# Patient Record
Sex: FEMALE | Race: ASIAN | NOT HISPANIC OR LATINO | Employment: UNEMPLOYED | URBAN - METROPOLITAN AREA
[De-identification: names, ages, dates, MRNs, and addresses within clinical notes are randomized per-mention and may not be internally consistent; named-entity substitution may affect disease eponyms.]

---

## 2017-02-21 ENCOUNTER — COMMUNICATION - HEALTHEAST (OUTPATIENT)
Dept: ADMINISTRATIVE | Facility: HOSPITAL | Age: 52
End: 2017-02-21

## 2017-03-17 ENCOUNTER — COMMUNICATION - HEALTHEAST (OUTPATIENT)
Dept: ADMINISTRATIVE | Facility: HOSPITAL | Age: 52
End: 2017-03-17

## 2021-05-30 ENCOUNTER — RECORDS - HEALTHEAST (OUTPATIENT)
Dept: ADMINISTRATIVE | Facility: CLINIC | Age: 56
End: 2021-05-30

## 2021-05-31 ENCOUNTER — RECORDS - HEALTHEAST (OUTPATIENT)
Dept: ADMINISTRATIVE | Facility: CLINIC | Age: 56
End: 2021-05-31

## 2021-06-01 ENCOUNTER — RECORDS - HEALTHEAST (OUTPATIENT)
Dept: ADMINISTRATIVE | Facility: CLINIC | Age: 56
End: 2021-06-01

## 2021-06-02 ENCOUNTER — RECORDS - HEALTHEAST (OUTPATIENT)
Dept: ADMINISTRATIVE | Facility: CLINIC | Age: 56
End: 2021-06-02

## 2021-06-03 ENCOUNTER — RECORDS - HEALTHEAST (OUTPATIENT)
Dept: ADMINISTRATIVE | Facility: CLINIC | Age: 56
End: 2021-06-03

## 2022-03-18 ENCOUNTER — HOSPITAL ENCOUNTER (EMERGENCY)
Facility: CLINIC | Age: 57
Discharge: ANOTHER HOSPITAL | End: 2022-03-19
Attending: EMERGENCY MEDICINE | Admitting: EMERGENCY MEDICINE
Payer: MEDICAID

## 2022-03-18 ENCOUNTER — APPOINTMENT (OUTPATIENT)
Dept: MRI IMAGING | Facility: CLINIC | Age: 57
End: 2022-03-18
Attending: EMERGENCY MEDICINE
Payer: MEDICAID

## 2022-03-18 DIAGNOSIS — Z85.3 PERSONAL HISTORY OF MALIGNANT NEOPLASM OF BREAST: ICD-10-CM

## 2022-03-18 DIAGNOSIS — G82.20 PARALYSIS OF BOTH LOWER LIMBS (H): ICD-10-CM

## 2022-03-18 DIAGNOSIS — C79.49 METASTASIS TO SPINAL CORD (H): ICD-10-CM

## 2022-03-18 DIAGNOSIS — C79.51 METASTATIC CANCER TO SPINE (H): ICD-10-CM

## 2022-03-18 LAB
ANION GAP SERPL CALCULATED.3IONS-SCNC: 10 MMOL/L (ref 5–18)
BUN SERPL-MCNC: 12 MG/DL (ref 8–22)
C REACTIVE PROTEIN LHE: <0.1 MG/DL (ref 0–0.8)
CALCIUM SERPL-MCNC: 9.5 MG/DL (ref 8.5–10.5)
CHLORIDE BLD-SCNC: 106 MMOL/L (ref 98–107)
CO2 SERPL-SCNC: 23 MMOL/L (ref 22–31)
CREAT SERPL-MCNC: 0.63 MG/DL (ref 0.6–1.1)
ERYTHROCYTE [DISTWIDTH] IN BLOOD BY AUTOMATED COUNT: 13.8 % (ref 10–15)
ERYTHROCYTE [SEDIMENTATION RATE] IN BLOOD BY WESTERGREN METHOD: 12 MM/HR (ref 0–20)
GFR SERPL CREATININE-BSD FRML MDRD: >90 ML/MIN/1.73M2
GLUCOSE BLD-MCNC: 98 MG/DL (ref 70–125)
HCT VFR BLD AUTO: 40.5 % (ref 35–47)
HGB BLD-MCNC: 13.3 G/DL (ref 11.7–15.7)
MCH RBC QN AUTO: 29.4 PG (ref 26.5–33)
MCHC RBC AUTO-ENTMCNC: 32.8 G/DL (ref 31.5–36.5)
MCV RBC AUTO: 90 FL (ref 78–100)
PLATELET # BLD AUTO: 300 10E3/UL (ref 150–450)
POTASSIUM BLD-SCNC: 4.2 MMOL/L (ref 3.5–5)
RBC # BLD AUTO: 4.52 10E6/UL (ref 3.8–5.2)
SODIUM SERPL-SCNC: 139 MMOL/L (ref 136–145)
WBC # BLD AUTO: 5.9 10E3/UL (ref 4–11)

## 2022-03-18 PROCEDURE — 82607 VITAMIN B-12: CPT | Performed by: EMERGENCY MEDICINE

## 2022-03-18 PROCEDURE — 36415 COLL VENOUS BLD VENIPUNCTURE: CPT | Performed by: EMERGENCY MEDICINE

## 2022-03-18 PROCEDURE — 85652 RBC SED RATE AUTOMATED: CPT | Performed by: EMERGENCY MEDICINE

## 2022-03-18 PROCEDURE — 72157 MRI CHEST SPINE W/O & W/DYE: CPT

## 2022-03-18 PROCEDURE — 82306 VITAMIN D 25 HYDROXY: CPT | Performed by: EMERGENCY MEDICINE

## 2022-03-18 PROCEDURE — 72158 MRI LUMBAR SPINE W/O & W/DYE: CPT

## 2022-03-18 PROCEDURE — 86140 C-REACTIVE PROTEIN: CPT | Performed by: EMERGENCY MEDICINE

## 2022-03-18 PROCEDURE — 85014 HEMATOCRIT: CPT | Performed by: EMERGENCY MEDICINE

## 2022-03-18 PROCEDURE — 99285 EMERGENCY DEPT VISIT HI MDM: CPT | Mod: 25

## 2022-03-18 PROCEDURE — C9803 HOPD COVID-19 SPEC COLLECT: HCPCS

## 2022-03-18 PROCEDURE — 82310 ASSAY OF CALCIUM: CPT | Performed by: EMERGENCY MEDICINE

## 2022-03-18 PROCEDURE — 250N000013 HC RX MED GY IP 250 OP 250 PS 637: Performed by: EMERGENCY MEDICINE

## 2022-03-18 PROCEDURE — A9585 GADOBUTROL INJECTION: HCPCS | Performed by: EMERGENCY MEDICINE

## 2022-03-18 PROCEDURE — 255N000002 HC RX 255 OP 636: Performed by: EMERGENCY MEDICINE

## 2022-03-18 RX ORDER — GADOBUTROL 604.72 MG/ML
6.5 INJECTION INTRAVENOUS ONCE
Status: COMPLETED | OUTPATIENT
Start: 2022-03-18 | End: 2022-03-18

## 2022-03-18 RX ORDER — OXYCODONE AND ACETAMINOPHEN 5; 325 MG/1; MG/1
1 TABLET ORAL EVERY 4 HOURS PRN
Status: DISCONTINUED | OUTPATIENT
Start: 2022-03-18 | End: 2022-03-19 | Stop reason: HOSPADM

## 2022-03-18 RX ORDER — OXYCODONE HYDROCHLORIDE 5 MG/1
5 TABLET ORAL ONCE
Status: COMPLETED | OUTPATIENT
Start: 2022-03-18 | End: 2022-03-18

## 2022-03-18 RX ADMIN — OXYCODONE HYDROCHLORIDE 5 MG: 5 TABLET ORAL at 19:36

## 2022-03-18 RX ADMIN — GADOBUTROL 6.5 ML: 604.72 INJECTION INTRAVENOUS at 21:30

## 2022-03-18 NOTE — ED TRIAGE NOTES
"Pt here with her sister because her legs don't move. Originally signed in with numbness but she can feel this writers touch but cannot move them. This started in January. Pt rented a wheelchair because she has not been able to walk since mid January. Sister and  have been caring for her. Has not been seen because she \"I had an insurance issue.\" Has had back pain for a year after lifting a heavy bag of rice. Pt states she is not incontinent of urine or bladder.Can move at the hip but not the knees or feet. Wearing back brace.   "

## 2022-03-18 NOTE — ED PROVIDER NOTES
EMERGENCY DEPARTMENT ENCOUNTER      NAME: Mickie Mcghee  AGE: 57 year old female  YOB: 1965  MRN: [id number]  EVALUATION DATE & TIME: 3/18/2022  3:36 PM    PCP: No Ref-Primary, Physician    ED PROVIDER: Kimberli Hussein MD    Chief Complaint   Patient presents with     Paralysis         FINAL IMPRESSION:  1. Metastatic cancer to spine (H)    2. 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 Paralysis of both lower limbs (H)    4. Personal history of malignant neoplasm of breast          ED COURSE & MEDICAL DECISION MAKING:    Pertinent Labs & Imaging studies reviewed. (See chart for details)  57 year old female with history of breast cancer in remission, anemia and vitamin D deficiency who presents to the Emergency Department for evaluation of 1 year of progressive lower extremity weakness/paralysis dating back to March 2021 after lifting a heavy bag of rice.  She is been completely paralyzed in wheelchair/bedbound x2 months.  Her exam shows fairly impressive flaccid paralysis of the lower extremities.  She had plain film imaging at [facility] earlier today that was reportedly negative for fracture.  I am not able to see these imaging however.  Differential includes disc herniation, fracture, metastatic disease, syrinx, transverse myelitis, Guillain-Barré or other neurologic illness.    IV established.  CBC, BMP, ESR, CRP unremarkable.  Given a dose of Percocet for pain. She got marked improvement of her pain with the Percocet. MRI imaging of the thoracic and lumbar spine unfortunately shows fairly impressive bony metastasis.  Greatest at T10-T12 with associated pathologic compression fracture/deformity up to 70%, involvement of the posterior elements and direct invasion of the spinal canal with abnormal signal to the spinal cord.  There is also some less impressive metastatic lesions that T4, T5, T6, T9, L1, L2, L5, S1, S2, bilateral iliac wings.  Findings were discussed with neurosurgery, Dr. Guzman.   Unfortunately given the fact that she has been paralyzed in bed/wheelchair-bound x2 months unlikely to gain any of her neurologic function back.  However seems how she does not have any issues with bowel or bladder function yet he recommends admission for additional oncology work-up to evaluate for any additional tumor burden, and potential stabilization of the spine to prevent neurologic bowel/bladder.  This would not be done here due to limitations with neurosurgery, but would rather need transfer to [facility] or [facility].  Findings were discussed with patient and family at bedside.  She is extremely stoic.  Agreeable to admission/transfer.      ED Course as of 03/19/22 0022   Fri Mar 18, 2022   2242 Spoke w rad - extensive metastatic disease most T10/T11 levels with pathologic compression deformity and involvement of posterior elements. Tumor extends into spinal canal and compresses cord w cord edema. Smaller mets in thoracic/lumbar spine, sacrum/iliac.     2313 Spoke w neurosurg       4:06 PM I met with the patient, obtained history, performed an initial exam, and discussed options and plan for diagnostics and treatment here in the ED. PPE worn: surgical mask  11:09 PM I spoke to Dr. Guzman, neurosurgery.  11:18 PM I rechecked and updated the patient.    At the conclusion of the encounter I discussed the results of all of the tests and the disposition. The questions were answered. The patient or family acknowledged understanding and was agreeable with the care plan.    CONSULTS:  Neurosurgery Dr. Guzman    CRITICAL CARE:  Critical Care  Performed by: Kimberli Hussein MD  Authorized by: Kimberli Hussein MD     Total critical care time: 52 minutes  Criticalcare time was exclusive of separately billable procedures and treating other patients.  Critical care was necessary to treat or prevent imminent or life-threatening deterioration of the following conditions: Bony metastasis with associated metastatic  compression fracture deformity and spinal cord injury/paralysis  Critical care was time spent personally by me on the following activities: development of treatment plan with patient or surrogate, discussions with consultants, examination of patient, evaluation of patient's response totreatment, obtaining history from patient or surrogate, ordering and performing treatments and interventions, ordering and review of laboratory studies, ordering and review of radiographic studies and re-evaluation ofpatient's condition, this excludes any separately billable procedures.      MEDICATIONS GIVEN IN THE EMERGENCY:  Medications   oxyCODONE-acetaminophen (PERCOCET) 5-325 MG per tablet 1 tablet (has no administration in time range)   oxyCODONE (ROXICODONE) tablet 5 mg (5 mg Oral Given 3/18/22 1936)   gadobutrol (GADAVIST) injection 6.5 mL (6.5 mLs Intravenous Given 3/18/22 2130)       NEW PRESCRIPTIONS STARTED AT TODAY'S ER VISIT  New Prescriptions    No medications on file          =================================================================    HPI    Patient information was obtained from: Patient     Use of Intrepreter: N/A    Mickie Mcghee is a 57 year old female with pertinent medical history of breast cancer, s/p mastectomy, anemia, and s/p hysterectomy who presents paralysis.     In March 2021, the patient reports of back pain due to lifting a heavy bag of rice. She adds that in July 2021, she started to notice tingling in her toes, particularly on her right side. By September 2021, she developed weakness in her legs, causing her to start using a cane in November 2021.    Since January 2022, the patient has not been able to walk. She adds that she has only been able to stay in bed. The patient states that she developed back pain during this time. Today is the first time the patient has used a wheelchair; she notes that her leg pain has become more apparent when she is in a sitting position. The patient is unable to  "move her legs, but feels sensations. The patient states that she has waited to come to the ED due to not having insurance. She reports that when her sister massages her feet, she feels like the \"bones act on their own.\" The patient uses pads and diapers for bowel movements and urination. No incontinence or urinary symptoms. No bowel movement issues. No abdominal pain. No previous metal placements. No other symptoms or complaints at this time.     Per chart review, the patient was seen earlier today at [facility] for lower extremity paralysis since January 2022, lower extremity atrophy, and lumbar spondylosis without myelopathy. Lumbar X-rays showed disc spaces were maintained except for some moderate degenerative changes on L5-S1 and L1-L2. Lumbar spondylosis moderate especially on L4-5 and L5-S1. Discharged to ED for workup for a possible neurological issue versus spine pathology.       REVIEW OF SYSTEMS  Constitutional:  Denies fever, chills, weight loss or weakness  GI:  Denies abdominal pain, nausea, vomiting, diarrhea  : Denies dysuria, denies hematuria  Musculoskeletal: Positive for back pain. Positive for inability to walk. Denies any swelling.  Skin:  Denies rash, pallor  Neurologic: Positive for not being able to move below knee. Positive for leg weakness. Denies headache.  All other systems negative unless noted in HPI.      PAST MEDICAL HISTORY:  Past Medical History:   Diagnosis Date     Anemia      Breast cancer (H)      Vitamin D deficiency        PAST SURGICAL HISTORY:  Past Surgical History:   Procedure Laterality Date     IR MISCELLANEOUS PROCEDURE  7/5/2007     IR MISCELLANEOUS PROCEDURE  10/25/2010       CURRENT MEDICATIONS:    Prior to Admission Medications   Prescriptions Last Dose Informant Patient Reported? Taking?   FERROUS SULFATE ORAL   Yes No   Sig: [FERROUS SULFATE ORAL] Take 1 tablet by mouth.   MEDICATION CANNOT BE REORDERED - PLEASE MANUALLY REORDER AND DISCONTINUE THE OLD " ORDER   Yes No   Sig: [CALCIUM CARBONATE-VITAMIN D2 ORAL] Take 2,000 Units by mouth daily.      Facility-Administered Medications: None       ALLERGIES:  No Known Allergies    FAMILY HISTORY:  History reviewed. No pertinent family history.    SOCIAL HISTORY:  Social History     Tobacco Use     Smoking status: None     Smokeless tobacco: None   Substance Use Topics     Alcohol use: None     Drug use: None        VITALS:  Patient Vitals for the past 24 hrs:   BP Temp Pulse Resp SpO2 Weight   03/18/22 1930 106/79 -- 88 -- 97 % --   03/18/22 1900 106/81 -- 92 -- 93 % --   03/18/22 1830 97/73 -- 88 -- 95 % --   03/18/22 1808 110/79 -- 96 -- 97 % --   03/18/22 1231 (!) 157/99 98.7  F (37.1  C) 61 18 98 % 63.5 kg (140 lb)       PHYSICAL EXAM    General Appearance: Well-appearing, well-nourished, no acute distress  Head:  Normocephalic, atraumatic  Eyes:   conjunctiva/corneas clear  ENT:  membranes are moist without pallor  Neck:  Supple, no midline tenderness palpation  Cardio:  Regular rate and rhythm  Pulm:  No respiratory distress  Back:  No midline tenderness to palpation, No CVA tenderness, normal ROM. No midline tenderness to cervical spine. Points to thoracal lumbar junction of location of pain and extends to lumbar spine but no pain currently. No step offs. No crepitus.  Abdomen:  Soft, non-tender, thin  Extremities:  No cyanosis or edema. 5/5 bilateral upper extremity strength with reflexes intact and sensation intact. Has 1/5 strength to hip flexion bilaterally. Has muscular atrophy of the thigh but also noted in the calves.  Skin:  Skin warm, dry, no rashes  Neuro:  Alert and oriented ×3. Lower extremity sensations intact but paralysis from the waist down. No movement to toes, ankle flexion extension, knee flexion extension. Areflexic at achilles. Patellar reflex causes mild contraction to teena muscles. No clonus.       RADIOLOGY/LABS:  Reviewed all pertinent imaging. Please see official radiology report. All  pertinent labs reviewed and interpreted.    Results for orders placed or performed during the hospital encounter of 03/18/22   MR Thoracic Spine w/o & w Contrast    Impression    IMPRESSION:  THORACIC SPINE MRI:  1.  Extensive osseous metastasis centered at the T10 through T12 levels where there are pathologic compression deformities at T10 and T11 with up to 70% loss of height and extensive involvement of the posterior elements as detailed above. Direct invasion   of the spinal canal ventrally and dorsolaterally bilaterally, left greater than right contribute to severe spinal canal stenosis from T10 through T12 where there is focal abnormal T2 hyperintense signal within the cord at the T10-T11 level.  2.  Additional metastatic lesions are noted at the T9 transverse process and possibly with early involvement of the vertebral body, T6 vertebral body to the right of midline, T4 spinous process, and T5 facet.  3.  Atypical infection such as tuberculosis could give a similar appearance, but this is felt to be much less likely given the patient's history.    LUMBAR SPINE MRI:  1.  Metastasis involving the spinous process of L1. Extensive metastatic involvement of L2 to the right of midline with extension into the right pedicle. Subcentimeter lesion on the left at L5. Near complete involvement of S2 with extension into the   inferior endplate of S1. Sizable metastasis involving the superior aspect of the left iliac wing with smaller metastatic lesions involving both iliac wings, detailed above.  2.  No high grade central canal or neural foraminal stenosis.    Exam discussed with Dr. Hussein at 2241 hours 3/18/2022.   MR Lumbar Spine w/o & w Contrast    Impression    IMPRESSION:  THORACIC SPINE MRI:  1.  Extensive osseous metastasis centered at the T10 through T12 levels where there are pathologic compression deformities at T10 and T11 with up to 70% loss of height and extensive involvement of the posterior elements as  detailed above. Direct invasion   of the spinal canal ventrally and dorsolaterally bilaterally, left greater than right contribute to severe spinal canal stenosis from T10 through T12 where there is focal abnormal T2 hyperintense signal within the cord at the T10-T11 level.  2.  Additional metastatic lesions are noted at the T9 transverse process and possibly with early involvement of the vertebral body, T6 vertebral body to the right of midline, T4 spinous process, and T5 facet.  3.  Atypical infection such as tuberculosis could give a similar appearance, but this is felt to be much less likely given the patient's history.    LUMBAR SPINE MRI:  1.  Metastasis involving the spinous process of L1. Extensive metastatic involvement of L2 to the right of midline with extension into the right pedicle. Subcentimeter lesion on the left at L5. Near complete involvement of S2 with extension into the   inferior endplate of S1. Sizable metastasis involving the superior aspect of the left iliac wing with smaller metastatic lesions involving both iliac wings, detailed above.  2.  No high grade central canal or neural foraminal stenosis.    Exam discussed with Dr. Hussein at 2241 hours 3/18/2022.   CBC (+ platelets, no diff)   Result Value Ref Range    WBC Count 5.9 4.0 - 11.0 10e3/uL    RBC Count 4.52 3.80 - 5.20 10e6/uL    Hemoglobin 13.3 11.7 - 15.7 g/dL    Hematocrit 40.5 35.0 - 47.0 %    MCV 90 78 - 100 fL    MCH 29.4 26.5 - 33.0 pg    MCHC 32.8 31.5 - 36.5 g/dL    RDW 13.8 10.0 - 15.0 %    Platelet Count 300 150 - 450 10e3/uL   Basic metabolic panel   Result Value Ref Range    Sodium 139 136 - 145 mmol/L    Potassium 4.2 3.5 - 5.0 mmol/L    Chloride 106 98 - 107 mmol/L    Carbon Dioxide (CO2) 23 22 - 31 mmol/L    Anion Gap 10 5 - 18 mmol/L    Urea Nitrogen 12 8 - 22 mg/dL    Creatinine 0.63 0.60 - 1.10 mg/dL    Calcium 9.5 8.5 - 10.5 mg/dL    Glucose 98 70 - 125 mg/dL    GFR Estimate >90 >60 mL/min/1.73m2   Erythrocyte  sedimentation rate auto   Result Value Ref Range    Erythrocyte Sedimentation Rate 12 0 - 20 mm/hr   CRP inflammation   Result Value Ref Range    CRP <0.1 0.0-<0.8 mg/dL       The creation of this record is based on the scribe s observations of the work being performed by Kimberli Hussein MD and the provider s statements to them. It was created on his behalf by Ezekiel Barrios, a trained medical scribe. This document has been checked and approved by the attending provider.    Kimberli Hussein MD  Emergency Medicine  [facility] EMERGENCY ROOM  [address] [facility] [id number] [phone]  Dept: [phone]     Kimberli Hussein MD  03/19/22 0022

## 2022-03-18 NOTE — ED PROVIDER NOTES
Expected Patient Referral to ED  11:48 AM    Referring Clinic/Provider:  [facility]    Reason for referral/Clinical facts:  Back pain progressive, lower extremity paralysis since January, can control bowel bladder.  Concerned more for neurologic process such as transverse myelitis over cauda equina?  Will need imaging and admission.      Recommendations provided:  Send to ED for further evaluation.  Would recommend [facility] however patient already en route    Caller was informed that this institution does possess the capabilities and/or resources to provide for patient and should be transferred to our facility.    Discussed that if direct admit is sought and any hurdles are encountered, this ED would be happy to see the patient and evaluate.    Informed caller that recommendations provided are recommendations based only on the facts provided and that they responsible to accept or reject the advice, or to seek a formal in person consultation as needed and that this ED will see/treat patient should they arrive.      Karli Mckeon DO  Emergency Medicine  [facility] EMERGENCY ROOM  [facility] [facility] [id number] [phone]     Karli Mckeon DO  03/18/22 1154

## 2022-03-19 ENCOUNTER — APPOINTMENT (OUTPATIENT)
Dept: MRI IMAGING | Facility: CLINIC | Age: 57
End: 2022-03-19
Attending: INTERNAL MEDICINE
Payer: MEDICAID

## 2022-03-19 ENCOUNTER — APPOINTMENT (OUTPATIENT)
Dept: PHYSICAL THERAPY | Facility: CLINIC | Age: 57
End: 2022-03-19
Attending: INTERNAL MEDICINE
Payer: MEDICAID

## 2022-03-19 ENCOUNTER — APPOINTMENT (OUTPATIENT)
Dept: CT IMAGING | Facility: CLINIC | Age: 57
End: 2022-03-19
Attending: INTERNAL MEDICINE
Payer: MEDICAID

## 2022-03-19 ENCOUNTER — HOSPITAL ENCOUNTER (INPATIENT)
Facility: CLINIC | Age: 57
LOS: 10 days | Discharge: HOME | End: 2022-03-29
Attending: HOSPITALIST | Admitting: INTERNAL MEDICINE
Payer: MEDICAID

## 2022-03-19 VITALS
HEART RATE: 89 BPM | BODY MASS INDEX: 24.03 KG/M2 | RESPIRATION RATE: 16 BRPM | SYSTOLIC BLOOD PRESSURE: 111 MMHG | TEMPERATURE: 98.7 F | OXYGEN SATURATION: 97 % | WEIGHT: 140 LBS | DIASTOLIC BLOOD PRESSURE: 73 MMHG

## 2022-03-19 DIAGNOSIS — K21.9 GASTROESOPHAGEAL REFLUX DISEASE WITHOUT ESOPHAGITIS: ICD-10-CM

## 2022-03-19 DIAGNOSIS — G95.29 SPINAL CORD COMPRESSION DUE TO MALIGNANT NEOPLASM METASTATIC TO SPINE (H): Primary | ICD-10-CM

## 2022-03-19 DIAGNOSIS — G82.20 PARAPLEGIA (H): ICD-10-CM

## 2022-03-19 DIAGNOSIS — K21.00 GASTROESOPHAGEAL REFLUX DISEASE WITH ESOPHAGITIS, UNSPECIFIED WHETHER HEMORRHAGE: ICD-10-CM

## 2022-03-19 DIAGNOSIS — C79.51 SPINAL CORD COMPRESSION DUE TO MALIGNANT NEOPLASM METASTATIC TO SPINE (H): Primary | ICD-10-CM

## 2022-03-19 DIAGNOSIS — C50.919 METASTATIC BREAST CANCER: ICD-10-CM

## 2022-03-19 LAB
ALBUMIN SERPL-MCNC: 3.2 G/DL (ref 3.4–5)
ALBUMIN UR-MCNC: NEGATIVE MG/DL
ALP SERPL-CCNC: 87 U/L (ref 40–150)
ALT SERPL W P-5'-P-CCNC: 15 U/L (ref 0–50)
ANION GAP SERPL CALCULATED.3IONS-SCNC: 7 MMOL/L (ref 3–14)
APPEARANCE UR: CLEAR
AST SERPL W P-5'-P-CCNC: 14 U/L (ref 0–45)
BILIRUB DIRECT SERPL-MCNC: <0.1 MG/DL (ref 0–0.2)
BILIRUB SERPL-MCNC: 0.3 MG/DL (ref 0.2–1.3)
BILIRUB UR QL STRIP: NEGATIVE
BUN SERPL-MCNC: 21 MG/DL (ref 7–30)
CALCIUM SERPL-MCNC: 8.9 MG/DL (ref 8.5–10.1)
CHLORIDE BLD-SCNC: 107 MMOL/L (ref 94–109)
CO2 SERPL-SCNC: 25 MMOL/L (ref 20–32)
COLOR UR AUTO: ABNORMAL
CREAT SERPL-MCNC: 0.61 MG/DL (ref 0.52–1.04)
GFR SERPL CREATININE-BSD FRML MDRD: >90 ML/MIN/1.73M2
GLUCOSE BLD-MCNC: 112 MG/DL (ref 70–99)
GLUCOSE UR STRIP-MCNC: NEGATIVE MG/DL
HGB UR QL STRIP: NEGATIVE
KETONES UR STRIP-MCNC: 10 MG/DL
LEUKOCYTE ESTERASE UR QL STRIP: NEGATIVE
MAGNESIUM SERPL-MCNC: 2.2 MG/DL (ref 1.6–2.3)
NITRATE UR QL: NEGATIVE
PH UR STRIP: 5 [PH] (ref 5–7)
POTASSIUM BLD-SCNC: 3.6 MMOL/L (ref 3.4–5.3)
POTASSIUM BLD-SCNC: 3.7 MMOL/L (ref 3.4–5.3)
PROT SERPL-MCNC: 6.7 G/DL (ref 6.8–8.8)
SARS-COV-2 RNA RESP QL NAA+PROBE: NEGATIVE
SODIUM SERPL-SCNC: 139 MMOL/L (ref 133–144)
SP GR UR STRIP: 1.02 (ref 1–1.03)
UROBILINOGEN UR STRIP-MCNC: NORMAL MG/DL
VIT B12 SERPL-MCNC: 843 PG/ML (ref 213–816)

## 2022-03-19 PROCEDURE — 83735 ASSAY OF MAGNESIUM: CPT | Performed by: INTERNAL MEDICINE

## 2022-03-19 PROCEDURE — 36415 COLL VENOUS BLD VENIPUNCTURE: CPT | Performed by: INTERNAL MEDICINE

## 2022-03-19 PROCEDURE — 99207 PR NO CHARGE LOS: CPT | Performed by: HOSPITALIST

## 2022-03-19 PROCEDURE — 120N000001 HC R&B MED SURG/OB

## 2022-03-19 PROCEDURE — 82248 BILIRUBIN DIRECT: CPT | Performed by: HOSPITALIST

## 2022-03-19 PROCEDURE — 99223 1ST HOSP IP/OBS HIGH 75: CPT | Performed by: INTERNAL MEDICINE

## 2022-03-19 PROCEDURE — 84132 ASSAY OF SERUM POTASSIUM: CPT | Performed by: INTERNAL MEDICINE

## 2022-03-19 PROCEDURE — 81003 URINALYSIS AUTO W/O SCOPE: CPT | Performed by: HOSPITALIST

## 2022-03-19 PROCEDURE — 250N000009 HC RX 250: Performed by: INTERNAL MEDICINE

## 2022-03-19 PROCEDURE — 87635 SARS-COV-2 COVID-19 AMP PRB: CPT | Performed by: EMERGENCY MEDICINE

## 2022-03-19 PROCEDURE — 97530 THERAPEUTIC ACTIVITIES: CPT | Mod: GP | Performed by: PHYSICAL THERAPIST

## 2022-03-19 PROCEDURE — 97161 PT EVAL LOW COMPLEX 20 MIN: CPT | Mod: GP | Performed by: PHYSICAL THERAPIST

## 2022-03-19 PROCEDURE — 250N000011 HC RX IP 250 OP 636: Performed by: INTERNAL MEDICINE

## 2022-03-19 PROCEDURE — 70553 MRI BRAIN STEM W/O & W/DYE: CPT

## 2022-03-19 PROCEDURE — 258N000001 HC RX 258: Performed by: INTERNAL MEDICINE

## 2022-03-19 PROCEDURE — 99222 1ST HOSP IP/OBS MODERATE 55: CPT | Performed by: PHYSICIAN ASSISTANT

## 2022-03-19 PROCEDURE — A9585 GADOBUTROL INJECTION: HCPCS | Performed by: INTERNAL MEDICINE

## 2022-03-19 PROCEDURE — 80053 COMPREHEN METABOLIC PANEL: CPT | Performed by: HOSPITALIST

## 2022-03-19 PROCEDURE — 255N000002 HC RX 255 OP 636: Performed by: INTERNAL MEDICINE

## 2022-03-19 PROCEDURE — 250N000013 HC RX MED GY IP 250 OP 250 PS 637: Performed by: INTERNAL MEDICINE

## 2022-03-19 PROCEDURE — 74177 CT ABD & PELVIS W/CONTRAST: CPT

## 2022-03-19 PROCEDURE — 99223 1ST HOSP IP/OBS HIGH 75: CPT | Mod: AI | Performed by: INTERNAL MEDICINE

## 2022-03-19 RX ORDER — LIDOCAINE 40 MG/G
CREAM TOPICAL
Status: DISCONTINUED | OUTPATIENT
Start: 2022-03-19 | End: 2022-03-29 | Stop reason: HOSPADM

## 2022-03-19 RX ORDER — PROCHLORPERAZINE 25 MG
25 SUPPOSITORY, RECTAL RECTAL EVERY 12 HOURS PRN
Status: DISCONTINUED | OUTPATIENT
Start: 2022-03-19 | End: 2022-03-29 | Stop reason: HOSPADM

## 2022-03-19 RX ORDER — ACETAMINOPHEN 325 MG/1
650 TABLET ORAL EVERY 6 HOURS PRN
Status: DISCONTINUED | OUTPATIENT
Start: 2022-03-19 | End: 2022-03-29 | Stop reason: HOSPADM

## 2022-03-19 RX ORDER — GADOBUTROL 604.72 MG/ML
5 INJECTION INTRAVENOUS ONCE
Status: COMPLETED | OUTPATIENT
Start: 2022-03-19 | End: 2022-03-19

## 2022-03-19 RX ORDER — IOPAMIDOL 755 MG/ML
61 INJECTION, SOLUTION INTRAVASCULAR ONCE
Status: COMPLETED | OUTPATIENT
Start: 2022-03-19 | End: 2022-03-19

## 2022-03-19 RX ORDER — HYDROMORPHONE HCL IN WATER/PF 6 MG/30 ML
0.2 PATIENT CONTROLLED ANALGESIA SYRINGE INTRAVENOUS
Status: DISCONTINUED | OUTPATIENT
Start: 2022-03-19 | End: 2022-03-29 | Stop reason: HOSPADM

## 2022-03-19 RX ORDER — DEXTROSE MONOHYDRATE, SODIUM CHLORIDE, AND POTASSIUM CHLORIDE 50; 1.49; 9 G/1000ML; G/1000ML; G/1000ML
100 INJECTION, SOLUTION INTRAVENOUS CONTINUOUS
Status: DISCONTINUED | OUTPATIENT
Start: 2022-03-19 | End: 2022-03-19 | Stop reason: ALTCHOICE

## 2022-03-19 RX ORDER — AMOXICILLIN 250 MG
2 CAPSULE ORAL 2 TIMES DAILY PRN
Status: DISCONTINUED | OUTPATIENT
Start: 2022-03-19 | End: 2022-03-29 | Stop reason: HOSPADM

## 2022-03-19 RX ORDER — NALOXONE HYDROCHLORIDE 0.4 MG/ML
0.4 INJECTION, SOLUTION INTRAMUSCULAR; INTRAVENOUS; SUBCUTANEOUS
Status: DISCONTINUED | OUTPATIENT
Start: 2022-03-19 | End: 2022-03-29 | Stop reason: HOSPADM

## 2022-03-19 RX ORDER — PROCHLORPERAZINE MALEATE 10 MG
10 TABLET ORAL EVERY 6 HOURS PRN
Status: DISCONTINUED | OUTPATIENT
Start: 2022-03-19 | End: 2022-03-29 | Stop reason: HOSPADM

## 2022-03-19 RX ORDER — OXYCODONE HYDROCHLORIDE 5 MG/1
5 TABLET ORAL EVERY 4 HOURS PRN
Status: DISCONTINUED | OUTPATIENT
Start: 2022-03-19 | End: 2022-03-29 | Stop reason: HOSPADM

## 2022-03-19 RX ORDER — NALOXONE HYDROCHLORIDE 0.4 MG/ML
0.2 INJECTION, SOLUTION INTRAMUSCULAR; INTRAVENOUS; SUBCUTANEOUS
Status: DISCONTINUED | OUTPATIENT
Start: 2022-03-19 | End: 2022-03-29 | Stop reason: HOSPADM

## 2022-03-19 RX ORDER — ACETAMINOPHEN 650 MG/1
650 SUPPOSITORY RECTAL EVERY 6 HOURS PRN
Status: DISCONTINUED | OUTPATIENT
Start: 2022-03-19 | End: 2022-03-29 | Stop reason: HOSPADM

## 2022-03-19 RX ORDER — ONDANSETRON 2 MG/ML
4 INJECTION INTRAMUSCULAR; INTRAVENOUS EVERY 6 HOURS PRN
Status: DISCONTINUED | OUTPATIENT
Start: 2022-03-19 | End: 2022-03-29 | Stop reason: HOSPADM

## 2022-03-19 RX ORDER — AMOXICILLIN 250 MG
1 CAPSULE ORAL 2 TIMES DAILY PRN
Status: DISCONTINUED | OUTPATIENT
Start: 2022-03-19 | End: 2022-03-29 | Stop reason: HOSPADM

## 2022-03-19 RX ORDER — ONDANSETRON 4 MG/1
4 TABLET, ORALLY DISINTEGRATING ORAL EVERY 6 HOURS PRN
Status: DISCONTINUED | OUTPATIENT
Start: 2022-03-19 | End: 2022-03-29 | Stop reason: HOSPADM

## 2022-03-19 RX ADMIN — SODIUM CHLORIDE 60 ML: 9 INJECTION, SOLUTION INTRAVENOUS at 12:28

## 2022-03-19 RX ADMIN — ACETAMINOPHEN 650 MG: 325 TABLET, FILM COATED ORAL at 16:32

## 2022-03-19 RX ADMIN — IOPAMIDOL 61 ML: 755 INJECTION, SOLUTION INTRAVENOUS at 12:27

## 2022-03-19 RX ADMIN — Medication 1 MG: at 04:24

## 2022-03-19 RX ADMIN — POTASSIUM CHLORIDE, DEXTROSE MONOHYDRATE AND SODIUM CHLORIDE 100 ML/HR: 150; 5; 900 INJECTION, SOLUTION INTRAVENOUS at 04:25

## 2022-03-19 RX ADMIN — GADOBUTROL 5 ML: 604.72 INJECTION INTRAVENOUS at 16:17

## 2022-03-19 ASSESSMENT — ACTIVITIES OF DAILY LIVING (ADL)
ADLS_ACUITY_SCORE: 9
ADLS_ACUITY_SCORE: 11
ADLS_ACUITY_SCORE: 13
ADLS_ACUITY_SCORE: 11
TOILETING_ISSUES: NO
DRESSING/BATHING_DIFFICULTY: YES
ADLS_ACUITY_SCORE: 13
ADLS_ACUITY_SCORE: 11
DRESSING/BATHING: BATHING DIFFICULTY, ASSISTANCE 1 PERSON;DRESSING DIFFICULTY, ASSISTANCE 1 PERSON
ADLS_ACUITY_SCORE: 13
ADLS_ACUITY_SCORE: 10
ADLS_ACUITY_SCORE: 11
ADLS_ACUITY_SCORE: 13
DIFFICULTY_EATING/SWALLOWING: NO
BATHING: 1-->ASSISTANCE NEEDED
ADLS_ACUITY_SCORE: 11
CHANGE_IN_FUNCTIONAL_STATUS_SINCE_ONSET_OF_CURRENT_ILLNESS/INJURY: NO
DRESS: 1-->ASSISTANCE (EQUIPMENT/PERSON) NEEDED (NOT DEVELOPMENTALLY APPROPRIATE)
ADLS_ACUITY_SCORE: 13
ADLS_ACUITY_SCORE: 11
ADLS_ACUITY_SCORE: 13
ADLS_ACUITY_SCORE: 11
ADLS_ACUITY_SCORE: 10
ADLS_ACUITY_SCORE: 11
ADLS_ACUITY_SCORE: 11
DRESS: 1-->ASSISTANCE (EQUIPMENT/PERSON) NEEDED

## 2022-03-19 NOTE — PLAN OF CARE
Pt arrived from [facility] around 0200. A&Ox4. VSS on RA. Denies pain. LS clear. BS active, passing gas, no BM this shift. No skin issues noted. Purewick in place, pt able to tell when she needs to void. Q4H neuro and CMS checks. Unable to move from the hips down. Has some sensation to BLE. Uses W/C @ home. T/R as pt allowed. R PIV infusing IVF. NPO since 0500.  K & Mag WNL. MRI checklist completed. Brain MRI, and CT of chest, abdomen, and pelvis to be completed today. Neurosurgery  and heme/onc consulted.

## 2022-03-19 NOTE — ED PROVIDER NOTES
"Progress Note    The patient was signed out to me by Dr. Kimberli Hussein at 12:22 AM. The patient is pending admission at the [facility] or [facility].    1:25 AM I spoke to the Hospitalist at [facility], Dr. Sultana who accepts the patient.    Brief HPI:   Since January 2022, the patient has not been able to walk. She adds that she has only been able to stay in bed. The patient states that she developed back pain during this time. Today is the first time the patient has used a wheelchair; she notes that her leg pain has become more apparent when she is in a sitting position. The patient is unable to move her legs, but feels sensations. The patient states that she has waited to come to the ED due to not having insurance. She reports that when her sister massages her feet, she feels like the \"bones act on their own.\" The patient uses pads and diapers for bowel movements and urination. No incontinence or urinary symptoms.     ED course during my shift:  Patient was accepted for admission at [facility] and is in agreement with plan. No other acute events.    (C79.51) Metastatic cancer to spine (H)  (C79.49) Metastasis to spinal cord (H)  (G82.20) Paralysis of both lower limbs (H)  (Z85.3) Personal history of malignant neoplasm of breast      Elke Menezes MD  Emergency Medicine  [facility]       Elke Menezes MD  03/19/22 0133    "

## 2022-03-19 NOTE — PHARMACY-ADMISSION MEDICATION HISTORY
Pharmacy Medication History  Admission medication history interview status for the 3/19/2022  admission is complete. See EPIC admission navigator for prior to admission medications     Location of Interview: n/a  Medication history sources: Surescripts and Care Everywhere    Significant changes made to the medication list:  none      Additional medication history information:   Patient lost to follow up, no insurance, not on any medications currently    Medication reconciliation completed by provider prior to medication history? Yes    Time spent in this activity: 5 min    Prior to Admission medications    Not on File       The information provided in this note is only as accurate as the sources available at the time of update(s)     Angelica Hernandez, [place]  Inpatient Clinical Pharmacist  [phone]

## 2022-03-19 NOTE — CONSULTS
[facility] Oncology Consult Note    Date of Service.  3/19/2022     Date of Admission.  3/19/2022     Reason:   Hx of Breast Cancer- now with concern for metastatic disease    HPI    She has hx of LEFT breast cancer, T2 N3 M0, stage III, ER/VT +, status post bilateral mastectomy in 2007, followed by TAC ( Taxotere, Adriamycin, Cytoxan )  x6 followed by radiation followed by Arimidex for 6 years and completed in 2013.    She was last seen in 2015.  She lost insurance after that and was lost to follow-up after that.    She was doing well but then started to notice slight left-sided low back pain in March 2021.  In July 2021 when she noticed some right toe numbness.  She started to develop difficulty walking in October 2021 and then in December 2021 she noticed that she was unable to stand or walk at all and the numbness and tingling was more so on the left side as compared to the right side.  Since January 2022 she has been completely paralyzed in the lower limbs and has been bedbound.  She denies any problems passing urine or having a bowel movement.  She denies any perineal anesthesia.  The left lower back pain is not very bothersome.  She is not taking any medications at home.  Denies any new swellings.  No nausea or vomiting.  Initially lost weight but her sister came from [place] in February 2022 and after she started to eat the food which she cooks, she has gained some weight.  Denies any shortness of breath.      She went to [facility] emergency department and then she was transferred to [facility] because she was found to have diffuse bony metastatic disease and invasion of the spinal canal by tumor resulting in a spinal cord compression.        Review of the system.  Otherwise review of the system was negative.    Active Ambulatory Problems     Diagnosis Date Noted     Breast cancer of lower-outer quadrant of left female breast (H) 08/29/2008     Iron deficiency anemia 11/12/2013     Leukopenia 11/12/2013      Vitamin D deficiency 2013     Resolved Ambulatory Problems     Diagnosis Date Noted     No Resolved Ambulatory Problems     Past Medical History:   Diagnosis Date     Anemia      Breast cancer (H)      No family history on file.  Her mother had colon cancer.  She  last year.    No Known Allergies    Social History     Socioeconomic History     Marital status:      Spouse name: Not on file     Number of children: Not on file     Years of education: Not on file     Highest education level: Not on file   Occupational History     Not on file   Tobacco Use     Smoking status: Not on file     Smokeless tobacco: Not on file   Substance and Sexual Activity     Alcohol use: Not on file     Drug use: Not on file     Sexual activity: Not on file   Other Topics Concern     Not on file   Social History Narrative     Not on file     Social Determinants of Health     Financial Resource Strain: Not on file   Food Insecurity: Not on file   Transportation Needs: Not on file   Physical Activity: Not on file   Stress: Not on file   Social Connections: Not on file   Intimate Partner Violence: Not on file   Housing Stability: Not on file     She lives with her .  Her  is also having several health issues.  Her sister is now with her.  She has 1 son who is healthy.    Current Facility-Administered Medications   Medication     acetaminophen (TYLENOL) tablet 650 mg    Or     acetaminophen (TYLENOL) Suppository 650 mg     HYDROmorphone (DILAUDID) injection 0.2 mg     lidocaine (LMX4) cream     lidocaine 1 % 0.1-1 mL     melatonin tablet 1 mg     naloxone (NARCAN) injection 0.2 mg    Or     naloxone (NARCAN) injection 0.4 mg    Or     naloxone (NARCAN) injection 0.2 mg    Or     naloxone (NARCAN) injection 0.4 mg     ondansetron (ZOFRAN-ODT) ODT tab 4 mg    Or     ondansetron (ZOFRAN) injection 4 mg     oxyCODONE (ROXICODONE) tablet 5 mg     prochlorperazine (COMPAZINE) injection 10 mg    Or      "prochlorperazine (COMPAZINE) tablet 10 mg    Or     prochlorperazine (COMPAZINE) suppository 25 mg     senna-docusate (SENOKOT-S/PERICOLACE) 8.6-50 MG per tablet 1 tablet    Or     senna-docusate (SENOKOT-S/PERICOLACE) 8.6-50 MG per tablet 2 tablet     sodium chloride (PF) 0.9% PF flush 3 mL     sodium chloride (PF) 0.9% PF flush 3 mL     Physical examination.  BP 92/61 (BP Location: Right arm)   Pulse 81   Temp 97.8  F (36.6  C) (Oral)   Resp 16   Ht 1.651 m (5' 5\")   Wt 55.3 kg (122 lb)   SpO2 95%   BMI 20.30 kg/m    Wt Readings from Last 4 Encounters:   03/19/22 55.3 kg (122 lb)   03/18/22 63.5 kg (140 lb)   08/28/15 61 kg (134 lb 8 oz)   08/29/14 57.8 kg (127 lb 8 oz)     CONSTITUTIONAL: no acute distress  EYES:  no palor or icterus.   ENT/MOUTH: no mouth lesions. Ears normal  CVS: s1s2 no m r g .   RESPIRATORY: clear to auscultation b/l  GI: soft non tender no hepatosplenomegaly  NEURO: AAOX3 she has complete loss of strength of the lower extremities and decreased sensation.  She has intact groin sensations.  INTEGUMENT: no obvious rashes  LYMPHATIC: no palpable cervical, supraclavicular, axillary or inguinal LAD  MUSCULOSKELETAL: Unremarkable. No bony tenderness.  Previous mastectomy sites are well-healed and there is no evidence of local recurrence upon palpation.  EXTREMITIES: no edema  PSYCH: Mentation, mood and affect are normal. Decision making capacity is intact        LABS/IMAGING    Reviewed    CBC unremarkable    CMP unremarkable      MRI T/L  Spine                          IMPRESSION:  THORACIC SPINE MRI:  1.  Extensive osseous metastasis centered at the T10 through T12 levels where there are pathologic compression deformities at T10 and T11 with up to 70% loss of height and extensive involvement of the posterior elements as detailed above. Direct invasion   of the spinal canal ventrally and dorsolaterally bilaterally, left greater than right contribute to severe spinal canal stenosis from T10 " through T12 where there is focal abnormal T2 hyperintense signal within the cord at the T10-T11 level.  2.  Additional metastatic lesions are noted at the T9 transverse process and possibly with early involvement of the vertebral body, T6 vertebral body to the right of midline, T4 spinous process, and T5 facet.  3.  Atypical infection such as tuberculosis could give a similar appearance, but this is felt to be much less likely given the patient's history.     LUMBAR SPINE MRI:  1.  Metastasis involving the spinous process of L1. Extensive metastatic involvement of L2 to the right of midline with extension into the right pedicle. Subcentimeter lesion on the left at L5. Near complete involvement of S2 with extension into the   inferior endplate of S1. Sizable metastasis involving the superior aspect of the left iliac wing with smaller metastatic lesions involving both iliac wings, detailed above.  2.  No high grade central canal or neural foraminal stenosis.    CT CAP _ Prelim   FINDINGS:   LUNGS AND PLEURA: Focal scarring at the left upper lobe apex is again seen, smaller in size as compared to 05/22/2010. There are several indeterminate new pulmonary nodules noted. These are on the right side. An example is solid and irregular measuring   0.8 x 0.6 cm at the right middle lobe series 4 image 149 with adjacent tiny nodularity. Another example at the right upper lobe is 0.6 x 0.5 cm image 47. A few other small peripheral nodules at the right lower lobe. No effusions.     MEDIASTINUM/AXILLAE: No acute thoracic aortic abnormality. Enlarged right upper paratracheal lymph node with short axis of 1 cm series 3 image 40, previously 0.5 cm. Stable right hilar lymph node measuring 0.5 cm image 63. No axillary enlarged lymph   node.     CORONARY ARTERY CALCIFICATION: None.     HEPATOBILIARY: There is a new hypodense nodule at the right hepatic dome measuring 0.8 cm image 126. Tiny hypodense nodule at the left liver that is 0.4  cm image 134. The gallbladder is distended with increased density material diffusely, image 157 along   with additional gallstones.     PANCREAS: Normal.     SPLEEN: Lobulated hypodense nodular lesion at the spleen is stable in overall size measuring 2.6 cm image 130.     ADRENAL GLANDS: Normal.     KIDNEYS/BLADDER: A few tiny cysts not requiring specific imaging follow-up. No hydronephrosis. Bladder is unremarkable.     BOWEL: No acute abnormality.     LYMPH NODES: Normal.     VASCULATURE: Unremarkable.     PELVIC ORGANS: Unremarkable uterus for any acute abnormality. No adnexal lesion identified.     MUSCULOSKELETAL: New destructive appearing bone lesions with sclerosis and mixed lucency and height loss at T10, T11, sagittal series 9 image 56. Bony lesions also identified involving T12, L1, L2, left iliac wing with lucency and sclerosis, left   posterior innominate bone with a lucent bone lesion, proximal right femur. An example lesion at the left innominate is 2.6 cm image 223. New focal sclerosis at the right second rib. Other small rib sclerosis as well.                                                                      IMPRESSION:  1.  Destructive bone lesions newly identified. These are associated with compression deformity at T10 and T11. There are other areas of involvement involving the spine at the pelvis, right proximal femur, and a few ribs.  2.  New indeterminate pulmonary nodules in the right. Metastatic nodules are a possibility. Recommend follow-up CT chest in 3 months.  3.  New indeterminate enlarged upper mediastinal lymph node with neoplasm being a possibility.  4.  New hypodense nodules of the liver. These are nonspecific. Suggest further assessment with liver MRI.    ASSESSMENT/PLAN    History of left-sided breast cancer, ER/WI positive, HER-2 negative, treated with bilateral mastectomy, adjuvant chemotherapy and radiation in 2007 followed by 6 years of Arimidex.    Now she is coming with  progressive weakness of the lower extremities causing complete paralysis which has been going on for 2 months without any acute change.  She is found to have diffuse metastatic disease to the spine as well as other bones.  There is myelopathy with cord compression at T10-T11 level.    She was evaluated by neurosurgery and I reviewed their note, considering her symptoms are going on for so long and she has deep paralysis of the lower extremities for more than 2 months, no urgent neurosurgical intervention is recommended and I agree with that.  Neurosurgical decompression will not result in return of lower extremity strength and sensations.  Due to the same reason I do not feel an urgent need to start her on systemic dexamethasone.    We did discuss that if there is any worsening neurological symptoms in terms of problems with passing urine or bowel control, then she should be started on dexamethasone.  I would choose 4 mg every 6 hours of dexamethasone in that case.    We definitely need a tissue diagnosis understanding the fact that at this time the most likely diagnosis is metastatic breast cancer and the treatment would be palliative in nature and not curative.  Metastatic lesion in the left iliac bone should be an easy biopsy target.  Recommend IR biopsy.    I would also recommend checking CA 27-29.    Bone metastasis.  She would benefit from medication like Zometa or Xgeva but this can be addressed as an outpatient.    She has significant social issues.  She had lost medical insurance so she did not seek prompt medical care when it was needed.  Her  is also not very well and currently her sister has come from [place] to help her at home.  It would be useful if  can talk to her.      We will follow with her after the biopsy.    Please call with any questions.    I answered all of her and her sister's questions to their satisfaction.  They are agreeable and comfortable with the plan.    Venessa BAUER  MD Zack     Pager.  [phone].

## 2022-03-19 NOTE — H&P
[facility]    History and Physical - Hospitalist Service       Date of Admission:  3/19/2022    Assessment & Plan      Mickie Mcghee is a 57 year old female admitted on 3/19/2022. She presents as a direct admission from [facility] emergency department with bilateral lower extremity paresis since mid January found to have bony metastases, likely of prior breast cancer with direct invasion of spinal canal and spinal cord impingement at T10 level.  Transferred for neurosurgical evaluation and suspected fixation    Bilateral lower extremity paresis secondary to tumor invasion of the spinal canal: Patient with multiple bony metastases suspected secondary to known history of breast cancer.  Paresis has been present/stable since mid January  T2 N3 M0 stage III left breast cancer: Initial diagnosis in 2007.  ER/DC positive, HER-2/halina negative.  Treated with bilateral mastectomy plus radiation, TAC x6 cycles, tamoxifen x6 years, Zometa.  Last follow-up with oncology was in 2015 prior to patient losing insurance.  -MRI brain, CT chest abdomen pelvis for evaluation of other metastatic sites, potential biopsy sites if repeat tissue diagnosis desired and patient is not to undergo neurosurgical intervention.  Note that patient does not wish to receive these results until her sister is able to be at bedside with her.  No urgency to obtaining imaging overnight.  -[place] [facility] consulted.   -Neurosurgery consulted; discussed with on-call neurosurgical PA overnight regarding any potential benefit from Decadron.  Given duration of symptoms, I do not anticipate significant benefit, and pending surgical plan, may actually interfere with surgical healing.  Case will be staffed with neurosurgery staff prior to recommendations per my discussion, callback number given if needed.  -Discussed strong consideration for DNR/DNI status in the future as a future medical emergency is likely going to be secondary to her  underlying malignancy.  Patient is quite realistic and understanding, though wishes wishes to discuss with her  prior to any CODE STATUS change.  -Every 4 hours CNS and neuro checks for now; anticipate we can de-escalate these following neurosurgery evaluation and demonstrated stability.  -Fall precautions   -physical therapy    Social/care issues: Patient's  has significant medical issues apparently, is unable to drive.  Her sister has been caring for her from [place] since she developed paralysis in January.  Sister is unable to drive, she is on a 3-month visa which expires in early May, I believe.  -Care management consult placed  -Patient will clarify with her sister regarding any potential benefit of medical note for visa extension through [place] customs without sister having to travel back to [place] to extend her visa.  With initial diagnosis in 2017, there was apparently an ability to extend visa here on [place] side with medical letter.  -Care team to be aware that transportation will be an issue for patient at discharge if requiring frequent follow-up or outpatient treatments.  -Patient declines  consult.  She tells me that her sister is more Voodoo than she is, and I let her know that a  consult in the future for support of family is also reasonable if they desire.  She will let us know.       Diet:  N.p.o. for breakfast pending surgical evaluation  DVT Prophylaxis: Pneumatic compression devices for now while awaiting neurosurgical evaluation  Plunkett Catheter: Not present  Central Lines: None  Cardiac Monitoring: None  Code Status:  Full code    Clinically Significant Risk Factors Present on Admission                     Disposition Plan   Expected Discharge:  likely 3 to 5 days pending neurosurgical evaluation and possible intervention  Anticipated discharge location:  Awaiting care coordination huddle  Delays:           The patient's care was discussed with the Patient and Accepting  physician, Dr. Sultana.  Discussed also with neurosurgery PA and bedside nurse.    Fabio Vasques MD  Hospitalist Service  [facility]  Securely message with the [employer] Web Console (learn more here)  Text page via [facility] Paging/Directory         ______________________________________________________________________    Chief Complaint   Back pain, lower extremity paresis    History is obtained from patient, chart review, review of outside records including oncology follow-up in 2015 following which time patient was lost to follow-up, discussion with accepting physician.    History of Present Illness   Mickie Mcghee is a 57 year old female who presents as a direct admission from [facility] emergency department after she was found to have diffuse bony metastatic disease and invasion of spinal canal by tumor resulting in spinal cord compression at the T10 level and bilateral lower extremity paresis.    Patient had been generally healthy other than a diagnosis of T2 N3 M0 left-sided breast cancer in 2007 resulting in mastectomy, radiation, TAC x6 cycles, Arimidex x6 years, and Zometa treatment for osteoporosis.  Patient was lost to follow-up in oncology clinic after 2015 visit as she lost her insurance.  She takes no medications.    In March 2021, patient was lifting a heavy bag of rice when she had some mid back pain.  This did not have any significant progression or change, no acute neurologic deficits at that time, so she thought she potentially had a muscular injury.  Discomfort persisted, however, and in July 2021, she started to develop some neuropathy in her feet bilaterally.  In October, she recalls feeling more weak, though still ambulatory, and in January, over the course of several weeks, patient had progressive weakness and neuropathy described in her bilateral lower extremities climbing from her ankles to her knees, then to her thighs.  By mid January, she was unable to ambulate,  and has been bedridden or in a wheelchair.    Patient's sister traveled from [place] to help care for her.  Patient tells me she was initially losing weight as she had difficulty eating with her reduced ability to mobilize in her house.  Patient lives with her , though he has medical issues as well.  Since her sister has arrived, patient has been eating more, and has actually put on weight again.    3/18/2022, patient had an appointment with [facility] in [facility] for evaluation of her bilateral lower extremity paresis.  She was concern for a spine injury.  She had x-rays performed of her spine, these were reportedly negative, but with her paresis, was referred to the emergency department given concern for neurologic issue.    In the emergency department, patient underwent lumbar and thoracic spine MRI.  This was notable for significant bony metastases of pelvis, spine.  At approximately the T10 level, patient had direct involvement/invasion of tumor into spinal canal with impingement of the spinal cord at that level.  Multiple compression fractures present as well.  Outside emergency department discussed with neurosurgery, and recommendation was for transfer for anticipated neurosurgical fixation.  It was identified that with patient's duration of symptoms including lower extremity paresis since mid January, return of function with surgical intervention was unlikely.    Patient has had no fevers or chills, she has no shortness of breath.  She has not had any abdominal pain, nausea, or vomiting.  No headaches, no vision changes, no loss of awareness or seizure-like activity.    Patient sister is visiting from [place] on a 3-month visa as noted above, and patient believes that sister will have to return to [place] to renew her visa at the beginning of May.    Patient tells me when she was initially diagnosed with breast cancer she prayed that she would survive long enough for her son to go to college.  He  is graduating from college this May, and she tells me that her prayers were answered, though in an unfortunate way.    Patient is very stoic, very cerebral regarding her diagnosis.  Recognizes the implications of her disease.  Her mother also  of breast cancer with her sister caring for her mother as well.    For now, patient is full code.  Discussed likely terminal disease and recommendation for DNR/DNI status as any cardiopulmonary arrest will likely be secondary to her underlying cancer.  Patient is understanding of this conversation, wishes to talk with her  prior to any change in CODE STATUS.    Patient continues to have bowel and bladder function.  She uses adult diapers and pads only because she is no longer mobile.  She has sensation to light touch in her lower extremities, though is unable to move significantly.  On exam, patient is able to slightly move toes on right, otherwise no function from hip flexors down bilaterally    Review of Systems    The 10 point Review of Systems is negative other than noted in the HPI or here.  No fevers or chills  No headache    Past Medical History    I have reviewed this patient's medical history and updated it with pertinent information if needed.   Past Medical History:   Diagnosis Date     Anemia      Breast cancer (H)      Vitamin D deficiency        Past Surgical History   I have reviewed this patient's surgical history and updated it with pertinent information if needed.  Past Surgical History:   Procedure Laterality Date     IR MISCELLANEOUS PROCEDURE  2007     IR MISCELLANEOUS PROCEDURE  10/25/2010   Bilateral mastectomy  Oophorectomy following breast cancer diagnosis   section in     Social History   I have reviewed this patient's social history and updated it with pertinent information if needed.  Social History     Tobacco Use     Smoking status: No     Smokeless tobacco: No   Substance Use Topics     Alcohol use: No              Family History     Mother  of breast cancer  Father with diabetes and kidney stones    Prior to Admission Medications   Prior to Admission Medications   Prescriptions Last Dose Informant Patient Reported? Taking?   FERROUS SULFATE ORAL   Yes No   Sig: [FERROUS SULFATE ORAL] Take 1 tablet by mouth.   MEDICATION CANNOT BE REORDERED - PLEASE MANUALLY REORDER AND DISCONTINUE THE OLD ORDER   Yes No   Sig: [CALCIUM CARBONATE-VITAMIN D2 ORAL] Take 2,000 Units by mouth daily.      Facility-Administered Medications: None     Allergies   No Known Allergies    Physical Exam   Vital Signs: Temp: 98.4  F (36.9  C) Temp src: Oral BP: 111/80 Pulse: 85   Resp: 16 SpO2: 96 % O2 Device: None (Room air)    Weight: 122 lbs 0 oz    General Appearance: Generally well-appearing 57-year-old [place] American female reclining in bed.  She does not appear to be in any distress.  Eyes: No scleral icterus or injection  HEENT: Normocephalic, atraumatic  Respiratory: Breath sounds are clear bilateral to auscultation, no wheezes, no crackles.  Good effort and air movement throughout lung fields  Cardiovascular: Regular rate and rhythm without murmur  GI: Abdomen thin, soft, nontender to palpation.  No palpable mass.  Lymph/Hematologic: No lower extremity edema  Genitourinary: Not examined  Skin: Does have some duskiness of third and fourth toes distally on right foot  Musculoskeletal: Muscular tone and bulk intact in all extremities.  Some muscular wasting of thighs bilaterally is noted, no subcutaneous fat loss.  Neurologic: Alert, conversant, appropriate conversation.  Patient reports that sensation to light touch is intact in bilateral lower extremities including toes.  This said, she is able to very minimally wiggle her toes on the right.  Clonus present on the right.  No reflex with Babinski bilaterally appreciated.  She is not able to perform any other movement of her lower extremities from hip flexors down other than slight  wiggling of right toes.  Psychiatric: Very pleasant, normal affect, stoic    Data   Data reviewed today: I reviewed all medications, new labs and imaging results over the last 24 hours. I personally reviewed the Thoracic spine MR image(s) showing Bony destruction secondary to tumor with invasion of spinal canal largely at T10 level.  Correlates with patient's most notable area of discomfort.    Recent Labs   Lab 03/18/22  1633 03/18/22  1632   WBC  --  5.9   HGB  --  13.3   MCV  --  90   PLT  --  300     --    POTASSIUM 4.2  --    CHLORIDE 106  --    CO2 23  --    BUN 12  --    CR 0.63  --    ANIONGAP 10  --    OSMEL 9.5  --    GLC 98  --

## 2022-03-19 NOTE — CONSULTS
Neurosurgery Consult    HPI    Ms. Mcghee is a 57-year-old female with a history of breast cancer who was directly admitted from [facility] emergency department after she was found to have diffuse bony metastatic disease and invasion of the spinal canal by tumor resulting in spinal cord compression and bilateral lower extremity paresis.     Patient states her symptoms began in March 2021 with mild symptoms into her feet and a little bit of back pain, they progressively got worse July and August 2021, and by October she was walking with a cane.  By December 2021 in January 2022 she was no longer able to ambulate.  And this continues until the present.    She states she has sensation until about her mid to upper thighs and anteriorly and posteriorly.  She states she has some sensation in her feet to touch, but does not discern temperature well.  She reports she is able to control her bowel and bladder function if she is sitting up on the edge of the bed in a certain position.  She is able to urinate into a bedpan independently when she positions herself on the edge of the bed.  She is able to control her bowels and has been defecating into a diaper.    She has been at home and receiving care from her sister who traveled from [place] to help care for her, as well as her .    She was lost to oncology follow-up in 2015 due to loss of insurance.    Patient denies neck pain, denies any upper extremity radicular symptoms or weakness.    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      Reflexes are 2+ patella and ankle    Patient has sensation to light touch intact in her bilateral lower extremities in the upper thighs and on her feet she had a few areas where she did not have sensation on the lateral aspect of her left leg.    Sustained bilateral ankle clonus    Mute toes bilaterally      Imaging    IMPRESSION:  THORACIC SPINE MRI:  1.  Extensive osseous metastasis centered at the T10 through T12 levels where there are pathologic compression deformities at T10 and T11 with up to 70% loss of height and extensive involvement of the posterior elements as detailed above. Direct invasion   of the spinal canal ventrally and dorsolaterally bilaterally, left greater than right contribute to severe spinal canal stenosis from T10 through T12 where there is focal abnormal T2 hyperintense signal within the cord at the T10-T11 level.  2.  Additional metastatic lesions are noted at the T9 transverse process and possibly with early involvement of the vertebral body, T6 vertebral body to the right of midline, T4 spinous process, and T5 facet.  3.  Atypical infection such as tuberculosis could give a similar appearance, but this is felt to be much less likely given the patient's history.     LUMBAR SPINE MRI:  1.  Metastasis involving the spinous process of L1. Extensive metastatic involvement of L2 to the right of midline with extension into the right pedicle. Subcentimeter lesion on the left at L5. Near complete involvement of S2 with extension into the   inferior endplate of S1. Sizable metastasis involving the superior aspect of the left iliac wing with smaller metastatic lesions involving both iliac wings, detailed above.  2.  No high grade central canal or neural foraminal stenosis.    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  Further imaging is pending, and oncology consultation is pending to determine tumor burden and prognosis.  Surgical intervention is possible and would be aimed at preserving bowel and bladder function depending on the overall cancer prognosis is determined by oncology.  We will defer initiation of steroid medications to oncology.    Patient imaging, exam, and plan reviewed with Dr. Guzman.    Total time of 60 minutes spent with the patient today in counseling and coordination of care.

## 2022-03-20 LAB
ANION GAP SERPL CALCULATED.3IONS-SCNC: 5 MMOL/L (ref 3–14)
BUN SERPL-MCNC: 9 MG/DL (ref 7–30)
CALCIUM SERPL-MCNC: 8.6 MG/DL (ref 8.5–10.1)
CHLORIDE BLD-SCNC: 109 MMOL/L (ref 94–109)
CO2 SERPL-SCNC: 26 MMOL/L (ref 20–32)
CREAT SERPL-MCNC: 0.53 MG/DL (ref 0.52–1.04)
GFR SERPL CREATININE-BSD FRML MDRD: >90 ML/MIN/1.73M2
GLUCOSE BLD-MCNC: 128 MG/DL (ref 70–99)
MAGNESIUM SERPL-MCNC: 2.2 MG/DL (ref 1.6–2.3)
POTASSIUM BLD-SCNC: 3.3 MMOL/L (ref 3.4–5.3)
POTASSIUM BLD-SCNC: 4.2 MMOL/L (ref 3.4–5.3)
SODIUM SERPL-SCNC: 140 MMOL/L (ref 133–144)

## 2022-03-20 PROCEDURE — 120N000001 HC R&B MED SURG/OB

## 2022-03-20 PROCEDURE — 250N000013 HC RX MED GY IP 250 OP 250 PS 637: Performed by: INTERNAL MEDICINE

## 2022-03-20 PROCEDURE — 99231 SBSQ HOSP IP/OBS SF/LOW 25: CPT | Performed by: PHYSICIAN ASSISTANT

## 2022-03-20 PROCEDURE — 99233 SBSQ HOSP IP/OBS HIGH 50: CPT | Performed by: HOSPITALIST

## 2022-03-20 PROCEDURE — 250N000013 HC RX MED GY IP 250 OP 250 PS 637: Performed by: HOSPITALIST

## 2022-03-20 PROCEDURE — 83735 ASSAY OF MAGNESIUM: CPT | Performed by: HOSPITALIST

## 2022-03-20 PROCEDURE — 80048 BASIC METABOLIC PNL TOTAL CA: CPT | Performed by: HOSPITALIST

## 2022-03-20 PROCEDURE — 36415 COLL VENOUS BLD VENIPUNCTURE: CPT | Performed by: HOSPITALIST

## 2022-03-20 PROCEDURE — 84132 ASSAY OF SERUM POTASSIUM: CPT | Performed by: HOSPITALIST

## 2022-03-20 RX ORDER — POTASSIUM CHLORIDE 1500 MG/1
20 TABLET, EXTENDED RELEASE ORAL ONCE
Status: COMPLETED | OUTPATIENT
Start: 2022-03-20 | End: 2022-03-20

## 2022-03-20 RX ADMIN — ACETAMINOPHEN 650 MG: 325 TABLET, FILM COATED ORAL at 21:45

## 2022-03-20 RX ADMIN — POTASSIUM CHLORIDE 20 MEQ: 1500 TABLET, EXTENDED RELEASE ORAL at 09:51

## 2022-03-20 RX ADMIN — SENNOSIDES AND DOCUSATE SODIUM 2 TABLET: 50; 8.6 TABLET ORAL at 21:45

## 2022-03-20 RX ADMIN — ACETAMINOPHEN 650 MG: 325 TABLET, FILM COATED ORAL at 08:18

## 2022-03-20 RX ADMIN — OXYCODONE HYDROCHLORIDE 5 MG: 5 TABLET ORAL at 12:14

## 2022-03-20 ASSESSMENT — ACTIVITIES OF DAILY LIVING (ADL)
ADLS_ACUITY_SCORE: 13

## 2022-03-20 NOTE — PLAN OF CARE
Pt A/O, VSS on RA. C/o pain to back, PRN tylenol given, heat packs given in addition, with relief. A2 w/ lift, paralysis to LE. Neuros/CMS unchanged, sensation to LE present, denies N/T. Regular diet, good appetite. T/R as pt allows. Pt unable to void this AM after trying to sit at EOB, Bladder scan for 750ml, straight cath for 675ml. This afternoon, pt bladder scan for 700ml, pt voided on BSC, PVR 0ml. PIV SL. MRI and CT completed this shift, please see results. Neurosurgery and Oncology following. Discharge pending, nursing to monitor.

## 2022-03-20 NOTE — PLAN OF CARE
Pt A/O, VSS on RA. C/o pain to back managed with PRN tylenol and oxy, heat packs used in addition. Up A2, w/ lift. T/R as pt allows. Neuros unchanged. Regular diet, great appetite. Continent, of B/B, up to BSC;  passing flatus. PIV SL. K+ 3.3, replaced on days, recheck 4.2, recheck in the AM. Neuro and oncology following. Plan for IR biopsy of  bone tomorrow, NPO at MN. Discharge pending workup.

## 2022-03-20 NOTE — UTILIZATION REVIEW
[facility] Utilization Review  Admission Status; Secondary Review Determination     Admission Date: 3/19/2022  2:43 AM      Under the authority of the Utilization Management Committee, the utilization review process indicated a secondary review on the above patient.  The review outcome is based on review of the medical records, discussions with staff, and applying clinical experience noted on the date of the review.        (X)      Inpatient Status Appropriate - This patient's medical care is consistent with medical management for inpatient care and reasonable inpatient medical practice.          RATIONALE FOR DETERMINATION   Mickie Mcghee is a 57 year old female with past medical history of metastatic breast cancer, who was transferred from outside hospital with paralysis of lower extremities due to metastatic spinal cord compression and infiltration complicated by bowel and bladder dysfunction, urinary retention, liver metastases.  She is noted to have significant urinary retention requiring straight catheterization, neurosurgery and oncology consultations are obtained and further imaging of the brain is being conducted.  Plan for conservative versus surgical intervention pending consultation services recommendations.  Cares are rendered complex due to widespread metastatic disease, advanced breast cancer and poor prognosis.  Anticipated length of stay is more than 2 midnights for the work-up, management and safe disposition planning.  Criteria for inpatient admission is met.             The definitions of Inpatient Status and Observation Status used in making the determination above are those provided in the CMS Coverage Manual, Chapter 1 and Chapter 6, section 70.4.      Sincerely,       Wanda Villareal MD, MS  Physician Advisor  Utilization Review-[place]    Phone: [phone]

## 2022-03-20 NOTE — CONSULTS
Care Management Initial Consult    General Information  Assessment completed with: Patient, Other,  (Mickie and sister, Maurilio)  Type of CM/SW Visit: Initial Assessment    Primary Care Provider verified and updated as needed: No   Readmission within the last 30 days: no previous admission in last 30 days      Reason for Consult: discharge planning  Advance Care Planning: Advance Care Planning Reviewed: no concerns identified        Communication Assessment  Patient's communication style: spoken language (English or Bilingual)    Hearing Difficulty or Deaf: no   Wear Glasses or Blind: yes    Cognitive  Cognitive/Neuro/Behavioral: WDL  Level of Consciousness: alert  Arousal Level: opens eyes spontaneously  Orientation: oriented x 4  Mood/Behavior: calm, cooperative  Best Language: 0 - No aphasia  Speech: clear    Living Environment:   People in home: child(efraín), adult, spouse     Current living Arrangements: other (see comments) ([facility])      Able to return to prior arrangements: other (see comments) ([facility])     Family/Social Support:  Care provided by: self, spouse/significant other, child(efraín), other (see comments) (other = sister)  Provides care for: no one  Marital Status:   , Children, Sibling(s)          Description of Support System: Supportive, Involved    Support Assessment: Adequate social supports, Adequate family and caregiver support    Current Resources:   Patient receiving home care services: No     Community Resources:    Equipment currently used at home: none  Supplies currently used at home:      Employment/Financial:  Employment Status:        Financial Concerns:       Lifestyle & Psychosocial Needs:  Social Determinants of Health     Tobacco Use: Not on file   Alcohol Use: Not on file   Financial Resource Strain: Not on file   Food Insecurity: Not on file   Transportation Needs: Not on file   Physical Activity: Not on file   Stress: Not on file   Social Connections: Not on file    Intimate Partner Violence: Not on file   Depression: Not on file   Housing Stability: Not on file     Functional Status:  Prior to admission patient needed assistance:   Mental Health Status:  Chemical Dependency Status:  Values/Beliefs:  Spiritual, Cultural Beliefs, Advent Practices, Values that affect care:              Additional Information:   Per care management/ social work consult, SW met with patient and patients sister. Patient was admitted to inpatient on 3/19 for bony metastases and has an expected discharge date to be determined. Patient has a history of breast cancer and was last seen by oncology in 2015 prior to losing insurance. Patients sister came to the  on [place] passport and her max visitation stay is up May 11th, 2022. Patients sister is requesting a medical letter to present to [facility] for possible visitation extension.     Patients , Juan Daniel, is disabled and no longer works or drives (adult son also does not drive). Patient lives in a town house with the main bed and bath upstairs. Patient does not currently have and DME and has been taking sponge baths in the sink and sleeping in the living room since January 2022. Currently patient gets families groceries delivered, and depends on sister to clean/cook. Family utilizes [employer] for transportation, SW gave [employer] application to patient.     SW spoke with patient about the possibility of going to TCU, patient is open to TCU and would like to stay in the [facility] area (if this is the route taken). Patient currently has her covid vaccine but needs Pfizer booster. Charge nurse notified.    Patient also needs a primary care provider at [facility], prefers female provider.      SW and CC will continue to follow.    Nikki Morocho, MSW, LGSW

## 2022-03-20 NOTE — PLAN OF CARE
Goal Outcome Evaluation:        A&Ox4.  VSS, RA.  Denies pain.  Up to BSC with mechanical lift.  Continent of bowel/bladder; no BM this shift.  PIV SL.  Neuros and CMS checks done; unchanged this shift.  Tolerating regular diet without problems.  Discharge pending progress/plan.

## 2022-03-20 NOTE — PROGRESS NOTES
[facility]    Hospitalist Progress Note      Assessment & Plan   Mickie Mcghee is a 57 year old female admitted on 3/19/2022. She presents as a direct admission from [facility] emergency department with bilateral lower extremity paresis since mid January found to have bony metastases, likely of prior breast cancer with direct invasion of spinal canal and spinal cord impingement at T10 level.  Transferred for neurosurgical evaluation    Bilateral lower extremity paresis secondary to tumor invasion of the spinal canal  T2 N3 M0 stage III left breast cancer dx 2007  Multiple bony metastases by MRI, CT, March 2022   Patient with multiple bony metastases suspected secondary to known history of breast cancer.  Paresis has been present/stable since mid January   Initial diagnosis in 2007.  ER/UT positive, HER-2/halina negative.  Treated with bilateral mastectomy plus radiation, TAC x6 cycles, tamoxifen x6 years, Zometa.  Lost to follow-up with oncology was in 2015 prior to patient losing insurance.  - CT chest abdomen pelvis; confirms destructive bony lesion T and L-spine.  Indeterminate pulmonary nodules right, indeterminate mediastinal lymph node, hypodense nodule liver, bony metastases right proximal femur, ribs.    -Brain MRI: Interval development of right frontal, right parietal, left temporal and left parietal bony metastases.  No brain metastatic findings.  -Neurosurgery consult.  See note, due to duration of paresis LE 2' to thoracic/lumbar bony metastases impinging on spinal cord defer immediate surgical intervention, defer to oncology plans.  Unclear if intervention may preserve bladder and bowel function.  -Oncology consult: see Note: IR bond bx iliac bone; pending results Palliative treatment.   -Admitting Hospitalist discussed strong consideration for DNR/DNI status in the future as a future medical emergency is likely going to be secondary to her underlying malignancy.  Patient is quite  realistic and understanding, though wishes wishes to discuss with her  prior to any CODE STATUS change.  Her wish is to attend her son's graduation from high school this spring.  -Bladder scan was 700 cc, straight cath.  Scheduled toileting q. meal and at bedtime. Continent of bladder and bowels.   -Currently no significant pain condition.  Has PRN APAP, Oxy and HM available.   -Fall precautions   -PT when able.  Likely would benefit from adaptive equipment including wheelchair, bedside commode, etc.  -Discussed with SW, patient now with MA likely would qualify for PCA, post discharge to TCU     Social/care issues:  By patient's report she was lost to medical follow-up about 14 years ago due to loss of insurance.  She states in January her disabled  qualified for SS disability and now patient has MA.  Due to 's disability and now patient with lower extremity hemiparesis her sister from [place] is providing cares.  However now with MA she probably will qualify for PCA.  Her primary wish is to see her son graduate this spring from high school.  He has been accepted to the [employer] full scholarship this fall.  Discussed care plan pending Oncology recommendations including bone biopsy and subsequent treatment plan established, in the meantime identify functional status and adaptive equipment needed.  -Care management consult placed      DVT Prophylaxis: Pneumatic Compression Devices  Code Status: Full Code  Expected Discharge: TBD pending oncology and Neurosurgery plan established    Total unit/floor time 35 minutes:  time consisted of the following, examination of patient, review of records including labs, imaging results, medications, interdisciplinary notes and completing documentation; > 50% Counseling/discussion with Patient and Sister regarding current condition including results of MRI, CT, oncology recommendations to date including IR iliac bone biopsy, PT and adaptive measures, recommended to  have family including sister, brother and son initiate early cancer surveillance given mother  of colon cancer and patient with premenopausal breast cancer and Coordination of Care time with Nursing  and Specialists, Oncology regarding bone bx and care plan, management and surveillance.      Elena River MD  Text Page (7am - 6pm, M-F)    Interval History   No change bilateral LE paresis, sensation intact.  Bladder and bowel appears continent.  No significant pain reported.  Eating.  Mood and emotion appears strong, sister present to support.      SH: No tobacco.  Lives with  and son who is a senior in high school.   is now on SSI disability [appears has CP]  allowing patient to have Medicaid insurance.  Prior to this was lost to medical follow-up due to lack of insurance which appears to be over the last 14 years.  Her wish is to see her son graduate high school this spring, he is accepted to [employer] on full scholarship in [employer].  Sister visiting from [place] assist with patient and patient's 's care.    FH: Mother  of colon cancer.    ROS: Complete ROS negative except as above.     -Data reviewed today: I reviewed all new labs and imaging results over the last 24 hours.     Physical Exam   Temp: 97.5  F (36.4  C) Temp src: Axillary BP: 107/72 Pulse: 75   Resp: 16 SpO2: 96 % O2 Device: None (Room air)    Vitals:    22 0254   Weight: 55.3 kg (122 lb)     Vital Signs with Ranges  Temp:  [97.5  F (36.4  C)-98  F (36.7  C)] 97.5  F (36.4  C)  Pulse:  [61-75] 75  Resp:  [16] 16  BP: (101-107)/(68-72) 107/72  SpO2:  [93 %-96 %] 96 %  I/O last 3 completed shifts:  In: 720 [P.O.:720]  Out: 2500 [Urine:2500]    General/Constitutional:   NAD, alert, calm, cooperative;  bilateral LE paresis.  HEENT/Head Exam:  atraumatic  Eyes:  PERRL, no conjunctivits  Mouth/Oral Pharynx:  Buccal mucosa WNL  Chest/Respiratory:  Air exchange bilateral lung fields; no rales or wheeze.  Respiration nonlabored on room air.  Cardiovascular:  no murmur appreciated.  LE edema none  Gastrointestinal/Abdomen:  soft, nontender, no rebound, guarding or other peritoneal signs.  Musculoskeletal:  extremities warm, dry, noncyanotic; no acitve synovitis.  Neurologic:  gross CN intact and motor testing paraplegia lower extremities, sensation grossly tested intact   Psychiatric:  Mental status:  Alert, oriented, affect calm      Medications       sodium chloride (PF)  3 mL Intracatheter Q8H       Data   Recent Labs   Lab 03/20/22  1358 03/20/22  0733 03/19/22  0457 03/18/22  1633 03/18/22  1633 03/18/22  1632   WBC  --   --   --   --   --  5.9   HGB  --   --   --   --   --  13.3   MCV  --   --   --   --   --  90   PLT  --   --   --   --   --  300   NA  --  140 139  --  139  --    POTASSIUM 4.2 3.3* 3.7  3.6   < > 4.2  --    CHLORIDE  --  109 107  --  106  --    CO2  --  26 25  --  23  --    BUN  --  9 21  --  12  --    CR  --  0.53 0.61  --  0.63  --    ANIONGAP  --  5 7  --  10  --    OSMEL  --  8.6 8.9  --  9.5  --    GLC  --  128* 112*  --  98  --    ALBUMIN  --   --  3.2*  --   --   --    PROTTOTAL  --   --  6.7*  --   --   --    BILITOTAL  --   --  0.3  --   --   --    ALKPHOS  --   --  87  --   --   --    ALT  --   --  15  --   --   --    AST  --   --  14  --   --   --     < > = values in this interval not displayed.

## 2022-03-21 ENCOUNTER — APPOINTMENT (OUTPATIENT)
Dept: CT IMAGING | Facility: CLINIC | Age: 57
End: 2022-03-21
Attending: HOSPITALIST
Payer: MEDICAID

## 2022-03-21 LAB
ANION GAP SERPL CALCULATED.3IONS-SCNC: 4 MMOL/L (ref 3–14)
BUN SERPL-MCNC: 9 MG/DL (ref 7–30)
CALCIUM SERPL-MCNC: 8.7 MG/DL (ref 8.5–10.1)
CHLORIDE BLD-SCNC: 107 MMOL/L (ref 94–109)
CO2 SERPL-SCNC: 27 MMOL/L (ref 20–32)
CREAT SERPL-MCNC: 0.53 MG/DL (ref 0.52–1.04)
DEPRECATED CALCIDIOL+CALCIFEROL SERPL-MC: 35 UG/L (ref 30–80)
GFR SERPL CREATININE-BSD FRML MDRD: >90 ML/MIN/1.73M2
GLUCOSE BLD-MCNC: 90 MG/DL (ref 70–99)
MAGNESIUM SERPL-MCNC: 2.4 MG/DL (ref 1.6–2.3)
POTASSIUM BLD-SCNC: 3.6 MMOL/L (ref 3.4–5.3)
SODIUM SERPL-SCNC: 138 MMOL/L (ref 133–144)

## 2022-03-21 PROCEDURE — 99152 MOD SED SAME PHYS/QHP 5/>YRS: CPT

## 2022-03-21 PROCEDURE — 36415 COLL VENOUS BLD VENIPUNCTURE: CPT | Performed by: HOSPITALIST

## 2022-03-21 PROCEDURE — 83735 ASSAY OF MAGNESIUM: CPT | Performed by: HOSPITALIST

## 2022-03-21 PROCEDURE — 120N000001 HC R&B MED SURG/OB

## 2022-03-21 PROCEDURE — 250N000011 HC RX IP 250 OP 636: Performed by: INTERNAL MEDICINE

## 2022-03-21 PROCEDURE — 99232 SBSQ HOSP IP/OBS MODERATE 35: CPT | Performed by: INTERNAL MEDICINE

## 2022-03-21 PROCEDURE — 99233 SBSQ HOSP IP/OBS HIGH 50: CPT | Performed by: INTERNAL MEDICINE

## 2022-03-21 PROCEDURE — 999N000154 HC STATISTIC RADIOLOGY XRAY, US, CT, MAR, NM

## 2022-03-21 PROCEDURE — 250N000013 HC RX MED GY IP 250 OP 250 PS 637: Performed by: INTERNAL MEDICINE

## 2022-03-21 PROCEDURE — 0QB33ZX EXCISION OF LEFT PELVIC BONE, PERCUTANEOUS APPROACH, DIAGNOSTIC: ICD-10-PCS | Performed by: RADIOLOGY

## 2022-03-21 PROCEDURE — 250N000011 HC RX IP 250 OP 636: Performed by: NURSE PRACTITIONER

## 2022-03-21 PROCEDURE — 88342 IMHCHEM/IMCYTCHM 1ST ANTB: CPT | Mod: TC | Performed by: RADIOLOGY

## 2022-03-21 PROCEDURE — 82310 ASSAY OF CALCIUM: CPT | Performed by: HOSPITALIST

## 2022-03-21 PROCEDURE — 20225 BONE BIOPSY TROCAR/NDL DEEP: CPT

## 2022-03-21 RX ORDER — NALOXONE HYDROCHLORIDE 0.4 MG/ML
0.4 INJECTION, SOLUTION INTRAMUSCULAR; INTRAVENOUS; SUBCUTANEOUS
Status: DISCONTINUED | OUTPATIENT
Start: 2022-03-21 | End: 2022-03-21

## 2022-03-21 RX ORDER — NALOXONE HYDROCHLORIDE 0.4 MG/ML
0.2 INJECTION, SOLUTION INTRAMUSCULAR; INTRAVENOUS; SUBCUTANEOUS
Status: DISCONTINUED | OUTPATIENT
Start: 2022-03-21 | End: 2022-03-21

## 2022-03-21 RX ORDER — DEXAMETHASONE 4 MG/1
4 TABLET ORAL EVERY 12 HOURS SCHEDULED
Status: COMPLETED | OUTPATIENT
Start: 2022-03-21 | End: 2022-03-28

## 2022-03-21 RX ORDER — FLUMAZENIL 0.1 MG/ML
0.2 INJECTION, SOLUTION INTRAVENOUS
Status: DISCONTINUED | OUTPATIENT
Start: 2022-03-21 | End: 2022-03-23

## 2022-03-21 RX ORDER — FENTANYL CITRATE 50 UG/ML
25-50 INJECTION, SOLUTION INTRAMUSCULAR; INTRAVENOUS EVERY 5 MIN PRN
Status: DISCONTINUED | OUTPATIENT
Start: 2022-03-21 | End: 2022-03-23

## 2022-03-21 RX ADMIN — ACETAMINOPHEN 650 MG: 325 TABLET, FILM COATED ORAL at 20:50

## 2022-03-21 RX ADMIN — FENTANYL CITRATE 50 MCG: 50 INJECTION, SOLUTION INTRAMUSCULAR; INTRAVENOUS at 15:10

## 2022-03-21 RX ADMIN — MIDAZOLAM HYDROCHLORIDE 1 MG: 1 INJECTION, SOLUTION INTRAMUSCULAR; INTRAVENOUS at 15:09

## 2022-03-21 RX ADMIN — DEXAMETHASONE 4 MG: 4 TABLET ORAL at 20:11

## 2022-03-21 ASSESSMENT — ACTIVITIES OF DAILY LIVING (ADL)
ADLS_ACUITY_SCORE: 17
ADLS_ACUITY_SCORE: 13
ADLS_ACUITY_SCORE: 17
ADLS_ACUITY_SCORE: 13
ADLS_ACUITY_SCORE: 17
ADLS_ACUITY_SCORE: 13
ADLS_ACUITY_SCORE: 17
ADLS_ACUITY_SCORE: 13
ADLS_ACUITY_SCORE: 13
ADLS_ACUITY_SCORE: 15
ADLS_ACUITY_SCORE: 15
ADLS_ACUITY_SCORE: 13
ADLS_ACUITY_SCORE: 17

## 2022-03-21 NOTE — PROGRESS NOTES
Patient is on CT schedule today Monday 3/21/22 for a CT guided left ilium lesion bone biopsy.     -Labs WNL for procedure.    -Orders for NPO have been entered.   -No NPO required.   -Consent was obtained from patient Jeayoung after explaining the procedure, risks and benefits and consent is in IR.     ER,DC, HER2 testing to be done and will be added to the order.     Please contact the CT department at [phone] for procedural related questions.     Thanks, Maeve [facility] Interventional Radiology CNP ([phone]) (phone [phone])

## 2022-03-21 NOTE — PLAN OF CARE
Goal Outcome Evaluation:      Pt is A/Ox4, VSS on RA. Neuros unchanged. C/o back pain - managed with hot packs, no PRN pain meds given. NPO this morning for bone biopsy, oral cares completed independently. A2 w/ lift to [facility]. Cont B/B - 1 large BM this morning. Voiding adequately. T/R as pt allows. R PIV SL. K+ = 3.6; Mag = 2.4 - rechecks in AM. Bone biopsy scheduled for 1430 today. Neuro & oncology following. Pt would prefer to go home & receive care from her sister instead of going to [facility]  informed. Discharge pending plan.

## 2022-03-21 NOTE — PROGRESS NOTES
[facility]    Hospitalist Progress Note      Assessment & Plan   Mickie Mcghee is a 57 year old female admitted on 3/19/2022. She presented as a direct admission from [facility] emergency department with bilateral lower extremity paresis since mid January and found to have bony metastases, likely of prior breast cancer with direct invasion of spinal canal and spinal cord impingement at T10 level.  Transferred for neurosurgical evaluation    Bilateral lower extremity paresis secondary to tumor invasion of the spinal canal  T2 N3 M0 stage III left breast cancer dx 2007  Multiple bony metastases by MRI, CT, March 2022   -Patient with multiple bony metastases suspected secondary to known history of breast cancer.  Paresis has been present/stable since mid January    - Initial diagnosis in 2007.  ER/TX positive, HER-2/halina negative.  Treated with bilateral mastectomy plus radiation, TAC x6 cycles, tamoxifen x6 years, Zometa.  Lost to follow-up with oncology, was last seen in 2015 prior to losing insurance.  - CT chest abdomen pelvis; confirms destructive bony lesion T and L-spine.  Indeterminate pulmonary nodules right, indeterminate mediastinal lymph node, hypodense nodule liver, bony metastases right proximal femur, ribs.    -Brain MRI: Interval development of right frontal, right parietal, left temporal and left parietal bony metastases.  No brain metastatic findings.  -Neurosurgery consulted.  See note, due to duration of paresis LE 2' to thoracic/lumbar bony metastases impinging on spinal cord no indication for immediate surgical intervention, defer to oncology plans.  Unclear if intervention may preserve bladder and bowel function.  -Oncology consulted: see Note: Any treatment would be palliative  -S/p iliac bone biopsy 3/21.  Results pending  -Radiation oncology consulted.  Radiation treatments to thoracolumbar spine will be considered after tissue diagnosis.  -Admitting Hospitalist  discussed strong consideration for DNR/DNI status in the future as a future medical emergency is likely going to be secondary to her underlying malignancy.  Patient is quite realistic and understanding, though wishes wishes to discuss with her  prior to any CODE STATUS change.  Her wish is to attend her son's graduation from high school this spring.  -Bladder scan was 700 cc, straight cath.  Scheduled toileting q. meal and at bedtime. Continent of bladder and bowels.   -Currently no significant pain condition.  Has PRN APAP, Oxy and HM available.   -Fall precautions   -PT  when able.  Likely would benefit from adaptive equipment including wheelchair, bedside commode, etc.  -Patient would prefer to go home at discharge       Social/care issues:  By patient's report she was lost to medical follow-up about 14 years ago due to loss of insurance.  She states in January her disabled  qualified for SS disability and now patient has MA.  Due to 's disability and now patient with lower extremity hemiparesis her sister from [place] is providing cares.  However now with MA she probably will qualify for PCA.  Her primary wish is to see her son graduate this spring from high school.  He has been accepted to the [email or web address] full scholarship this fall.  Discussed care plan pending Oncology recommendations including bone biopsy and subsequent treatment plan established, in the meantime identify functional status and adaptive equipment needed.  -Care management consult placed      DVT Prophylaxis: Pneumatic Compression Devices  Code Status: Full Code  Expected Discharge: Likely tomorrow.          Glory Shaikh MD  Text Page (7am - 6pm, M-F)    Interval History   Patient reports she feels okay.  No significant change since yesterday   Continues to have bowel and bladder function   Denies any pain   Emotionally stable   Would prefer to go home at discharge with her sister helping         SH: No tobacco.  Lives with   and son who is a senior in high school.   is now on SSI disability [appears has CP]  allowing patient to have Medicaid insurance.  Prior to this was lost to medical follow-up due to lack of insurance which appears to be over the last 14 years.  Her wish is to see her son graduate high school this spring, he is accepted to [employer]  on full scholarship in [employer].  Sister visiting from [place] assist with patient and patient's 's care.      -Data reviewed today: I reviewed all new labs and imaging results over the last 24 hours.     Physical Exam   Temp: 98.1  F (36.7  C) Temp src: Oral BP: 112/65 Pulse: 69   Resp: 16 SpO2: 96 % O2 Device: None (Room air)    Vitals:    03/19/22 0254   Weight: 55.3 kg (122 lb)     Vital Signs with Ranges  Temp:  [97.5  F (36.4  C)-98.4  F (36.9  C)] 98.1  F (36.7  C)  Pulse:  [67-75] 69  Resp:  [16] 16  BP: ()/(65-72) 112/65  SpO2:  [95 %-96 %] 96 %  I/O last 3 completed shifts:  In: 240 [P.O.:240]  Out: 1900 [Urine:1900]    General/Constitutional:   NAD, alert, calm, cooperative;  bilateral LE paresis.  HEENT/Head Exam:  atraumatic  Eyes:  PERRL, no conjunctivits  Chest/Respiratory:  Air exchange bilateral lung fields; no rales or wheeze. Respiration nonlabored on room air.  Cardiovascular:  no murmur appreciated.  LE edema none  Gastrointestinal/Abdomen:  soft, nontender, no rebound, guarding or other peritoneal signs.  Neurologic:  gross CN intact and motor testing paraplegia lower extremities, sensation grossly tested intact   Psychiatric:  Mental status:  Alert, oriented, affect calm      Medications       sodium chloride (PF)  3 mL Intracatheter Q8H       Data   Recent Labs   Lab 03/21/22  0633 03/20/22  1358 03/20/22  0733 03/19/22  0457 03/18/22  1633 03/18/22  1632   WBC  --   --   --   --   --  5.9   HGB  --   --   --   --   --  13.3   MCV  --   --   --   --   --  90   PLT  --   --   --   --   --  300     --  140 139   < >  --     POTASSIUM 3.6 4.2 3.3* 3.7  3.6   < >  --    CHLORIDE 107  --  109 107   < >  --    CO2 27  --  26 25   < >  --    BUN 9  --  9 21   < >  --    CR 0.53  --  0.53 0.61   < >  --    ANIONGAP 4  --  5 7   < >  --    OSMEL 8.7  --  8.6 8.9   < >  --    GLC 90  --  128* 112*   < >  --    ALBUMIN  --   --   --  3.2*  --   --    PROTTOTAL  --   --   --  6.7*  --   --    BILITOTAL  --   --   --  0.3  --   --    ALKPHOS  --   --   --  87  --   --    ALT  --   --   --  15  --   --    AST  --   --   --  14  --   --     < > = values in this interval not displayed.

## 2022-03-21 NOTE — PROGRESS NOTES
Care Suites Procedure Nursing Note    Patient Information  Name: Mickie Mcghee  Age: 57 year old    Procedure  Procedure: CT guided bone biopsy  Procedure start time: 1505  Procedure complete time: 1525  Concerns/abnormal assessment: No immediate  If abnormal assessment, provider notified: N/A  Plan/Other: Proceed as planned    Jose Alfredo Cheema RN MD Sedation Exam completed at this time. Consent signed previously    1508  Time Out done.    CT guided bone Biopsy: Pt tolerated well. VSS. Total sedation given - 50 mcg Fentanyl and 1 mg Versed. Multiple cores obtained & sent to lab for Quick Path results. Bandaid applied to site - area CDI.     1535  Pt back to rm [id number]  per cart & transport. Detailed report called to elsi Cha RN.

## 2022-03-21 NOTE — PROVIDER NOTIFICATION
MD Notification    Notified Person: MD    Notified Person Name: Glory Shaikh    Notification Date/Time: 03/21/22    Notification Interaction: [employer] messaging    Purpose of Notification: Pt has just returned from biopsy, tolerated very well, no nausea or vomiting. Pt requesting diet order changed to regular as she says she is very hungry.    Orders Received:    Comments:

## 2022-03-21 NOTE — PROGRESS NOTES
Service Date: 03/21/2022    SUBJECTIVE:  Ms. Mcghee is a 57-year-old female with history of stage III left breast cancer diagnosed in 2007, ER positive and HER-2/halina negative.  She had bilateral mastectomy, chemotherapy, radiation and anastrozole.    The patient was brought to the Emergency Room on 03/18/2022 because of bilateral leg weakness for last 2 months.  Her lower extremities have gotten progressively very weak.  The patient cannot put weight on it.  For last 2 months, she has been needing help with everything.  The patient was brought to the Emergency Room and had multiple investigations done.  -Normal CBC.  -Normal BMP.  -Normal vitamin B12 and vitamin D.  -MRI of the lumbar and thoracic spine reveals extensive bone metastases.  There is pathological compression deformity at T10 and T11.  There is direct invasion of the spinal cord from T10-T12.  There is near complete involvement of S2.  -CT chest, abdomen and pelvis reveals bone metastases.  There were indeterminate lung nodules.  There is an enlarged upper mediastinal node and hypodense nodule in the liver.  -Brain MRI reveals bone metastases.    On further questioning, patient's leg weakness has been going on for many months, but in last 2 months it has gotten to the point that she is unable to take care of herself.  She cannot put any weight on it.    No headache or dizziness.  No chest pain or shortness of breath.  No nausea or vomiting.  No bleeding.  Denies any pain.    All other review of systems negative.    PHYSICAL EXAMINATION:    GENERAL:  She is alert and oriented x 3.  Not in distress.  Rest of systems not examined.    LABS:  Reviewed.    ASSESSMENT:    1.  A 57-year-old female with history of breast cancer has multiple new bone metastases.  This is clinically consistent with recurrent breast cancer with bone metastases.  2.  Spinal cord compression secondary to bone metastases.    PLAN:    1.  I had a long discussion with the patient.  Imaging  studies were discussed.  I explained to her that this is clinically consistent with metastatic breast cancer.  She has extensive bone metastases.  There is a small possibility of this being from a new primary or myeloma.    2.  Discussed regarding further workup.  She is going to get a CT-guided bone biopsy. I am also going to get some labs for myeloma workup.    3.  Discussed regarding treatment.  Again, treatment will depend on the pathology.  If it is metastatic breast cancer, will have to wait for ER/AZ and HER-2/halina and then decide regarding treatment.  Further discussion regarding treatment after pathology is back.    The patient will benefit from radiation.  Case discussed with Dr. Rosen.  He will plan on giving palliative radiation.     4.  The patient will need bisphosphonate or Xgeva.  That will be started after pathology proves malignancy.    5.  I am going to start her on dexamethasone twice a day.  After 5-7 days, we will taper it off to once a day.    6.  Discussed with her regarding disposition.  The patient wants to go home.  The patient says that her sister will take care of her.    7.  Oncology will continue to follow.  Case discussed with Dr. Shaikh.    TOTAL TIME SPENT:  Forty minutes.  Time spent in today's visit, review of chart/investigations today, communicating with other providers and documentation.    Yury Lozada MD        D: 2022   T: 2022   MT: [id number]    Name:     JUSTIN ECHEVARRIA  MRN:      [id number]        Account:      [id number]   :      1965           Service Date: 2022       Document: [id number]

## 2022-03-21 NOTE — PROGRESS NOTES
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 Continues to have BLE paresis. Sensation intact. Awaiting bone biopsy today.    Plan:  -Bone biopsy as ordered  -Appreciate assistance from Oncology  -No urgent neurosurgical intervention recommended at this time     I have discussed the following assessment and plan Dr. Guzman who is in agreement with initial plan and will follow up with further consultation recommendations.    Philomena Holguin, CASH  [facility] Neurosurgery  [facility]  [address]  [address] [place] [address]    Tel [phone]  Pager [phone]      Interval History   Stable.    Physical Exam   Temp: 98.1  F (36.7  C) Temp src: Oral BP: 107/69 Pulse: 69   Resp: 16 SpO2: 96 % O2 Device: None (Room air)    Vitals:    03/19/22 0254   Weight: 122 lb (55.3 kg)     Vital Signs with Ranges  Temp:  [97.5  F (36.4  C)-98.4  F (36.9  C)] 98.1  F (36.7  C)  Pulse:  [67-75] 69  Resp:  [16] 16  BP: ()/(65-72) 107/69  SpO2:  [95 %-96 %] 96 %  I/O last 3 completed shifts:  In: 480 [P.O.:480]  Out: 2950 [Urine:2950]     , Blood pressure 107/69, pulse 69, temperature 98.1  F (36.7  C), temperature source Oral, resp. rate 16, height 5' 5\" (1.651 m), weight 122 lb (55.3 kg), SpO2 96 %.  122 lbs 0 oz  HEENT:  Normocephalic.  PERRLA.    Heart:  No peripheral edema  Lungs:  No SOB  Skin:  Warm and dry, good capillary refill.  Extremities:  Good radial and dorsalis pedis pulses bilaterally, no edema, cyanosis or clubbing.    NEUROLOGICAL EXAMINATION:   Mental status:  Alert and Oriented x 3, speech is fluent.  Cranial nerves:  II-XII intact.   Motor:  Strength intact BUE, no movement BLE  Sensation:  " intact    Medications       sodium chloride (PF)  3 mL Intracatheter Q8H       Data     CBC RESULTS:   Recent Labs   Lab Test 03/18/22  1632   WBC 5.9   RBC 4.52   HGB 13.3   HCT 40.5   MCV 90   MCH 29.4   MCHC 32.8   RDW 13.8        Basic Metabolic Panel:  Lab Results   Component Value Date     03/21/2022      Lab Results   Component Value Date    POTASSIUM 3.6 03/21/2022     Lab Results   Component Value Date    CHLORIDE 107 03/21/2022     Lab Results   Component Value Date    OSMEL 8.7 03/21/2022     Lab Results   Component Value Date    CO2 27 03/21/2022     Lab Results   Component Value Date    BUN 9 03/21/2022     Lab Results   Component Value Date    CR 0.53 03/21/2022     Lab Results   Component Value Date    GLC 90 03/21/2022     INR:  No results found for: INR

## 2022-03-21 NOTE — PRE-PROCEDURE
GENERAL PRE-PROCEDURE:   Procedure:  Image guided left ilium bone lesion biopsy with intravenous moderate sedation   Date/Time:  3/21/2022 12:50 PM    Written consent obtained?: Yes    Risks and benefits: Risks, benefits and alternatives were discussed    Consent given by:  Patient  Patient states understanding of procedure being performed: Yes    Patient's understanding of procedure matches consent: Yes    Procedure consent matches procedure scheduled: Yes    Expected level of sedation:  Moderate  Appropriately NPO:  Yes  ASA Class:  3  Mallampati  :  Grade 2- soft palate, base of uvula, tonsillar pillars, and portion of posterior pharyngeal wall visible  Lungs:  Lungs clear with good breath sounds bilaterally  Heart:  Normal heart sounds and rate  History & Physical reviewed:  History and physical reviewed and no updates needed  Statement of review:  I have reviewed the lab findings, diagnostic data, medications, and the plan for sedation

## 2022-03-21 NOTE — PLAN OF CARE
Goal Outcome Evaluation:  1930-0730: Pt A/O, VSS on RA. Neuros unchanged. C/o back pain managed with PRN tylenol and heat packs.up to BSC with lift. Continent, of B/B, up to BSC; Pt reports day 4 of no BM. Thinks shell have bm today. T/R as pt allows. PIV SL. K+ 4.2, Mag 2.2 recheck in the AM. Neuro and oncology following. Npo since midnight for IR biopsy of  bone sometime today. Discharge pending workup.

## 2022-03-21 NOTE — PROGRESS NOTES
Neurosurgery progress note    Subjective    Patient states she feels well today, denies any pain, denies any change in her symptoms.  Her bowel bladder function remains unchanged.  Bone biopsy has been ordered.  Brain MRI shows bony metastases in the calvarium.  CT chest and pelvis demonstrates additional destructive bone lesions in the pelvis, femur and ribs, as well as enlarged mediastinal lymph node, pulmonary nodules and liver nodules.     Assessment    History of breast cancer, probable metastasis to the spine  Myelopathy with abnormal cord signal at the T10-T11 level  Lower extremity paresis    Plan    Awaiting further evaluation via bone biopsy and oncology.  No urgent surgical intervention at this time.

## 2022-03-21 NOTE — PROGRESS NOTES
Pt S/P biopsy of left iliac bone lesion. Pathology pending. I discussed with pt XRT treatment to T-L spine area of spinal cord compression.  Pt would like XRT rx here at [facility] and plans to be discharged to home.  Outlined XRT rx. to dose of 3000 cGy in 10 treatment fractions to spine area.  Will plan to carry out simulation tomorrow AM & initiate XRT Rx. after finalizing treatment plan.  I discussed with Dr. Lozada in Oncology. KG Rosen MD.

## 2022-03-21 NOTE — PROCEDURES
[facility]    Procedure: IR Procedure Note    Date/Time: 3/21/2022 4:00 PM  Performed by: Lelia Cuellar MD  Authorized by: Lelia Cuellar MD       UNIVERSAL PROTOCOL   Site Marked: NA  Prior Images Obtained and Reviewed:  Yes  Required items: Required blood products, implants, devices and special equipment available    Patient identity confirmed:  Verbally with patient, arm band, provided demographic data and hospital-assigned identification number  Patient was reevaluated immediately before administering moderate or deep sedation or anesthesia  Confirmation Checklist:  Patient's identity using two indicators, relevant allergies, procedure was appropriate and matched the consent or emergent situation and correct equipment/implants were available  Time out: Immediately prior to the procedure a time out was called    Universal Protocol: the Joint Commission Universal Protocol was followed    Preparation: Patient was prepped and draped in usual sterile fashion       ANESTHESIA    Anesthesia: Local infiltration  Local Anesthetic:  Lidocaine 1% without epinephrine      SEDATION  Patient Sedated: Yes    Sedation Type:  Moderate (conscious) sedation  Sedation:  Fentanyl and midazolam  Vital signs: Vital signs monitored during sedation    See dictated procedure note for full details.  Findings: Lytic bone lesion in the left iliac bone    Specimens: core needle biopsy specimens sent for pathological analysis    Complications: None    Condition: Stable      PROCEDURE    Patient Tolerance:  Patient tolerated the procedure well with no immediate complications  Length of time physician/provider present for 1:1 monitoring during sedation: 20

## 2022-03-21 NOTE — PLAN OF CARE
Pt is A&Ox4, neuros unchanged, BLE paralysis, VSS on RA. Had bone biopsy this shift, site lower left back/hip, dressing CDI. Denies pain. Good appetite, tolerating regular diet. Continent B&B, uses lift. Refused turns this shift. Neuro and oncology following. Pt prefers to go home and receive care from sister instead of TCU. Discharge pending plan.

## 2022-03-21 NOTE — CONSULTS
56 y/o female admitted 3/19/22 with spinal cord compression & lower extremity paralysis.  Pt has previous Dx. Of left breast CA in 2007. MRI T spine shows T10-T12  Pathologic compression & direct spinal canal invasion & L1 & L2 lesions.  Biopsy of left iliac bone metastasis pending.  I met & reviewed potential XRT treatment with pt & family.  Rec: Consider XRT treatment to T-L spine after tissue Dx.  Pt being considered for TCU placement & this may limit XRT treatment location.  Will discuss with Oncology & will follow.  KG Rosen MD.

## 2022-03-22 LAB
KAPPA LC FREE SER-MCNC: 1.33 MG/DL (ref 0.33–1.94)
KAPPA LC FREE/LAMBDA FREE SER NEPH: 1.2 {RATIO} (ref 0.26–1.65)
LAMBDA LC FREE SERPL-MCNC: 1.11 MG/DL (ref 0.57–2.63)
MAGNESIUM SERPL-MCNC: 2.5 MG/DL (ref 1.6–2.3)
POTASSIUM BLD-SCNC: 4 MMOL/L (ref 3.4–5.3)
TOTAL PROTEIN SERUM FOR ELP: 6.5 G/DL (ref 6.8–8.8)

## 2022-03-22 PROCEDURE — 99232 SBSQ HOSP IP/OBS MODERATE 35: CPT | Performed by: INTERNAL MEDICINE

## 2022-03-22 PROCEDURE — 77300 RADIATION THERAPY DOSE PLAN: CPT

## 2022-03-22 PROCEDURE — 120N000001 HC R&B MED SURG/OB

## 2022-03-22 PROCEDURE — 250N000011 HC RX IP 250 OP 636: Performed by: INTERNAL MEDICINE

## 2022-03-22 PROCEDURE — DP0C1ZZ BEAM RADIATION OF OTHER BONE USING PHOTONS 1 - 10 MEV: ICD-10-PCS | Performed by: INTERNAL MEDICINE

## 2022-03-22 PROCEDURE — 77290 THER RAD SIMULAJ FIELD CPLX: CPT

## 2022-03-22 PROCEDURE — 77295 3-D RADIOTHERAPY PLAN: CPT

## 2022-03-22 PROCEDURE — 86334 IMMUNOFIX E-PHORESIS SERUM: CPT | Performed by: PATHOLOGY

## 2022-03-22 PROCEDURE — 84165 PROTEIN E-PHORESIS SERUM: CPT | Mod: TC | Performed by: PATHOLOGY

## 2022-03-22 PROCEDURE — 99231 SBSQ HOSP IP/OBS SF/LOW 25: CPT | Performed by: INTERNAL MEDICINE

## 2022-03-22 PROCEDURE — 84132 ASSAY OF SERUM POTASSIUM: CPT | Performed by: INTERNAL MEDICINE

## 2022-03-22 PROCEDURE — 36415 COLL VENOUS BLD VENIPUNCTURE: CPT | Performed by: INTERNAL MEDICINE

## 2022-03-22 PROCEDURE — 84155 ASSAY OF PROTEIN SERUM: CPT | Performed by: INTERNAL MEDICINE

## 2022-03-22 PROCEDURE — 77334 RADIATION TREATMENT AID(S): CPT

## 2022-03-22 PROCEDURE — 77412 RADIATION TX DELIVERY LVL 3: CPT

## 2022-03-22 PROCEDURE — 83735 ASSAY OF MAGNESIUM: CPT | Performed by: INTERNAL MEDICINE

## 2022-03-22 PROCEDURE — 83521 IG LIGHT CHAINS FREE EACH: CPT | Performed by: INTERNAL MEDICINE

## 2022-03-22 RX ADMIN — ONDANSETRON 4 MG: 4 TABLET, ORALLY DISINTEGRATING ORAL at 15:46

## 2022-03-22 RX ADMIN — DEXAMETHASONE 4 MG: 4 TABLET ORAL at 08:29

## 2022-03-22 RX ADMIN — DEXAMETHASONE 4 MG: 4 TABLET ORAL at 20:59

## 2022-03-22 ASSESSMENT — ACTIVITIES OF DAILY LIVING (ADL)
ADLS_ACUITY_SCORE: 17
ADLS_ACUITY_SCORE: 19
ADLS_ACUITY_SCORE: 17
ADLS_ACUITY_SCORE: 19
ADLS_ACUITY_SCORE: 17
ADLS_ACUITY_SCORE: 17
ADLS_ACUITY_SCORE: 19
ADLS_ACUITY_SCORE: 17
ADLS_ACUITY_SCORE: 19
ADLS_ACUITY_SCORE: 19
ADLS_ACUITY_SCORE: 17

## 2022-03-22 NOTE — PLAN OF CARE
Goal Outcome Evaluation:        A&Ox4, neuros unchanged, BLE paralysis, VSS on RA, soft BPs. Had bone biopsy this shift, site lower left back/hip, dressing CDI. Daily dressing change orders. Denies pain. Good appetite, tolerating regular diet. Continent B&B, uses lift. Up to BSC once during shift. Refused turns this shift. Neuro and oncology following. First of 10 radiation treatments planned for AM. Pt prefers to go home and receive care from sister instead of TCU. Discharge pending plan.

## 2022-03-22 NOTE — PLAN OF CARE
Goal Outcome Evaluation:      A/Ox4, neuros unchanged. BLE paralysis. VSS on RA. Denies pain or N/V. Regular diet, tolerating well. Up A2 w/ lift to BSC, T/R as pt allows. Cont B/B, voiding adequately, no BM this shift. K+ & Mag protocol - recheck in AM. Bone biopsy completed yesterday evening, site lower left back/hip, dressing CDI. Radiation planning this am, will go down at 345 for 1/10 treatments. D/t transportation needs & pt condition, pt will stay in hospital until radiation treatment is completed - estimated for 4/4.

## 2022-03-22 NOTE — PLAN OF CARE
Goal Outcome Evaluation:  Patient A/Ox4, Vs in baseline, bed bound- use lift.   Denies pain. Sister is in the room to assist the patient.   Repositioned patient- has redness on the buttocks and small open wound (stage 2).

## 2022-03-22 NOTE — PROGRESS NOTES
Service Date: 2022    SUBJECTIVE:  Ms. Echevarria is a 57-year-old female with likely recurrent breast cancer.  The patient admitted with spinal cord compression.  The patient has multiple bone metastases.  Biopsy has been done.  Pathology is pending.    The patient's overall condition is stable.  No headache or dizziness.  No chest pain or shortness of breath.  No nausea or vomiting.  Main problem is bilateral lower extremity paralysis.  She cannot move her lower extremities at all.    The patient is going to start radiation today.  Also started her on dexamethasone.    The patient said that overall she is stable.    PHYSICAL EXAMINATION:   She is alert, oriented x3.  Not in distress.  Rest of the systems not examined.    ASSESSMENT:     1.  A 57-year-old female with history of breast cancer has new bone metastases.  This is likely recurrent breast cancer.  2.  Spinal cord compression.    PLAN:    1.  For spinal cord compression, she is going to start radiation.  Also started on dexamethasone 4 mg twice a day.  After a week, we will taper it down to once a day.  2.  Depending on the pathology, I will discuss regarding further treatment.  Hopefully, it will be back tomorrow.  3.  Once pathology confirms malignancy, we will also start her on bisphosphonate or Xgeva.  4.  She had a few questions, which were all answered.  Oncology will see her after pathology is back.    Yury Lozada MD        D: 2022   T: 2022   MT: [id number]    Name:     JUSTIN ECHEVARRIA  MRN:      [id number]        Account:      [id number]   :      1965           Service Date: 2022       Document: [id number]

## 2022-03-22 NOTE — PROGRESS NOTES
Patient came to radiation therapy for treatment #1 of 10 to her Thoracic-Lumbar Spine and Lumbar-Sacral Spine at a daily dose of 300 cGy to each treatment area using 10MV energy.  Plan for 9 more radiation therapy treatments.

## 2022-03-22 NOTE — PLAN OF CARE
Goal Outcome Evaluation:             Summary: Direct admission from [facility] with bilateral lower extremity paresis since mid January found to have bony metastases, likely of prior breast cancer with direct invasion of spinal canal and spinal cord impingement at T10 level.    Primary Diagnosis: Spinal cord compression due to malignant neoplasm metastatic to spine   Orientation: A&Ox4  Aggression Stop Light: green  Mobility: W/C bound, BLE paralysis   Pain Management: denies pain  Diet: NPO  Bowel/Bladder: purewick, can tell when she needs to void/ have a BM  Abnormal Lab/Assessments:  Drain/Device/Wound: R PIV  Consults: neurosurgery   D/C Day/Goals/Place:     Shift Note:   Pt is A&Ox4, neuros unchanged, BLE paralysis, VSS on RA. Had bone biopsy today, site lower left back/hip, dressing CDI. Tylenol given for pain. Good appetite, tolerating regular diet. Continent B&B, uses lift. Neuro and oncology following. Pt prefers to go home and receive care from sister instead of TCU. Discharge pending plan.

## 2022-03-22 NOTE — PROVIDER NOTIFICATION
MD Notification    Notified Person: MD    Notified Person Name: Jolly MALDONADO    Notification Date/Time: 3/22/22 14:35    Notification Interaction: FYI page regarding DME requested for discharge    Purpose of Notification: Patient and sister preferring discharge to home vs TCU they would like help with DME asking for orders for Hospital bed,wheelchair and bedside commode for discharge to home if appropriate     Orders Received: Awaiting call back if needed or orders to be entered.    Comments:

## 2022-03-22 NOTE — CONSULTS
Care Management Follow UP    Length of Stay (days): 3    Expected Discharge Date: to be determined     Concerns to be Addressed: basic needs, coping/stress, discharge planning, financial/insurance     Referrals for DME to be sent with supporting documentation when available  [facility]  Phone#[phone]  Fax# [phone]  Referral to [facility] for possible homecare at discharge (pending accepting Oncology provider for orders or PCP to be established and scheduled)  Patient plan of care discussed at interdisciplinary rounds: Yes    Anticipated Discharge Disposition: Home, Home Care     Anticipated Discharge Services: Transportation Services  Anticipated Discharge DME: Bed, Wheelchair, Commode    Patient/family educated on Medicare website which has current facility and service quality ratings:  no  Education Provided on the Discharge Plan:  yes  Patient/Family in Agreement with the Plan: no patient and sister preferring to go home with DME, Homecare and transportation    Referrals Placed by CM/SW: External Care Coordination, Homecare, Specialty Providers, Durable Medical Equipment (DME), Transportation, Community Resources, Insurance Verification for DME  Private pay costs discussed: insurance costs possible DME, transportation etc    Additional Information:  Writer met with the patient and her sister GI He at the bedside.  Patient is A+Ox4 she is lift dependent but has upper arm strength for repositioning and is hopeful to be able to transfer to/from bed to chair at home, and is willing to work with PT/OT here as well.  Patient had met with SW and was possibly going to discharge to TCU, however the patient is now preferring to discharge to home.    Patient stated her  is disabled and home during the day. He is able to assist her with some cares like [facility], and bringing the patient things that she needs but not a lot of physical things.  Her sister is here on a visa until mid May and plans on going home and  coming back when able.  Patient has a son that is able to provide some assistance.    Writer discussed the following with the patient who was also translating for her sister who has been her primary caregiver.  1. Patient is simulated for radiation therapy and recommendation is 10 treatments to start today.  Writer explained that most patients do not stay in the hospital for radiation and that radiation therapy provides an outpatient schedule with permit if able.  Patient did receive  a copy of her schedule and is expected to complete radiation therapy by ~4/4/22.   Patient does not have transportation and has not sat up to chair and may need to be cleared for this due to cord compression. If patient is not able to have transportation or sit up radiation therapy may need to be completed here during this hospitalization.  2. Writer discussed possible transportation benefits and if patient had checked with her insurance, patient has not looked into this. Writer explained that [place] Medicaid sometimes has benefits for transportation and they would bring her to and from medical appointments.  Patient asked for assistance with coordinating possible transportation to home and in the future.  3. Writer went over DME equipment.  Patient is in need of a hospital bed, bedside commode, wheelchair and possible PCD's. Writer will work work DME agency and discharging provider to work on a prior auth for DME.  Writer reached out to [facility] and they require a prescription for the DME, documentation for the support of the DME in the Physicians progress note and face to face for the DME. They do not have PCD's so this may need to be looked at by the patient or family as patient stated she is looking on "[employer].   Per [facility] once all of the information is received they would reach out to us to determine if more documentation is needed this can take a couple of days.  Writer will reach out to Hospitalist to start process if agreeable  so that we can work on DME for safe discharge.  Patient stated they have a place for the DME and that she would be staying on the main floor where her sister would be helping to care for her.  4. Patient would need homecare at discharge. Patient needs a new PCP and is preferring to go to [facility] or [facility] for PCP.   Patient aware that we are currently having difficulties in finding  homecare due to capacity and patient would need to have a PCP appointment prior to getting homecare.  Patient stated she feels that with the help of her spouse and sister that she would be able to manage at home okay even if homecare was not to start immediately.  5. Writer discussed follow up recommendations. Will need to see where Oncology will refer the patient to if at [facility] writer would work on coordinating this appointment pending final recommendations.    Patient aware that we will continue to follow for further discharge planning needs as identified.          Kelley Guallpa RN

## 2022-03-22 NOTE — PROGRESS NOTES
[facility]    Hospitalist Progress Note      Assessment & Plan   Mickie Mcghee is a 57 year old female admitted on 3/19/2022. She presented as a direct admission from [facility] emergency department with bilateral lower extremity paresis since mid January and found to have bony metastases, likely of prior breast cancer with direct invasion of spinal canal and spinal cord impingement at T10 level.  Transferred for neurosurgical evaluation    Bilateral lower extremity paresis secondary to tumor invasion of the spinal canal  T2 N3 M0 stage III left breast cancer dx 2007  Multiple bony metastases by MRI, CT, March 2022   -Patient with multiple bony metastases suspected secondary to known history of breast cancer.  Paresis has been present/stable since mid January    - Initial diagnosis in 2007.  ER/NE positive, HER-2/halina negative.  Treated with bilateral mastectomy plus radiation, TAC x6 cycles, tamoxifen x6 years, Zometa.  Lost to follow-up with oncology, was last seen in 2015 prior to losing insurance.  - CT chest abdomen pelvis; confirms destructive bony lesion T and L-spine.  Indeterminate pulmonary nodules right, indeterminate mediastinal lymph node, hypodense nodule liver, bony metastases right proximal femur, ribs.    -Brain MRI: Interval development of right frontal, right parietal, left temporal and left parietal bony metastases.  No brain metastatic findings.  -Neurosurgery consulted.  See note, due to duration of paresis LE 2' to thoracic/lumbar bony metastases impinging on spinal cord no indication for immediate surgical intervention, defer to oncology plans.  Unclear if intervention may preserve bladder and bowel function.  -Oncology consulted: see Note: Any treatment would be palliative  -S/p iliac bone biopsy 3/21.  Results pending  -Radiation oncology consulted.  Radiation treatments to thoracolumbar spine to start 3/22.  -Admitting Hospitalist discussed strong consideration for  DNR/DNI status in the future as a future medical emergency is likely going to be secondary to her underlying malignancy.  Patient is quite realistic and understanding, though wishes wishes to discuss with her  prior to any CODE STATUS change.  Her wish is to attend her son's graduation from high school this spring.  -Bladder scan was 700 cc, straight cath.  Scheduled toileting q. meal and at bedtime. Continent of bladder and bowels.   -Currently no significant pain condition.  Has PRN APAP, Oxy and HM available.   -Fall precautions   -Discussed with care coordinator-hospital bed, wheelchair, bedside commode ordered.  -Patient would prefer to go home at discharge   -Started on dexamethasone 4 mg twice daily on 3/22.  After 5-7 days this can be decreased to once a day.   -10 radiation therapy sessions starting 3/22  -Oncology will start her on bisphosphonates or Xgeva after final pathology report  - working on discharge     Social/care issues:  By patient's report she was lost to medical follow-up about 14 years ago due to loss of insurance.  She states in January her disabled  qualified for SS disability and now patient has MA.  Due to 's disability and now patient with lower extremity hemiparesis her sister from [place] is providing cares.  However now with MA she probably will qualify for PCA.  Her primary wish is to see her son graduate this spring from high school.  He has been accepted to the [employer] full scholarship this fall.  Discussed care plan pending Oncology recommendations including bone biopsy and subsequent treatment plan established, in the meantime identify functional status and adaptive equipment needed.  -Care management consult placed      DVT Prophylaxis: Pneumatic Compression Devices  Code Status: Full Code  Expected Discharge: Likely in 1 to 2 days           Glory Shaikh MD  Text Page (7am - 6pm, M-F)    Interval History   Patient reports she feels okay.  No  significant change since yesterday   Continues to have bowel and bladder function   Denies any pain   Emotionally stable   Now started on dexamethasone  Also assessed by radiation therapy, reports she will be getting radiation treatment later today        SH: No tobacco.  Lives with  and son who is a senior in high school.   is now on SSI disability [appears has CP]  allowing patient to have Medicaid insurance.  Prior to this was lost to medical follow-up due to lack of insurance which appears to be over the last 14 years.  Her wish is to see her son graduate high school this spring, he is accepted to [employer] on full scholarship in [place].  Sister visiting from [place] assist with patient and patient's 's care.      -Data reviewed today: I reviewed all new labs and imaging results over the last 24 hours.     Physical Exam   Temp: 99.3  F (37.4  C) Temp src: Oral BP: 110/72 Pulse: 83   Resp: 20 SpO2: 98 % O2 Device: None (Room air)    Vitals:    03/19/22 0254   Weight: 55.3 kg (122 lb)     Vital Signs with Ranges  Temp:  [96.9  F (36.1  C)-99.3  F (37.4  C)] 99.3  F (37.4  C)  Pulse:  [58-87] 83  Resp:  [14-20] 20  BP: ()/(63-72) 110/72  SpO2:  [95 %-98 %] 98 %  I/O last 3 completed shifts:  In: 824 [P.O.:824]  Out: 1875 [Urine:1875]    General/Constitutional:   NAD, alert, calm, cooperative;  bilateral LE paresis.  HEENT/Head Exam:  atraumatic  Eyes:  PERRL, no conjunctivits  Chest/Respiratory:  Air exchange bilateral lung fields; no rales or wheeze. Respiration nonlabored on room air.  Cardiovascular:  no murmur appreciated.  LE edema none  Gastrointestinal/Abdomen:  soft, nontender, no rebound, guarding or other peritoneal signs.  Neurologic:  gross CN intact and motor testing paraplegia lower extremities, sensation grossly tested intact   Psychiatric:  Mental status:  Alert, oriented, affect calm      Medications       dexamethasone  4 mg Oral Q12H TAN     sodium chloride  (PF)  3 mL Intracatheter Q8H       Data   Recent Labs   Lab 03/22/22  0645 03/21/22  0633 03/20/22  1358 03/20/22  0733 03/19/22  0457 03/18/22  1633 03/18/22  1632   WBC  --   --   --   --   --   --  5.9   HGB  --   --   --   --   --   --  13.3   MCV  --   --   --   --   --   --  90   PLT  --   --   --   --   --   --  300   NA  --  138  --  140 139   < >  --    POTASSIUM 4.0 3.6 4.2 3.3* 3.7  3.6   < >  --    CHLORIDE  --  107  --  109 107   < >  --    CO2  --  27  --  26 25   < >  --    BUN  --  9  --  9 21   < >  --    CR  --  0.53  --  0.53 0.61   < >  --    ANIONGAP  --  4  --  5 7   < >  --    OSMEL  --  8.7  --  8.6 8.9   < >  --    GLC  --  90  --  128* 112*   < >  --    ALBUMIN  --   --   --   --  3.2*  --   --    PROTTOTAL  --   --   --   --  6.7*  --   --    BILITOTAL  --   --   --   --  0.3  --   --    ALKPHOS  --   --   --   --  87  --   --    ALT  --   --   --   --  15  --   --    AST  --   --   --   --  14  --   --     < > = values in this interval not displayed.

## 2022-03-22 NOTE — PROGRESS NOTES
Patient was brought down to Radiation therapy for planning to begin for her treatment to her T-L-S-spines.  She will return later today @ 3:45 pm to have the first of ten total treatments to these areas.  She has been given an outpatient schedule in case of discharge.  Given her disability she may need to come on a stretcher or with a jani lift belt as an outpatient.  SE  RTT

## 2022-03-22 NOTE — PROGRESS NOTES
Pt seen & XRT treatment to T-L spine & L-S spinal cord compression outlined & recommended.  Explained side-effects & goals of XRT rx.  Rec: 3000 cGy in 10 rx fractions to both areas.  Pt consents.  Will simulate area this AM & initiate rx this PM.  Consult dictated.  KG Rosen MD.

## 2022-03-23 ENCOUNTER — APPOINTMENT (OUTPATIENT)
Dept: PHYSICAL THERAPY | Facility: CLINIC | Age: 57
End: 2022-03-23
Attending: HOSPITALIST
Payer: MEDICAID

## 2022-03-23 LAB
ALBUMIN SERPL ELPH-MCNC: 3.8 G/DL (ref 3.7–5.1)
ALPHA1 GLOB SERPL ELPH-MCNC: 0.3 G/DL (ref 0.2–0.4)
ALPHA2 GLOB SERPL ELPH-MCNC: 0.8 G/DL (ref 0.5–0.9)
B-GLOBULIN SERPL ELPH-MCNC: 0.7 G/DL (ref 0.6–1)
GAMMA GLOB SERPL ELPH-MCNC: 0.9 G/DL (ref 0.7–1.6)
M PROTEIN SERPL ELPH-MCNC: 0 G/DL
MAGNESIUM SERPL-MCNC: 2.5 MG/DL (ref 1.6–2.3)
POTASSIUM BLD-SCNC: 4.1 MMOL/L (ref 3.4–5.3)
PROT PATTERN SERPL ELPH-IMP: NORMAL
PROT PATTERN SERPL IFE-IMP: NORMAL

## 2022-03-23 PROCEDURE — 120N000001 HC R&B MED SURG/OB

## 2022-03-23 PROCEDURE — 83735 ASSAY OF MAGNESIUM: CPT | Performed by: INTERNAL MEDICINE

## 2022-03-23 PROCEDURE — 84132 ASSAY OF SERUM POTASSIUM: CPT | Performed by: INTERNAL MEDICINE

## 2022-03-23 PROCEDURE — 77412 RADIATION TX DELIVERY LVL 3: CPT

## 2022-03-23 PROCEDURE — 86334 IMMUNOFIX E-PHORESIS SERUM: CPT | Mod: 26 | Performed by: PATHOLOGY

## 2022-03-23 PROCEDURE — 97530 THERAPEUTIC ACTIVITIES: CPT | Mod: GP | Performed by: PHYSICAL THERAPIST

## 2022-03-23 PROCEDURE — 250N000011 HC RX IP 250 OP 636: Performed by: INTERNAL MEDICINE

## 2022-03-23 PROCEDURE — 84165 PROTEIN E-PHORESIS SERUM: CPT | Mod: 26 | Performed by: PATHOLOGY

## 2022-03-23 PROCEDURE — 99232 SBSQ HOSP IP/OBS MODERATE 35: CPT | Performed by: INTERNAL MEDICINE

## 2022-03-23 PROCEDURE — 77387 GUIDANCE FOR RADJ TX DLVR: CPT

## 2022-03-23 PROCEDURE — 36415 COLL VENOUS BLD VENIPUNCTURE: CPT | Performed by: INTERNAL MEDICINE

## 2022-03-23 RX ADMIN — ONDANSETRON 4 MG: 4 TABLET, ORALLY DISINTEGRATING ORAL at 11:02

## 2022-03-23 RX ADMIN — DEXAMETHASONE 4 MG: 4 TABLET ORAL at 20:52

## 2022-03-23 RX ADMIN — DEXAMETHASONE 4 MG: 4 TABLET ORAL at 08:51

## 2022-03-23 ASSESSMENT — ACTIVITIES OF DAILY LIVING (ADL)
ADLS_ACUITY_SCORE: 19

## 2022-03-23 NOTE — PLAN OF CARE
Wheelchair Documentation:     Describe the reason for need to support medical necessity: B LE paralysis.   1. The patient has mobility limitations that impairs their ability to participate in one or more mobility related activities: Toileting, Feeding, Grooming and Bathing.  2. The patient's mobility limitations cannot be safely resolved by using a cane/walker: Yes  3. The patients home has adequate access to use a manual wheelchair: Yes  4. The use of a manual wheelchair on a regular basis will improve the patients ability to participate in mobility related ADL's at home: Yes  5. The patient is willing to use a manual wheelchair at home: Yes  6. The patient has adequate upper body strength and the mental capability to safely use a manual wheelchair and/or has a caregiver that is able to assist: Yes  7. Does the patient have a lower extremity injury or edema?: Yes    Reason for Type of Wheelchair:    1) Light Weight Wheelchair: Patient is unable to self-propel a standard wheelchair in the home but can self propel a light weight wheelchair.     2) Reclining back support:  Needed to relieve pressure and decrease the risk of pressure sores. Needed to reduce risk of patient falling out of chair due to B LE spasms and locking her legs into knee extension.     3) Elevating foot rests: Needed due to patient needing reclining back support (see above). Needed also due to patient being unable to achieve 90deg knee flexion consistently due to severe muscle spasms and locking her legs into knee extension.     4) Gel or Roho Wheelchair Cushion:   Needed to relieve pressure and decrease the risk of pressure sores. Patient already has skin tear/open skin on her bottom. Needed for increased stability in sitting to have a contoured base to provide better posture and stability.     5) Anti-tippers: Pt is at increased risk of tipping backwards due to severe LE muscle spasms and locking her legs into knee extension. Needed as a safety  mechanism to prevent the wheelchair from tipping over.        **Use of a manual wheelchair will significantly improve the patient's ability to participate in MRADLs and the patient will use it on a regular basis in the home. The patient has not expressed an unwillingness to use the manual wheelchair that is provided in the home.**    I, the undersigned, certify that the above prescribed supplies are medically necessary for this patient and is both reasonable and necessary in reference to accepted standards of medical and necessary in reference to accepted standards of medical practice in the treatment of this patient's condition and is not prescribed as a convenience.     María Beltran, PT, DPT

## 2022-03-23 NOTE — CONSULTS
Care Management Follow Up    Length of Stay (days): 4    Expected Discharge Date: 03/28/2022     Concerns to be Addressed: basic needs, coping/stress, discharge planning, financial/insurance     Patient plan of care discussed at interdisciplinary rounds: Yes    Anticipated Discharge Disposition: Home, Home Care     Anticipated Discharge Services: Transportation Services  Anticipated Discharge DME: Bed, Wheelchair, Commode    Patient/family educated on Medicare website which has current facility and service quality ratings:    Education Provided on the Discharge Plan:    Patient/Family in Agreement with the Plan: yes    Referrals Placed by CM/SW: External Care Coordination, Homecare, Specialty Providers, Durable Medical Equipment (DME), Transportation, Community Resources, Insurance Verification for DME  Private pay costs discussed: Not applicable    Additional Information:  Writer did not get a chance to meet with the patient today.  Donovanr received a follow up call from [facility] DME per Pema they have enough information to support the need for hospital bed, wheelchair and bedside commode.  Pema was working on sending a new RX for the wheelchair that will need to be signed by the rounding provider.  Per Pema they have the equipment in stock and would need a 24 hour notice for delivery they would only be able to deliver the equipment day before discharge due to billing and reimbursement requirements.  Writer left a message for the PT lead regarding working with the patient and sister for transferring into wheelchair/chair from bed (per Hospitalist this would be okay) as the patient currently declined the need for a jani lift at home.  Patient will most likely need a jani lift belt for outpatient radiation if we are able to coordinate transportation through her insurance.  Will need to continue to work with the patient to identify discharge and discharge planning needs to include transportation, possible  homecare, follow up appointments and DME.     Addendum: 3/23/22 16:09  Per PT the patient requires additional equipment for a wheelchair, she will enter the specifics in her note from today. Will need to check back with [employer] to see if they are able to accomodate.        Kelley Guallpa RN

## 2022-03-23 NOTE — PROGRESS NOTES
[facility]    Neurosurgery  Daily Progress Note    Assessment & Plan   57F with history of breast cancer, admitted with bony metastasis to spine and BLE paresis. Radiation oncology met with patient and discussed radiation treatment to spine. Patient had first treatment yesterday. Neuro exam stable with continued BLE weakness, sensation intact. Patient reports midline low back pain.     Plan:  - Radiation therapy treatment per Rad Onc  - No surgical intervention planned  - NSG will sign off. Please call with questions or concerns.     Discussed with Dr. Megan Morejon CNP  [facility]  [address] [address]  Tel [phone]  Pager [phone]      Interval History   Stable     Physical Exam   Temp: 97.8  F (36.6  C) Temp src: Oral BP: 107/69 Pulse: 63   Resp: 18 SpO2: 95 % O2 Device: None (Room air)    Vitals:    03/19/22 0254   Weight: 122 lb (55.3 kg)     Vital Signs with Ranges  Temp:  [97.8  F (36.6  C)-99.3  F (37.4  C)] 97.8  F (36.6  C)  Pulse:  [63-83] 63  Resp:  [18-20] 18  BP: (102-110)/(64-72) 107/69  SpO2:  [95 %-98 %] 95 %  I/O last 3 completed shifts:  In: 584 [P.O.:584]  Out: 2050 [Urine:2050]    Mental status:  Alert and Oriented x 3, speech is fluent.  Cranial nerves:  II-XII intact.   Motor:  Strength intact BUE. No movement in BLE.   Sensation:  Intact    Medications        dexamethasone  4 mg Oral Q12H TAN     sodium chloride (PF)  3 mL Intracatheter Q8H       Nusrat Morejon CNP  [facility]   [facility]  [address] [place] [address] [address]  [place] [address]  Tel [phone]  Pager [phone]

## 2022-03-23 NOTE — PROGRESS NOTES
Radiation treatment # 2 to T-L-S spines.  600 cGy total dose to date delivered with 10 MV Photons.  Patient has 8 treatments remaining.  SE  RTT

## 2022-03-23 NOTE — PROGRESS NOTES
[facility]    Hospitalist Progress Note      Assessment & Plan   Mickie Mcghee is a 57 year old female admitted on 3/19/2022. She presented as a direct admission from [facility] emergency department with bilateral lower extremity paresis since mid January and found to have bony metastases, likely of prior breast cancer with direct invasion of spinal canal and spinal cord impingement at T10 level.  Transferred for neurosurgical evaluation    Bilateral lower extremity paresis secondary to tumor invasion of the spinal canal  T2 N3 M0 stage III left breast cancer dx 2007  Multiple bony metastases by MRI, CT, March 2022   -Patient with multiple bony metastases suspected secondary to known history of breast cancer.  Paresis has been present/stable since mid January    - Initial diagnosis in 2007.  ER/ME positive, HER-2/halina negative.  Treated with bilateral mastectomy plus radiation, TAC x6 cycles, tamoxifen x6 years, Zometa.  Lost to follow-up with oncology, was last seen in 2015 prior to losing insurance.  - CT chest abdomen pelvis; confirms destructive bony lesion T and L-spine.  Indeterminate pulmonary nodules right, indeterminate mediastinal lymph node, hypodense nodule liver, bony metastases right proximal femur, ribs.    -Brain MRI: Interval development of right frontal, right parietal, left temporal and left parietal bony metastases.  No brain metastatic findings.  -Neurosurgery consulted.  See note, due to duration of paresis LE 2' to thoracic/lumbar bony metastases impinging on spinal cord no indication for immediate surgical intervention, defer to oncology plans.  Unclear if intervention may preserve bladder and bowel function.  -Oncology consulted: see Note: Any treatment would be palliative  -S/p iliac bone biopsy 3/21.  Results pending  -Radiation oncology consulted.  Radiation treatments to thoracolumbar spine to start 3/22.  -Admitting Hospitalist discussed strong consideration for  DNR/DNI status in the future as a future medical emergency is likely going to be secondary to her underlying malignancy.  Patient is quite realistic and understanding, though wishes wishes to discuss with her  prior to any CODE STATUS change.  Her wish is to attend her son's graduation from high school this spring.  -Bladder scan was 700 cc, straight cath.  Scheduled toileting q. meal and at bedtime. Continent of bladder and bowels.   -Currently no significant pain condition.  Has PRN APAP, Oxy and HM available.   -Fall precautions   -Discussed with care coordinator-hospital bed, wheelchair, bedside commode ordered.  -Patient would prefer to go home at discharge   -Started on dexamethasone 4 mg twice daily on 3/22.  After 7 days this can be decreased to once a day.   -10 radiation therapy sessions starting 3/22  -Oncology will start her on bisphosphonates or Xgeva after final pathology report  - working on discharge     Social/care issues:  By patient's report she was lost to medical follow-up about 14 years ago due to loss of insurance.  She states in January her disabled  qualified for SS disability and now patient has MA.  Due to 's disability and now patient with lower extremity hemiparesis her sister from [place] is providing cares.  However now with MA she probably will qualify for PCA.  Her primary wish is to see her son graduate this spring from high school.  He has been accepted to the [employer] full scholarship this fall.  Discussed care plan pending Oncology recommendations including bone biopsy and subsequent treatment plan established, in the meantime identify functional status and adaptive equipment needed.  -Care management consult placed      DVT Prophylaxis: Pneumatic Compression Devices  Code Status: Full Code  Expected Discharge: Likely in 1 to 2 days           Glory Shaikh MD  Text Page (7am - 6pm, M-F)    Interval History   Patient reports she feels okay.  No  significant change since yesterday   Has noted some pain after sitting in chair for over 30 minutes  Continues to have bowel and bladder function   Underwent second radiation therapy treatment today        SH: No tobacco.  Lives with  and son who is a senior in high school.   is now on SSI disability [appears has CP]  allowing patient to have Medicaid insurance.  Prior to this was lost to medical follow-up due to lack of insurance which appears to be over the last 14 years.  Her wish is to see her son graduate high school this spring, he is accepted to [employer] on full scholarship in [employer].  Sister visiting from [place] assist with patient and patient's 's care.      -Data reviewed today: I reviewed all new labs and imaging results over the last 24 hours.     Physical Exam   Temp: 98.2  F (36.8  C) Temp src: Oral BP: 91/59 Pulse: 69   Resp: 20 SpO2: 97 % O2 Device: None (Room air)    Vitals:    03/19/22 0254   Weight: 55.3 kg (122 lb)     Vital Signs with Ranges  Temp:  [97.8  F (36.6  C)-98.2  F (36.8  C)] 98.2  F (36.8  C)  Pulse:  [63-69] 69  Resp:  [18-20] 20  BP: ()/(59-69) 91/59  SpO2:  [95 %-97 %] 97 %  I/O last 3 completed shifts:  In: 340 [P.O.:340]  Out: 1400 [Urine:1400]    General/Constitutional:   NAD, alert, calm, cooperative;  bilateral LE paresis.  Chest/Respiratory:  Air exchange bilateral lung fields; no rales or wheeze. Respiration nonlabored on room air.  Cardiovascular:  no murmur appreciated.  LE edema none  Gastrointestinal/Abdomen:  soft, nontender, no rebound, guarding or other peritoneal signs.  Neurologic:  gross CN intact and motor testing paraplegia lower extremities, sensation grossly tested intact   Psychiatric:  Mental status:  Alert, oriented, affect calm      Medications       dexamethasone  4 mg Oral Q12H TAN     sodium chloride (PF)  3 mL Intracatheter Q8H       Data   Recent Labs   Lab 03/23/22  0611 03/22/22  0645 03/21/22  0633  03/20/22  1358 03/20/22  0733 03/19/22  0457 03/18/22  1633 03/18/22  1632   WBC  --   --   --   --   --   --   --  5.9   HGB  --   --   --   --   --   --   --  13.3   MCV  --   --   --   --   --   --   --  90   PLT  --   --   --   --   --   --   --  300   NA  --   --  138  --  140 139   < >  --    POTASSIUM 4.1 4.0 3.6   < > 3.3* 3.7  3.6   < >  --    CHLORIDE  --   --  107  --  109 107   < >  --    CO2  --   --  27  --  26 25   < >  --    BUN  --   --  9  --  9 21   < >  --    CR  --   --  0.53  --  0.53 0.61   < >  --    ANIONGAP  --   --  4  --  5 7   < >  --    OSMEL  --   --  8.7  --  8.6 8.9   < >  --    GLC  --   --  90  --  128* 112*   < >  --    ALBUMIN  --   --   --   --   --  3.2*  --   --    PROTTOTAL  --   --   --   --   --  6.7*  --   --    BILITOTAL  --   --   --   --   --  0.3  --   --    ALKPHOS  --   --   --   --   --  87  --   --    ALT  --   --   --   --   --  15  --   --    AST  --   --   --   --   --  14  --   --     < > = values in this interval not displayed.

## 2022-03-23 NOTE — PLAN OF CARE
A&Ox4, VSS on RA. Neuros unchanged. BLE paralysis. Denied pain. Tolerating regular diet. UP A2 with lift to BSC, continent of bladder and bowel. Educated pt on need to T/R as sore is present on left buttocks. Dressing over biopsy site is CDI. Radiation 1 of 10 completed yesterday, 3/22. SW following to assist in planning discharge back home and equipment needs.

## 2022-03-23 NOTE — PLAN OF CARE
Goal Outcome Evaluation:      A/Ox4, VSS on RA. Neuros unchanged. BLE paralysis. Denies pain & N/V. Regular diet, tolerating well. Up A2 w/ lift to BSC, continent of bladder & bowel. T/R Q2h - encouraged d/t skin tear/open skin on bottom. 1 BM this shift. Dressing over biopsy site is CDI. Radiation 2/10 is completed (3/23). PT following. SW assisting with getting pt ready to discharge home (equipment, transportation, etc).

## 2022-03-23 NOTE — CONSULTS
Consult Date: 03/22/2022    REFERRING PHYSICIANS:  Dr. Venessa Dixon, Dr. Yury Lozada, also hospitalist Dr. River.    HISTORY OF PRESENT ILLNESS:  Mickie Mcghee is a 57-year-old female with a history of left breast cancer, admitted to [facility] on 03/19/2022 with lower extremity paralysis and evidence of spinal cord compression.    The patient had previous treatment for left breast cancer, stage III, in 2007.  The patient underwent bilateral mastectomy followed by Taxotere, Adriamycin, and Cytoxan chemotherapy followed by radiation treatment to the left chest wall region and Arimidex hormonal medication.  This was completed in 2013.    The patient subsequently has been lost to followup.    The patient has noted left low back discomfort occurring in 03/2021.  Subsequent lower extremity weakness led to inability for ambulation starting in 12/2021.  She continues to have lower extremity weakness.  She has had some numbness left greater than right in the lower extremities as well.  She states that she is able to have bowel movements and has passing of urine.  She denies significant perineal anesthesia.    Subsequent evaluation in the emergency room led to MRI study of the lower thoracic and lumbar spine on 03/18/2022.  This study identified compression fractures of the T10 and T11 vertebrae with direct invasion of the spinal cord from T10 through T12.  Additional near complete involvement of the S2 sacral region was noted, in addition to left iliac bone metastatic lesion.  CT scan of the chest, abdomen and pelvis identified bony metastasis and an indeterminate pulmonary nodules and a hypodense lesion in the liver.  Brain MRI study showed no parenchymal brain lesions; however, there was evidence of calvarial bony metastasis.    The patient has additionally undergone a biopsy of her left iliac crest bone lesion carried out on 03/21/2022.  Pathology from this is pending.    The patient is seen for  consideration of radiation treatment for her spinal cord impinging tumor in the thoracolumbar spine in addition to sacral bone metastatic involvement.    PAST MEDICAL HISTORY:  As noted above.  Previous chemotherapy and radiation treatment for her left breast cancer disease involvement in 2007.    MEDICATIONS:  Include:  1.  Decadron 4 mg b.i.d.  2.  Dilaudid pain medication.  4.  Oxycodone pain medication as needed.  5.  Compazine medication.    ALLERGIES:  NO KNOWN DRUG ALLERGIES.    ECOG PERFORMANCE SCORE:  3.    PHYSICAL EXAMINATION:    GENERAL:  Reveals an alert female, resting on gurney.  ECOG performance score 3.  VITAL SIGNS:  Blood pressure 107/70, pulse 64 and regular, temperature 96.9 degrees Fahrenheit, weight 122 pounds.  EOMs are intact.  There is no cervical or supraclavicular lymphadenopathy.  LUNGS:  On auscultation reveal no significant rales or rhonchi.  CARDIAC:  Regular rhythm without significant murmur.  CHEST:  Bilateral mastectomy wound sites are well-healed without nodules or masses.  ABDOMEN:  Soft, without organomegaly identified.  EXTREMITIES:  Show lower extremity paresis with sensation along the right lateral hip region but distal sensation absent from the left lower extremity and right distal lower extremity.    NEUROLOGIC:  No cranial nerve deficits noted.    IMPRESSION AND RECOMMENDATIONS:  Mickie Mcghee is a 57-year-old female with a diagnosis of likely metastatic breast cancer with spinal cord impingement at the T10 through T12 vertebrae and S2 sacral region.    I reviewed and discussed with the patient consideration of external beam radiation treatment to be directed to the thoracolumbar spine and lumbosacral spine regions.  I have outlined treatment with external beam radiation to a dose of 3000 cGy in 10 treatment fractions to these sites.  I have recommended pretreatment antiemetic medication with Compazine medication.  The patient will additionally continue on Decadron  medication as outlined above.  As noted, the patient has consented, and we will be carrying out simulation and initiating emergency radiation treatment later today based on the patient's significant spinal cord impingement.    I spent 20 minutes in face-to-face time with the patient, 18 minutes of which were spent in counseling and coordination of care.  Ten minutes were spent reviewing imaging, test results and other medical records.  I additionally spent 5 minutes in consultative discussions with Dr. Lozada.    En Rosen MD        D: 2022   T: 2022   MT: [place]OL[place]    Name:     JUSTIN ECHEVARRAI  MRN:      [id number]        Account:      [id number]   :      1965           Consult Date: 2022     Document: [id number]    cc:  MD Yury Trevizo MD

## 2022-03-24 LAB
MAGNESIUM SERPL-MCNC: 2.5 MG/DL (ref 1.6–2.3)
POTASSIUM BLD-SCNC: 3.6 MMOL/L (ref 3.4–5.3)

## 2022-03-24 PROCEDURE — 83735 ASSAY OF MAGNESIUM: CPT | Performed by: INTERNAL MEDICINE

## 2022-03-24 PROCEDURE — 84132 ASSAY OF SERUM POTASSIUM: CPT | Performed by: INTERNAL MEDICINE

## 2022-03-24 PROCEDURE — 88360 TUMOR IMMUNOHISTOCHEM/MANUAL: CPT | Mod: 26 | Performed by: PATHOLOGY

## 2022-03-24 PROCEDURE — 99233 SBSQ HOSP IP/OBS HIGH 50: CPT | Performed by: INTERNAL MEDICINE

## 2022-03-24 PROCEDURE — 120N000001 HC R&B MED SURG/OB

## 2022-03-24 PROCEDURE — 250N000011 HC RX IP 250 OP 636: Performed by: INTERNAL MEDICINE

## 2022-03-24 PROCEDURE — 250N000013 HC RX MED GY IP 250 OP 250 PS 637: Performed by: INTERNAL MEDICINE

## 2022-03-24 PROCEDURE — 77412 RADIATION TX DELIVERY LVL 3: CPT

## 2022-03-24 PROCEDURE — 88305 TISSUE EXAM BY PATHOLOGIST: CPT | Mod: 26 | Performed by: PATHOLOGY

## 2022-03-24 PROCEDURE — 258N000003 HC RX IP 258 OP 636: Performed by: INTERNAL MEDICINE

## 2022-03-24 PROCEDURE — 36415 COLL VENOUS BLD VENIPUNCTURE: CPT | Performed by: INTERNAL MEDICINE

## 2022-03-24 PROCEDURE — 99232 SBSQ HOSP IP/OBS MODERATE 35: CPT | Performed by: INTERNAL MEDICINE

## 2022-03-24 PROCEDURE — 88342 IMHCHEM/IMCYTCHM 1ST ANTB: CPT | Mod: 26 | Performed by: PATHOLOGY

## 2022-03-24 PROCEDURE — 77387 GUIDANCE FOR RADJ TX DLVR: CPT

## 2022-03-24 RX ORDER — LETROZOLE 2.5 MG/1
2.5 TABLET, FILM COATED ORAL DAILY
Status: DISCONTINUED | OUTPATIENT
Start: 2022-03-25 | End: 2022-03-29 | Stop reason: HOSPADM

## 2022-03-24 RX ADMIN — CALCIUM CARBONATE 600 MG (1,500 MG)-VITAMIN D3 400 UNIT TABLET 1 TABLET: at 17:09

## 2022-03-24 RX ADMIN — DEXAMETHASONE 4 MG: 4 TABLET ORAL at 20:38

## 2022-03-24 RX ADMIN — DEXAMETHASONE 4 MG: 4 TABLET ORAL at 08:40

## 2022-03-24 RX ADMIN — ZOLEDRONIC ACID 4 MG: 4 INJECTION, SOLUTION, CONCENTRATE INTRAVENOUS at 17:08

## 2022-03-24 RX ADMIN — ONDANSETRON 4 MG: 4 TABLET, ORALLY DISINTEGRATING ORAL at 10:51

## 2022-03-24 RX ADMIN — CALCIUM CARBONATE 600 MG (1,500 MG)-VITAMIN D3 400 UNIT TABLET 1 TABLET: at 13:12

## 2022-03-24 RX ADMIN — ACETAMINOPHEN 650 MG: 325 TABLET, FILM COATED ORAL at 21:45

## 2022-03-24 ASSESSMENT — ACTIVITIES OF DAILY LIVING (ADL)
ADLS_ACUITY_SCORE: 17
ADLS_ACUITY_SCORE: 19
ADLS_ACUITY_SCORE: 17
ADLS_ACUITY_SCORE: 19
ADLS_ACUITY_SCORE: 19
ADLS_ACUITY_SCORE: 17
ADLS_ACUITY_SCORE: 19
ADLS_ACUITY_SCORE: 17
ADLS_ACUITY_SCORE: 19
ADLS_ACUITY_SCORE: 17
ADLS_ACUITY_SCORE: 17
ADLS_ACUITY_SCORE: 19
ADLS_ACUITY_SCORE: 17
ADLS_ACUITY_SCORE: 17
ADLS_ACUITY_SCORE: 19
ADLS_ACUITY_SCORE: 17
ADLS_ACUITY_SCORE: 17
ADLS_ACUITY_SCORE: 19
ADLS_ACUITY_SCORE: 19

## 2022-03-24 NOTE — PLAN OF CARE
Goal Outcome Evaluation:        Pt A/O x4, VSS on RA. Neuros intact. Denies n/v, some pain to back when repositioning. A2 lift, pt has BLE paralysis. Continent B/B, lift to [facility], 1x BM this shift. T/r q2 encouraged, pt shifts her weight and calls appropriately to repo. Regular diet, tolerating well. Open skin to L buttock, covered with new mepi. Radiation site to L flank, covered with bandaid. Mg and K protocol, recheck in AM. PT saw today, pt will need specialized wheelchair and jani lift provided for home. R PIV SL. Discharge to home pending equipment availability and clinical improvement.

## 2022-03-24 NOTE — PROGRESS NOTES
"Care Management Follow Up    Length of Stay (days): 5    Expected Discharge Date: 03/28/2022     Concerns to be Addressed: basic needs, coping/stress, discharge planning, financial/insurance     Patient plan of care discussed at interdisciplinary rounds: Yes    Anticipated Discharge Disposition: Home, Home Care     Anticipated Discharge Services: Transportation Services  Anticipated Discharge DME: Bed, Wheelchair, Commode    Patient/family educated on Medicare website which has current facility and service quality ratings:    Education Provided on the Discharge Plan:    Patient/Family in Agreement with the Plan: yes    Referrals Placed by CM/SW: External Care Coordination, Homecare, Specialty Providers, Durable Medical Equipment (DME), Transportation, Community Resources, Insurance Verification for DME  Private pay costs discussed: Not applicable    Additional Information:    Referral sent to [facility] declined for home PT and HHA: \"at capacity for this type of payer in this patient s area.\" Will wait for PCP establishment before sending more referrals (pending location of final oncology referral).     Wheelchair specifications faxed to [employer] at [phone]. VM left x2 for [facility] to confirm receipt of fax.    Various DME locations contacted for PCDs. Does not appear that this is an option offered by DME suppliers. Pt to continue to search other retailers.     Ongoing need from CM team: transportation at discharge, home care PT and HHA, establish PCP, follow-up appointments and DME.     CM team will continue to follow for discharge planning.         Guerline Whitehead, RN, BSN, PHN, CMSRN  Care Coordination  [facility]  Phone [phone]       "

## 2022-03-24 NOTE — PROGRESS NOTES
Radiation treatment # 3 to T-L-S-spines.  900 cGy total dose to date delivered with 10 MV Photons.  7 treatments remaining.  SE  RTT

## 2022-03-24 NOTE — PLAN OF CARE
Goal Outcome Evaluation:     A/Ox4. VSS on RA. Denies pain/ N/V. Neuros intact & unchanged. Regular diet, tolerating well. Up A2 w/ lift to BSC. Good UOP, no BM this shift. T/R Q2hr - tolerating well.  L flank biopsy site bandaid CDI. Mepilex to buttock - CDI. Mg/K+ protocol - scheduled AM recheck. Discharge pending improvement & obtaining necessary home equipment including jani lift & specialized wheelchair.

## 2022-03-24 NOTE — PROGRESS NOTES
Service Date: 03/24/2022    SUBJECTIVE:  Ms. Mcghee is a 57-year-old female with recurrent breast cancer.  The patient had stage III left breast cancer in 2007, ER positive, HER-2/halina negative.  She had mastectomy, chemotherapy, radiation and anastrozole.  The patient now admitted with bilateral leg weakness.  Scans revealed bone metastases including spinal cord compression.  She is currently getting radiation and is also on steroid.    Left posterior iliac crest biopsy was done on 03/21/2022.  Pathology reveals metastatic breast cancer. ER positive, FL positive and HER-2/halina negative by IHC.    The patient overall is stable.  She has no new complaints or concerns.    ASSESSMENT:    1.  A 57-year-old female with recurrent breast cancer with bone metastases.  2.  Spinal cord compression causing lower extremity paresis.    PLAN:    1.  Biopsy report was discussed with the patient.  I explained to her she has metastatic breast cancer, ER/FL positive, HER-2/halina negative.    Discussed regarding further treatment.  Her tumor is ER/FL positive and HER-2/halina negative.  I would recommend hormonal treatment.    I am going to start her on letrozole once a day.  We will also add a CDK4/6 inhibitor like Ibrance or ribociclib.  I explained to her that we will have our clinic Oncology pharmacy try to get one of the CDK4/6 inhibitors approved.  Once approved, it will be started.      I explained to the patient that at this time, we do need to give her any chemotherapy.  We will probably use chemotherapy in the future when there is progression.    The patient had a few questions, which were all answered.    2.  She has bone metastases.  We will start her on Zometa.  She will get 4 mg IV once.  Side effects including jaw pain, swelling and osteonecrosis of were discussed.  I will also start her on calcium and vitamin D twice a day.    3.  She is on dexamethasone 4 mg twice a day.  It can be reduced to 4 mg once a day starting 03/28/2022  (Monday).    4.  Oncology will see her next week in the hospital if she is still inpatient.  If discharged, we will see her in the clinic.  Please call us with any questions.    TOTAL TIME SPENT:  40 minutes.  Time spent in today's visit, review of chart/investigations today and documentation.    uYry Lozada MD        D: 2022   T: 2022   MT: [id number]    Name:     JUSTIN ECHEVARRIA  MRN:      [id number]        Account:      [id number]   :      1965           Service Date: 2022       Document: [id number]

## 2022-03-24 NOTE — PROGRESS NOTES
[facility]    Hospitalist Progress Note      Assessment & Plan   Mickie Mcghee is a 57 year old female admitted on 3/19/2022. She presented as a direct admission from [facility] emergency department with bilateral lower extremity paresis since mid January and found to have bony metastases, likely of prior breast cancer with direct invasion of spinal canal and spinal cord impingement at T10 level.  Transferred for neurosurgical evaluation    Bilateral lower extremity paresis secondary to tumor invasion of the spinal canal  Metastatic breast cancer, estrogen and progesterone positive and  IHC HER2 negative (iliac bone biopsy 3/21/2022)  Multiple bony metastases by MRI, CT, March 2022   -Patient with multiple bony metastases suspected secondary to known history of breast cancer.  Paresis has been present/stable since mid January    - Initial diagnosis in 2007.  ER/[place] positive, HER-2/halina negative.  Treated with bilateral mastectomy plus radiation, TAC x6 cycles, tamoxifen x6 years, Zometa.  Lost to follow-up with oncology, was last seen in 2015 prior to losing insurance.  - CT chest abdomen pelvis; confirms destructive bony lesion T and L-spine.  Indeterminate pulmonary nodules right, indeterminate mediastinal lymph node, hypodense nodule liver, bony metastases right proximal femur, ribs.    -Brain MRI: Interval development of right frontal, right parietal, left temporal and left parietal bony metastases.  No brain metastatic findings.  -Neurosurgery consulted.  See note, due to duration of paresis LE 2' to thoracic/lumbar bony metastases impinging on spinal cord no indication for immediate surgical intervention.  Unclear if intervention may preserve bladder and bowel function.  -Oncology consulted: see Note: Any treatment would be palliative  -S/p iliac bone biopsy 3/21.  Pathology - metastatic breast carcinoma, estrogen and progesterone positive and IHC HER2 negative   -Radiation oncology  consulted.  Radiation treatments to thoracolumbar spine to start 3/22.  -Admitting Hospitalist discussed strong consideration for DNR/DNI status in the future as a future medical emergency is likely going to be secondary to her underlying malignancy.  Patient is quite realistic and understanding, though wishes wishes to discuss with her  prior to any CODE STATUS change.  Her wish is to attend her son's graduation from high school this spring.  -Bladder scan was 700 cc, straight cath.  Scheduled toileting q. meal and at bedtime. Continent of bladder and bowels.   -Currently no significant pain condition.  Has PRN APAP, Oxy and HM available.   -Fall precautions   -Discussed with care coordinator-hospital bed, wheelchair, bedside commode ordered.  -Patient would prefer to go home at discharge   -Started on dexamethasone 4 mg twice daily on 3/22.  After 7 days this can be decreased to once a day.   -10 radiation therapy sessions starting 3/22  -Oncology will start her on bisphosphonates or Xgeva after final pathology report  - working on discharge     Social/care issues:  By patient's report she was lost to medical follow-up about 14 years ago due to loss of insurance.  She states in January her disabled  qualified for SS disability and now patient has MA.  Due to 's disability and now patient with lower extremity hemiparesis her sister from [place] is providing cares.  However now with MA she probably will qualify for PCA.  Her primary wish is to see her son graduate this spring from high school.  He has been accepted to the [employer] full scholarship this fall.  Discussed care plan pending Oncology recommendations including bone biopsy and subsequent treatment plan established, in the meantime identify functional status and adaptive equipment needed.  -Care management consult placed      DVT Prophylaxis: Pneumatic Compression Devices  Code Status: Full Code  Expected Discharge: Likely in  1 to 2 days           Glory Shaikh MD  Text Page (7am - 6pm, M-F)    Interval History   Patient reports she feels okay.  No significant change since yesterday   No significant pain   Continues to have bowel and bladder function   Underwent 3rd radiation therapy treatment today        SH: No tobacco.  Lives with  and son who is a senior in high school.   is now on SSI disability [appears has CP]  allowing patient to have Medicaid insurance.  Prior to this was lost to medical follow-up due to lack of insurance which appears to be over the last 14 years.  Her wish is to see her son graduate high school this spring, he is accepted to [employer] on full scholarship in [employer].  Sister visiting from [place] assist with patient and patient's 's care.      -Data reviewed today: I reviewed all new labs and imaging results over the last 24 hours.     Physical Exam   Temp: 98.5  F (36.9  C) Temp src: Oral BP: (!) 87/53 Pulse: 64   Resp: 18 SpO2: 96 % O2 Device: None (Room air)    Vitals:    03/19/22 0254   Weight: 55.3 kg (122 lb)     Vital Signs with Ranges  Temp:  [98.1  F (36.7  C)-98.5  F (36.9  C)] 98.5  F (36.9  C)  Pulse:  [64-69] 64  Resp:  [18-20] 18  BP: (87-92)/(53-59) 87/53  SpO2:  [96 %-97 %] 96 %  I/O last 3 completed shifts:  In: 340 [P.O.:340]  Out: 2600 [Urine:2600]    General/Constitutional:   NAD, alert, calm, cooperative;  bilateral LE paresis.  Chest/Respiratory:  Air exchange bilateral lung fields; no rales or wheeze. Respiration nonlabored on room air.  Cardiovascular:  no murmur appreciated.  LE edema none  Gastrointestinal/Abdomen:  soft, nontender, no rebound, guarding or other peritoneal signs.  Neurologic:  gross CN intact and motor testing paraplegia lower extremities, sensation grossly tested intact   Psychiatric:  Mental status:  Alert, oriented, affect calm      Medications       calcium carbonate 600 mg-vitamin D 400 units  1 tablet Oral BID w/meals      dexamethasone  4 mg Oral Q12H TAN     [START ON 3/25/2022] letrozole  2.5 mg Oral Daily     sodium chloride (PF)  3 mL Intracatheter Q8H     zoledronic acid  4 mg Intravenous Once       Data   Recent Labs   Lab 03/24/22  0739 03/23/22  0611 03/22/22  0645 03/21/22  0633 03/20/22  1358 03/20/22  0733 03/19/22  0457 03/18/22  1633 03/18/22  1632   WBC  --   --   --   --   --   --   --   --  5.9   HGB  --   --   --   --   --   --   --   --  13.3   MCV  --   --   --   --   --   --   --   --  90   PLT  --   --   --   --   --   --   --   --  300   NA  --   --   --  138  --  140 139   < >  --    POTASSIUM 3.6 4.1 4.0 3.6   < > 3.3* 3.7  3.6   < >  --    CHLORIDE  --   --   --  107  --  109 107   < >  --    CO2  --   --   --  27  --  26 25   < >  --    BUN  --   --   --  9  --  9 21   < >  --    CR  --   --   --  0.53  --  0.53 0.61   < >  --    ANIONGAP  --   --   --  4  --  5 7   < >  --    OSMEL  --   --   --  8.7  --  8.6 8.9   < >  --    GLC  --   --   --  90  --  128* 112*   < >  --    ALBUMIN  --   --   --   --   --   --  3.2*  --   --    PROTTOTAL  --   --   --   --   --   --  6.7*  --   --    BILITOTAL  --   --   --   --   --   --  0.3  --   --    ALKPHOS  --   --   --   --   --   --  87  --   --    ALT  --   --   --   --   --   --  15  --   --    AST  --   --   --   --   --   --  14  --   --     < > = values in this interval not displayed.

## 2022-03-24 NOTE — PLAN OF CARE
[id number]  A/Ox4. VSS on RA. Denies pain/nausea. Neuro checks intact.  T/R q2h as pt allows - shifts weight at times. Cont b/b. Up A2 w/ lift to BSC, good UOP. R PIV SL. L flank radiation site band aid CDI. Mepilex to buttock. Pt is on Mg/K+ protocol, re-check for am. Discharge pending improvement & acquisition of home equipment including jani lift and specialized wheelchair.

## 2022-03-25 ENCOUNTER — APPOINTMENT (OUTPATIENT)
Dept: PHYSICAL THERAPY | Facility: CLINIC | Age: 57
End: 2022-03-25
Attending: HOSPITALIST
Payer: MEDICAID

## 2022-03-25 LAB
MAGNESIUM SERPL-MCNC: 2.5 MG/DL (ref 1.6–2.3)
POTASSIUM BLD-SCNC: 4 MMOL/L (ref 3.4–5.3)

## 2022-03-25 PROCEDURE — 77387 GUIDANCE FOR RADJ TX DLVR: CPT

## 2022-03-25 PROCEDURE — 83735 ASSAY OF MAGNESIUM: CPT | Performed by: INTERNAL MEDICINE

## 2022-03-25 PROCEDURE — 250N000011 HC RX IP 250 OP 636: Performed by: INTERNAL MEDICINE

## 2022-03-25 PROCEDURE — 36415 COLL VENOUS BLD VENIPUNCTURE: CPT | Performed by: INTERNAL MEDICINE

## 2022-03-25 PROCEDURE — 84132 ASSAY OF SERUM POTASSIUM: CPT | Performed by: INTERNAL MEDICINE

## 2022-03-25 PROCEDURE — 77412 RADIATION TX DELIVERY LVL 3: CPT

## 2022-03-25 PROCEDURE — 120N000001 HC R&B MED SURG/OB

## 2022-03-25 PROCEDURE — 97530 THERAPEUTIC ACTIVITIES: CPT | Mod: GP | Performed by: PHYSICAL THERAPIST

## 2022-03-25 PROCEDURE — 99232 SBSQ HOSP IP/OBS MODERATE 35: CPT | Performed by: INTERNAL MEDICINE

## 2022-03-25 PROCEDURE — 250N000013 HC RX MED GY IP 250 OP 250 PS 637: Performed by: INTERNAL MEDICINE

## 2022-03-25 RX ADMIN — DEXAMETHASONE 4 MG: 4 TABLET ORAL at 20:47

## 2022-03-25 RX ADMIN — ONDANSETRON 4 MG: 4 TABLET, ORALLY DISINTEGRATING ORAL at 09:28

## 2022-03-25 RX ADMIN — ONDANSETRON 4 MG: 4 TABLET, ORALLY DISINTEGRATING ORAL at 00:34

## 2022-03-25 RX ADMIN — OXYCODONE HYDROCHLORIDE 5 MG: 5 TABLET ORAL at 01:04

## 2022-03-25 RX ADMIN — DEXAMETHASONE 4 MG: 4 TABLET ORAL at 09:00

## 2022-03-25 RX ADMIN — CALCIUM CARBONATE 600 MG (1,500 MG)-VITAMIN D3 400 UNIT TABLET 1 TABLET: at 09:00

## 2022-03-25 RX ADMIN — LETROZOLE 2.5 MG: 2.5 TABLET ORAL at 08:59

## 2022-03-25 RX ADMIN — CALCIUM CARBONATE 600 MG (1,500 MG)-VITAMIN D3 400 UNIT TABLET 1 TABLET: at 17:59

## 2022-03-25 ASSESSMENT — ACTIVITIES OF DAILY LIVING (ADL)
ADLS_ACUITY_SCORE: 17
ADLS_ACUITY_SCORE: 19
ADLS_ACUITY_SCORE: 19
ADLS_ACUITY_SCORE: 17
ADLS_ACUITY_SCORE: 19
ADLS_ACUITY_SCORE: 17
ADLS_ACUITY_SCORE: 19
ADLS_ACUITY_SCORE: 17
ADLS_ACUITY_SCORE: 19
ADLS_ACUITY_SCORE: 19
ADLS_ACUITY_SCORE: 17
ADLS_ACUITY_SCORE: 19
ADLS_ACUITY_SCORE: 19
ADLS_ACUITY_SCORE: 17
ADLS_ACUITY_SCORE: 19
ADLS_ACUITY_SCORE: 17

## 2022-03-25 NOTE — PROGRESS NOTES
[facility]    Hospitalist Progress Note      Assessment & Plan   Mickie Mcghee is a 57 year old female admitted on 3/19/2022. She presented as a direct admission from [facility] emergency department with bilateral lower extremity paresis since mid January and found to have bony metastases, likely of prior breast cancer with direct invasion of spinal canal and spinal cord impingement at T10 level.  Transferred for neurosurgical evaluation    #.  Paraplegia secondary to metastatic breast cancer invasion of the spinal canal  #.  Metastatic breast cancer, estrogen and progesterone positive and  HER2 negative (iliac bone biopsy 3/21/2022)  #. Multiple bony metastases by MRI, CT, March 2022    - Initial diagnosis in 2007.  ER/SD positive, HER-2/halina negative.  Treated with bilateral mastectomy plus radiation, chemotherapy TAC x6 cycles, tamoxifen x6 years, Zometa.    - Lost to follow-up with oncology, was last seen in 2015 prior to losing insurance.  - Now presented with 2 months history of paraplegia  - CT chest abdomen pelvis -showed destructive bony lesion T and L-spine.  Indeterminate pulmonary nodules right, indeterminate mediastinal lymph node, hypodense nodule liver, bony metastases right proximal femur, ribs.    -Brain MRI: Interval development of right frontal, right parietal, left temporal and left parietal bony metastases.  No brain metastatic findings.  -Neurosurgery consulted. Due to duration of paresis LE 2' to thoracic/lumbar bony metastases impinging on spinal cord no indication for immediate surgical intervention.  Unclear if intervention may preserve bladder and bowel function.  -Oncology consulted  -S/p iliac bone biopsy 3/21.  Pathology - metastatic breast carcinoma, estrogen and progesterone positive and HER2 negative   -Radiation oncology consulted.  10 Radiation treatments to thoracolumbar spine, started on 3/22.  -Bladder scan was 700 cc, straight cath.  Scheduled toileting  q. meal and at bedtime. Continent of bladder and bowels.   -Currently no significant pain condition.  Has PRN APAP, Oxy and HM available.   -Fall precautions   -Discussed with care coordinator-hospital bed, wheelchair, bedside commode ordered.  -Patient would prefer to go home at discharge   -Started on dexamethasone 4 mg twice daily on 3/22.  On 3/29, decrease to once daily   -Started on letrozole 2.5 mg daily on 3/25  -IV zoledronic acid 4 mg administered on 3/24  - working on discharge     Social/care issues:  By patient's report she was lost to medical follow-up about 14 years ago due to loss of insurance.  She states in January her disabled  qualified for SS disability and now patient has MA.  Due to 's disability and now patient with lower extremity paresis, her sister from [place] is providing cares.  However now with MA she probably will qualify for PCA.  Her primary wish is to see her son graduate this spring from high school.  He has been accepted to the [employer] full scholarship this fall.      DVT Prophylaxis: Pneumatic Compression Devices  Code Status: Full Code   -Admitting Hospitalist discussed strong consideration for DNR/DNI status in the future as a future medical emergency is likely going to be secondary to her underlying malignancy.  Patient is quite realistic and understanding, though wishes wishes to discuss with her  prior to any CODE STATUS change.  Her wish is to attend her son's graduation from high school this spring    Expected Discharge: Likely in 1 to 2 days           Glory Shaikh MD  Text Page (7am - 6pm, M-F)    Interval History   Patient reports she feels okay.  No significant change since yesterday   No significant pain   Continues to have bowel and bladder function   Underwent 4rd radiation therapy treatment today    -Data reviewed today: I reviewed all new labs and imaging results over the last 24 hours.     Physical Exam   Temp: 98.4  F (36.9  C)  Temp src: Oral BP: 94/57 Pulse: 66   Resp: 17 SpO2: 97 % O2 Device: None (Room air)    Vitals:    03/19/22 0254   Weight: 55.3 kg (122 lb)     Vital Signs with Ranges  Temp:  [97.9  F (36.6  C)-98.4  F (36.9  C)] 98.4  F (36.9  C)  Pulse:  [58-73] 66  Resp:  [17-20] 17  BP: ()/(57-70) 94/57  SpO2:  [96 %-97 %] 97 %  I/O last 3 completed shifts:  In: 825 [P.O.:825]  Out: 2150 [Urine:2150]    General/Constitutional:   NAD, alert, calm, cooperative;  bilateral LE paresis.  Chest/Respiratory:  Air exchange bilateral lung fields; no rales or wheeze. Respiration nonlabored on room air.  Cardiovascular:  no murmur appreciated.  LE edema none  Gastrointestinal/Abdomen:  soft, nontender, no rebound, guarding or other peritoneal signs.  Neurologic:  gross CN intact and motor testing paraplegia lower extremities, sensation grossly tested intact   Psychiatric:  Mental status:  Alert, oriented, affect calm      Medications       calcium carbonate 600 mg-vitamin D 400 units  1 tablet Oral BID w/meals     dexamethasone  4 mg Oral Q12H TAN     letrozole  2.5 mg Oral Daily     sodium chloride (PF)  3 mL Intracatheter Q8H       Data   Recent Labs   Lab 03/25/22  0656 03/24/22  0739 03/23/22  0611 03/22/22  0645 03/21/22  0633 03/20/22  1358 03/20/22  0733 03/19/22  0457 03/18/22  1633 03/18/22  1632   WBC  --   --   --   --   --   --   --   --   --  5.9   HGB  --   --   --   --   --   --   --   --   --  13.3   MCV  --   --   --   --   --   --   --   --   --  90   PLT  --   --   --   --   --   --   --   --   --  300   NA  --   --   --   --  138  --  140 139   < >  --    POTASSIUM 4.0 3.6 4.1   < > 3.6   < > 3.3* 3.7  3.6   < >  --    CHLORIDE  --   --   --   --  107  --  109 107   < >  --    CO2  --   --   --   --  27  --  26 25   < >  --    BUN  --   --   --   --  9  --  9 21   < >  --    CR  --   --   --   --  0.53  --  0.53 0.61   < >  --    ANIONGAP  --   --   --   --  4  --  5 7   < >  --    OSMEL  --   --   --   --  8.7  --   8.6 8.9   < >  --    GLC  --   --   --   --  90  --  128* 112*   < >  --    ALBUMIN  --   --   --   --   --   --   --  3.2*  --   --    PROTTOTAL  --   --   --   --   --   --   --  6.7*  --   --    BILITOTAL  --   --   --   --   --   --   --  0.3  --   --    ALKPHOS  --   --   --   --   --   --   --  87  --   --    ALT  --   --   --   --   --   --   --  15  --   --    AST  --   --   --   --   --   --   --  14  --   --     < > = values in this interval not displayed.

## 2022-03-25 NOTE — PLAN OF CARE
Goal Outcome Evaluation:        Pt A/O x4, VSS on RA. Denies n/v. C/o 3/10 back pain, PRN tylenol given, effective. Regular diet, tolerating well. A2 lift, paralysis to BLE. Continent B/B, no BM this shift, up to BSC to void x2. Neuros intact. R PIV SL, zoledronic acid given x1 per order. Mepilex to L buttock, bandaid to L flank biopsy site. K and Mg protocol, rechecks in AM[facility], no chemo at this time. Discharge pending acquisition of specialized wheelchair and jani lift, and discharge transportation.

## 2022-03-25 NOTE — PLAN OF CARE
Goal Outcome Evaluation:      [id number]:    Pt A/O x4, VSS on RA. Denies n/v. C/o 5/10 rib cage/ chest pain, managed with Oxycodone PO PRN with improvement and effective. Regular diet, not eating this shift. C/o nause, Zofran given x1, effective. A2 lift, paralysis to BLE. Continent B/B, no BM this shift, commode bedside. Neuros intact. R PIV SL. Mepilex to L buttock, bandaid to L flank biopsy site, DCI. K and Mg protocol, rechecks in AM. Sister at bedside helping pt. Plan to treat cancer medically, no chemo at this time. Discharge pending acquisition of specialized wheelchair and jani lift, and discharge transportation.

## 2022-03-25 NOTE — PLAN OF CARE
[id number] shift. Vitals stable. A&O. Paraplegic, up to chair and commode w/ lift. Pain to chest/back- managed with heat pad. Denies nausea this shift, ate fair for supper. Abrasion to L buttock- mepi CDI. Bandaid to biopsy site on back. Sister at bedside helping with cares. Daily radiation continues on Monday, discharge plan pending.

## 2022-03-25 NOTE — PLAN OF CARE
Goal Outcome Evaluation:        Pt A&Ox4, VSS on RA. Denied pain and nausea during shift. Pre-medicated with ODT zofran prior to radiation treatment 4/10 @ 1015. Regular diet, good appetite. BLE paralysis. A2 with lift to BSC. Continent B&B, no BM this shift. Neuros intact. R PIV SL. Bandaid to L flank biopsy site - changed/CDI. PT worked with pt in afternoon to use slide board to get into chair. Sister at bedside, helps with cares. Discharge pending acquisition of specialized wheelchair and jani lift, and discharge transportation.

## 2022-03-25 NOTE — CONSULTS
Care Management Follow Up    Length of Stay (days): 6    Expected Discharge Date: 03/28/2022     Concerns to be Addressed: basic needs, coping/stress, discharge planning, financial/insurance     Patient plan of care discussed at interdisciplinary rounds: Yes    Anticipated Discharge Disposition: Home, Home Care     Anticipated Discharge Services: Transportation Services  Anticipated Discharge DME: Bed, Wheelchair, Commode    Patient/family educated on Medicare website which has current facility and service quality ratings:  n/a  Education Provided on the Discharge Plan:  Yes  Patient/Family in Agreement with the Plan: yes    Referrals Placed by CM/SW: External Care Coordination, Homecare, Specialty Providers, Durable Medical Equipment (DME), Transportation, Community Resources, Insurance Verification for DME  Private pay costs discussed: insurance costs out of pocket expenses   -Sliding Board and PCD's if recommended.    Additional Information:  Writer met with patient and her sister at the bedside. Patient aware that we have coordinated with [facility] and they are able to provider a wheelchair, bedside commode and hospital bed they received the updated PT notes to support the additional equipment needed for the wheelchair and are able to accommodate.  They do not have PCD's/Sliding boards and patient would need to most likely explore getting this on her own she will check on amaz[employer] and other sites.  Currently working on getting transportation to and from Radiation appointments.  Called [facility] at [phone] per Emma we would need to work with the [employer] contracted providers to get rides to/from appointments and follow up's.  Writer working on list of contractors for follow up patient stated her sister will also be attending these appointments. Patient is aware that she would need to continue to coordinate rides with provider(s) for future appointments.   Patient did have a concern about uncontrolled pain last  night, she is aware that any new medications can be prescribed for her to take home.  Per Patient she agreed to see Dr. Lozada for follow up so will work on follow up appointment(s) and ask if Dr. Lozada would be willing to sign for homecare orders.  Will need homecare agency attempted [facility] they were not able to take her will work on additional agencies.  Will continue following for discharge planning.       Kelley Guallpa RN

## 2022-03-25 NOTE — PROGRESS NOTES
Radiation treatment # 4 to T-L-S spines.  1200 cGy total dose to date delivered with 10 MV Photons.  Patient has 6 treatments remaining.  She was given an outpatient schedule earlier this week in case of discharge.  We will need a stretcher or jani sling to be able to get on our treatment table.  SE  RTT

## 2022-03-26 ENCOUNTER — APPOINTMENT (OUTPATIENT)
Dept: PHYSICAL THERAPY | Facility: CLINIC | Age: 57
End: 2022-03-26
Attending: HOSPITALIST
Payer: MEDICAID

## 2022-03-26 LAB
MAGNESIUM SERPL-MCNC: 2.6 MG/DL (ref 1.6–2.3)
POTASSIUM BLD-SCNC: 3.7 MMOL/L (ref 3.4–5.3)
SARS-COV-2 RNA RESP QL NAA+PROBE: NEGATIVE

## 2022-03-26 PROCEDURE — 99232 SBSQ HOSP IP/OBS MODERATE 35: CPT | Performed by: INTERNAL MEDICINE

## 2022-03-26 PROCEDURE — 250N000013 HC RX MED GY IP 250 OP 250 PS 637: Performed by: INTERNAL MEDICINE

## 2022-03-26 PROCEDURE — 36415 COLL VENOUS BLD VENIPUNCTURE: CPT | Performed by: INTERNAL MEDICINE

## 2022-03-26 PROCEDURE — 84132 ASSAY OF SERUM POTASSIUM: CPT | Performed by: INTERNAL MEDICINE

## 2022-03-26 PROCEDURE — 120N000001 HC R&B MED SURG/OB

## 2022-03-26 PROCEDURE — 250N000011 HC RX IP 250 OP 636: Performed by: INTERNAL MEDICINE

## 2022-03-26 PROCEDURE — U0005 INFEC AGEN DETEC AMPLI PROBE: HCPCS | Performed by: INTERNAL MEDICINE

## 2022-03-26 PROCEDURE — 83735 ASSAY OF MAGNESIUM: CPT | Performed by: INTERNAL MEDICINE

## 2022-03-26 PROCEDURE — 97530 THERAPEUTIC ACTIVITIES: CPT | Mod: GP

## 2022-03-26 RX ADMIN — CALCIUM CARBONATE 600 MG (1,500 MG)-VITAMIN D3 400 UNIT TABLET 1 TABLET: at 08:08

## 2022-03-26 RX ADMIN — LETROZOLE 2.5 MG: 2.5 TABLET ORAL at 08:08

## 2022-03-26 RX ADMIN — DEXAMETHASONE 4 MG: 4 TABLET ORAL at 08:08

## 2022-03-26 RX ADMIN — DEXAMETHASONE 4 MG: 4 TABLET ORAL at 20:21

## 2022-03-26 RX ADMIN — ACETAMINOPHEN 650 MG: 325 TABLET, FILM COATED ORAL at 13:49

## 2022-03-26 RX ADMIN — CALCIUM CARBONATE 600 MG (1,500 MG)-VITAMIN D3 400 UNIT TABLET 1 TABLET: at 17:31

## 2022-03-26 ASSESSMENT — ACTIVITIES OF DAILY LIVING (ADL)
ADLS_ACUITY_SCORE: 17
ADLS_ACUITY_SCORE: 15
ADLS_ACUITY_SCORE: 17
ADLS_ACUITY_SCORE: 17
ADLS_ACUITY_SCORE: 15
ADLS_ACUITY_SCORE: 15
ADLS_ACUITY_SCORE: 17
ADLS_ACUITY_SCORE: 15
ADLS_ACUITY_SCORE: 17
ADLS_ACUITY_SCORE: 15
ADLS_ACUITY_SCORE: 17
ADLS_ACUITY_SCORE: 17
ADLS_ACUITY_SCORE: 15
ADLS_ACUITY_SCORE: 17

## 2022-03-26 NOTE — PLAN OF CARE
Goal Outcome Evaluation:      A&Ox4, VSS on RA. Paraplegic, up with lift to commode. BM x1 last evening, voiding adequately. Family at bedside assisting with cares. Back pain controlled with heat pad. Denies nausea. Mepilex to L buttock CDI. Bandaid to biopsy site on back CDI. Plan for continued daily radiation resuming on Monday, discharge pending.

## 2022-03-26 NOTE — PROGRESS NOTES
[facility]    Hospitalist Progress Note      Assessment & Plan   Mickie Mcghee is a 57 year old female admitted on 3/19/2022. She presented as a direct admission from [facility] ED with paraplegia since mid January and found to have bony metastases due to metastatic breast cancer with direct invasion of spinal canal and spinal cord impingement at T10 level.  Transferred for neurosurgical evaluation    #.  Paraplegia secondary to metastatic breast cancer invasion of the spinal canal at T10-T12  #.  Metastatic breast cancer, estrogen and progesterone positive and  HER2 negative (iliac bone biopsy 3/21/2022)  #. Multiple bony metastases by MRI, CT, March 2022    - Initial diagnosis in 2007.  ER/IN positive, HER-2/halina negative.  Treated with bilateral mastectomy plus radiation, chemotherapy TAC x6 cycles, tamoxifen x6 years, Zometa.    - Lost to follow-up with oncology, was last seen in 2015 prior to losing insurance.  - Now presented with 2 months history of paraplegia    - MRI thoracic and lumbar spine 3/18 -showed extensive osseous metastasis centered at T10-T12 with pathologic compression deformities and extensive involvement of posterior elements, direct invasion of spinal canal bilaterally causing severe spinal canal stenosis from T10-T12 and invasion of spinal cord at T10-T11 level.  -Additional metastatic lesions involving T4, T5, T6, T9, L1, L2, L5, S1, S2     - CT chest abdomen pelvis 3/19 -showed destructive bony lesion T and L-spine.  Indeterminate pulmonary nodules right, indeterminate mediastinal lymph node, hypodense nodule liver, bony metastases right proximal femur, ribs.      -Brain MRI 3/19: new right frontal, right parietal, left temporal and left parietal bony metastases.  No brain metastatic findings.    -Neurosurgery consulted. Due to duration of paresis LE 2' to thoracic/lumbar bony metastases impinging on spinal cord no indication for immediate surgical intervention.   Unclear if intervention may preserve bladder and bowel function.    -Oncology consulted  -S/p iliac bone biopsy 3/21.  Pathology - metastatic breast carcinoma, estrogen and progesterone positive and HER2 negative     -Radiation oncology consulted.  10 Radiation treatments to thoracolumbar spine, started on 3/22.  Underwent fourth radiation treatment on 3/25    -Continue toileting q. meal and at bedtime. Continent of bladder and bowels.   -Currently no significant pain condition.  Has PRN APAP, Oxy and HM available.   -Fall precautions   -Discussed with care coordinator-hospital bed, wheelchair, bedside commode ordered as patient is paraplegic 1  -Started on dexamethasone 4 mg twice daily on 3/22.  On 3/29, decrease to once daily   -Started on letrozole 2.5 mg daily on 3/25  -IV zoledronic acid 4 mg administered on 3/24  - working on discharge.  Patient will discharge home       Social/care issues:  By patient's report she was lost to medical follow-up about 14 years ago due to loss of insurance.  She states in January her disabled  qualified for SS disability and now patient has MA.  Due to 's disability and now patient with lower extremity paresis, her sister from [facility] is providing cares.  However now with MA she probably will qualify for PCA.  Her primary wish is to see her son graduate this spring from high school.  He has been accepted to the [employer] full scholarship this fall.      DVT Prophylaxis: Pneumatic Compression Devices  Code Status: Full Code   -Admitting Hospitalist discussed strong consideration for DNR/DNI status in the future as a future medical emergency is likely going to be secondary to her underlying malignancy.  Patient is quite realistic and understanding, though wishes wishes to discuss with her  prior to any CODE STATUS change.  Her wish is to attend her son's graduation from high school this spring    Expected Discharge: Likely in 2 days           Glory  MD Hawa  Text Page (7am - 6pm, M-F)    Interval History   Patient reports she feels okay.  No significant change since yesterday   Had some increased pain yesterday.  Relief from aqua K pad  Continues to have bowel and bladder function   No other questions or concerns    -Data reviewed today: I reviewed all new labs and imaging results over the last 24 hours.     Physical Exam   Temp: 97.6  F (36.4  C) Temp src: Oral BP: 102/66 Pulse: 60   Resp: 16 SpO2: 96 % O2 Device: None (Room air)    Vitals:    03/19/22 0254   Weight: 55.3 kg (122 lb)     Vital Signs with Ranges  Temp:  [97.6  F (36.4  C)-98  F (36.7  C)] 97.6  F (36.4  C)  Pulse:  [60-65] 60  Resp:  [16] 16  BP: ()/(61-66) 102/66  SpO2:  [95 %-96 %] 96 %  I/O last 3 completed shifts:  In: 480 [P.O.:480]  Out: 2725 [Urine:2725]    General/Constitutional:   NAD, alert, calm, cooperative;  bilateral LE paresis.  Chest/Respiratory:  Air exchange bilateral lung fields; no rales or wheeze. Respiration nonlabored on room air.  Cardiovascular:  no murmur appreciated.  LE edema none  Gastrointestinal/Abdomen:  soft, nontender, no rebound, guarding or other peritoneal signs.  Neurologic:  gross CN intact and motor testing paraplegia lower extremities, sensation grossly tested intact   Psychiatric:  Mental status:  Alert, oriented, affect calm      Medications       calcium carbonate 600 mg-vitamin D 400 units  1 tablet Oral BID w/meals     dexamethasone  4 mg Oral Q12H TAN     letrozole  2.5 mg Oral Daily     sodium chloride (PF)  3 mL Intracatheter Q8H       Data   Recent Labs   Lab 03/26/22  0730 03/25/22  0656 03/24/22  0739 03/22/22  0645 03/21/22  0633 03/20/22  1358 03/20/22  0733   NA  --   --   --   --  138  --  140   POTASSIUM 3.7 4.0 3.6   < > 3.6   < > 3.3*   CHLORIDE  --   --   --   --  107  --  109   CO2  --   --   --   --  27  --  26   BUN  --   --   --   --  9  --  9   CR  --   --   --   --  0.53  --  0.53   ANIONGAP  --   --   --   --  4  --  5    OSMEL  --   --   --   --  8.7  --  8.6   GLC  --   --   --   --  90  --  128*    < > = values in this interval not displayed.

## 2022-03-26 NOTE — PLAN OF CARE
Goal Outcome Evaluation:      A/Ox4, VSS on RA. C/o back pain - controlled with heat pad & PRN tylenol x1. Denies nausea. Regualr diet, tolerating well - great appetite. Paraplegic, up with lift to commode. T/R Q2hr, family at bedside assisting with cares & repos. Bandaid to biopsy site - CDI. New mepilex applied to L buttock - CDI. Plan for continued daily radiation resuming on Monday, discharge pending.

## 2022-03-26 NOTE — PLAN OF CARE
Primary Diagnosis: Spinal cord compression due to malignant neoplasm metastatic to spine   Orientation: A&Ox4  Aggression Stop Light: green  Mobility: W/C bound, BLE paralysis up with ceiling lift moves very well in bed using upper body just unable to move legs  Pain Management: denies pain  Diet: Regular  Bowel/Bladder: Continent, BSC  Abnormal Lab/Assessments: Drain/Device/Wound: R PIV  Consults: neurosurgery   D/C Day/Goals/Place: pending  plans

## 2022-03-27 ENCOUNTER — APPOINTMENT (OUTPATIENT)
Dept: PHYSICAL THERAPY | Facility: CLINIC | Age: 57
End: 2022-03-27
Attending: HOSPITALIST
Payer: MEDICAID

## 2022-03-27 LAB
ALBUMIN SERPL-MCNC: 3.3 G/DL (ref 3.4–5)
ALP SERPL-CCNC: 90 U/L (ref 40–150)
ALT SERPL W P-5'-P-CCNC: 24 U/L (ref 0–50)
ANION GAP SERPL CALCULATED.3IONS-SCNC: 6 MMOL/L (ref 3–14)
AST SERPL W P-5'-P-CCNC: 10 U/L (ref 0–45)
BILIRUB SERPL-MCNC: 0.4 MG/DL (ref 0.2–1.3)
BUN SERPL-MCNC: 16 MG/DL (ref 7–30)
CALCIUM SERPL-MCNC: 8.7 MG/DL (ref 8.5–10.1)
CHLORIDE BLD-SCNC: 104 MMOL/L (ref 94–109)
CO2 SERPL-SCNC: 26 MMOL/L (ref 20–32)
CREAT SERPL-MCNC: 0.43 MG/DL (ref 0.52–1.04)
ERYTHROCYTE [DISTWIDTH] IN BLOOD BY AUTOMATED COUNT: 13.4 % (ref 10–15)
GFR SERPL CREATININE-BSD FRML MDRD: >90 ML/MIN/1.73M2
GLUCOSE BLD-MCNC: 149 MG/DL (ref 70–99)
HCT VFR BLD AUTO: 35.8 % (ref 35–47)
HGB BLD-MCNC: 11.8 G/DL (ref 11.7–15.7)
MCH RBC QN AUTO: 29.6 PG (ref 26.5–33)
MCHC RBC AUTO-ENTMCNC: 33 G/DL (ref 31.5–36.5)
MCV RBC AUTO: 90 FL (ref 78–100)
PLATELET # BLD AUTO: 285 10E3/UL (ref 150–450)
POTASSIUM BLD-SCNC: 3.5 MMOL/L (ref 3.4–5.3)
PROT SERPL-MCNC: 6.8 G/DL (ref 6.8–8.8)
RBC # BLD AUTO: 3.98 10E6/UL (ref 3.8–5.2)
SODIUM SERPL-SCNC: 136 MMOL/L (ref 133–144)
WBC # BLD AUTO: 4.6 10E3/UL (ref 4–11)

## 2022-03-27 PROCEDURE — 120N000001 HC R&B MED SURG/OB

## 2022-03-27 PROCEDURE — 250N000013 HC RX MED GY IP 250 OP 250 PS 637: Performed by: INTERNAL MEDICINE

## 2022-03-27 PROCEDURE — 82040 ASSAY OF SERUM ALBUMIN: CPT | Performed by: INTERNAL MEDICINE

## 2022-03-27 PROCEDURE — 85027 COMPLETE CBC AUTOMATED: CPT | Performed by: INTERNAL MEDICINE

## 2022-03-27 PROCEDURE — 250N000011 HC RX IP 250 OP 636: Performed by: INTERNAL MEDICINE

## 2022-03-27 PROCEDURE — 36415 COLL VENOUS BLD VENIPUNCTURE: CPT | Performed by: INTERNAL MEDICINE

## 2022-03-27 PROCEDURE — 80053 COMPREHEN METABOLIC PANEL: CPT | Performed by: INTERNAL MEDICINE

## 2022-03-27 PROCEDURE — 97530 THERAPEUTIC ACTIVITIES: CPT | Mod: GP

## 2022-03-27 PROCEDURE — 99232 SBSQ HOSP IP/OBS MODERATE 35: CPT | Performed by: INTERNAL MEDICINE

## 2022-03-27 RX ORDER — DEXAMETHASONE 4 MG/1
4 TABLET ORAL DAILY
Status: DISCONTINUED | OUTPATIENT
Start: 2022-03-29 | End: 2022-03-29 | Stop reason: HOSPADM

## 2022-03-27 RX ADMIN — ACETAMINOPHEN 650 MG: 325 TABLET, FILM COATED ORAL at 11:30

## 2022-03-27 RX ADMIN — DEXAMETHASONE 4 MG: 4 TABLET ORAL at 08:00

## 2022-03-27 RX ADMIN — DEXAMETHASONE 4 MG: 4 TABLET ORAL at 19:31

## 2022-03-27 RX ADMIN — LETROZOLE 2.5 MG: 2.5 TABLET ORAL at 08:00

## 2022-03-27 RX ADMIN — CALCIUM CARBONATE 600 MG (1,500 MG)-VITAMIN D3 400 UNIT TABLET 1 TABLET: at 08:00

## 2022-03-27 RX ADMIN — CALCIUM CARBONATE 600 MG (1,500 MG)-VITAMIN D3 400 UNIT TABLET 1 TABLET: at 18:08

## 2022-03-27 ASSESSMENT — ACTIVITIES OF DAILY LIVING (ADL)
ADLS_ACUITY_SCORE: 15
ADLS_ACUITY_SCORE: 13
ADLS_ACUITY_SCORE: 15
ADLS_ACUITY_SCORE: 15
ADLS_ACUITY_SCORE: 13
ADLS_ACUITY_SCORE: 15

## 2022-03-27 NOTE — PROGRESS NOTES
[facility]    Hospitalist Progress Note      Assessment & Plan   Mickie Mcghee is a 57 year old female admitted on 3/19/2022. She presented as a direct admission from [facility] ED with paraplegia since mid January and found to have bony metastases due to metastatic breast cancer with direct invasion of spinal canal and spinal cord impingement at T10 level.  Transferred for neurosurgical evaluation    #.  Paraplegia secondary to metastatic breast cancer invasion of the spinal canal at T10-T12  #.  Metastatic breast cancer, estrogen and progesterone positive and  HER2 negative (iliac bone biopsy 3/21/2022)  #. Multiple bony metastases by MRI, CT, March 2022    - Initial diagnosis in 2007.  ER/IL positive, HER-2/halina negative.  Treated with bilateral mastectomy plus radiation, chemotherapy TAC x6 cycles, tamoxifen x6 years, Zometa.    - Lost to follow-up with oncology, was last seen in 2015 prior to losing insurance.  - Now presented with 2 months history of paraplegia    - MRI thoracic and lumbar spine 3/18 -showed extensive osseous metastasis centered at T10-T12 with pathologic compression deformities and extensive involvement of posterior elements, direct invasion of spinal canal bilaterally causing severe spinal canal stenosis from T10-T12 and invasion of spinal cord at T10-T11 level.  -Additional metastatic lesions involving T4, T5, T6, T9, L1, L2, L5, S1, S2     - CT chest abdomen pelvis 3/19 -showed destructive bony lesion T and L-spine.  Indeterminate pulmonary nodules right, indeterminate mediastinal lymph node, hypodense nodule liver, bony metastases right proximal femur, ribs.      -Brain MRI 3/19: new right frontal, right parietal, left temporal and left parietal bony metastases.  No brain metastatic findings.    -Neurosurgery consulted. Due to duration of paresis LE 2' to thoracic/lumbar bony metastases impinging on spinal cord no indication for immediate surgical intervention.   Unclear if intervention may preserve bladder and bowel function.    -Oncology consulted  -S/p iliac bone biopsy 3/21.  Pathology - metastatic breast carcinoma, estrogen and progesterone positive and HER2 negative     -Radiation oncology consulted.  10 Radiation treatments to thoracolumbar spine, started on 3/22.  Underwent fourth radiation treatment on 3/25    -Continue toileting q. meal and at bedtime. Continent of bladder and bowels.   -Currently no significant pain condition.  Has PRN APAP, Oxy and HM available.   -Fall precautions   -Discussed with care coordinator-hospital bed, wheelchair, bedside commode ordered as patient is paraplegic 1  -Started on dexamethasone 4 mg twice daily on 3/22.  On 3/29, decrease to once daily   -Started on letrozole 2.5 mg daily on 3/25  -IV zoledronic acid 4 mg administered on 3/24  - working on discharge.  Patient will discharge home       Social/care issues:  By patient's report she was lost to medical follow-up about 14 years ago due to loss of insurance.  She states in January her disabled  qualified for SS disability and now patient has MA.  Due to 's disability and now patient with lower extremity paresis, her sister from [facility] is providing cares.  However now with MA she probably will qualify for PCA.  Her primary wish is to see her son graduate this spring from high school.  He has been accepted to the [employer] full scholarship this fall.      DVT Prophylaxis: Pneumatic Compression Devices  Code Status: Full Code   -Admitting Hospitalist discussed strong consideration for DNR/DNI status in the future as a future medical emergency is likely going to be secondary to her underlying malignancy.  Patient is quite realistic and understanding, though wishes wishes to discuss with her  prior to any CODE STATUS change.  Her wish is to attend her son's graduation from high school this spring    Expected Discharge: Likely in 2 days           Glory  MD Hawa  Text Page (7am - 6pm, M-F)    Interval History   Patient reports she feels okay.    Has started to experience some increased back pain in the last 2 days and has been utilizing aqua K pad for pain relief.    Had some increased pain yesterday.  Relief from aqua K pad  Continues to have bowel and bladder function   No other questions or concerns    -Data reviewed today: I reviewed all new labs and imaging results over the last 24 hours.     Physical Exam   Temp: 98  F (36.7  C) Temp src: Oral BP: 98/60 Pulse: 74   Resp: 16 SpO2: 99 % O2 Device: None (Room air)    Vitals:    03/19/22 0254   Weight: 55.3 kg (122 lb)     Vital Signs with Ranges  Temp:  [97.9  F (36.6  C)-98  F (36.7  C)] 98  F (36.7  C)  Pulse:  [70-74] 74  Resp:  [16] 16  BP: (96-99)/(57-60) 98/60  SpO2:  [98 %-99 %] 99 %  I/O last 3 completed shifts:  In: -   Out: 2850 [Urine:2850]    General/Constitutional:   NAD, alert, calm, cooperative;  bilateral LE paresis.  Chest/Respiratory:  Air exchange bilateral lung fields; no rales or wheeze. Respiration nonlabored on room air.  Cardiovascular:  no murmur appreciated.  LE edema none  Gastrointestinal/Abdomen:  soft, nontender, no rebound, guarding or other peritoneal signs.  Neurologic:  gross CN intact and motor testing paraplegia lower extremities, sensation grossly tested intact   Psychiatric:  Mental status:  Alert, oriented, affect calm      Medications       calcium carbonate 600 mg-vitamin D 400 units  1 tablet Oral BID w/meals     dexamethasone  4 mg Oral Q12H TAN     letrozole  2.5 mg Oral Daily     sodium chloride (PF)  3 mL Intracatheter Q8H       Data   Recent Labs   Lab 03/27/22  0753 03/26/22  0730 03/25/22  0656 03/22/22  0645 03/21/22  0633   WBC 4.6  --   --   --   --    HGB 11.8  --   --   --   --    MCV 90  --   --   --   --      --   --   --   --      --   --   --  138   POTASSIUM 3.5 3.7 4.0   < > 3.6   CHLORIDE 104  --   --   --  107   CO2 26  --   --   --  27    BUN 16  --   --   --  9   CR 0.43*  --   --   --  0.53   ANIONGAP 6  --   --   --  4   OSMEL 8.7  --   --   --  8.7   *  --   --   --  90   ALBUMIN 3.3*  --   --   --   --    PROTTOTAL 6.8  --   --   --   --    BILITOTAL 0.4  --   --   --   --    ALKPHOS 90  --   --   --   --    ALT 24  --   --   --   --    AST 10  --   --   --   --     < > = values in this interval not displayed.

## 2022-03-27 NOTE — PROGRESS NOTES
SPIRITUAL HEALTH SERVICES Progress Note    808    Visit per LOS.  Litzy politely declined a visit.  I let her know that [facility] if available for any needs that may arise.    Domitila Wells MA  Associate   [phone] - pager

## 2022-03-27 NOTE — CONSULTS
Care Management Follow Up    Length of Stay (days): 8    Expected Discharge Date: 03/29/2022     Concerns to be Addressed: basic needs, coping/stress, discharge planning, financial/insurance     Patient plan of care discussed at interdisciplinary rounds: Yes    Anticipated Discharge Disposition: Home, Home Care     Anticipated Discharge Services: Transportation Services  Anticipated Discharge DME: Bed, Wheelchair, Commode    Patient/family educated on Medicare website which has current facility and service quality ratings:    Education Provided on the Discharge Plan:    Patient/Family in Agreement with the Plan: yes    Referrals Placed by CM/SW: External Care Coordination, Homecare, Specialty Providers, Durable Medical Equipment (DME), Transportation, Community Resources, Insurance Verification for DME  Private pay costs discussed: Not applicable    Additional Information:  Writer met with the patient at the bedside. Patient is aware that we will be working on getting her transportation through Insurance for all medical appointments.  Will get a copy of the radiation therapy and Dr. Lozada appointment for scheduling.  Patient will also be provided with the company information for appointments extended after radiation therapy.  Patient aware that we will also work on getting homecare Dr. Lozada would need to be agreeable to getting orders for future needs.    Patient was working with therapies is now asking for jani lift.  Writer will check with [email or web address] tomorrow there is also a question about bedside commode having a liftable arm to allow the patient to slide. Therapy to see patient and sister tomorrow ~10:00 for continued teaching on safe transfer technique.  Writer will continue to update patient and discharge planning team.       Kelley Guallpa RN

## 2022-03-27 NOTE — PLAN OF CARE
Goal Outcome Evaluation:      A/Ox4, VSS on RA. C/o mild back pain - controlled with heat pad & PRN tylenol x1. Denies nausea. Regular diet, tolerating well - great appetite. Paraplegic, up with lift to commode; voiding adequately, no BM this shift.  T/R Q2hr - pt changes position independently.  Bandaid to biopsy site - CDI. Mepilex to L buttock - CDI. R PIV SL. K+ & Mag protocol - AM recheck. PT/OT following. Plan for continued daily radiation resuming on Monday, discharge home pending transportation services.

## 2022-03-27 NOTE — PLAN OF CARE
Summary: Direct admission from [facility] with bilateral lower extremity paralysis since mid January found to have bony metastases, likely of prior breast cancer with direct invasion of spinal canal and spinal cord impingement at T10 level.    Primary Diagnosis: Spinal cord compression due to malignant neoplasm metastatic to spine   Orientation: A&Ox4  Aggression Stop Light: green  Mobility: W/C bound, BLE paralysis up with ceiling lift moves very well in bed using upper body just unable to move legs  Pain Management: denies pain  Diet: Regular  Bowel/Bladder: Continent, BSC  Abnormal Lab/Assessments: Drain/Device/Wound: R PIV  Consults: neurosurgery   D/C Day/Goals/Place: pending SW plans     Patient rested well, respiration non labored, v/s to baseline, denies pain ,no distress  Noted. Routine medications given as ordered, assistance provided as  Needed, patients  Deniers discomfort,po fluid  Encouraged, call light within reach.

## 2022-03-28 ENCOUNTER — APPOINTMENT (OUTPATIENT)
Dept: OCCUPATIONAL THERAPY | Facility: CLINIC | Age: 57
End: 2022-03-28
Attending: INTERNAL MEDICINE
Payer: MEDICAID

## 2022-03-28 ENCOUNTER — APPOINTMENT (OUTPATIENT)
Dept: PHYSICAL THERAPY | Facility: CLINIC | Age: 57
End: 2022-03-28
Attending: HOSPITALIST
Payer: MEDICAID

## 2022-03-28 ENCOUNTER — TELEPHONE (OUTPATIENT)
Dept: ONCOLOGY | Facility: CLINIC | Age: 57
End: 2022-03-28

## 2022-03-28 PROBLEM — C50.919 METASTATIC BREAST CANCER: Status: ACTIVE | Noted: 2022-03-28

## 2022-03-28 LAB
MAGNESIUM SERPL-MCNC: 2.6 MG/DL (ref 1.6–2.3)
POTASSIUM BLD-SCNC: 4 MMOL/L (ref 3.4–5.3)

## 2022-03-28 PROCEDURE — 250N000011 HC RX IP 250 OP 636: Performed by: INTERNAL MEDICINE

## 2022-03-28 PROCEDURE — 83735 ASSAY OF MAGNESIUM: CPT | Performed by: INTERNAL MEDICINE

## 2022-03-28 PROCEDURE — 250N000013 HC RX MED GY IP 250 OP 250 PS 637: Performed by: INTERNAL MEDICINE

## 2022-03-28 PROCEDURE — 97530 THERAPEUTIC ACTIVITIES: CPT | Mod: GP | Performed by: PHYSICAL THERAPIST

## 2022-03-28 PROCEDURE — 97165 OT EVAL LOW COMPLEX 30 MIN: CPT | Mod: GO

## 2022-03-28 PROCEDURE — 36415 COLL VENOUS BLD VENIPUNCTURE: CPT | Performed by: INTERNAL MEDICINE

## 2022-03-28 PROCEDURE — 99232 SBSQ HOSP IP/OBS MODERATE 35: CPT | Performed by: INTERNAL MEDICINE

## 2022-03-28 PROCEDURE — 250N000013 HC RX MED GY IP 250 OP 250 PS 637: Performed by: NURSE PRACTITIONER

## 2022-03-28 PROCEDURE — 84132 ASSAY OF SERUM POTASSIUM: CPT | Performed by: INTERNAL MEDICINE

## 2022-03-28 PROCEDURE — 120N000001 HC R&B MED SURG/OB

## 2022-03-28 PROCEDURE — 97535 SELF CARE MNGMENT TRAINING: CPT | Mod: GO

## 2022-03-28 PROCEDURE — 77412 RADIATION TX DELIVERY LVL 3: CPT

## 2022-03-28 PROCEDURE — 77336 RADIATION PHYSICS CONSULT: CPT

## 2022-03-28 PROCEDURE — 77387 GUIDANCE FOR RADJ TX DLVR: CPT

## 2022-03-28 RX ORDER — AMOXICILLIN 250 MG
1-2 CAPSULE ORAL 2 TIMES DAILY PRN
Qty: 60 TABLET | Refills: 0 | Status: SHIPPED | OUTPATIENT
Start: 2022-03-28 | End: 2022-04-20

## 2022-03-28 RX ORDER — LETROZOLE 2.5 MG/1
2.5 TABLET, FILM COATED ORAL DAILY
Qty: 30 TABLET | Refills: 0 | Status: SHIPPED | OUTPATIENT
Start: 2022-03-29 | End: 2022-04-05

## 2022-03-28 RX ORDER — CALCIUM CARBONATE 500 MG/1
500-1000 TABLET, CHEWABLE ORAL 3 TIMES DAILY PRN
Status: DISCONTINUED | OUTPATIENT
Start: 2022-03-28 | End: 2022-03-29 | Stop reason: HOSPADM

## 2022-03-28 RX ORDER — DEXAMETHASONE 4 MG/1
4 TABLET ORAL DAILY
Qty: 30 TABLET | Refills: 0 | Status: SHIPPED | OUTPATIENT
Start: 2022-03-30 | End: 2022-04-05

## 2022-03-28 RX ORDER — ACETAMINOPHEN 325 MG/1
650 TABLET ORAL EVERY 6 HOURS PRN
Qty: 30 TABLET | Refills: 0 | Status: SHIPPED | OUTPATIENT
Start: 2022-03-28 | End: 2022-12-28

## 2022-03-28 RX ORDER — MAGNESIUM HYDROXIDE/ALUMINUM HYDROXICE/SIMETHICONE 120; 1200; 1200 MG/30ML; MG/30ML; MG/30ML
30 SUSPENSION ORAL EVERY 4 HOURS PRN
Status: DISCONTINUED | OUTPATIENT
Start: 2022-03-28 | End: 2022-03-29 | Stop reason: HOSPADM

## 2022-03-28 RX ADMIN — DEXAMETHASONE 4 MG: 4 TABLET ORAL at 08:00

## 2022-03-28 RX ADMIN — ONDANSETRON 4 MG: 4 TABLET, ORALLY DISINTEGRATING ORAL at 11:44

## 2022-03-28 RX ADMIN — CALCIUM CARBONATE 600 MG (1,500 MG)-VITAMIN D3 400 UNIT TABLET 1 TABLET: at 08:00

## 2022-03-28 RX ADMIN — DEXAMETHASONE 4 MG: 4 TABLET ORAL at 20:41

## 2022-03-28 RX ADMIN — CALCIUM CARBONATE (ANTACID) CHEW TAB 500 MG 1000 MG: 500 CHEW TAB at 20:53

## 2022-03-28 RX ADMIN — CALCIUM CARBONATE 600 MG (1,500 MG)-VITAMIN D3 400 UNIT TABLET 1 TABLET: at 17:35

## 2022-03-28 RX ADMIN — ACETAMINOPHEN 650 MG: 325 TABLET, FILM COATED ORAL at 08:00

## 2022-03-28 RX ADMIN — LETROZOLE 2.5 MG: 2.5 TABLET ORAL at 08:00

## 2022-03-28 RX ADMIN — ACETAMINOPHEN 650 MG: 325 TABLET, FILM COATED ORAL at 14:14

## 2022-03-28 ASSESSMENT — ACTIVITIES OF DAILY LIVING (ADL)
ADLS_ACUITY_SCORE: 19
ADLS_ACUITY_SCORE: 13
ADLS_ACUITY_SCORE: 17
ADLS_ACUITY_SCORE: 13
ADLS_ACUITY_SCORE: 19
ADLS_ACUITY_SCORE: 13
ADLS_ACUITY_SCORE: 19
ADLS_ACUITY_SCORE: 17
ADLS_ACUITY_SCORE: 13
ADLS_ACUITY_SCORE: 13
ADLS_ACUITY_SCORE: 17
ADLS_ACUITY_SCORE: 19
ADLS_ACUITY_SCORE: 17
ADLS_ACUITY_SCORE: 17

## 2022-03-28 NOTE — PROGRESS NOTES
Patient received 5th treatment to her T-L spine and L-S spine today. Both areas received 300 cGy with 10 mV photons for a total dose of 1500 cGy. 5 treatments remain to both areas.    ** Patient inquired about how we would be getting her up on to the treatment table if she came in as an Outpatient. We would definitely need her to arrive already sitting in a Bebeto harness. I gave her a harness and also labeled it for positioning in chair.    Kelley Dunne RTT

## 2022-03-28 NOTE — PLAN OF CARE
Primary Diagnosis: Spinal cord compression due to malignant neoplasm metastatic to spine   Orientation: A&Ox4  Aggression Stop Light: green  Mobility: W/C bound, BLE paralysis up with ceiling lift moves very well in bed using upper body just unable to move legs  Pain Management: heat pack & PRN tylenol  Diet: Regular  Bowel/Bladder: Continent, BSC  Abnormal Lab/Assessments: Drain/Device/Wound: R PIV  Consults: neurosurgery   D/C Day/Goals/Place: pending  plans

## 2022-03-28 NOTE — PROGRESS NOTES
[facility]    Medicine Progress Note - Hospitalist Service       Date of Admission:  3/19/2022    Assessment & Plan   Micike Mcghee is a 57 year old female with hx of breast cancer lost to follow-up due to loss of insurance who initially presented to [facility] ED on 3/1/2022 for evaluation of 2-month history of paraplegia, and was found to have extensive bone metastases from breast cancer with direct invasion of spinal canal and spinal cord impingement at T10 level. She was transferred to [facility] on 3/19 for Neurosurgery consultation.    # Paraplegia due to bone metastases with invasion of the spinal canal at T10-T12  # Brain metastases  # Stage IV metastatic breast cancer (ER+, AR+, HER2 negative)  * Initially diagnosed with breast cancer in 2007. ER/AR positive, HER-2/halina negative. Treated with bilateral mastectomy plus radiation, chemotherapy TAC x6 cycles, tamoxifen x6 years  * Last saw Oncology in 2015. Was lost to follow-up due to loss of insurance  * Presented to [facility] ED on 3/18 with paraplegia since Jan 2022. MRI thoracic and lumbar spine (3/18) revealed extensive osseous metastasis centered at T10-T12 with pathologic compression deformities and extensive involvement of posterior elements, direct invasion of spinal canal bilaterally causing severe spinal canal stenosis from T10-T12 and invasion of spinal cord at T10-T11 level; dditional metastatic lesions involving T4-T6, T9, L1-L2, L5, S1-S2   * CT chest/abd/pelvis (3/19) showed destructive bony lesion T and L-spine.  Indeterminate pulmonary nodules right, indeterminate mediastinal lymph node, hypodense nodule liver, bony metastases right proximal femur and ribs.    * Brain MRI (3/19) showed new right frontal, right parietal, left temporal and left parietal bony metastases.  No brain mets  * Neurosurgery was consulted on admission; given duration of symptoms, surgical intervention was not felt indicated  * [facility] and  Radiation Oncology were consulted during admission  * Underwent iliac bone biopsy 3/21.  Pathology revealed metastatic breast carcinoma, ER/DE+, HER2 neg  * Initiated on dexamethasone on 3/22  * Initiated on radiation to T-L spine on 3/22  * Received zoledronic acid 4 mg IV x1 on 3/24  * Initiated on letrozole on 3/25  - Continues on dexamethasone 4 mg BID, decrease to daily tomorrow, 3/29  - Pain adequately controlled with APAP prn  - Care coordinator currently arranging home equipment  - Has follow up with Dr. Lozada on 3/31  - Plan to continue outpatient radiation. Patient's goal is to pursue any life prolonging treatments to attend her son's high school graduation in the Spring    Advanced care planning  Admitting Hospitalist discussed strong consideration of changing her code status to DNR/DNI. Patient is quite realistic and understanding, but is focused on attending her son's high school graduation this upcoming Spring     Diet: Regular Diet Adult    DVT Prophylaxis: Pneumatic Compression Devices  Plunkett Catheter: Not present  Code Status: Full Code         Disposition: Expected discharge once able to coordinate home equipment, possibly tomorrow    The patient's care was discussed with the Bedside Nurse, Care Coordinator/ and Patient.    Mala Hardy MD  Hospitalist Service  [facility]  Contact information available via [facility] Paging/Directory    ______________________________________________________________________    Interval History   No events overnight. Pain adequately controlled with Tylenol prn. Discussed with care coordinator who is currently working on getting patient the equipment she needs at home    Data reviewed today: I reviewed all medications, new labs and imaging results over the last 24 hours. I personally reviewed no images or EKG's today.    Physical Exam   Vital Signs: Temp: 98  F (36.7  C) Temp src: Oral BP: 102/68 Pulse: 67   Resp: 16 SpO2: 97  % O2 Device: None (Room air)    Weight: 122 lbs 0 oz  Constitutional: Appears comfortable  HEENT: Sclera white, MMM  Respiratory: Breathing non-labored. Lungs CTAB - no wheezes, crackles, or rhonchi  Cardiovascular: Heart RRR, no m/r/g. No pedal edema  GI: +BS, abd soft/NT  Skin/Integument: No rash  Musculoskeletal: Normal muscle bulk and tone  Neuro: Alert and appropriate, +paraplegia, no sensation below level of T10  Psych: Calm and cooperative    Data   Recent Labs   Lab 03/28/22  0640 03/27/22  0753 03/26/22  0730   WBC  --  4.6  --    HGB  --  11.8  --    MCV  --  90  --    PLT  --  285  --    NA  --  136  --    POTASSIUM 4.0 3.5 3.7   CHLORIDE  --  104  --    CO2  --  26  --    BUN  --  16  --    CR  --  0.43*  --    ANIONGAP  --  6  --    OSMEL  --  8.7  --    GLC  --  149*  --    ALBUMIN  --  3.3*  --    PROTTOTAL  --  6.8  --    BILITOTAL  --  0.4  --    ALKPHOS  --  90  --    ALT  --  24  --    AST  --  10  --          No results found for this or any previous visit (from the past 24 hour(s)).    Medications       calcium carbonate 600 mg-vitamin D 400 units  1 tablet Oral BID w/meals     [START ON 3/29/2022] dexamethasone  4 mg Oral Daily     dexamethasone  4 mg Oral Q12H TAN     letrozole  2.5 mg Oral Daily     sodium chloride (PF)  3 mL Intracatheter Q8H

## 2022-03-28 NOTE — PLAN OF CARE
Goal Outcome Evaluation:      A&Ox4, VSS on RA. Paraplegic, up with lift to BSC. Continent of B/B. No changes in CMS or neuros. T/R as requested. Back pain controlled with T pump. Tolerating regular diet. Mepilex to L buttock abrasion CDI. Plan to discharge home with home care.

## 2022-03-28 NOTE — PLAN OF CARE
Goal Outcome Evaluation:      A/Ox4, VSS on RA. Paraplegic, up w/ lift to BSC. Back pain managed with PRN tylenol x2 & heating pad.  Regular diet, tolerating well. Up A2 w/ lift to BSC. Continent of B/B, 1 BM this shift. Mepilex to L buttock abrasion, CDI. Completed 5/10 radiation treatment today. Plan to discharge home tomorrow pending jani lift sling being delivered.

## 2022-03-28 NOTE — DISCHARGE INSTRUCTIONS
"[employer] Phone# [phone]    We were unable to find a homecare company for your discharge.  Please continue to work with Dr. Lozada's team to get homecare services.      [email or web address] Phone# [phone]  You are scheduled for the following rides through [email or web address] through April 4th.  Please call them for any other appointments scheduled after your discharge.  Please call them within 72 hours for any follow up appointment to make sure that they are able to fit you into their schedule.  They are aware that your sister will be attending these appointments as well.  You will need to call them once your appointments are finished for \"will call\" for .    Wednesday March 30th 2022 your ride will arrive between 11:30-12:00 p.m.  Thursday March 31th 2022 you will have two rides the first will arrive between 9:20-9:50 a.m.([place]) and the second arrival will be between 1:45-2:15 p.m.  Friday April 1st 2022 your ride will arrive between 7:30-8:00 a.m.  Monday April 4th 2022 your ride will arrive between 12:45-1:15 p.m.    You should reach out to your county and or Medical Assistance Insurance for other resources and questions for future needs.  You should ask for a county  or a medical case manager to be assigned to you for ongoing needs.      "

## 2022-03-28 NOTE — TELEPHONE ENCOUNTER
PA Initiation    Medication: Kisqali- PA initiated, treatment voucher obtained  Insurance Company: [place] Medicaid ([facility]) - Phone [phone] Fax [phone]  Pharmacy Filling the Rx: [facility] - [place], [place] - [address]  Filling Pharmacy Phone:    Filling Pharmacy Fax:    Start Date: 3/28/2022    Also obtained Kisqali free treatment voucher:

## 2022-03-28 NOTE — PROGRESS NOTES
"   03/28/22 0824   Quick Adds   Type of Visit Initial Occupational Therapy Evaluation   Living Environment   People in Home child(efraín), dependent;sibling(s);spouse  (, son (going to college soon), temporarily with siste)   Current Living Arrangements other (see comments)  ([facility])   Home Accessibility stairs to enter home;stairs within home   Number of Stairs, Main Entrance 1   Number of Stairs, Within Home, Primary   (flight to upstairs)   Transportation Anticipated health plan transportation   Living Environment Comments Currently living in [facility], but  is planning to move them to a wheelchair accessible apartment soon. They are looking for A in resources for finding apartment. Pt has been \"bed bound\" since end of December/early January. Since decline in LE function (~November/December). pt Has been living on [facility] with a bed that was moved to main floor with half bath. Full bath and bedroom are upstairs. Currently does not have any AE/AD.  planning to move to wheelchair accessible apartment.    Self-Care   Usual Activity Tolerance poor   Current Activity Tolerance poor   Equipment Currently Used at Home wheelchair, manual   Fall history within last six months yes   Number of times patient has fallen within last six months 1   Activity/Exercise/Self-Care Comment Pt was using bed pan and depends for voiding.  or sister A for ADLs (bathing, toileting, g/h). Sister was trained by [place] nurses to be a caregiver, but may have to move back to [place] in May. With set up, (I) in dressing in bed. Does not have own WC.    Instrumental Activities of Daily Living (IADL)   IADL Comments , sister and son A for all IADLs.   General Information   Onset of Illness/Injury or Date of Surgery 03/19/22   Referring Physician Kimberli Hussein MD   Patient/Family Therapy Goal Statement (OT) Home with  and sister with jani lift, AE, HH   Additional Occupational Profile Info/Pertinent " History of Current Problem Mickie Mcghee is a 57 year old female admitted on 3/19/2022. She presented as a direct admission from [facility] ED with paraplegia since mid January and found to have bony metastases due to metastatic breast cancer with direct invasion of spinal canal and spinal cord impingement at T10 level. Pt is paraplegic.    Existing Precautions/Restrictions fall   Cognitive Status Examination   Orientation Status orientation to person, place and time   Visual Perception   Visual Impairment/Limitations corrective lenses full-time   Sensory   Sensory Comments No pain or temperature sensation in LEs. Able to feel light touch in LEs.    Range of Motion Comprehensive   General Range of Motion no range of motion deficits identified   Bed Mobility   Bed Mobility supine-sit;sit-supine   Supine-Sit [place] (Bed Mobility) contact guard   Sit-Supine [place] (Bed Mobility) maximum assist (25% patient effort)   Transfers   Transfers bed-chair transfer;toilet transfer   Transfer Skill: Bed to Chair/Chair to Bed   Bed-Chair [place] (Transfers) dependent (less than 25% patient effort)   Transfer Comments lift   Toilet Transfer   [place] Level (Toilet Transfer) dependent (less than 25% patient effort)   Toilet Transfer Comments lift   Activities of Daily Living   BADL Assessment/Intervention lower body dressing;upper body dressing;toileting   Upper Body Dressing Assessment/Training   Comment, (Upper Body Dressing) With set up, (I) in bed   Lower Body Dressing Assessment/Training   Comment, (Lower Body Dressing) With set up, (I) in bed   Toileting   [place] Level (Toileting) not tested   Comment, (Toileting) Declined   Clinical Impression   Criteria for Skilled Therapeutic Interventions Met (OT) Yes, treatment indicated   OT Diagnosis Impaired (I) in I/ADLs   OT Problem List-Impairments impacting ADL problems related to;activity tolerance impaired;balance;mobility;strength;sensation;range of  motion (ROM);muscle tone;motor control;sensory feedback;pain   Assessment of Occupational Performance 5 or more Performance Deficits   Identified Performance Deficits Impaired (I) in dressing, bathing, toileting, functional mobility, all IADLs   Planned Therapy Interventions (OT) ADL retraining;IADL retraining;neuromuscular re-education;ROM;strengthening;transfer training;home program guidelines;progressive activity/exercise;risk factor education   Clinical Decision Making Complexity (OT) low complexity   Anticipated Equipment Needs Upon Discharge (OT) commode chair;lift device;transfer board;wheelchair;wheelchair cushion;toileting equipment   Risk & Benefits of therapy have been explained evaluation/treatment results reviewed;care plan/treatment goals reviewed;risks/benefits reviewed;current/potential barriers reviewed;participants voiced agreement with care plan;participants included;patient   OT Discharge Planning   OT Discharge Recommendation (DC Rec) home with assist;home with home care occupational therapy;Leaving home requires significant assistance;Leaving home requires significant taxing effort;[facility]-Motivated patient will benefit from intensive, interdisciplinary therapy.  Anticipate will be able to tolerate 3 hours of therapy per day   OT Rationale for DC Rec Pt functioning well below baseline due to paraplegic status, weakness, activity tolerance, sensation. Recommend ARU and continued IP OT services for strengthening, functional transfer safety and to improve (I) in I/ADLs. Pt requests to d/c home. If pt is able to obtain lift, commode chair with drop arm rests, slide board, WC, could discharge home with 24/7 supervision and A for all I/ADLs from , sister and possibly home health aide. Pt open to possibly having home health aide or respite care services to relieve  and sister as caregivers. Recommend HHOT for adaptive equipment and home safety. Pt reports her  is  looking into moving to a wheelchair accessible apartment as ron pt has a bed moved into their main floor in town home with only a half bath available to pt that is not wc accessible.    OT Brief overview of current status lift for transfer to commode, working on slide board transfers w/ PT, (I) dressing with set up   Total Evaluation Time (Minutes)   Total Evaluation Time (Minutes) 10   OT Goals   Therapy Frequency (OT) Daily   OT Predicated Duration/Target Date for Goal Attainment 04/01/22   OT Goals Hygiene/Grooming;Upper Body Dressing;Lower Body Dressing;Toilet Transfer/Toileting   OT: Hygiene/Grooming minimal assist;from wheelchair;using adaptive equipment   OT: Upper Body Dressing Modified independent  (A for set up )   OT: Lower Body Dressing Modified independent  (A for set up)   OT: Toilet Transfer/Toileting cleaning and garment management;using adaptive equipment;Minimal assist

## 2022-03-28 NOTE — PLAN OF CARE
Goal Outcome Evaluation:      A/Ox4, VSS on RA. Paraplegic, up w/ lift to BSC. Back pain managed with PRN tylenol & heating pad.  Regular diet, tolerating well. Up A2 w/ lift to BSC. Continent of B/B, 1 BM this shift. Mepilex to L buttock abrasion, CDI. Completed 5/10 radiation treatment today. Plan to discharge home tomorrow pending jani lift sling being delivered.

## 2022-03-28 NOTE — CONSULTS
Care Management Follow Up    Length of Stay (days): 9    Expected Discharge Date: 2022     Concerns to be Addressed: basic needs, coping/stress, discharge planning, financial/insurance     Patient plan of care discussed at interdisciplinary rounds: Yes    Anticipated Discharge Disposition: Home, Home Care     Anticipated Discharge Services: Transportation Services  Anticipated Discharge DME: Bed, Wheelchair, Commode    Patient/family educated on Medicare website which has current facility and service quality ratings:    Education Provided on the Discharge Plan:    Patient/Family in Agreement with the Plan: yes    Referrals Placed by CM/SW: External Care Coordination, Homecare, Specialty Providers, Durable Medical Equipment (DME), Transportation, Community Resources, Insurance Verification for DME  Private pay costs discussed:   Patient looking at PCD's and possible Sliding board through [employer]    Additional Information:  Writer met with the patient at the bedside. Discussed discharge planning to home 3/29/22 and the followin. We have set up transportation through [employer]  Patient provided with her ride  times and dates through .  [facility] confirmed that her sister will be able to attend appointments and that they will use a wheelchair lift to get the patient to/from appointments.  2. Writer working with [facility] regarding DME wheelchair, bed, commode and now a jani.  Talked to Pema at [facility] they do not have a wheelchair or commode that has a an arm removal for sliding. Patient is aware of this and the jani would then be the mode of transfer for the patient.  Patient encouraged to continue to work with outpatient and homecare (when an agency is found) to continue to assess for additional equipment or needs in the home. Patient aware that the wheelchair and bed are rented and once/if  additional equipment is needed then the bed/wheelchair would be returned to  [facility] and she may need to use another DME company.  3. We have not had any success with homecare agencies tried the following   -[facility]  -[facility]  -[facility]  -[facility]  -[facility]  Patient is scheduled with Dr. Lozada 3/31 she is aware that we will ask the CC RN and SW at his clinic to continue to assist in finding a homecare agency RN/PT/OT/HHA/SW.  Dr. Lzoada would be her primary MD as she has not established care.  Patient stated she will have help at home with her son and sister for cares and needs.  4. Patient was encouraged to call her county for additional resources such as additional equipment and possible resources for housing (patient was inquiring about ramp accessible/handicap accessible apartments).  4. Eduora has ordered a Bebeto Sling as the DME company [employer] has the Bebeto but Slings are on back order.  Per [employer] delivery estimated for 3/29 patient is aware of this, she stated that she would still like to go home even if the sling is not delivered and will have her family assist her with needs until they can get the sling to use the equipment.  5. Writer set up a stretcher ride for 14:00 through [employer].  PCS transport form faxed and original is on the chart.    CCRN to follow up with patient tomorrow if additional questions.    Addendum: 3/28 16:26  [facility] was delivering equipment in the home. Writer faxed the Bebeto lift order for DME.       Kelley Guallpa RN

## 2022-03-29 ENCOUNTER — DOCUMENTATION ONLY (OUTPATIENT)
Dept: PHARMACY | Facility: CLINIC | Age: 57
End: 2022-03-29
Payer: COMMERCIAL

## 2022-03-29 VITALS
OXYGEN SATURATION: 97 % | RESPIRATION RATE: 16 BRPM | WEIGHT: 122 LBS | SYSTOLIC BLOOD PRESSURE: 106 MMHG | DIASTOLIC BLOOD PRESSURE: 66 MMHG | TEMPERATURE: 98.4 F | HEIGHT: 65 IN | BODY MASS INDEX: 20.33 KG/M2 | HEART RATE: 62 BPM

## 2022-03-29 DIAGNOSIS — C50.919 METASTATIC BREAST CANCER: Primary | ICD-10-CM

## 2022-03-29 LAB
MAGNESIUM SERPL-MCNC: 2.5 MG/DL (ref 1.6–2.3)
POTASSIUM BLD-SCNC: 4.3 MMOL/L (ref 3.4–5.3)

## 2022-03-29 PROCEDURE — 77412 RADIATION TX DELIVERY LVL 3: CPT

## 2022-03-29 PROCEDURE — 250N000013 HC RX MED GY IP 250 OP 250 PS 637: Performed by: INTERNAL MEDICINE

## 2022-03-29 PROCEDURE — 250N000013 HC RX MED GY IP 250 OP 250 PS 637: Performed by: NURSE PRACTITIONER

## 2022-03-29 PROCEDURE — 250N000011 HC RX IP 250 OP 636: Performed by: INTERNAL MEDICINE

## 2022-03-29 PROCEDURE — 99239 HOSP IP/OBS DSCHRG MGMT >30: CPT | Performed by: INTERNAL MEDICINE

## 2022-03-29 PROCEDURE — 36415 COLL VENOUS BLD VENIPUNCTURE: CPT | Performed by: INTERNAL MEDICINE

## 2022-03-29 PROCEDURE — 84132 ASSAY OF SERUM POTASSIUM: CPT | Performed by: INTERNAL MEDICINE

## 2022-03-29 PROCEDURE — G0463 HOSPITAL OUTPT CLINIC VISIT: HCPCS

## 2022-03-29 PROCEDURE — 77387 GUIDANCE FOR RADJ TX DLVR: CPT

## 2022-03-29 PROCEDURE — 83735 ASSAY OF MAGNESIUM: CPT | Performed by: INTERNAL MEDICINE

## 2022-03-29 PROCEDURE — 99231 SBSQ HOSP IP/OBS SF/LOW 25: CPT | Performed by: INTERNAL MEDICINE

## 2022-03-29 RX ORDER — MAGNESIUM HYDROXIDE/ALUMINUM HYDROXICE/SIMETHICONE 120; 1200; 1200 MG/30ML; MG/30ML; MG/30ML
30 SUSPENSION ORAL EVERY 4 HOURS PRN
Qty: 355 ML | Refills: 0 | Status: SHIPPED | OUTPATIENT
Start: 2022-03-29 | End: 2022-12-28

## 2022-03-29 RX ORDER — LETROZOLE 2.5 MG/1
2.5 TABLET, FILM COATED ORAL EVERY MORNING
Qty: 28 TABLET | Refills: 0 | Status: SHIPPED | OUTPATIENT
Start: 2022-03-30 | End: 2022-05-19

## 2022-03-29 RX ORDER — PROCHLORPERAZINE MALEATE 10 MG
10 TABLET ORAL EVERY 6 HOURS PRN
Qty: 30 TABLET | Refills: 2 | Status: SHIPPED | OUTPATIENT
Start: 2022-03-29 | End: 2022-04-20

## 2022-03-29 RX ADMIN — CALCIUM CARBONATE 600 MG (1,500 MG)-VITAMIN D3 400 UNIT TABLET 1 TABLET: at 09:10

## 2022-03-29 RX ADMIN — ALUMINUM HYDROXIDE, MAGNESIUM HYDROXIDE, AND SIMETHICONE 30 ML: 200; 200; 20 SUSPENSION ORAL at 00:02

## 2022-03-29 RX ADMIN — ACETAMINOPHEN 650 MG: 325 TABLET, FILM COATED ORAL at 13:09

## 2022-03-29 RX ADMIN — LETROZOLE 2.5 MG: 2.5 TABLET ORAL at 09:11

## 2022-03-29 RX ADMIN — ONDANSETRON 4 MG: 4 TABLET, ORALLY DISINTEGRATING ORAL at 11:29

## 2022-03-29 RX ADMIN — DEXAMETHASONE 4 MG: 4 TABLET ORAL at 09:11

## 2022-03-29 ASSESSMENT — ACTIVITIES OF DAILY LIVING (ADL)
ADLS_ACUITY_SCORE: 17

## 2022-03-29 NOTE — PROGRESS NOTES
Care Management Discharge Note    Discharge Date: 03/29/2022       Discharge Disposition: Home, Home Care    Discharge Services: Transportation Services    Discharge DME: Bed, Wheelchair, Commode    Discharge Transportation: health plan transportation    Private pay costs discussed: Not applicable    Education Provided on the Discharge Plan:    Persons Notified of Discharge Plans:   Patient/Family in Agreement with the Plan: yes    Handoff Referral Completed: Yes    Additional Information:  Spoke w/ CC SHAREE Benson to confirm. She will follow up w/ Dr Lozada's coordinator tomorrow to have him as her primary MD and to follow through with HHC orders. No need for HHC orders upon discharge  2 additional Agencies were contacted earlier- [facility] (at capacity ) and [facility] ( capped for [place] patients)  Per Chuyita Benson confirmed 3/29 that all DME was being delivered. Sling ordered off [employer]- family aware it may not be delivered yet. Patient has assist of family at home.

## 2022-03-29 NOTE — PROGRESS NOTES
Service Date: 2022    SUBJECTIVE:  Ms. Echevarria is a 57-year-old female with recurrent breast cancer with bone metastasis.  This is causing spinal cord compression.  She is currently getting palliative radiation.  She is also on dexamethasone.  We have also started her on letrozole.  Plan is to add ribociclib as outpatient.    Overall, her condition is stable.  Mild back pain.  No headache or dizziness.  No chest pain or shortness of breath.  No nausea or vomiting.  No improvement in her lower extremity weakness.    PHYSICAL EXAMINATION:    She is alert and oriented x 3.  Not in distress.  Rest of the systems not examined.    ASSESSMENT:  A 57-year-old female with recurrent breast cancer with bone metastasis causing spinal cord compression.    PLAN:     1.  The patient is clinically stable.  She will complete her radiation.  2.  She will continue on letrozole.  Ribociclib will be added in the next few days when approved by insurance.  3.  She will continue on monthly Zometa.  4.  She had a few questions, which were all answered.  I will see her in clinic for followup.    Yury Lozada MD        D: 2022   T: 2022   MT: [id number]    Name:     JUSTIN ECHEVARRIA  MRN:      [id number]        Account:      [id number]   :      1965           Service Date: 2022       Document: [id number]

## 2022-03-29 NOTE — CONSULTS
"WOC Focused Assessment    S: WOC consulted for coccyx wound    B:  Mickie Mcghee is a 57 year old female with hx of breast cancer lost to follow-up due to loss of insurance who initially presented to [facility] ED on 3/1/2022 for evaluation of 2-month history of paraplegia, and was found to have extensive bone metastases from breast cancer with direct invasion of spinal canal and spinal cord impingement at T10 level. She was transferred to [facility] on 3/19 for Neurosurgery consultation.       A: Pt reports she was being moved with lift and she feels her brief pinched and rubbed.      Pt has a 1cm x 0.7cm x 0.01cm small abrasion to Left fleshy buttock buttock. Wound with scant serous drainage, and blanchable periwound pink erythema. Pt denies any pain. Mild blanchable erythema from wrinkled linens to Right buttock with no open areas. Pt able to position independently side to side.     R: 1. Clean wound with saline or MicroKlenz Spray, pat dry  2. Wipe / \"clean\" the surrounding periwound tissue with skin prep (Cavilon No Sting Skin Prep #[id number]) and allow to dry. This will help protect periwound and help dressing adherence  3. Press a Mepilex  Border Dressing (#[id number])   to the area, making sure to conform nicely to skin curvatures.   4. Time and date dressing change  NOTE  -Reposition pt side to side giovany when in bed, every 2 hours-get the pt way over on side to completely offload pressure. This will benefit skin and respiratory function   -Keep heels elevated and floating on pillows at all times. Try using at least 2 pillows under each calf.  -When up to the chair pt needs to fully offload every 2 hours and use a chair cushion if needed     Kaushik White RN CWOCN      "

## 2022-03-29 NOTE — DISCHARGE SUMMARY
[facility]  Hospitalist Discharge Summary      Date of Admission:  3/19/2022  Date of Discharge:  3/29/2022  Discharging Provider: Mala Hardy MD    Discharge Diagnoses   1. Paraplegia due to bone metastases with invasion of the spinal canal at T10-T12  2. Brain metastases  3. Stage IV metastatic breast cancer (ER+, GA+, HER2 negative)    Follow-ups Needed After Discharge   Follow-up Appointments     Follow-up and recommended labs and tests       Follow up with Dr. Lozada on 3/31 as scheduled           Discharge Disposition   Discharged to home  Condition at discharge: Stable    Hospital Course   Mickie Mcghee is a 57 year old female with hx of breast cancer lost to follow-up due to loss of insurance who initially presented to [facility] ED on 3/1/2022 for evaluation of 2-month history of paraplegia, and was found to have extensive bone metastases from breast cancer with direct invasion of spinal canal and spinal cord impingement at T10 level. She was transferred to [facility] on 3/19 for Neurosurgery consultation.    # Paraplegia due to bone metastases with invasion of the spinal canal at T10-T12  # Brain metastases  # Stage IV metastatic breast cancer (ER+, GA+, HER2 negative)  * Initially diagnosed with breast cancer in 2007. ER/GA positive, HER-2/halina negative. Treated with bilateral mastectomy plus radiation, chemotherapy TAC x6 cycles, tamoxifen x6 years  * Last saw Oncology in 2015. Was lost to follow-up due to loss of insurance  * Presented to [facility] ED on 3/18 with paraplegia since Jan 2022. MRI thoracic and lumbar spine (3/18) revealed extensive osseous metastasis centered at T10-T12 with pathologic compression deformities and extensive involvement of posterior elements, direct invasion of spinal canal bilaterally causing severe spinal canal stenosis from T10-T12 and invasion of spinal cord at T10-T11 level; dditional metastatic lesions involving T4-T6, T9, L1-L2, L5, S1-S2    * CT chest/abd/pelvis (3/19) showed destructive bony lesion T and L-spine.  Indeterminate pulmonary nodules right, indeterminate mediastinal lymph node, hypodense nodule liver, bony metastases right proximal femur and ribs.    * Brain MRI (3/19) showed new right frontal, right parietal, left temporal and left parietal bony metastases.  No brain mets  * Neurosurgery was consulted on admission; given duration of symptoms, surgical intervention was not felt indicated  * [facility] and [facility] were consulted during admission  * Underwent iliac bone biopsy 3/21.  Pathology revealed metastatic breast carcinoma, ER/MI+, HER2 neg  * Initiated on dexamethasone on 3/22  * Initiated on radiation to T-L spine on 3/22  * Received zoledronic acid 4 mg IV x1 on 3/24  - Initiated on letrozole on 3/25, and will continue at discharge  - She will be discharged on dexamethasone 4 mg daily  - Pain has otherwise been adequately controlled with APAP prn  - Home equipment was arranged prior to discharge  - Has follow up with Dr. Lozada on 3/31  - She will continue outpatient radiation    Advanced care planning  Admitting Hospitalist discussed strong consideration of changing her code status to DNR/DNI. Patient is quite realistic and understanding, but is focused on pursuing life prolonging treatments to attend her son's high school graduation in the Spring    Consultations This Hospital Stay   1. HEMATOLOGY & ONCOLOGY IP CONSULT  2. NEUROSURGERY IP CONSULT  3. RADIATION IP CONSULT  4. PHYSICAL THERAPY ADULT IP CONSULT  5. OCCUPATIONAL THERAPY ADULT IP CONSULT  6. WOUND OSTOMY CONTINENCE NURSE  IP CONSULT  7. CARE MANAGEMENT / SOCIAL WORK IP CONSULT    Code Status   Full Code    Time Spent on this Encounter   I, Mala Hardy MD, personally saw the patient today and spent 40 minutes discharging this patient.       Mala Hardy MD  [place] [id number] [facility]  [address]  "[id number]  Phone: [phone]  ______________________________________________________________________    Physical Exam   Vital Signs: Temp: 98.4  F (36.9  C) Temp src: Oral BP: 106/66 Pulse: 62   Resp: 16 SpO2: 97 % O2 Device: None (Room air)    Weight: 122 lbs 0 oz    Constitutional: Appears comfortable  HEENT: Sclera white, MMM  Respiratory: Breathing non-labored. Lungs CTAB - no wheezes, crackles, or rhonchi  Cardiovascular: Heart RRR, no m/r/g. No pedal edema  GI: +BS, abd soft/NT  Skin/Integument: No rash  Musculoskeletal: Normal muscle bulk and tone  Neuro: Alert and appropriate, +paraplegia, no sensation below level of T10  Psych: Calm and cooperative    Primary Care Physician   Physician No Ref-Primary    Discharge Orders      Comprehensive metabolic panel     Magnesium     Phosphorus     Hepatic panel     Reason for your hospital stay    Paraplegia due to bone mets     Follow-up and recommended labs and tests     Follow up with Dr. Lozada on 3/31 as scheduled     Activity    Your activity upon discharge: activity as tolerated     Hospital Bed DME    Hospital Bed Documentation:   Hospital bed is required for body positioning, to allow for safe transfers to wheelchair and standing and frequent changes in body position, not feasible in an ordinary bed     NOTE: Patient must have a \"Yes\" in one of the four following questions to qualify for a hospital bed.    1. Does the patient require positioning of the body in ways not feasible with an ordinary bed due to a medical condition that is expected to last at least 1 month? Yes (Please explain): has paraplegia due to metastatic cancer    2. Does the patient require, for the alleviation of pain, positioning of the body in ways not feasible with an ordinary bed? No     3. Does the patient require the head of the bed to be elevated more than 30 degrees most of the time due to congestive heart failure, chronic pulmonary disease, or aspiration? No    4. Does the " patient require traction that can only be attached to a hospital bed? No    Additional Criteria:    Does the patient require frequent changes in body position and/or have an immediate need for change in body position? Yes - Patient qualifies for Semi Electric Bed     Trapeze Criteria:  (Patient must meet standard hospital bed criteria also)   1. Does patient need this device to sit up because of a respiratory condition, for change in body position for other medical reasons, or to get in or out of bed? Yes (Please explain): has paraplegia and needs to adjust height for transfers     I, the undersigned, certify that the above prescribed supplies are medically necessary for this patient and is both reasonable and necessary in reference to accepted standards of medical and necessary in reference to accepted standards of medical practice in the treatment of this patient's condition and is not prescribed as a convenience.     Commode Chair DME    DME Documentation:   Describe the reason for need to support medical necessity: paraplegia due to metastatic cancer, spinal cord compression.     I, the undersigned, certify that the above prescribed supplies are medically necessary for this patient and is both reasonable and necessary in reference to accepted standards of medical and necessary in reference to accepted standards of medical practice in the treatment of this patient's condition and is not prescribed as a convenience.     Wheelchair Order    Wheelchair Documentation:   Describe the reason for need to support medical necessity: paraplegia, spinal cord compression, metastatic cancer   1. The patient has mobility limitations that impairs their ability to participate in one or more mobility related activities: Toileting, Bathing, and ambulating.  2. The patient's mobility limitations cannot be safely resolved by using a cane/walker: Yes  3. The patients home has adequate access to use a manual wheelchair: Yes  4. The  use of a manual wheelchair on a regular basis will improve the patients ability to participate in mobility related ADL's at home: Yes  5. The patient is willing to use a manual wheelchair at home: Yes  6. The patient has adequate upper body strength and the mental capability to safely use a manual wheelchair and/or has a caregiver that is able to assist: Yes  7. Does the patient have a lower extremity injury or edema?: No    Reason for Type of Wheelchair: Light Weight Wheelchair: Patient is unable to self-propel a standard wheelchair in the home but can self propel a light weight wheelchair.    **Use of a manual wheelchair will significantly improve the patient's ability to participate in MRADLs and the patient will use it on a regular basis in the home. The patient has not expressed an unwillingness to use the manual wheelchair that is provided in the home.**    I, the undersigned, certify that the above prescribed supplies are medically necessary for this patient and is both reasonable and necessary in reference to accepted standards of medical and necessary in reference to accepted standards of medical practice in the treatment of this patient's condition and is not prescribed as a convenience.     Miscellaneous DME Order    DME Documentation:   Describe the reason for need to support medical necessity: difficulty tranferring    I, the undersigned, certify that the above prescribed supplies are medically necessary for this patient and is both reasonable and necessary in reference to accepted standards of medical and necessary in reference to accepted standards of medical practice in the treatment of this patient's condition and is not prescribed as a convenience.     Miscellaneous DME Order    DME Documentation: Bebeto lift  Describe the reason for need to support medical necessity: cord compression need for safe transferring.     I, the undersigned, certify that the above prescribed supplies are medically  necessary for this patient and is both reasonable and necessary in reference to accepted standards of medical and necessary in reference to accepted standards of medical practice in the treatment of this patient's condition and is not prescribed as a convenience.     Bebeto Lift Order    Bebeto Lift Documentation:   Patient is non-weight bearing: Yes.     I, the undersigned, certify that the above prescribed supplies are medically necessary for this patient and is both reasonable and necessary in reference to accepted standards of medical and necessary in reference to accepted standards of medical practice in the treatment of this patient's condition and is not prescribed as a convenience.     Diet    Follow this diet upon discharge: Regular diet     CBC with platelets differential     CBC with platelets differential       Significant Results and Procedures   Results for orders placed or performed during the hospital encounter of 03/19/22   MR Brain w/o & w Contrast    Narrative    EXAM: MR BRAIN W/O and W CONTRAST  LOCATION: [facility]  DATE/TIME: 3/19/2022 3:39 PM    INDICATION: Breast cancer. Clinical concern for brain metastases.  COMPARISON: Head CT 10/27/2007  CONTRAST: 5mL Gadavist  TECHNIQUE: Routine multiplanar multisequence head MRI without and with intravenous contrast.    FINDINGS:  INTRACRANIAL CONTENTS: No acute or subacute infarct. No mass, acute hemorrhage, or extra-axial fluid collections. Scattered nonspecific T2/FLAIR hyperintensities within the cerebral white matter most consistent with mild chronic microvascular ischemic   change. Normal ventricles and sulci. Normal position of the cerebellar tonsils. No abnormal intra-axial enhancement.    SELLA: No abnormality accounting for technique.    OSSEOUS STRUCTURES/SOFT TISSUES: Right frontal bony metastasis measuring 21.0 x 12.7 mm in AP by transverse dimensions. Right parietal bony metastasis measuring 15.2 x 8.1 mm in AP by  transverse dimensions. 10.0 x 4.4 mm left temporal bone metastasis and   5.5 x 3.5 mm left parietal bony metastasis. The major intracranial vascular flow voids are maintained.     ORBITS: No abnormality accounting for technique.     SINUSES/MASTOIDS: No paranasal sinus mucosal disease. Left mastoid effusion.       Impression    IMPRESSION:  1.  Interval development of right frontal, right parietal, left temporal and left parietal bony metastases.  2.  Normal intracranial contents.   CT Chest/Abdomen/Pelvis w Contrast    Narrative    EXAM: CT CHEST/ABDOMEN/PELVIS W CONTRAST  LOCATION: [facility]  DATE/TIME: 3/19/2022 12:07 PM    INDICATION: Invasive breast cancer, stage IV, follow up.  COMPARISON: CT of chest abdomen and pelvis 05/22/2010.  TECHNIQUE: CT scan of the chest, abdomen, and pelvis was performed following injection of IV contrast. Multiplanar reformats were obtained. Dose reduction techniques were used.   CONTRAST: 60mL Isovue 370.    FINDINGS:   LUNGS AND PLEURA: Focal scarring at the left upper lobe apex is again seen, smaller in size as compared to 05/22/2010. There are several indeterminate new pulmonary nodules noted. These are on the right side. An example is solid and irregular measuring   0.8 x 0.6 cm at the right middle lobe series 4 image 149 with adjacent tiny nodularity. Another example at the right upper lobe is 0.6 x 0.5 cm image 47. A few other small peripheral nodules at the right lower lobe. No effusions.    MEDIASTINUM/AXILLAE: No acute thoracic aortic abnormality. Enlarged right upper paratracheal lymph node with short axis of 1 cm series 3 image 40, previously 0.5 cm. Stable right hilar lymph node measuring 0.5 cm image 63. No axillary enlarged lymph   node.    CORONARY ARTERY CALCIFICATION: None.    HEPATOBILIARY: There is a new hypodense nodule at the right hepatic dome measuring 0.8 cm image 126. Tiny hypodense nodule at the left liver that is 0.4 cm image  134. The gallbladder is distended with increased density material diffusely, image 157 along   with additional gallstones.    PANCREAS: Normal.    SPLEEN: Lobulated hypodense nodular lesion at the spleen is stable in overall size measuring 2.6 cm image 130.    ADRENAL GLANDS: Normal.    KIDNEYS/BLADDER: A few tiny cysts not requiring specific imaging follow-up. No hydronephrosis. Bladder is unremarkable.    BOWEL: No acute abnormality.    LYMPH NODES: Normal.    VASCULATURE: Unremarkable.    PELVIC ORGANS: Unremarkable uterus for any acute abnormality. No adnexal lesion identified.    MUSCULOSKELETAL: New destructive appearing bone lesions with sclerosis and mixed lucency and height loss at T10, T11, sagittal series 9 image 56. Bony lesions also identified involving T12, L1, L2, left iliac wing with lucency and sclerosis, left   posterior innominate bone with a lucent bone lesion, proximal right femur. An example lesion at the left innominate is 2.6 cm image 223. New focal sclerosis at the right second rib. Other small rib sclerosis as well.      Impression    IMPRESSION:  1.  Destructive bone lesions newly identified. These are associated with compression deformity at T10 and T11. There are other areas of involvement involving the spine at the pelvis, right proximal femur, and a few ribs.  2.  New indeterminate pulmonary nodules in the right. Metastatic nodules are a possibility. Recommend follow-up CT chest in 3 months.  3.  New indeterminate enlarged upper mediastinal lymph node with neoplasm being a possibility.  4.  New hypodense nodules of the liver. These are nonspecific. Suggest further assessment with liver MRI.   CT Bone Biopsy Deep    Narrative     CT BONE BIOPSY DEEP  3/21/2022 4:48 PM     HISTORY: breast cancer; multiple bone lesions including L iliac crest     COMPARISON: 3/19/2021     TECHNIQUE: Volumetric helical acquisition of CT images were acquired  without contrast. Radiation dose for this scan  was reduced using  automated exposure control, adjustment of the mA and/or kV according  to patient size, or iterative reconstruction technique.    MEDICATIONS:  1mL lidocaine 50mcg fentanyl 1mg versed    FINDINGS: After written and oral informed consent was obtained, and  pause for the cause correctly identified the patient and procedure,  the patient was scanned in prone position for procedural planning. The  subcutaneous tissues overlying the left posterior iliac bone was  identified, marked, prepped and draped in the usual sterile fashion,  and anesthetized with 10 mL of 1% subcutaneous lidocaine. A 17-gauge  introducer device was advanced into the left iliac bone lytic lesion  and seven 18-gauge core biopsy specimens were obtained by coaxial  technique. Samples were submitted for histopathology. Needle removed  and sterile dressing applied. No immediate complications.    Procedure performed with moderate sedation under my direct supervision  for 20 minutes.      Impression    IMPRESSION: Technically successful CT guided left iliac bone mass  biopsy.    XOCHITL CAMACHO MD         SYSTEM ID:  [id number]       Discharge Medications   Current Discharge Medication List      START taking these medications    Details   acetaminophen (TYLENOL) 325 MG tablet Take 2 tablets (650 mg) by mouth every 6 hours as needed for mild pain or other (and adjunct with moderate or severe pain or per patient request)  Qty: 30 tablet, Refills: 0    Associated Diagnoses: Metastatic breast cancer (H)      alum & mag hydroxide-simethicone (MAALOX) 200-200-20 MG/5ML SUSP suspension Take 30 mLs by mouth every 4 hours as needed for indigestion  Qty: 355 mL, Refills: 0    Associated Diagnoses: Gastroesophageal reflux disease without esophagitis      calcium carbonate 600 mg-vitamin D 400 units (CALTRATE) 600-400 MG-UNIT per tablet Take 1 tablet by mouth 2 times daily (with meals)  Qty: 60 tablet, Refills: 0    Associated Diagnoses: Metastatic  breast cancer (H)      dexamethasone (DECADRON) 4 MG tablet Take 1 tablet (4 mg) by mouth daily  Qty: 30 tablet, Refills: 0    Associated Diagnoses: Spinal cord compression due to malignant neoplasm metastatic to spine (H)      letrozole (FEMARA) 2.5 MG tablet Take 1 tablet (2.5 mg) by mouth daily  Qty: 30 tablet, Refills: 0    Associated Diagnoses: Metastatic breast cancer (H)      senna-docusate (SENOKOT-S/PERICOLACE) 8.6-50 MG tablet Take 1-2 tablets by mouth 2 times daily as needed for constipation  Qty: 60 tablet, Refills: 0    Associated Diagnoses: Metastatic breast cancer (H)           Allergies   No Known Allergies

## 2022-03-29 NOTE — CARE PLAN
Physical Therapy Discharge Summary    Reason for therapy discharge:    Discharged to home with home therapy.    Progress towards therapy goal(s). See goals on Care Plan in [facility] electronic health record for goal details.  Goals met, GB issued for home use    Therapy recommendation(s):    Continued therapy is recommended.  Rationale/Recommendations:  to manage home equipment and further family education with jani..

## 2022-03-29 NOTE — PROGRESS NOTES
Radiation treatment # 6 to T-L-S spines.  1800 cGy total dose to date delivered with 10 MV Photons.  Patient has 4 treatments remaining.  SE  RTT

## 2022-03-29 NOTE — PLAN OF CARE
Problem: Pain Acute  Goal: Acceptable Pain Control and Functional Ability  Outcome: Ongoing, Progressing     Problem: Plan of Care - These are the overarching goals to be used throughout the patient stay.    Goal: Plan of Care Review/Shift Note  Description: The Plan of Care Review/Shift note should be completed every shift.  The Outcome Evaluation is a brief statement about your assessment that the patient is improving, declining, or no change.  This information will be displayed automatically on your shift note.  Outcome: Ongoing, Progressing   Goal Outcome Evaluation:     No pain, pt has been comfortable.  Planning on discharging tomorrow after her radiation treatment which is scheduled at Noon. No concerns at this time.

## 2022-03-29 NOTE — PLAN OF CARE
Goal Outcome Evaluation:        Discharge Note    Patient discharged to home via private vehicle  and EMS/BLS accompanied by no family/friend .  IV: Discontinued  Prescriptions printed and given to patient/family.   Belongings reviewed and sent with patient.   Home medications returned to patient: NA  Equipment sent with: patient.   patient verbalizes understanding of discharge instructions. AVS given to patient.

## 2022-03-29 NOTE — TELEPHONE ENCOUNTER
PRIOR AUTHORIZATION DENIED    Medication: [facility]- [place] denied (insurance changes 4/1, will resubmit), treatment voucher obtained    Denial Date: 3/29/2022    Denial Rational:           Appeal Information: Pts insurance changes on 4/1, will resubmit to new insurance and pt can use voucher in meantime-- if insurance continues to deny, pts income low enough for free drug program but insurance should cover this since its not off label

## 2022-03-29 NOTE — PROGRESS NOTES
Hospitalist JOYCELYN cross cover:    Paged by nursing as pt reporting heartburn.  Nursing notes pt A/Ox4, belching, no chest pain.  Will order PRN TUMs and Maalox.    SAMI Llanes, CNP  Hospitalist JOYCELYN    No charge.

## 2022-03-29 NOTE — PROGRESS NOTES
"Oral Chemotherapy Monitoring Program    Lab Monitoring Plan  CBCw/diff + CMP q2wk x 2 cycles then monthly.  Subjective/Objective:  Mickie Mcghee is a 57 year old female contacted by phone for an initial visit for oral chemotherapy education. The patient was provided with all necessary information regarding ribociclib therapy and was sent a copy of an education sheet for her reference. She was able to repeat back instructions accurately and will call with any further questions. The patient had already received one month supply of letrozole upon discharge from the ER and will fill through [facility] going forward.    ORAL CHEMOTHERAPY 3/29/2022   Assessment Type New Teach   Diagnosis Code Breast Cancer   Providers Neville   Clinic Name/Location [facility]   Drug Name Kisqali (ribociclib)   Dose 600 mg   Current Schedule Daily   Cycle Details 3 weeks on, 1 week off   Planned next cycle start date 3/31/2022   Any new drug interactions? No   Is the dose as ordered appropriate for the patient? Yes       Last PHQ-2 Score on record: No flowsheet data found.    Vitals:  BP:   BP Readings from Last 1 Encounters:   03/29/22 106/66     Wt Readings from Last 1 Encounters:   03/19/22 55.3 kg (122 lb)     Estimated body surface area is 1.59 meters squared as calculated from the following:    Height as of 3/19/22: 1.651 m (5' 5\").    Weight as of 3/19/22: 55.3 kg (122 lb).    Labs:  _  Result Component Current Result Ref Range   Sodium 136 (3/27/2022) 133 - 144 mmol/L     _  Result Component Current Result Ref Range   Potassium 4.3 (3/29/2022) 3.4 - 5.3 mmol/L     _  Result Component Current Result Ref Range   Calcium 8.7 (3/27/2022) 8.5 - 10.1 mg/dL     _  Result Component Current Result Ref Range   Magnesium 2.5 (H) (3/29/2022) 1.6 - 2.3 mg/dL     No results found for Phos within last 30 days.     _  Result Component Current Result Ref Range   Albumin 3.3 (L) (3/27/2022) 3.4 - 5.0 g/dL     _  Result Component Current Result Ref Range "   Urea Nitrogen 16 (3/27/2022) 7 - 30 mg/dL     _  Result Component Current Result Ref Range   Creatinine 0.43 (L) (3/27/2022) 0.52 - 1.04 mg/dL     _  Result Component Current Result Ref Range   AST 10 (3/27/2022) 0 - 45 U/L     _  Result Component Current Result Ref Range   ALT 24 (3/27/2022) 0 - 50 U/L     _  Result Component Current Result Ref Range   Bilirubin Total 0.4 (3/27/2022) 0.2 - 1.3 mg/dL     _  Result Component Current Result Ref Range   WBC Count 4.6 (3/27/2022) 4.0 - 11.0 10e3/uL     _  Result Component Current Result Ref Range   Hemoglobin 11.8 (3/27/2022) 11.7 - 15.7 g/dL     _  Result Component Current Result Ref Range   Platelet Count 285 (3/27/2022) 150 - 450 10e3/uL       Assessment:  Patient is appropriate to start therapy.    Plan:  Basic chemotherapy teaching was reviewed with the patient including indication, start date of therapy, dose, administration, adverse effects, missed doses, food and drug interactions, monitoring, side effect management, office contact information, and safe handling. Written materials were provided and all questions answered.    Follow-Up:  1 week after start date with phone call.     Rachel Moniz, Pharmacist Intern  March 29, 2022    Ashley Mcadams PharmD  March 29, 2022

## 2022-03-29 NOTE — PLAN OF CARE
Occupational Therapy Discharge Summary    Reason for therapy discharge:    Discharged to home with home therapy.    Progress towards therapy goal(s). See goals on Care Plan in [facility] electronic health record for goal details.  Goals not met.  Barriers to achieving goals:   discharge from facility.    Therapy recommendation(s):    Continued therapy is recommended.  Rationale/Recommendations:  Recommend ARU, but pt declines and wishes to return home.  If pt is able to obtain lift, commode chair with drop arm rests, slide board, WC, could discharge home with 24/7 supervision and A for all I/ADLs from , sister and possibly home health aide. Pt open to possibly having home health aide or respite care services to relieve  and sister as caregivers. Recommend HHOT for adaptive equipment and home safety. Pt reports her  is looking into moving to a wheelchair accessible apartment as curerntly pt has a bed moved into their main floor in town home with only a half bath available to pt that is not wc accessible..

## 2022-03-29 NOTE — PROVIDER NOTIFICATION
MD Notification    Notified Person: MD    Notified Person Name: Aristeo Akins    Notification Date/Time: 03/28/22 0823    Notification Interaction: [employer]    Purpose of Notification:  Pt c/o heartburn. Could we get tums or something else for heartburn?     Orders Received:    Comments:

## 2022-03-30 NOTE — PROGRESS NOTES
After discharge Note  Hand off communication to  at [facility] Tatiana Farley regarding homecare and that we were unable to secure. Writer will be available to discuss further with outpatient team if needed.

## 2022-03-31 ENCOUNTER — PATIENT OUTREACH (OUTPATIENT)
Dept: ONCOLOGY | Facility: CLINIC | Age: 57
End: 2022-03-31
Payer: COMMERCIAL

## 2022-03-31 ENCOUNTER — PATIENT OUTREACH (OUTPATIENT)
Dept: ONCOLOGY | Facility: CLINIC | Age: 57
End: 2022-03-31

## 2022-03-31 DIAGNOSIS — C50.919 METASTATIC BREAST CANCER: Primary | ICD-10-CM

## 2022-03-31 NOTE — PROGRESS NOTES
"Patient placed a HC referral  Per the request of inpatient coordinator. Patient was denied services due to insurance and place being at capacity.     Patient is managing using medical van transportation to getting her radiation appointments and states she has 3 more treatments left. Patient does not know at this time if she will gain mobility to her lower extremities and her sister is here on a VISA till 5/11 to take care of her.     Patient does not verbalize being anxious and states her pain is managed with Tylenol at this time.  Writer reviewed upcoming appointment with Dr. Lozada on 4/5 and she verbalized understanding stating she has \"tansportation set up\".     Jolynn Rajput RN    "

## 2022-03-31 NOTE — PROGRESS NOTES
Writer received a return call and [facility] has to decline patient do to capacity. Writer called and LVM with [facility] [phone] to send referral to.    Will wait for a return call.    Jolynn Rajput RN

## 2022-04-01 NOTE — PROGRESS NOTES
Writer called and spoke with Elsy and was provided fax # [phone] for intake the referral , discharge note, and face sheet.     Jolynn Rajput RN

## 2022-04-04 ENCOUNTER — APPOINTMENT (OUTPATIENT)
Dept: CT IMAGING | Facility: CLINIC | Age: 57
End: 2022-04-04
Attending: EMERGENCY MEDICINE
Payer: COMMERCIAL

## 2022-04-04 ENCOUNTER — PATIENT OUTREACH (OUTPATIENT)
Dept: ONCOLOGY | Facility: CLINIC | Age: 57
End: 2022-04-04

## 2022-04-04 ENCOUNTER — HOSPITAL ENCOUNTER (EMERGENCY)
Facility: CLINIC | Age: 57
Discharge: HOME | End: 2022-04-04
Attending: EMERGENCY MEDICINE | Admitting: EMERGENCY MEDICINE
Payer: COMMERCIAL

## 2022-04-04 VITALS
OXYGEN SATURATION: 98 % | WEIGHT: 130 LBS | DIASTOLIC BLOOD PRESSURE: 81 MMHG | TEMPERATURE: 97 F | SYSTOLIC BLOOD PRESSURE: 117 MMHG | HEART RATE: 58 BPM | HEIGHT: 65 IN | BODY MASS INDEX: 21.66 KG/M2 | RESPIRATION RATE: 18 BRPM

## 2022-04-04 DIAGNOSIS — R07.9 ACUTE CHEST PAIN: ICD-10-CM

## 2022-04-04 DIAGNOSIS — C79.51 CARCINOMA OF BREAST METASTATIC TO BONE, UNSPECIFIED LATERALITY (H): ICD-10-CM

## 2022-04-04 DIAGNOSIS — C50.919 CARCINOMA OF BREAST METASTATIC TO BONE, UNSPECIFIED LATERALITY (H): ICD-10-CM

## 2022-04-04 LAB
ALBUMIN SERPL-MCNC: 3 G/DL (ref 3.4–5)
ALP SERPL-CCNC: 81 U/L (ref 40–150)
ALT SERPL W P-5'-P-CCNC: 19 U/L (ref 0–50)
ANION GAP SERPL CALCULATED.3IONS-SCNC: 4 MMOL/L (ref 3–14)
AST SERPL W P-5'-P-CCNC: 9 U/L (ref 0–45)
ATRIAL RATE - MUSE: 67 BPM
BASOPHILS # BLD AUTO: 0 10E3/UL (ref 0–0.2)
BASOPHILS NFR BLD AUTO: 0 %
BILIRUB SERPL-MCNC: 0.2 MG/DL (ref 0.2–1.3)
BUN SERPL-MCNC: 18 MG/DL (ref 7–30)
CALCIUM SERPL-MCNC: 8.1 MG/DL (ref 8.5–10.1)
CHLORIDE BLD-SCNC: 107 MMOL/L (ref 94–109)
CO2 SERPL-SCNC: 26 MMOL/L (ref 20–32)
CREAT SERPL-MCNC: 0.45 MG/DL (ref 0.52–1.04)
DIASTOLIC BLOOD PRESSURE - MUSE: NORMAL MMHG
EOSINOPHIL # BLD AUTO: 0 10E3/UL (ref 0–0.7)
EOSINOPHIL NFR BLD AUTO: 0 %
ERYTHROCYTE [DISTWIDTH] IN BLOOD BY AUTOMATED COUNT: 14.3 % (ref 10–15)
GFR SERPL CREATININE-BSD FRML MDRD: >90 ML/MIN/1.73M2
GLUCOSE BLD-MCNC: 177 MG/DL (ref 70–99)
HCT VFR BLD AUTO: 34.2 % (ref 35–47)
HGB BLD-MCNC: 11.1 G/DL (ref 11.7–15.7)
HOLD SPECIMEN: NORMAL
HOLD SPECIMEN: NORMAL
IMM GRANULOCYTES # BLD: 0 10E3/UL
IMM GRANULOCYTES NFR BLD: 1 %
INR PPP: 0.92 (ref 0.85–1.15)
INTERPRETATION ECG - MUSE: NORMAL
LYMPHOCYTES # BLD AUTO: 0 10E3/UL (ref 0.8–5.3)
LYMPHOCYTES NFR BLD AUTO: 1 %
MCH RBC QN AUTO: 29.9 PG (ref 26.5–33)
MCHC RBC AUTO-ENTMCNC: 32.5 G/DL (ref 31.5–36.5)
MCV RBC AUTO: 92 FL (ref 78–100)
MONOCYTES # BLD AUTO: 0 10E3/UL (ref 0–1.3)
MONOCYTES NFR BLD AUTO: 1 %
NEUTROPHILS # BLD AUTO: 3.7 10E3/UL (ref 1.6–8.3)
NEUTROPHILS NFR BLD AUTO: 97 %
NRBC # BLD AUTO: 0 10E3/UL
NRBC BLD AUTO-RTO: 0 /100
NT-PROBNP SERPL-MCNC: 187 PG/ML (ref 0–900)
P AXIS - MUSE: 80 DEGREES
PLATELET # BLD AUTO: 178 10E3/UL (ref 150–450)
POTASSIUM BLD-SCNC: 3.9 MMOL/L (ref 3.4–5.3)
PR INTERVAL - MUSE: 124 MS
PROT SERPL-MCNC: 6.1 G/DL (ref 6.8–8.8)
QRS DURATION - MUSE: 60 MS
QT - MUSE: 450 MS
QTC - MUSE: 475 MS
R AXIS - MUSE: 40 DEGREES
RBC # BLD AUTO: 3.71 10E6/UL (ref 3.8–5.2)
SODIUM SERPL-SCNC: 137 MMOL/L (ref 133–144)
SYSTOLIC BLOOD PRESSURE - MUSE: NORMAL MMHG
T AXIS - MUSE: 56 DEGREES
TROPONIN I SERPL HS-MCNC: 4 NG/L
TROPONIN I SERPL HS-MCNC: 5 NG/L
VENTRICULAR RATE- MUSE: 67 BPM
WBC # BLD AUTO: 3.8 10E3/UL (ref 4–11)

## 2022-04-04 PROCEDURE — 99285 EMERGENCY DEPT VISIT HI MDM: CPT | Mod: 25

## 2022-04-04 PROCEDURE — 93005 ELECTROCARDIOGRAM TRACING: CPT

## 2022-04-04 PROCEDURE — 96375 TX/PRO/DX INJ NEW DRUG ADDON: CPT | Mod: 59

## 2022-04-04 PROCEDURE — 96361 HYDRATE IV INFUSION ADD-ON: CPT

## 2022-04-04 PROCEDURE — 258N000003 HC RX IP 258 OP 636: Performed by: EMERGENCY MEDICINE

## 2022-04-04 PROCEDURE — 85610 PROTHROMBIN TIME: CPT | Performed by: EMERGENCY MEDICINE

## 2022-04-04 PROCEDURE — 83880 ASSAY OF NATRIURETIC PEPTIDE: CPT | Performed by: EMERGENCY MEDICINE

## 2022-04-04 PROCEDURE — 96374 THER/PROPH/DIAG INJ IV PUSH: CPT | Mod: 59

## 2022-04-04 PROCEDURE — 36415 COLL VENOUS BLD VENIPUNCTURE: CPT | Performed by: EMERGENCY MEDICINE

## 2022-04-04 PROCEDURE — 84155 ASSAY OF PROTEIN SERUM: CPT | Performed by: EMERGENCY MEDICINE

## 2022-04-04 PROCEDURE — 250N000009 HC RX 250: Performed by: EMERGENCY MEDICINE

## 2022-04-04 PROCEDURE — 250N000011 HC RX IP 250 OP 636: Performed by: EMERGENCY MEDICINE

## 2022-04-04 PROCEDURE — 71275 CT ANGIOGRAPHY CHEST: CPT

## 2022-04-04 PROCEDURE — 250N000013 HC RX MED GY IP 250 OP 250 PS 637: Performed by: EMERGENCY MEDICINE

## 2022-04-04 PROCEDURE — 84484 ASSAY OF TROPONIN QUANT: CPT | Performed by: EMERGENCY MEDICINE

## 2022-04-04 PROCEDURE — 85004 AUTOMATED DIFF WBC COUNT: CPT | Performed by: EMERGENCY MEDICINE

## 2022-04-04 RX ORDER — ONDANSETRON 2 MG/ML
4 INJECTION INTRAMUSCULAR; INTRAVENOUS EVERY 30 MIN PRN
Status: DISCONTINUED | OUTPATIENT
Start: 2022-04-04 | End: 2022-04-04 | Stop reason: HOSPADM

## 2022-04-04 RX ORDER — MORPHINE SULFATE 4 MG/ML
4 INJECTION, SOLUTION INTRAMUSCULAR; INTRAVENOUS
Status: COMPLETED | OUTPATIENT
Start: 2022-04-04 | End: 2022-04-04

## 2022-04-04 RX ORDER — OXYCODONE HYDROCHLORIDE 5 MG/1
5 TABLET ORAL ONCE
Status: COMPLETED | OUTPATIENT
Start: 2022-04-04 | End: 2022-04-04

## 2022-04-04 RX ORDER — IOPAMIDOL 755 MG/ML
57 INJECTION, SOLUTION INTRAVASCULAR ONCE
Status: COMPLETED | OUTPATIENT
Start: 2022-04-04 | End: 2022-04-04

## 2022-04-04 RX ADMIN — SODIUM CHLORIDE 84 ML: 900 INJECTION INTRAVENOUS at 16:24

## 2022-04-04 RX ADMIN — SODIUM CHLORIDE 1000 ML: 9 INJECTION, SOLUTION INTRAVENOUS at 14:45

## 2022-04-04 RX ADMIN — MORPHINE SULFATE 4 MG: 4 INJECTION, SOLUTION INTRAMUSCULAR; INTRAVENOUS at 14:48

## 2022-04-04 RX ADMIN — OXYCODONE HYDROCHLORIDE 5 MG: 5 TABLET ORAL at 20:03

## 2022-04-04 RX ADMIN — IOPAMIDOL 57 ML: 755 INJECTION, SOLUTION INTRAVENOUS at 16:24

## 2022-04-04 RX ADMIN — ONDANSETRON 4 MG: 2 INJECTION INTRAMUSCULAR; INTRAVENOUS at 14:45

## 2022-04-04 ASSESSMENT — ENCOUNTER SYMPTOMS
FEVER: 0
SHORTNESS OF BREATH: 1
VOMITING: 0
COUGH: 0
NAUSEA: 1
BACK PAIN: 0
DIARRHEA: 0

## 2022-04-04 NOTE — PROGRESS NOTES
Patient called in this afternoon and said she was at radiation therapy in the waiting room.  She said while she was on her way to radiation therapy, she became short of breath, started having chest pain and back pain.  Rated pain 6-7/10.  While on the phone with patient, she became audibly short of breath and was very anxious.  Explained to patient that she needed to get help right away.  Asked patient if there were any nurses, doctors or staff members that she could tell that she needed help?  While on the phone with patient, a nurse walked in to the waiting room and patient said a nurse was there to help her.  Patient hung up the phone at that time so she could be assessed by the RN at radiation therapy.  Will route message to Dr. Neville Davis's RNCC, as well as Dr. Lozada, to update them and to follow.    Yandy Meyers, RN, BSN    RN Care Coordinator/Triage RN  [facility]  [phone]

## 2022-04-04 NOTE — ED TRIAGE NOTES
Pt was at raD therapy and c/o CP and SOB FOR 1 hour   Xmnmxsbe-Usxb-Rmplew-Hyalur Ac [phone]-[facility]): Elisa Love MD  [employer] [facility]  [address] [place] [address]

## 2022-04-04 NOTE — ED PROVIDER NOTES
History   Chief Complaint:  Chest Pain     The history is provided by the patient.      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 Patient reports that she has metastatic breast cancer, which she is using oral chemotherapy for. States that she was scheduled to have radiation therapy today, however while on the way, she developed shortness of breath, nausea and chest tightness. Onset was approximately 1245 and when she spoke to a triage nurse with Hem/Onc, she rated pain as 6-7/10. Notes she has had this pain in the past after using her oral chemotherapy, but she is unsure if the two are related. No new back pain. No cough, vomiting, diarrhea or fever. No history of blood clots or blood thinner medication use.     Patient was recent admitted on 03/19/22 and discharged on 03/29/22 with new diagnoses of brain metastases and paraplegia due to bone metastases with invasion of the spinal canal at T10-12. See discharge summary below.     Dr. ALISSA Vega Discharge Summary 03/29/22  * Initially diagnosed with breast cancer in 2007. ER/TN positive, HER-2/halina negative. Treated with bilateral mastectomy plus radiation, chemotherapy TAC x6 cycles, tamoxifen x6 years  * Last saw Oncology in 2015. Was lost to follow-up due to loss of insurance  * Presented to [facility] ED on 3/18 with paraplegia since Jan 2022. MRI thoracic and lumbar spine (3/18) revealed extensive osseous metastasis centered at T10-T12 with pathologic compression deformities and extensive involvement of posterior elements, direct invasion of spinal canal bilaterally causing severe spinal canal stenosis from T10-T12 and invasion of spinal cord at T10-T11 level; dditional metastatic lesions involving T4-T6, T9, L1-L2, L5, S1-S2   * CT chest/abd/pelvis (3/19) showed destructive bony lesion T and L-spine.  Indeterminate pulmonary  nodules right, indeterminate mediastinal lymph node, hypodense nodule liver, bony metastases right proximal femur and ribs.    * Brain MRI (3/19) showed new right frontal, right parietal, left temporal and left parietal bony metastases.  No brain mets  * Neurosurgery was consulted on admission; given duration of symptoms, surgical intervention was not felt indicated  * [facility] were consulted during admission  * Underwent iliac bone biopsy 3/21.  Pathology revealed metastatic breast carcinoma, ER/MD+, HER2 neg  * Initiated on dexamethasone on 3/22  * Initiated on radiation to T-L spine on 3/22  * Received zoledronic acid 4 mg IV x1 on 3/24  - Initiated on letrozole on 3/25, and will continue at discharge  - She will be discharged on dexamethasone 4 mg daily  - Pain has otherwise been adequately controlled with APAP prn  - Home equipment was arranged prior to discharge  - Has follow up with Dr. Lozada on 3/31  - She will continue outpatient radiation    Review of Systems   Constitutional: Negative for fever.   Respiratory: Positive for shortness of breath. Negative for cough.    Cardiovascular: Positive for chest pain.   Gastrointestinal: Positive for nausea. Negative for diarrhea and vomiting.   Musculoskeletal: Negative for back pain.   All other systems reviewed and are negative.    Allergies:  No Known Allergies    Medications:  Maalox suspension  Decadron  Femara  Compazine  Kisqyuko Dialloot S    Past Medical History:     Iron deficiency anemia  Breast cancer  Brain metastases  Bone metastases  Paraplegia  Vitamin D deficient  Leukopenia  Spinal cord compression due to malignant neoplasm metastatic to spine    Social History:  Patient presents with female family member  Presents via private vehicle    Physical Exam     Patient Vitals for the past 24 hrs:   BP Temp Temp src Pulse Resp SpO2 Height Weight   04/04/22 2002 -- -- -- -- -- 98 % -- --   04/04/22 2001 -- -- -- -- -- 97 % -- --  "  04/04/22 2000 100/74 -- -- 61 16 96 % -- --   04/04/22 1600 106/69 -- -- 63 -- 100 % -- --   04/04/22 1500 100/66 -- -- 58 -- 100 % -- --   04/04/22 1349 100/66 97  F (36.1  C) Oral 64 14 98 % 1.651 m (5' 5\") 59 kg (130 lb)       Physical Exam  General: Patient is alert and frail.  Uncomfortable appearing  HEENT: Head atraumatic    Eyes: pupils equal and reactive. Conjunctiva clear   Nares: patent   Oropharynx: no lesions, uvula midline, no palatal draping, normal voice, no trismus  Neck: Supple without lymphadenopathy, no meningismus  Chest: Heart regular rate and rhythm.   Lungs: Equal clear to auscultation with no wheeze or rales  Abdomen: Soft, non tender, nondistended, normal bowel sounds  Back: No costovertebral angle tenderness, no midline C, T or L spine tenderness  Neuro: Grossly nonfocal, normal speech, strength equal bilaterally, CN 2-12 intact  Extremities: No deformities, equal radial and DP pulses. No clubbing, cyanosis.  No edema  Skin: Warm and dry with no rash.       Emergency Department Course   ECG  ECG obtained at 1348, ECG read at 1410  NSR   Rate 67 bpm. AR interval 124 ms. QRS duration 60 ms. QT/QTc 450/475 ms. P-R-T axes 80 40 56.     Imaging:  CT Chest Pulmonary Embolism w Contrast   Final Result   IMPRESSION:   1.  No evidence for pulmonary embolism.   2.  Several right pulmonary nodules and several lytic and sclerotic   bony lesions are unchanged, and remain consistent with metastatic   disease.      DANELLE HOWELL MD            SYSTEM ID:  [id number]        Report per radiology.    Laboratory:  Labs Ordered and Resulted from Time of ED Arrival to Time of ED Departure   COMPREHENSIVE METABOLIC PANEL - Abnormal       Result Value    Sodium 137      Potassium 3.9      Chloride 107      Carbon Dioxide (CO2) 26      Anion Gap 4      Urea Nitrogen 18      Creatinine 0.45 (*)     Calcium 8.1 (*)     Glucose 177 (*)     Alkaline Phosphatase 81      AST 9      ALT 19      Protein Total 6.1 (*)  "    Albumin 3.0 (*)     Bilirubin Total 0.2      GFR Estimate >90     CBC WITH PLATELETS AND DIFFERENTIAL - Abnormal    WBC Count 3.8 (*)     RBC Count 3.71 (*)     Hemoglobin 11.1 (*)     Hematocrit 34.2 (*)     MCV 92      MCH 29.9      MCHC 32.5      RDW 14.3      Platelet Count 178      % Neutrophils 97      % Lymphocytes 1      % Monocytes 1      % Eosinophils 0      % Basophils 0      % Immature Granulocytes 1      NRBCs per 100 WBC 0      Absolute Neutrophils 3.7      Absolute Lymphocytes 0.0 (*)     Absolute Monocytes 0.0      Absolute Eosinophils 0.0      Absolute Basophils 0.0      Absolute Immature Granulocytes 0.0      Absolute NRBCs 0.0     INR - Normal    INR 0.92     TROPONIN I - Normal    Troponin I High Sensitivity 4     NT PROBNP INPATIENT - Normal    N terminal Pro BNP Inpatient 187     TROPONIN I - Normal    Troponin I High Sensitivity 5       Emergency Department Course:         Reviewed:  I reviewed nursing notes, vitals and past medical history    Assessments/Consults:  1400 I obtained history and examined the patient.   1635 Patient rechecked and updated.   1907 Rechecked patient. Amenable to discharge at this time.    Interventions:  1445 NS, 1 L, IV           Zofran, 4 mg, IV  1448 Morphine, 4 mg, IV    Disposition:  The patient was discharged to home.     Impression & Plan   Medical Decision Making:  Patient is a 57-year-old female with history of metastatic breast cancer to the bone and brain and paraplegia due to invasion of the spinal canal who presents the emergency department for chest pain, nausea, shortness of breath that occurred while she was in the waiting room for her radiation oncology consultation appointment.  Patient is taking oral chemo.  She has had similar pain to this after starting chemo in the past.  She has previously been prescribed oxycodone but has not taken that today.  Work-up in the emergency department was undertaken as noted above.  EKG without acute ischemic  changes and troponin and delta troponin are negative making acute coronary syndrome unlikely.  Given her cancer history and the chest pain or shortness of breath I did send her for CT scan to rule out pulmonary embolism.  This was thankfully negative for PE and no evidence of pneumothorax or pneumonia was seen as well.  Patient felt improved with pain control here in the emergency department.  I offered to write the patient for a pain medication prescription for home but she wished to pursue this with Dr. Lozada so that she could have the meds delivered to her house as they have done previously.  She was given a dose of oxycodone here prior to discharge to help with her pain overnight.  She has complete relief of her symptoms.  She is hemodynamically stable and afebrile.  She is comfortable with the plan for discharge.  Return precautions the emergency department were reviewed and all questions and concerns addressed    Diagnosis:    ICD-10-CM    1. Acute chest pain  R07.9    2. Carcinoma of breast metastatic to bone, unspecified laterality (H)  C50.919     C79.51      Scribe Disclosure:  I, Anand De Souza, am serving as a scribe at 1:53 PM on 4/4/2022 to document services personally performed by Emma Reich MD based on my observations and the provider's statements to me.             Emma Reihc MD  04/04/22 2043

## 2022-04-05 ENCOUNTER — VIRTUAL VISIT (OUTPATIENT)
Dept: ONCOLOGY | Facility: CLINIC | Age: 57
End: 2022-04-05
Attending: INTERNAL MEDICINE
Payer: COMMERCIAL

## 2022-04-05 ENCOUNTER — TELEPHONE (OUTPATIENT)
Dept: PHARMACY | Facility: CLINIC | Age: 57
End: 2022-04-05
Payer: COMMERCIAL

## 2022-04-05 ENCOUNTER — DOCUMENTATION ONLY (OUTPATIENT)
Dept: PHARMACY | Facility: CLINIC | Age: 57
End: 2022-04-05
Payer: COMMERCIAL

## 2022-04-05 DIAGNOSIS — G95.29 SPINAL CORD COMPRESSION DUE TO MALIGNANT NEOPLASM METASTATIC TO SPINE (H): ICD-10-CM

## 2022-04-05 DIAGNOSIS — C79.51 SPINAL CORD COMPRESSION DUE TO MALIGNANT NEOPLASM METASTATIC TO SPINE (H): ICD-10-CM

## 2022-04-05 DIAGNOSIS — G89.3 CANCER ASSOCIATED PAIN: ICD-10-CM

## 2022-04-05 DIAGNOSIS — C50.919 METASTATIC BREAST CANCER: Primary | ICD-10-CM

## 2022-04-05 DIAGNOSIS — C50.912 MALIGNANT NEOPLASM OF LEFT BREAST IN FEMALE, ESTROGEN RECEPTOR POSITIVE, UNSPECIFIED SITE OF BREAST (H): ICD-10-CM

## 2022-04-05 DIAGNOSIS — Z17.0 MALIGNANT NEOPLASM OF LEFT BREAST IN FEMALE, ESTROGEN RECEPTOR POSITIVE, UNSPECIFIED SITE OF BREAST (H): ICD-10-CM

## 2022-04-05 PROCEDURE — 99215 OFFICE O/P EST HI 40 MIN: CPT | Mod: 95 | Performed by: INTERNAL MEDICINE

## 2022-04-05 RX ORDER — OXYCODONE HYDROCHLORIDE 5 MG/1
5 TABLET ORAL EVERY 6 HOURS PRN
Qty: 30 TABLET | Refills: 0 | Status: SHIPPED | OUTPATIENT
Start: 2022-04-05 | End: 2022-04-06

## 2022-04-05 RX ORDER — DEXAMETHASONE 4 MG/1
2 TABLET ORAL DAILY
Qty: 30 TABLET | Refills: 0 | COMMUNITY
Start: 2022-04-05 | End: 2022-04-20

## 2022-04-05 NOTE — LETTER
4/5/2022         RE: Mickie Mcghee  [address] [address]  [address]  [place] [address]        Dear Colleague,    Thank you for referring your patient, Mickie Mcghee, to the [facility]. Please see a copy of my visit note below.    Mickie is a 57 year old who is being evaluated via a billable video visit.      How would you like to obtain your AVS? Mail a copy  If the video visit is dropped, the invitation should be resent by: Text to cell phone: [phone]  Will anyone else be joining your video visit? No     Isabel Martin             ONCOLOGY HISTORY:  Ms. Mcghee is a female with metastatic breast cancer. ER positive, LA positive and HER-2/halina negative.  -She had a stage III left breast cancer in 2007. ER positive and HER-2/halina negative.  She had bilateral mastectomy, chemotherapy, radiation, and anastrozole.    1.  On 03/18/2022, multiple imaging studies done because of bilateral lower extremity weakness:  -MRI of thoracic and lumbar spine revealed bone metastases with spinal cord compression.  -CT chest, abdomen, and pelvis on 03/19/2022 revealed bone metastasis, lung nodules and enlarged upper abdominal lymph node.  There is a hypodense nodule in the liver.  -Brain MRI on 03/19/2022 did not reveal any intracranial metastasis.  2.  CT-guided bone biopsy of left iliac bone on 03/21/2022 revealed metastatic breast cancer. ER positive, LA positive and HER-2/halina negative.  3.  Letrozole started on 03/25/2022.  -Ribociclib started on 03/31/2022.  -Zometa started on 03/24/2022.  -Radiation.     SUBJECTIVE:  Ms. Mcghee is a 57-year-old female with metastatic breast cancer, ER positive and HER-2/hlaina negative.  She is on letrozole and ribociclib.  She is also getting palliative radiation.     The patient has bilateral leg paresis because of spinal cord compression.  That has not improved.  She is on dexamethasone 4 mg once a day now.     Overall, she is tolerating treatment well.  Some fatigue.  No  headache.  No dizziness.  No chest pain.  No shortness of breath.  No abdominal pain, nausea, or vomiting.  No urinary or bowel complaints.  No abnormal bleeding.  No fever, chills, or night sweats.     All other review of systems is negative.     PHYSICAL EXAMINATION:  GENERAL:  She is alert, oriented x3. Not in any distress.   Rest of systems not examined as this is a video visit.     ASSESSMENT:  1.  A 57-year-old female with metastatic breast cancer.  2.  Cancer associated pain.  3.  Spinal cord compression causing complete bilateral lower extremity paralysis.  4.  Mild leukopenia.  5.  Normocytic anemia from metastatic breast cancer and radiation.     PLAN:  1.  Discussed regarding breast cancer.  She has metastatic disease.     Explained to the patient that we will get a PET scan for further evaluation.  She is agreeable for it.  That will be ordered.     2.  Discussed regarding treatment.  She is on letrozole and ribociclib, which she will continue.  She is tolerating it well.     She is also getting radiation.  That will be completed.     She had questions regarding breast cancer treatment.  Explained to her that she should do well with hormonal treatment.  When it progresses, we will discuss regarding other treatment options, including different hormonal treatments and also chemotherapy.  We will also plan on sending her biopsy specimen for [employer] testing.     3.  She has bone metastasis.  She got Zometa in the hospital.  She will continue on monthly Zometa.  Side effects reviewed.  Advised her to follow up with a dentist.  She does not have dental or jaw related symptoms.     4.  She is on dexamethasone 4 mg a day.  We will decrease it to 2 mg a day.  Plan is to slowly discontinue it.     5.  The patient gets pain on and off.  She will take oxycodone as needed.  Side effect of oxycodone including sedation and constipation discussed.  Advised her to take a stool softener when she takes oxycodone.  If  pain is mild, she will just take Tylenol.     6.  She had few questions, which were all answered.  I will see her in 6-8 weeks.  In between, she will see our nurse practitioner.  I advised her to call us with any questions or concerns.     TOTAL VIDEO VISIT TIME: 45 minutes. Time spent in today's visit, review of chart/investigations today, monitoring for toxicity of high risk drugs and documentation.    This office note has been dictated.        Again, thank you for allowing me to participate in the care of your patient.        Sincerely,        Yury Lozada MD

## 2022-04-05 NOTE — PROGRESS NOTES
Mickie is a 57 year old who is being evaluated via a billable video visit.      How would you like to obtain your AVS? Mail a copy  If the video visit is dropped, the invitation should be resent by: Text to cell phone: [phone]  Will anyone else be joining your video visit? Polina GRANADOS

## 2022-04-05 NOTE — PATIENT INSTRUCTIONS
1.  Continue letrozole and ribociclib.  2.  Continue monthly Zometa.  3.  Take calcium and vitamin D twice a day.  4.  Complete radiation.  5.  Take oxycodone as needed for pain.  Take a stool softener with it.  6.  See nurse practitioner when she comes for next Zometa.  7.  See me in 6 to 8 weeks.  8.  Labs as per treatment protocol.  9.  Send biopsy specimen for [employer] One testing.  10. Decrease dexamethasone to 2 mg a day.

## 2022-04-05 NOTE — ORAL ONC MGMT
Oral Chemotherapy Monitoring Program    Dr. Lozada asked pharmacy to look into patient's question about taking the ribociclib in the afternoon/evening. The package insert states it is preferable to take in the morning, with or without food. Writer connected with a representative from [employer], who stated its a suggestion to take in the morning since that is when most people eat. Its ok to take at other times of the day. Although it can be taken with or without food, having food on the stomach is usually better tolerated.  Call placed to patient with the above information. Patient verbalized understanding and did not have any further questions.     Sally Dennis, Pharm. D., BCOP

## 2022-04-05 NOTE — LETTER
4/5/2022         RE: Mickie Mcghee  [address]  [address]  [place] [address]        Dear Colleague,    Thank you for referring your patient, Mickie Mcghee, to the [facility]. Please see a copy of my visit note below.    Mickie is a 57 year old who is being evaluated via a billable video visit.      How would you like to obtain your AVS? Mail a copy  If the video visit is dropped, the invitation should be resent by: Text to cell phone: [phone]  Will anyone else be joining your video visit? No     Isabel Martin             ONCOLOGY HISTORY:  Ms. Mcghee is a female with metastatic breast cancer. ER positive, GA positive and HER-2/halina negative.  -She had a stage III left breast cancer in 2007. ER positive and HER-2/halina negative.  She had bilateral mastectomy, chemotherapy, radiation, and anastrozole.    1.  On 03/18/2022, multiple imaging studies done because of bilateral lower extremity weakness:  -MRI of thoracic and lumbar spine revealed bone metastases with spinal cord compression.  -CT chest, abdomen, and pelvis on 03/19/2022 revealed bone metastasis, lung nodules and enlarged upper abdominal lymph node.  There is a hypodense nodule in the liver.  -Brain MRI on 03/19/2022 did not reveal any intracranial metastasis.  2.  CT-guided bone biopsy of left iliac bone on 03/21/2022 revealed metastatic breast cancer. ER positive, GA positive and HER-2/halina negative.  3.  Letrozole started on 03/25/2022.  -Ribociclib started on 03/31/2022.  -Zometa started on 03/24/2022.  -Radiation.     SUBJECTIVE:  Ms. Mcghee is a 57-year-old female with metastatic breast cancer, ER positive and HER-2/halina negative.  She is on letrozole and ribociclib.  She is also getting palliative radiation.     The patient has bilateral leg paresis because of spinal cord compression.  That has not improved.  She is on dexamethasone 4 mg once a day now.     Overall, she is tolerating treatment well.  Some fatigue.  No  headache.  No dizziness.  No chest pain.  No shortness of breath.  No abdominal pain, nausea, or vomiting.  No urinary or bowel complaints.  No abnormal bleeding.  No fever, chills, or night sweats.     All other review of systems is negative.     PHYSICAL EXAMINATION:  GENERAL:  She is alert, oriented x3. Not in any distress.   Rest of systems not examined as this is a video visit.     ASSESSMENT:  1.  A 57-year-old female with metastatic breast cancer.  2.  Cancer associated pain.  3.  Spinal cord compression causing complete bilateral lower extremity paralysis.  4.  Mild leukopenia.  5.  Normocytic anemia from metastatic breast cancer and radiation.     PLAN:  1.  Discussed regarding breast cancer.  She has metastatic disease.     Explained to the patient that we will get a PET scan for further evaluation.  She is agreeable for it.  That will be ordered.     2.  Discussed regarding treatment.  She is on letrozole and ribociclib, which she will continue.  She is tolerating it well.     She is also getting radiation.  That will be completed.     She had questions regarding breast cancer treatment.  Explained to her that she should do well with hormonal treatment.  When it progresses, we will discuss regarding other treatment options, including different hormonal treatments and also chemotherapy.  We will also plan on sending her biopsy specimen for [employer] testing.     3.  She has bone metastasis.  She got Zometa in the hospital.  She will continue on monthly Zometa.  Side effects reviewed.  Advised her to follow up with a dentist.  She does not have dental or jaw related symptoms.     4.  She is on dexamethasone 4 mg a day.  We will decrease it to 2 mg a day.  Plan is to slowly discontinue it.     5.  The patient gets pain on and off.  She will take oxycodone as needed.  Side effect of oxycodone including sedation and constipation discussed.  Advised her to take a stool softener when she takes oxycodone.  If  pain is mild, she will just take Tylenol.     6.  She had few questions, which were all answered.  I will see her in 6-8 weeks.  In between, she will see our nurse practitioner.  I advised her to call us with any questions or concerns.     TOTAL VIDEO VISIT TIME: 45 minutes. Time spent in today's visit, review of chart/investigations today, monitoring for toxicity of high risk drugs and documentation.    This office note has been dictated.        Again, thank you for allowing me to participate in the care of your patient.        Sincerely,        Yury Lozada MD

## 2022-04-05 NOTE — PROGRESS NOTES
"Oral Chemotherapy Monitoring Program    Subjective/Objective:  Mickie Mcghee is a 57 year old female contacted via phone call for a follow-up visit for oral chemotherapy. Patient confirmed C1D1 start date of 3/31/2022. Patient reports minor nausea and fatigue symptoms from therapy that were exacerbated yesterday during an acute episode of chest pain in which she went to the emergency room. Mickie reports that she is feeling better and that the fatigue and nausea have resolved for now. Patient reports that she is adherent to therapy with 600 mg twice daily. She has not started any new medications. Patient does not report any other concerns at this time.    ORAL CHEMOTHERAPY 3/29/2022 4/5/2022   Assessment Type New Teach Initial Follow up   Diagnosis Code Breast Cancer Breast Cancer   Providers Neville Lozada   Clinic Name/Location [facility]   Drug Name Kisqali (ribociclib) Kisqali (ribociclib)   Dose 600 mg 600 mg   Current Schedule Daily Daily   Cycle Details 3 weeks on, 1 week off 3 weeks on, 1 week off   Start Date of Last Cycle - 3/31/2022   Planned next cycle start date 3/31/2022 4/28/2022   Doses missed in last 2 weeks - 0   Adherence Assessment - Adherent   Adverse Effects - Nausea;Fatigue   Nausea - Grade 1   Pharmacist Intervention(nausea) - No   Fatigue - Grade 1   Pharmacist Intervention(fatigue) - No   Any new drug interactions? No -   Is the dose as ordered appropriate for the patient? Yes -       Last PHQ-2 Score on record: No flowsheet data found.    Vitals:  BP:   BP Readings from Last 1 Encounters:   04/04/22 117/81     Wt Readings from Last 1 Encounters:   04/04/22 59 kg (130 lb)     Estimated body surface area is 1.64 meters squared as calculated from the following:    Height as of 4/4/22: 1.651 m (5' 5\").    Weight as of 4/4/22: 59 kg (130 lb).    Labs:  _  Result Component Current Result Ref Range   Sodium 137 (4/4/2022) 133 - 144 mmol/L     _  Result Component Current Result Ref Range "   Potassium 3.9 (4/4/2022) 3.4 - 5.3 mmol/L     _  Result Component Current Result Ref Range   Calcium 8.1 (L) (4/4/2022) 8.5 - 10.1 mg/dL     _  Result Component Current Result Ref Range   Magnesium 2.5 (H) (3/29/2022) 1.6 - 2.3 mg/dL     No results found for Phos within last 30 days.     _  Result Component Current Result Ref Range   Albumin 3.0 (L) (4/4/2022) 3.4 - 5.0 g/dL     _  Result Component Current Result Ref Range   Urea Nitrogen 18 (4/4/2022) 7 - 30 mg/dL     _  Result Component Current Result Ref Range   Creatinine 0.45 (L) (4/4/2022) 0.52 - 1.04 mg/dL     _  Result Component Current Result Ref Range   AST 9 (4/4/2022) 0 - 45 U/L     _  Result Component Current Result Ref Range   ALT 19 (4/4/2022) 0 - 50 U/L     _  Result Component Current Result Ref Range   Bilirubin Total 0.2 (4/4/2022) 0.2 - 1.3 mg/dL     _  Result Component Current Result Ref Range   WBC Count 3.8 (L) (4/4/2022) 4.0 - 11.0 10e3/uL     _  Result Component Current Result Ref Range   Hemoglobin 11.1 (L) (4/4/2022) 11.7 - 15.7 g/dL     _  Result Component Current Result Ref Range   Platelet Count 178 (4/4/2022) 150 - 450 10e3/uL     Result Component Current Result Ref Range   ANC 3.7 (4/4/2022) 1.6 - 8.3 10e3/uL         Assessment/Plan:  Patient is adherent to therapy. Patient did not have any questions at this time, but verbalized understanding to call if anything comes up. Ok to proceed with ribociclib.     Follow-Up:  Labs on 4/20/2022.      Eulalia Gordillo, Pharmacist Intern  April 5, 2022

## 2022-04-05 NOTE — PROGRESS NOTES
Patient was evaluated in the ED and negative for cardiac work up. Please see ED encounter for details.    Patient has an appointment today with Dr. Lozada.    Jolynn Rajput RN

## 2022-04-06 ENCOUNTER — PATIENT OUTREACH (OUTPATIENT)
Dept: ONCOLOGY | Facility: CLINIC | Age: 57
End: 2022-04-06
Payer: COMMERCIAL

## 2022-04-06 DIAGNOSIS — G89.3 CANCER ASSOCIATED PAIN: ICD-10-CM

## 2022-04-06 DIAGNOSIS — C50.919 METASTATIC BREAST CANCER: ICD-10-CM

## 2022-04-06 RX ORDER — OXYCODONE HYDROCHLORIDE 5 MG/1
5 TABLET ORAL EVERY 6 HOURS PRN
Qty: 30 TABLET | Refills: 0 | Status: SHIPPED | OUTPATIENT
Start: 2022-04-06 | End: 2022-09-14

## 2022-04-06 NOTE — TELEPHONE ENCOUNTER
Writer received a message requesting that Oxycodone be sent to [facility] in [place] as she is not able to have the script picked up at [facility] pharmacy.     Script cancelled with [facility] pharmacy and new one routed to Dr. Lozada to be reviewed and signed for processing at [facility].    Jolynn Rajput RN

## 2022-04-07 ENCOUNTER — TRANSFERRED RECORDS (OUTPATIENT)
Dept: HEALTH INFORMATION MANAGEMENT | Facility: CLINIC | Age: 57
End: 2022-04-07
Payer: COMMERCIAL

## 2022-04-08 ENCOUNTER — PATIENT OUTREACH (OUTPATIENT)
Dept: CARE COORDINATION | Facility: CLINIC | Age: 57
End: 2022-04-08

## 2022-04-08 NOTE — PROGRESS NOTES
Social Work Note: Telephone Call  Oncology Clinic    Data/Intervention:  Patient Name:  Mickie Mcghee  /Age:  1965 (57 year old)    Call From:  EDWIN  Reason for Call:  Financial resources    Assessment:  Covering SW received message that Mickie was requesting paperwork to be completed to get disability from the [facility]. RN was not sure what that paperwork was and requested SW support in completing it.     EDWIN called and spoke to Mickie this afternoon. She states she is concerned because she currently has MA through her son, but she will lose it the day he turns 18 on . Shy states she needs to get Medicare so she can have insurance. EDWIN explained that MA is based on financial status, she Mickie may not actually lose her coverage because her son turns 18. EDWIN encouraged Mickie to follow up with the [facility] and ask some more questions about her particular situation. Additional, EDWIN clarified that disability does not come through the [facility], but rather as short/long term through an employer or from Social Security. Even with that, she would have to be on disability for two years before she would be eligible for MCR. EDWIN reviewed this a couple of times to try and ensure Mickie's understanding. EDWIN offered to send details via [employer] including instructions for applying for SSD. Mickie agreed she would appreciate that.     [employer] message sent with information on applying for Social Security, as well as about MA coverage. EDWIN also provided contact information for primary EDWIN Faust.      Plan:  Mickie states she will call with any follow up questions. SW team will remain available for ongoing support or resource needs.     BENJAMIN Lee, [facility]  Clinical , Adult Oncology  Phone: [phone]

## 2022-04-08 NOTE — PROGRESS NOTES
Writer received a return call from [facility] and they are accepting patient's but not [facility] and recommend [facility] as [facility]    Writer will continue to follow up on Home Care services and have  assist with resources as well.     Jolynn Rajput RN

## 2022-04-08 NOTE — PROGRESS NOTES
Codi sent request to [employer] on 3/31.    Writer following up on request with [employer] and [facility] with [phone]    Will wait for a return call.     Jolynn Rajput RN

## 2022-04-10 NOTE — PROGRESS NOTES
ONCOLOGY HISTORY:  Ms. Mcghee is a female with metastatic breast cancer. ER positive, NC positive and HER-2/halina negative.  -She had a stage III left breast cancer in 2007. ER positive and HER-2/halina negative.  She had bilateral mastectomy, chemotherapy, radiation, and anastrozole.    1.  On 03/18/2022, multiple imaging studies done because of bilateral lower extremity weakness:  -MRI of thoracic and lumbar spine revealed bone metastases with spinal cord compression.  -CT chest, abdomen, and pelvis on 03/19/2022 revealed bone metastasis, lung nodules and enlarged upper abdominal lymph node.  There is a hypodense nodule in the liver.  -Brain MRI on 03/19/2022 did not reveal any intracranial metastasis.  2.  CT-guided bone biopsy of left iliac bone on 03/21/2022 revealed metastatic breast cancer. ER positive, NC positive and HER-2/halina negative.  3.  Letrozole started on 03/25/2022.  -Ribociclib started on 03/31/2022.  -Zometa started on 03/24/2022.  -Radiation.     SUBJECTIVE:  Ms. Mcghee is a 57-year-old female with metastatic breast cancer, ER positive and HER-2/halina negative.  She is on letrozole and ribociclib.  She is also getting palliative radiation.     The patient has bilateral leg paresis because of spinal cord compression.  That has not improved.  She is on dexamethasone 4 mg once a day now.     Overall, she is tolerating treatment well.  Some fatigue.  No headache.  No dizziness.  No chest pain.  No shortness of breath.  No abdominal pain, nausea, or vomiting.  No urinary or bowel complaints.  No abnormal bleeding.  No fever, chills, or night sweats.     All other review of systems is negative.     PHYSICAL EXAMINATION:  GENERAL:  She is alert, oriented x3. Not in any distress.   Rest of systems not examined as this is a video visit.     ASSESSMENT:  1.  A 57-year-old female with metastatic breast cancer.  2.  Cancer associated pain.  3.  Spinal cord compression causing complete bilateral lower extremity  paralysis.  4.  Mild leukopenia.  5.  Normocytic anemia from metastatic breast cancer and radiation.     PLAN:  1.  Discussed regarding breast cancer.  She has metastatic disease.     Explained to the patient that we will get a PET scan for further evaluation.  She is agreeable for it.  That will be ordered.     2.  Discussed regarding treatment.  She is on letrozole and ribociclib, which she will continue.  She is tolerating it well.     She is also getting radiation.  That will be completed.     She had questions regarding breast cancer treatment.  Explained to her that she should do well with hormonal treatment.  When it progresses, we will discuss regarding other treatment options, including different hormonal treatments and also chemotherapy.  We will also plan on sending her biopsy specimen for [employer] testing.     3.  She has bone metastasis.  She got Zometa in the hospital.  She will continue on monthly Zometa.  Side effects reviewed.  Advised her to follow up with a dentist.  She does not have dental or jaw related symptoms.     4.  She is on dexamethasone 4 mg a day.  We will decrease it to 2 mg a day.  Plan is to slowly discontinue it.     5.  The patient gets pain on and off.  She will take oxycodone as needed.  Side effect of oxycodone including sedation and constipation discussed.  Advised her to take a stool softener when she takes oxycodone.  If pain is mild, she will just take Tylenol.     6.  She had few questions, which were all answered.  I will see her in 6-8 weeks.  In between, she will see our nurse practitioner.  I advised her to call us with any questions or concerns.     TOTAL VIDEO VISIT TIME: 45 minutes. Time spent in today's visit, review of chart/investigations today, monitoring for toxicity of high risk drugs and documentation.

## 2022-04-15 ENCOUNTER — PATIENT OUTREACH (OUTPATIENT)
Dept: ONCOLOGY | Facility: CLINIC | Age: 57
End: 2022-04-15
Payer: COMMERCIAL

## 2022-04-15 ENCOUNTER — PATIENT OUTREACH (OUTPATIENT)
Dept: CARE COORDINATION | Facility: CLINIC | Age: 57
End: 2022-04-15
Payer: COMMERCIAL

## 2022-04-15 DIAGNOSIS — C50.512 BREAST CANCER OF LOWER-OUTER QUADRANT OF LEFT FEMALE BREAST (H): Primary | ICD-10-CM

## 2022-04-15 NOTE — PROGRESS NOTES
Writer placed an order for HC to include PT/ OT  Private duty, and an aide.     [facility] HC agency  Phone # [phone]  Fax: [phone]    Referral, office note, and faceheet .    Jolynn Rajput RN

## 2022-04-15 NOTE — PROGRESS NOTES
Social Work Progress Note      Data/Intervention:  Patient Name:  Mickie Mcghee  /Age:  1965 (57 year old)    Reason for Follow-Up:  Mary is a 57-year-old woman with a diagnosis of metastatic breast cancer who is followed by Dr. Lozada at [facility]. This clinician received referral from ROBSON Davis for home care support.     Intervention:   Housing/Home Support:   Mary has spinal cord compression from tumor, presently lives at home with her  (Juan Daniel), 18 yo son, and sister (who is predominant caregiver, leaving 22- per chart note here on Visitor Visa from [place]). She resides in a non-wheelchair accessible apartment.     Mary reports that she is able to transfer herself into commode/chair/bed, but unable to get out independently. Mary reports that sister carries her, and she is uncertain whether  will be able to carry her as he is disabled (cannot lift equal weight due to half of his body weak), 18 yo son is at school and not providing care.     Mary reports that her sister plans to return end of May/early , but that she will need assistance in home for a few weeks. EDWIN discussed having [employer] assessment done for consideration of CADI waiver. Pawan reported that she would be receptive to this.    EDWIN called [facility]  and completed referral for [place]. [place] worker reported that Mary will be added to waitlist for outreach from  in 4 weeks, but that she will discuss case with supervisor 22. Per [facility] worker, Mary does not receive SSDI and has not been SMRTed by the [facility].     EDWIN called Mary and provided contact information for Housing Services through [facility] to locate a more accessible housing option ([phone]). Mary clarified that sister came to [place] without a visa, and understands that the law dictates that someone from [place] can come  to the [place], but that they need to return after a 3 month stay, once they return to [place], then can then return for an additional 3 months.     Home Care Support:   Per Susan, multiple attempts have been made to locate home care, with inpatient team and outpatient RNYENNY Davis contacting, and being denied by the following:   - [facility]  - [facility]  - [facility]  - [facility]  - [facility]  - [facility]   - [facility]    SW contacted [facility] Ph: [phone], Fax: [phone]- ROBSON Davis faxed orders- [place], not enough staffing.    Plan:  1) SW to collaborate with  to see if anything can be done to extend sister's stay.   2) Onc SW will continue to follow for ongoing psychosocial support. Ongoing collaboration with multidisciplinary care team.   3) SW to contact the following HC agencies next week: [facility], [facility], [facility], [facility]    Please call or page if needs or concerns arise.     BENJAMIN Lainez, LICSW  Direct Phone: [phone]  Pager: [phone]

## 2022-04-18 ENCOUNTER — PATIENT OUTREACH (OUTPATIENT)
Dept: CARE COORDINATION | Facility: CLINIC | Age: 57
End: 2022-04-18
Payer: COMMERCIAL

## 2022-04-18 LAB — SCANNED LAB RESULT: NORMAL

## 2022-04-18 NOTE — PROGRESS NOTES
Social Work Progress Note      Data/Intervention:  Patient Name:  Mickie GANDHI/Age:  1965 (57 year old)    Reason for Follow-Up:  Mary is a 57-year-old woman with a diagnosis of metastatic breast cancer who is followed by Dr. Lozada at [facility]. This clinician received referral from RNYENNY Davis for home care support.     Intervention:   Home Care Support:   Pt been denied at the following home care agencies:   - [facility]  - [facility]  - [facility]  - [facility]  - [facility]  - [facility]   - [facility]  - [facility]    This clinician contacted the following home care agencies this morning:   - [facility] [phone]- Does not work with [facility] plans  - [facility] [phone]- LVS for [place] in intake  - [facility] [phone]- At capacity at present time  - [facility] [phone]- Do not service [facility] [phone]- LVM, awaiting return call.     Visa/Extending Stay in [place]:   This clinician collaborated with  who reported that an option for family would be to file an application to extend sister's status ([id number]). SW called Mary and left detailed voicemail, providing contact information for  (and [facility] [employer] [facility]) to discuss pros and cons of filing this application.     Plan:  1) Ongoing collaboration with multidisciplinary care team.   2) SW will continue to work with [facility] to pursue options for home care and support Mary's safety in-home due to Mary's physical limitations and accessibility challenges.    Please call or page if needs or concerns arise.     BENJAMIN Lainez, [facility] Hospital[facility]  Direct Phone: [phone]  Pager: [phone]

## 2022-04-19 ENCOUNTER — DOCUMENTATION ONLY (OUTPATIENT)
Dept: ONCOLOGY | Facility: CLINIC | Age: 57
End: 2022-04-19
Payer: COMMERCIAL

## 2022-04-20 ENCOUNTER — TELEPHONE (OUTPATIENT)
Dept: ONCOLOGY | Facility: CLINIC | Age: 57
End: 2022-04-20

## 2022-04-20 ENCOUNTER — ONCOLOGY VISIT (OUTPATIENT)
Dept: ONCOLOGY | Facility: CLINIC | Age: 57
End: 2022-04-20
Attending: INTERNAL MEDICINE
Payer: COMMERCIAL

## 2022-04-20 ENCOUNTER — LAB (OUTPATIENT)
Dept: INFUSION THERAPY | Facility: CLINIC | Age: 57
End: 2022-04-20
Attending: INTERNAL MEDICINE
Payer: COMMERCIAL

## 2022-04-20 ENCOUNTER — TELEPHONE (OUTPATIENT)
Dept: PHARMACY | Facility: CLINIC | Age: 57
End: 2022-04-20

## 2022-04-20 VITALS
OXYGEN SATURATION: 97 % | HEART RATE: 70 BPM | RESPIRATION RATE: 16 BRPM | TEMPERATURE: 98.7 F | SYSTOLIC BLOOD PRESSURE: 118 MMHG | DIASTOLIC BLOOD PRESSURE: 72 MMHG

## 2022-04-20 DIAGNOSIS — C50.919 METASTATIC BREAST CANCER: Primary | ICD-10-CM

## 2022-04-20 DIAGNOSIS — G95.29 SPINAL CORD COMPRESSION DUE TO MALIGNANT NEOPLASM METASTATIC TO SPINE (H): Primary | ICD-10-CM

## 2022-04-20 DIAGNOSIS — C79.51 SPINAL CORD COMPRESSION DUE TO MALIGNANT NEOPLASM METASTATIC TO SPINE (H): ICD-10-CM

## 2022-04-20 DIAGNOSIS — G95.29 SPINAL CORD COMPRESSION DUE TO MALIGNANT NEOPLASM METASTATIC TO SPINE (H): ICD-10-CM

## 2022-04-20 DIAGNOSIS — C79.51 SPINAL CORD COMPRESSION DUE TO MALIGNANT NEOPLASM METASTATIC TO SPINE (H): Primary | ICD-10-CM

## 2022-04-20 DIAGNOSIS — C50.919 METASTATIC BREAST CANCER: ICD-10-CM

## 2022-04-20 LAB
ALBUMIN SERPL-MCNC: 2.8 G/DL (ref 3.4–5)
ALP SERPL-CCNC: 75 U/L (ref 40–150)
ALT SERPL W P-5'-P-CCNC: 33 U/L (ref 0–50)
ANION GAP SERPL CALCULATED.3IONS-SCNC: 7 MMOL/L (ref 3–14)
AST SERPL W P-5'-P-CCNC: 13 U/L (ref 0–45)
BASOPHILS # BLD MANUAL: 0 10E3/UL (ref 0–0.2)
BASOPHILS NFR BLD MANUAL: 0 %
BILIRUB DIRECT SERPL-MCNC: <0.1 MG/DL (ref 0–0.2)
BILIRUB SERPL-MCNC: 0.2 MG/DL (ref 0.2–1.3)
BUN SERPL-MCNC: 14 MG/DL (ref 7–30)
CALCIUM SERPL-MCNC: 8.1 MG/DL (ref 8.5–10.1)
CALCIUM SERPL-MCNC: 8.1 MG/DL (ref 8.5–10.1)
CHLORIDE BLD-SCNC: 106 MMOL/L (ref 94–109)
CO2 SERPL-SCNC: 25 MMOL/L (ref 20–32)
CREAT SERPL-MCNC: 0.53 MG/DL (ref 0.52–1.04)
EOSINOPHIL # BLD MANUAL: 0 10E3/UL (ref 0–0.7)
EOSINOPHIL NFR BLD MANUAL: 2 %
ERYTHROCYTE [DISTWIDTH] IN BLOOD BY AUTOMATED COUNT: 15.5 % (ref 10–15)
GFR SERPL CREATININE-BSD FRML MDRD: >90 ML/MIN/1.73M2
GLUCOSE BLD-MCNC: 140 MG/DL (ref 70–99)
HCT VFR BLD AUTO: 32.4 % (ref 35–47)
HGB BLD-MCNC: 10.6 G/DL (ref 11.7–15.7)
LYMPHOCYTES # BLD MANUAL: 0.1 10E3/UL (ref 0.8–5.3)
LYMPHOCYTES NFR BLD MANUAL: 8 %
MAGNESIUM SERPL-MCNC: 2.4 MG/DL (ref 1.6–2.3)
MCH RBC QN AUTO: 31.3 PG (ref 26.5–33)
MCHC RBC AUTO-ENTMCNC: 32.7 G/DL (ref 31.5–36.5)
MCV RBC AUTO: 96 FL (ref 78–100)
MONOCYTES # BLD MANUAL: 0 10E3/UL (ref 0–1.3)
MONOCYTES NFR BLD MANUAL: 4 %
NEUTROPHILS # BLD MANUAL: 0.6 10E3/UL (ref 1.6–8.3)
NEUTROPHILS NFR BLD MANUAL: 86 %
PHOSPHATE SERPL-MCNC: 3.2 MG/DL (ref 2.5–4.5)
PLAT MORPH BLD: ABNORMAL
PLATELET # BLD AUTO: 84 10E3/UL (ref 150–450)
POTASSIUM BLD-SCNC: 4.4 MMOL/L (ref 3.4–5.3)
PROT SERPL-MCNC: 5.8 G/DL (ref 6.8–8.8)
RBC # BLD AUTO: 3.39 10E6/UL (ref 3.8–5.2)
RBC MORPH BLD: ABNORMAL
SODIUM SERPL-SCNC: 138 MMOL/L (ref 133–144)
WBC # BLD AUTO: 0.7 10E3/UL (ref 4–11)

## 2022-04-20 PROCEDURE — 93005 ELECTROCARDIOGRAM TRACING: CPT

## 2022-04-20 PROCEDURE — 84100 ASSAY OF PHOSPHORUS: CPT | Performed by: INTERNAL MEDICINE

## 2022-04-20 PROCEDURE — 85027 COMPLETE CBC AUTOMATED: CPT | Performed by: INTERNAL MEDICINE

## 2022-04-20 PROCEDURE — 36415 COLL VENOUS BLD VENIPUNCTURE: CPT

## 2022-04-20 PROCEDURE — 82248 BILIRUBIN DIRECT: CPT | Performed by: INTERNAL MEDICINE

## 2022-04-20 PROCEDURE — 99215 OFFICE O/P EST HI 40 MIN: CPT | Performed by: NURSE PRACTITIONER

## 2022-04-20 PROCEDURE — G0463 HOSPITAL OUTPT CLINIC VISIT: HCPCS | Mod: 25

## 2022-04-20 PROCEDURE — 82310 ASSAY OF CALCIUM: CPT | Performed by: INTERNAL MEDICINE

## 2022-04-20 PROCEDURE — 83735 ASSAY OF MAGNESIUM: CPT | Performed by: INTERNAL MEDICINE

## 2022-04-20 PROCEDURE — 80053 COMPREHEN METABOLIC PANEL: CPT | Performed by: INTERNAL MEDICINE

## 2022-04-20 PROCEDURE — 93005 ELECTROCARDIOGRAM TRACING: CPT | Performed by: INTERNAL MEDICINE

## 2022-04-20 RX ORDER — AMOXICILLIN 250 MG
1-2 CAPSULE ORAL 2 TIMES DAILY PRN
Qty: 60 TABLET | Refills: 0 | Status: SHIPPED | OUTPATIENT
Start: 2022-04-20 | End: 2022-05-19

## 2022-04-20 RX ORDER — ZOLEDRONIC ACID 0.04 MG/ML
4 INJECTION, SOLUTION INTRAVENOUS ONCE
Status: CANCELLED
Start: 2022-04-21 | End: 2022-04-21

## 2022-04-20 RX ORDER — HEPARIN SODIUM,PORCINE 10 UNIT/ML
5 VIAL (ML) INTRAVENOUS
Status: CANCELLED | OUTPATIENT
Start: 2022-04-21

## 2022-04-20 RX ORDER — PROCHLORPERAZINE MALEATE 10 MG
10 TABLET ORAL EVERY 6 HOURS PRN
Qty: 30 TABLET | Refills: 2 | Status: SHIPPED | OUTPATIENT
Start: 2022-04-20 | End: 2022-12-28

## 2022-04-20 RX ORDER — DEXAMETHASONE 4 MG/1
2 TABLET ORAL DAILY
Qty: 30 TABLET | Refills: 0 | Status: SHIPPED | OUTPATIENT
Start: 2022-04-20 | End: 2022-05-19

## 2022-04-20 RX ORDER — HEPARIN SODIUM (PORCINE) LOCK FLUSH IV SOLN 100 UNIT/ML 100 UNIT/ML
5 SOLUTION INTRAVENOUS
Status: CANCELLED | OUTPATIENT
Start: 2022-04-21

## 2022-04-20 ASSESSMENT — PAIN SCALES - GENERAL: PAINLEVEL: NO PAIN (0)

## 2022-04-20 NOTE — TELEPHONE ENCOUNTER
Prior Authorization Not Needed per Insurance    Medication: [employer]_new [facility] insurance_No PA required  Insurance Company: [employer] - Phone [phone] Fax [phone]  Expected CoPay:    ?, needs cost exceeds max override  Pharmacy Filling the Rx: [facility] - [place], [place] - [address]   Pharmacy Notified:  Yes, they are aware of new insurance  Patient Notified:  Yes, pt filled with voucher last month

## 2022-04-20 NOTE — ORAL ONC MGMT
Oral Chemotherapy Monitoring Program  Lab Follow Up     Patient currently on Ribociclib therapy.  Reviewed lab results from today.     Lab Results   Component Value Date    WBC 0.7 04/20/2022     Lab Results   Component Value Date    RBC 3.39 04/20/2022     Lab Results   Component Value Date    HGB 10.6 04/20/2022     Lab Results   Component Value Date    HCT 32.4 04/20/2022     Lab Results   Component Value Date    MCV 96 04/20/2022     Lab Results   Component Value Date    MCH 31.3 04/20/2022     Lab Results   Component Value Date    MCHC 32.7 04/20/2022     Lab Results   Component Value Date    RDW 15.5 04/20/2022     Lab Results   Component Value Date    PLT 84 04/20/2022       Last Comprehensive Metabolic Panel:  Sodium   Date Value Ref Range Status   04/20/2022 138 133 - 144 mmol/L Final     Potassium   Date Value Ref Range Status   04/20/2022 4.4 3.4 - 5.3 mmol/L Final     Chloride   Date Value Ref Range Status   04/20/2022 106 94 - 109 mmol/L Final     Carbon Dioxide (CO2)   Date Value Ref Range Status   04/20/2022 25 20 - 32 mmol/L Final     Anion Gap   Date Value Ref Range Status   04/20/2022 7 3 - 14 mmol/L Final     Glucose   Date Value Ref Range Status   04/20/2022 140 (H) 70 - 99 mg/dL Final     Urea Nitrogen   Date Value Ref Range Status   04/20/2022 14 7 - 30 mg/dL Final     Creatinine   Date Value Ref Range Status   04/20/2022 0.53 0.52 - 1.04 mg/dL Final     GFR Estimate   Date Value Ref Range Status   04/20/2022 >90 >60 mL/min/1.73m2 Final     Comment:     Effective December 21, 2021 eGFRcr in adults is calculated using the 2021 CKD-EPI creatinine equation which includes age and gender (Beth et al., NEJM, DOI: 10.1056/DPPQtm0531073)     Calcium   Date Value Ref Range Status   04/20/2022 8.1 (L) 8.5 - 10.1 mg/dL Final   04/20/2022 8.1 (L) 8.5 - 10.1 mg/dL Final     Bilirubin Total   Date Value Ref Range Status   04/20/2022 0.2 0.2 - 1.3 mg/dL Final     Alkaline Phosphatase   Date Value Ref Range  Status   04/20/2022 75 40 - 150 U/L Final     ALT   Date Value Ref Range Status   04/20/2022 33 0 - 50 U/L Final     AST   Date Value Ref Range Status   04/20/2022 13 0 - 45 U/L Final     Assessment & Plan:  Reviewed labs with care team. CMP, calcium corrects to 9.06; no other new concerns. EKG reviewed: QTc = 433 on 4/20/22; QTc = 475 on 4/4/22. CBC concerning for grade 3 neutropenia (ANC = 0.6), and thrombocytopenia grade 1 (plts = 84k). Recommend drug hold beginning immediately 4/20/22. She will miss her last dose of her 21 day cycle. Will repeat CBC in 1 week (appt not yet scheduled). Will then resume Ribociclib if ANC > 1.0 at dose reduced to 400 mg daily x 21 days, with 7 days off.       Follow-Up:  ~4/27 lab appt and provider visit  4/28 pending start of next Ribociclib cycle (planning dose reduced 400 mg daily x 21 days on and then 7 days off)    Papito TompkinsD  [facility]  [phone]

## 2022-04-20 NOTE — TELEPHONE ENCOUNTER
DATE:  4/20/2022   TIME OF RECEIPT FROM LAB:  0145 PM  LAB TEST:  WBC   LAB VALUE:  700   RESULTS GIVEN WITH READ-BACK TO (PROVIDER):  Venancio   TIME LAB VALUE REPORTED TO PROVIDER:   0148 PM lab      Patient being seen by NP JUAN ALBERTO Martínez reported lab result. Adry Hogue RN,BSN,OCN,CBCN

## 2022-04-20 NOTE — LETTER
"    4/20/2022         RE: Mickie Mcghee  [address] [address]  [address]  [place] [address]        Dear Colleague,    Thank you for referring your patient, Mickie Mcghee, to the [facility]. Please see a copy of my visit note below.    Oncology Rooming Note    April 20, 2022 1:39 PM   Mickie Mcghee is a 57 year old female who presents for:    Chief Complaint   Patient presents with     Oncology Clinic Visit     Initial Vitals: /64   Pulse 70   Resp 16   SpO2 97%  Estimated body mass index is 21.63 kg/m  as calculated from the following:    Height as of 4/4/22: 1.651 m (5' 5\").    Weight as of 4/4/22: 59 kg (130 lb). There is no height or weight on file to calculate BSA.  No Pain (0) Comment: Data Unavailable   No LMP recorded.  Allergies reviewed: Yes  Medications reviewed: Yes    Medications: MEDICATION REFILLS NEEDED TODAY. Provider was notified.      Refills: Letrozole, Decadron, compazine, Senna, calcium, kisqali      Mildred Clements, [facility]              Oncology/Hematology Visit Note  Apr 20, 2022    Reason for Visit: follow up of metastatic breast cancer  ER positive OK positive HER2 negative  Diagnosed in 2007 status post bilateral mastectomy chemotherapy radiation anastrozole  -03/21/20222831-MG-iwoeyz bone biopsy of left iliac bone reveals metastatic breast cancer ER positive OK positive HER2 negative  03/31/2021-started Ribociclib 600 mg   Letrazole     03/22-04/07/2022-status post radiation to the L-spine and LS spine 10 fractions    Interval History:  Patient reports her sensation to the legs is slowly recovering she is able to feel his feet now.  She continues to be wheelchair-bound.  Her family is helping her with exercises  Denies fever chills sweats cough shortness of breath chest pain nausea vomiting diarrhea abdominal pain bleeding        Review of Systems:  14 point ROS of systems including Constitutional, Eyes, Respiratory, Cardiovascular, Gastroenterology, Genitourinary, " Integumentary, Muscularskeletal, Psychiatric were all negative except for pertinent positives noted in my HPI.    Physical Examination:  In wheelchair  General: The patient is a pleasant female in no acute distress.  HEENT: EOMI, PERRL. Sclerae are anicteric. Oral mucosa is pink and moist with no lesions or thrush.   Lymph: Neck is supple with no lymphadenopathy in the cervical or supraclavicular areas.   Heart: Regular rate and rhythm.   Lungs: Clear to auscultation bilaterally.   GI: Bowel sounds present, soft, nontender with no palpable hepatosplenomegaly or masses.   Extremities: No lower extremity edema noted bilaterally.   Skin: No rashes, petechiae, or bruising noted on exposed skin.    Laboratory Data:  CBC and CMP results reviewed    Assessment and Plan:  This is a 57-year-old female with    metastatic breast cancer  ER positive CT positive HER2 negative  Diagnosed in 2007 status post bilateral mastectomy chemotherapy radiation anastrozole  -03/21/20224685-OW-[id number] bone biopsy of left iliac bone reveals metastatic breast cancer ER positive CT positive HER2 negative  03/31/2021-started Ribociclib 600 mg  And Letrazole   Labs reviewed abnormalities discussed  Plan as below  Check CBC with differential and follow-up with me next      Bone metastasis  03/22-04/07/2022-status post radiation to the L-spine and LS spine 10 fractions  -Decreased to dexamethasone to 1 mg a day plan is to slowly discontinued  Continue with monthly Zometa  -Okay to proceed with Zometa tomorrow corrected calcium is about 9.8  Call our clinic in the event of jaw pain or any dental issues    Cancer pain-well controlled with occasional Tylenol now  She is not taking oxycodone    Neutropenia secondary to Ribociclib   Patient is afebrile and asymptomatic  -Today is her   Day 21 Ribociclib.  Hold  Ribociclib for 1 week.  Reviewed with Dr. Lozada and  pharmacy next cycle will be reduced to 400 mg dose  Since patient is afebrile and symptomatic no  need for Neupogen  -Neutropenic precautions discussed  Complications of neutropenia discussed   check temperature frequently in the event of fever chills sweats signs and symptoms of infection patient advised to go to ER-patient verbalized understanding  Schedule for CBC with differential and follow-up with me next week     Anemia  Mild  Continue to monitor    Thrombocytopenia secondary to Ribociclib   As above holding medication for 1 week and if labs are okay we will restart at a lower dose 400 mg  Patient advised to go to ER in the event of bleeding fall injury    Call our clinic with any changes in health condition or questions      SAMI Jacob CNP  [facility]     Chart documentation with Dragon Voice recognition Software. Although reviewed after completion, some words and grammatical errors may remain.            Again, thank you for allowing me to participate in the care of your patient.        Sincerely,        SAMI Jacob CNP

## 2022-04-20 NOTE — PROGRESS NOTES
Medical Assistant Note:  Mickie Mcghee presents today for lab draw.    Patient seen by provider today: No.   present during visit today: Not Applicable.    Concerns: No Concerns.    Procedure:  Lab draw site: RAC, Needle type: BF, Gauge: 21. Gauze and coban applied    Post Assessment:  Labs drawn without difficulty: Yes.    Discharge Plan:  Departure Mode: Ambulatory.    Face to Face Time: 5.    Arielle Fry CMA

## 2022-04-20 NOTE — PROGRESS NOTES
Oncology/Hematology Visit Note  Apr 20, 2022    Reason for Visit: follow up of metastatic breast cancer  ER positive AR positive HER2 negative  Diagnosed in 2007 status post bilateral mastectomy chemotherapy radiation anastrozole  -03/21/20227867-YN-[id number] bone biopsy of left iliac bone reveals metastatic breast cancer ER positive AR positive HER2 negative  03/31/2021-started Ribociclib 600 mg   Letrazole     03/22-04/07/2022-status post radiation to the L-spine and LS spine 10 fractions    Interval History:  Patient reports her sensation to the legs is slowly recovering she is able to feel his feet now.  She continues to be wheelchair-bound.  Her family is helping her with exercises  Denies fever chills sweats cough shortness of breath chest pain nausea vomiting diarrhea abdominal pain bleeding        Review of Systems:  14 point ROS of systems including Constitutional, Eyes, Respiratory, Cardiovascular, Gastroenterology, Genitourinary, Integumentary, Muscularskeletal, Psychiatric were all negative except for pertinent positives noted in my HPI.    Physical Examination:  In wheelchair  General: The patient is a pleasant female in no acute distress.  HEENT: EOMI, PERRL. Sclerae are anicteric. Oral mucosa is pink and moist with no lesions or thrush.   Lymph: Neck is supple with no lymphadenopathy in the cervical or supraclavicular areas.   Heart: Regular rate and rhythm.   Lungs: Clear to auscultation bilaterally.   GI: Bowel sounds present, soft, nontender with no palpable hepatosplenomegaly or masses.   Extremities: No lower extremity edema noted bilaterally.   Skin: No rashes, petechiae, or bruising noted on exposed skin.    Laboratory Data:  CBC and CMP results reviewed    Assessment and Plan:  This is a 57-year-old female with    metastatic breast cancer  ER positive AR positive HER2 negative  Diagnosed in 2007 status post bilateral mastectomy chemotherapy radiation anastrozole  -03/21/20226420-XS-odfmnt bone biopsy of  left iliac bone reveals metastatic breast cancer ER positive MI positive HER2 negative  03/31/2021-started Ribociclib 600 mg  And Letrazole   Labs reviewed abnormalities discussed  Plan as below  Check CBC with differential and follow-up with me next      Bone metastasis  03/22-04/07/2022-status post radiation to the L-spine and LS spine 10 fractions  -Decreased to dexamethasone to 1 mg a day plan is to slowly discontinued  Continue with monthly Zometa  -Okay to proceed with Zometa tomorrow corrected calcium is about 9.8  Call our clinic in the event of jaw pain or any dental issues    Cancer pain-well controlled with occasional Tylenol now  She is not taking oxycodone    Neutropenia secondary to Ribociclib   Patient is afebrile and asymptomatic  -Today is her   Day 21 Ribociclib.  Hold  Ribociclib for 1 week.  Reviewed with Dr. Lozada and  pharmacy next cycle will be reduced to 400 mg dose  Since patient is afebrile and symptomatic no need for Neupogen  -Neutropenic precautions discussed  Complications of neutropenia discussed   check temperature frequently in the event of fever chills sweats signs and symptoms of infection patient advised to go to ER-patient verbalized understanding  Schedule for CBC with differential and follow-up with me next week     Anemia  Mild  Continue to monitor    Thrombocytopenia secondary to Ribociclib   As above holding medication for 1 week and if labs are okay we will restart at a lower dose 400 mg  Patient advised to go to ER in the event of bleeding fall injury    Call our clinic with any changes in health condition or questions      SAMI Jacob [facility] [place]     Chart documentation with Dragon Voice recognition Software. Although reviewed after completion, some words and grammatical errors may remain.

## 2022-04-20 NOTE — PROGRESS NOTES
"Oncology Rooming Note    April 20, 2022 1:39 PM   Mickie Mcghee is a 57 year old female who presents for:    Chief Complaint   Patient presents with     Oncology Clinic Visit     Initial Vitals: /64   Pulse 70   Resp 16   SpO2 97%  Estimated body mass index is 21.63 kg/m  as calculated from the following:    Height as of 4/4/22: 1.651 m (5' 5\").    Weight as of 4/4/22: 59 kg (130 lb). There is no height or weight on file to calculate BSA.  No Pain (0) Comment: Data Unavailable   No LMP recorded.  Allergies reviewed: Yes  Medications reviewed: Yes    Medications: MEDICATION REFILLS NEEDED TODAY. Provider was notified.      Refills: Letrozole, Decadron, compazine, Senna, calcium, kisqali      Mildred Clements, [facility]            "

## 2022-04-21 ENCOUNTER — INFUSION THERAPY VISIT (OUTPATIENT)
Dept: INFUSION THERAPY | Facility: CLINIC | Age: 57
End: 2022-04-21
Attending: INTERNAL MEDICINE
Payer: COMMERCIAL

## 2022-04-21 VITALS
OXYGEN SATURATION: 98 % | TEMPERATURE: 98.3 F | DIASTOLIC BLOOD PRESSURE: 57 MMHG | SYSTOLIC BLOOD PRESSURE: 91 MMHG | HEART RATE: 72 BPM | RESPIRATION RATE: 20 BRPM

## 2022-04-21 DIAGNOSIS — C79.51 SPINAL CORD COMPRESSION DUE TO MALIGNANT NEOPLASM METASTATIC TO SPINE (H): ICD-10-CM

## 2022-04-21 DIAGNOSIS — C50.919 METASTATIC BREAST CANCER: Primary | ICD-10-CM

## 2022-04-21 DIAGNOSIS — G95.29 SPINAL CORD COMPRESSION DUE TO MALIGNANT NEOPLASM METASTATIC TO SPINE (H): ICD-10-CM

## 2022-04-21 PROCEDURE — 250N000011 HC RX IP 250 OP 636: Performed by: NURSE PRACTITIONER

## 2022-04-21 PROCEDURE — 96365 THER/PROPH/DIAG IV INF INIT: CPT

## 2022-04-21 RX ORDER — ZOLEDRONIC ACID 0.04 MG/ML
4 INJECTION, SOLUTION INTRAVENOUS ONCE
Status: COMPLETED | OUTPATIENT
Start: 2022-04-21 | End: 2022-04-21

## 2022-04-21 RX ADMIN — ZOLEDRONIC ACID 4 MG: 0.04 INJECTION, SOLUTION INTRAVENOUS at 13:46

## 2022-04-21 ASSESSMENT — PAIN SCALES - GENERAL: PAINLEVEL: NO PAIN (0)

## 2022-04-21 NOTE — PROGRESS NOTES
Infusion Nursing Note:  Mickie Mcghee presents today for Zometa.    Patient seen by provider today: Yes: Sha Martínez NP.   present during visit today: Not Applicable.    Note: N/A.      Intravenous Access:  Peripheral IV placed.    Treatment Conditions:  Lab Results   Component Value Date    HGB 10.6 (L) 04/20/2022    WBC 0.7 (LL) 04/20/2022    ANEU 0.6 (L) 04/20/2022    ANEUTAUTO 3.7 04/04/2022    PLT 84 (L) 04/20/2022      Lab Results   Component Value Date     04/20/2022    POTASSIUM 4.4 04/20/2022    MAG 2.4 (H) 04/20/2022    CR 0.53 04/20/2022    OSMEL 8.1 (L) 04/20/2022    OSMEL 8.1 (L) 04/20/2022    BILITOTAL 0.2 04/20/2022    ALBUMIN 2.8 (L) 04/20/2022    ALT 33 04/20/2022    AST 13 04/20/2022     Results reviewed, labs MET treatment parameters, ok to proceed with treatment.      Post Infusion Assessment:  Patient tolerated infusion without incident.  Blood return noted pre and post infusion.  Site patent and intact, free from redness, edema or discomfort.  No evidence of extravasations.  Access discontinued per protocol.       Discharge Plan:   Patient received prescriptions for Caltrate, Compazine, Decadron, Senokot  Discharge instructions reviewed with: Patient.  Patient verbalized understanding of discharge instructions and all questions answered.  AVS to patient via [employer].  Patient will return 4/28/22 for next appointment.   Patient discharged in stable condition accompanied by: self.  Departure Mode: Wheelchair.      Shawna Hernández RN

## 2022-04-23 DIAGNOSIS — C79.51 SPINAL CORD COMPRESSION DUE TO MALIGNANT NEOPLASM METASTATIC TO SPINE (H): ICD-10-CM

## 2022-04-23 DIAGNOSIS — G95.29 SPINAL CORD COMPRESSION DUE TO MALIGNANT NEOPLASM METASTATIC TO SPINE (H): ICD-10-CM

## 2022-04-23 DIAGNOSIS — C50.919 METASTATIC BREAST CANCER: Primary | ICD-10-CM

## 2022-04-23 RX ORDER — HEPARIN SODIUM,PORCINE 10 UNIT/ML
5 VIAL (ML) INTRAVENOUS
Status: CANCELLED | OUTPATIENT
Start: 2022-05-19

## 2022-04-23 RX ORDER — HEPARIN SODIUM (PORCINE) LOCK FLUSH IV SOLN 100 UNIT/ML 100 UNIT/ML
5 SOLUTION INTRAVENOUS
Status: CANCELLED | OUTPATIENT
Start: 2022-05-19

## 2022-04-26 DIAGNOSIS — C50.919 METASTATIC BREAST CANCER: ICD-10-CM

## 2022-04-27 DIAGNOSIS — C50.919 METASTATIC BREAST CANCER: Primary | ICD-10-CM

## 2022-05-02 ENCOUNTER — LAB (OUTPATIENT)
Dept: INFUSION THERAPY | Facility: CLINIC | Age: 57
End: 2022-05-02
Attending: NURSE PRACTITIONER
Payer: COMMERCIAL

## 2022-05-02 DIAGNOSIS — C50.919 METASTATIC BREAST CANCER: ICD-10-CM

## 2022-05-02 LAB
ALBUMIN SERPL-MCNC: 2.9 G/DL (ref 3.4–5)
ALP SERPL-CCNC: 80 U/L (ref 40–150)
ALT SERPL W P-5'-P-CCNC: 32 U/L (ref 0–50)
ANION GAP SERPL CALCULATED.3IONS-SCNC: 7 MMOL/L (ref 3–14)
AST SERPL W P-5'-P-CCNC: 16 U/L (ref 0–45)
BASOPHILS # BLD MANUAL: 0.1 10E3/UL (ref 0–0.2)
BASOPHILS NFR BLD MANUAL: 3 %
BILIRUB SERPL-MCNC: 0.2 MG/DL (ref 0.2–1.3)
BUN SERPL-MCNC: 11 MG/DL (ref 7–30)
CALCIUM SERPL-MCNC: 8.7 MG/DL (ref 8.5–10.1)
CHLORIDE BLD-SCNC: 106 MMOL/L (ref 94–109)
CO2 SERPL-SCNC: 24 MMOL/L (ref 20–32)
CREAT SERPL-MCNC: 0.45 MG/DL (ref 0.52–1.04)
DACRYOCYTES BLD QL SMEAR: SLIGHT
EOSINOPHIL # BLD MANUAL: 0 10E3/UL (ref 0–0.7)
EOSINOPHIL NFR BLD MANUAL: 2 %
ERYTHROCYTE [DISTWIDTH] IN BLOOD BY AUTOMATED COUNT: 17 % (ref 10–15)
GFR SERPL CREATININE-BSD FRML MDRD: >90 ML/MIN/1.73M2
GLUCOSE BLD-MCNC: 115 MG/DL (ref 70–99)
HCT VFR BLD AUTO: 28.6 % (ref 35–47)
HGB BLD-MCNC: 9.4 G/DL (ref 11.7–15.7)
LYMPHOCYTES # BLD MANUAL: 0.1 10E3/UL (ref 0.8–5.3)
LYMPHOCYTES NFR BLD MANUAL: 7 %
MAGNESIUM SERPL-MCNC: 2.3 MG/DL (ref 1.6–2.3)
MCH RBC QN AUTO: 31.3 PG (ref 26.5–33)
MCHC RBC AUTO-ENTMCNC: 32.9 G/DL (ref 31.5–36.5)
MCV RBC AUTO: 95 FL (ref 78–100)
MONOCYTES # BLD MANUAL: 0.3 10E3/UL (ref 0–1.3)
MONOCYTES NFR BLD MANUAL: 16 %
MYELOCYTES # BLD MANUAL: 0.1 10E3/UL
MYELOCYTES NFR BLD MANUAL: 6 %
NEUTROPHILS # BLD MANUAL: 1.3 10E3/UL (ref 1.6–8.3)
NEUTROPHILS NFR BLD MANUAL: 66 %
NRBC # BLD AUTO: 0.1 10E3/UL
NRBC BLD MANUAL-RTO: 3 %
PHOSPHATE SERPL-MCNC: 3.2 MG/DL (ref 2.5–4.5)
PLAT MORPH BLD: ABNORMAL
PLATELET # BLD AUTO: 185 10E3/UL (ref 150–450)
POTASSIUM BLD-SCNC: 4.6 MMOL/L (ref 3.4–5.3)
PROT SERPL-MCNC: 6.6 G/DL (ref 6.8–8.8)
RBC # BLD AUTO: 3 10E6/UL (ref 3.8–5.2)
RBC MORPH BLD: ABNORMAL
SODIUM SERPL-SCNC: 137 MMOL/L (ref 133–144)
WBC # BLD AUTO: 1.9 10E3/UL (ref 4–11)

## 2022-05-02 PROCEDURE — 84100 ASSAY OF PHOSPHORUS: CPT | Performed by: NURSE PRACTITIONER

## 2022-05-02 PROCEDURE — 83735 ASSAY OF MAGNESIUM: CPT | Performed by: NURSE PRACTITIONER

## 2022-05-02 PROCEDURE — 85027 COMPLETE CBC AUTOMATED: CPT | Performed by: NURSE PRACTITIONER

## 2022-05-02 PROCEDURE — 36415 COLL VENOUS BLD VENIPUNCTURE: CPT

## 2022-05-02 PROCEDURE — 80053 COMPREHEN METABOLIC PANEL: CPT | Performed by: NURSE PRACTITIONER

## 2022-05-02 NOTE — PROGRESS NOTES
Medical Assistant Note:  Mickie Mcghee presents today for blood draw.    Patient seen by provider today: No.   present during visit today: Not Applicable.    Concerns: No Concerns.    Procedure:  Lab draw site: rac, Needle type: bf, Gauge: 23.    Post Assessment:  Labs drawn without difficulty: Yes.    Discharge Plan:  Departure Mode: Ambulatory.    Face to Face Time: 5 min.    Dorothy Henao, CMA

## 2022-05-03 ENCOUNTER — VIRTUAL VISIT (OUTPATIENT)
Dept: ONCOLOGY | Facility: CLINIC | Age: 57
End: 2022-05-03
Attending: NURSE PRACTITIONER
Payer: COMMERCIAL

## 2022-05-03 ENCOUNTER — TELEPHONE (OUTPATIENT)
Dept: PHARMACY | Facility: CLINIC | Age: 57
End: 2022-05-03
Payer: COMMERCIAL

## 2022-05-03 DIAGNOSIS — C50.919 METASTATIC BREAST CANCER: Primary | ICD-10-CM

## 2022-05-03 PROCEDURE — 99214 OFFICE O/P EST MOD 30 MIN: CPT | Mod: 95 | Performed by: NURSE PRACTITIONER

## 2022-05-03 RX ORDER — LETROZOLE 2.5 MG/1
2.5 TABLET, FILM COATED ORAL EVERY MORNING
Qty: 28 TABLET | Refills: 0 | Status: SHIPPED | OUTPATIENT
Start: 2022-05-03 | End: 2022-05-19

## 2022-05-03 NOTE — PROGRESS NOTES
Mary is a 57 year old who is being evaluated via a billable telephone visit.      What phone number would you like to be contacted at? [phone]  How would you like to obtain your AVS? Paloma Daiston   Phone call duration: 10  Minutes      Oncology/Hematology Visit Note  May 3, 2022    Reason for Visit: follow up of metastatic breast cancer  ER positive CO positive HER2 negative  Diagnosed in 2007 status post bilateral mastectomy chemotherapy radiation anastrozole  -03/21/20227341-QB-hyyhbg bone biopsy of left iliac bone reveals metastatic breast cancer ER positive CO positive HER2 negative  03/31/2021-started Ribociclib 600 mg   Letrazole     03/22-04/07/2022-status post radiation to the L-spine and LS spine 10 fractions    Interval History:  Patient reports she has been tolerating treatment well denies fever chills sweats cough shortness of breath chest pain nausea vomiting diarrhea abdominal pain or bleeding        Review of Systems:  14 point ROS of systems including Constitutional, Eyes, Respiratory, Cardiovascular, Gastroenterology, Genitourinary, Integumentary, Muscularskeletal, Psychiatric were all negative except for pertinent positives noted in my HPI.    Physical Examination:  Telephone visit no audible wheezing or cough  Patient answers all questions appropriately       Laboratory Data:  CBC and CMP results reviewed    Assessment and Plan:  This is a 57-year-old female with    metastatic breast cancer  ER positive CO positive HER2 negative  Diagnosed in 2007 status post bilateral mastectomy chemotherapy radiation anastrozole  -03/21/20229443-SB-ooqktv bone biopsy of left iliac bone reveals metastatic breast cancer ER positive CO positive HER2 negative  03/31/2021-started Ribociclib 600 mg  And Letrazole   Neutropenia noted reviewed with pharmacy and Dr. Neville ramos ribociclib held with the plan of reducing the dose to 400 mg  Labs reviewed today  ANC has improved to 1.3 hemoglobin 9.4 normal  platelets  Reviewed with pharmacy  Okay to restart Ribociclib at a lower dose of 400 mg  Continue with letrozole  Per pharmacy patient should come in 2 weeks for labs   Patient is ready scheduled to see Dr. Lozada in 2 weeks with labs and Zometa      Bone metastasis  03/22-04/07/2022-status post radiation to the L-spine and LS spine 10 fractions  -Decreased to dexamethasone to 1 mg a day plan is to slowly discontinued  Continue with monthly Zometa    Cancer pain-well controlled with occasional Tylenol now  She is not taking oxycodone    Neutropenia secondary to Ribociclib   Patient is afebrile and asymptomatic  -As above we held  Ribociclib by 1 week  Neutrophils are now up to 1.3  Complications of neutropenia discussed including infection and sepsis  Neutropenia precautions discussed check temperature frequently in the event of fever chills sweats or any signs symptoms of infection patient advised to call our clinic or go to ER     Anemia  Mild  Continue to monitor    Thrombocytopenia secondary to Ribociclib   Improvement since last week  Call our clinic with any bleeding fall injury bruising    Hyperbilirubinemia  Increase protein in diet    Call our clinic with any changes in health condition or questions      SAMI Jacob [facility] [place]     Chart documentation with Dragon Voice recognition Software. Although reviewed after completion, some words and grammatical errors may remain.

## 2022-05-03 NOTE — LETTER
5/3/2022         RE: Mickie Mcghee  [address]  [address]  [place] [address]        Dear Colleague,    Thank you for referring your patient, Mickie Mcghee, to the [facility]. Please see a copy of my visit note below.    Mary is a 57 year old who is being evaluated via a billable telephone visit.      What phone number would you like to be contacted at? [phone]  How would you like to obtain your AVS? Paloma Perez   Phone call duration: 10  Minutes      Oncology/Hematology Visit Note  May 3, 2022    Reason for Visit: follow up of metastatic breast cancer  ER positive MD positive HER2 negative  Diagnosed in 2007 status post bilateral mastectomy chemotherapy radiation anastrozole  -03/21/20220841-DL-hxthxl bone biopsy of left iliac bone reveals metastatic breast cancer ER positive MD positive HER2 negative  03/31/2021-started Ribociclib 600 mg   Letrazole     03/22-04/07/2022-status post radiation to the L-spine and LS spine 10 fractions    Interval History:  Patient reports she has been tolerating treatment well denies fever chills sweats cough shortness of breath chest pain nausea vomiting diarrhea abdominal pain or bleeding        Review of Systems:  14 point ROS of systems including Constitutional, Eyes, Respiratory, Cardiovascular, Gastroenterology, Genitourinary, Integumentary, Muscularskeletal, Psychiatric were all negative except for pertinent positives noted in my HPI.    Physical Examination:  Telephone visit no audible wheezing or cough  Patient answers all questions appropriately       Laboratory Data:  CBC and CMP results reviewed    Assessment and Plan:  This is a 57-year-old female with    metastatic breast cancer  ER positive MD positive HER2 negative  Diagnosed in 2007 status post bilateral mastectomy chemotherapy radiation anastrozole  -03/21/20221334-FG-cavcoj bone biopsy of left iliac bone reveals metastatic breast cancer ER positive MD positive  HER2 negative  03/31/2021-started Ribociclib 600 mg  And Letrazole   Neutropenia noted reviewed with pharmacy and Dr. Lozada h ribociclib held with the plan of reducing the dose to 400 mg  Labs reviewed today  ANC has improved to 1.3 hemoglobin 9.4 normal platelets  Reviewed with pharmacy  Okay to restart Ribociclib at a lower dose of 400 mg  Continue with letrozole  Per pharmacy patient should come in 2 weeks for labs   Patient is ready scheduled to see Dr. Lozada in 2 weeks with labs and Zometa      Bone metastasis  03/22-04/07/2022-status post radiation to the L-spine and LS spine 10 fractions  -Decreased to dexamethasone to 1 mg a day plan is to slowly discontinued  Continue with monthly Zometa    Cancer pain-well controlled with occasional Tylenol now  She is not taking oxycodone    Neutropenia secondary to Ribociclib   Patient is afebrile and asymptomatic  -As above we held  Ribociclib by 1 week  Neutrophils are now up to 1.3  Complications of neutropenia discussed including infection and sepsis  Neutropenia precautions discussed check temperature frequently in the event of fever chills sweats or any signs symptoms of infection patient advised to call our clinic or go to ER     Anemia  Mild  Continue to monitor    Thrombocytopenia secondary to Ribociclib   Improvement since last week  Call our clinic with any bleeding fall injury bruising    Hyperbilirubinemia  Increase protein in diet    Call our clinic with any changes in health condition or questions      SAMI Jacob CNP  [facility] [place]     Chart documentation with Dragon Voice recognition Software. Although reviewed after completion, some words and grammatical errors may remain.            Again, thank you for allowing me to participate in the care of your patient.        Sincerely,        SAMI Jacob CNP

## 2022-05-03 NOTE — LETTER
5/3/2022         RE: Mickie Mcghee  [address]  [address]  [place] [address]        Dear Colleague,    Thank you for referring your patient, Mickie Mcghee, to the [facility]. Please see a copy of my visit note below.    Mary is a 57 year old who is being evaluated via a billable telephone visit.      What phone number would you like to be contacted at? [phone]  How would you like to obtain your AVS? Paloma Perez   Phone call duration: 10  Minutes      Oncology/Hematology Visit Note  May 3, 2022    Reason for Visit: follow up of metastatic breast cancer  ER positive SC positive HER2 negative  Diagnosed in 2007 status post bilateral mastectomy chemotherapy radiation anastrozole  -03/21/20229209-YV-[id number] bone biopsy of left iliac bone reveals metastatic breast cancer ER positive SC positive HER2 negative  03/31/2021-started Ribociclib 600 mg   Letrazole     03/22-04/07/2022-status post radiation to the L-spine and LS spine 10 fractions    Interval History:  Patient reports she has been tolerating treatment well denies fever chills sweats cough shortness of breath chest pain nausea vomiting diarrhea abdominal pain or bleeding        Review of Systems:  14 point ROS of systems including Constitutional, Eyes, Respiratory, Cardiovascular, Gastroenterology, Genitourinary, Integumentary, Muscularskeletal, Psychiatric were all negative except for pertinent positives noted in my HPI.    Physical Examination:  Telephone visit no audible wheezing or cough  Patient answers all questions appropriately       Laboratory Data:  CBC and CMP results reviewed    Assessment and Plan:  This is a 57-year-old female with    metastatic breast cancer  ER positive SC positive HER2 negative  Diagnosed in 2007 status post bilateral mastectomy chemotherapy radiation anastrozole  -03/21/20229599-BS-[id number] bone biopsy of left iliac bone reveals metastatic breast cancer ER positive SC positive  HER2 negative  03/31/2021-started Ribociclib 600 mg  And Letrazole   Neutropenia noted reviewed with pharmacy and Dr. Lozada h ribociclib held with the plan of reducing the dose to 400 mg  Labs reviewed today  ANC has improved to 1.3 hemoglobin 9.4 normal platelets  Reviewed with pharmacy  Okay to restart Ribociclib at a lower dose of 400 mg  Continue with letrozole  Per pharmacy patient should come in 2 weeks for labs   Patient is ready scheduled to see Dr. Lozada in 2 weeks with labs and Zometa      Bone metastasis  03/22-04/07/2022-status post radiation to the L-spine and LS spine 10 fractions  -Decreased to dexamethasone to 1 mg a day plan is to slowly discontinued  Continue with monthly Zometa    Cancer pain-well controlled with occasional Tylenol now  She is not taking oxycodone    Neutropenia secondary to Ribociclib   Patient is afebrile and asymptomatic  -As above we held  Ribociclib by 1 week  Neutrophils are now up to 1.3  Complications of neutropenia discussed including infection and sepsis  Neutropenia precautions discussed check temperature frequently in the event of fever chills sweats or any signs symptoms of infection patient advised to call our clinic or go to ER     Anemia  Mild  Continue to monitor    Thrombocytopenia secondary to Ribociclib   Improvement since last week  Call our clinic with any bleeding fall injury bruising    Hyperbilirubinemia  Increase protein in diet    Call our clinic with any changes in health condition or questions      SAMI Jacob CNP  [facility] [place]     Chart documentation with Dragon Voice recognition Software. Although reviewed after completion, some words and grammatical errors may remain.            Again, thank you for allowing me to participate in the care of your patient.        Sincerely,        SAMI Jacob CNP

## 2022-05-03 NOTE — TELEPHONE ENCOUNTER
"Oral Chemotherapy Program-lab review from 5/3/22    ORAL CHEMOTHERAPY 3/29/2022 4/5/2022 4/20/2022 5/3/2022   Assessment Type New Teach Initial Follow up Chart Review;Lab Monitoring Chart Review;Lab Monitoring   Diagnosis Code Breast Cancer Breast Cancer Breast Cancer Breast Cancer   Providers Neville Lozada   Clinic Name/Location [facility]   Drug Name Kisqali (ribociclib) Kisqali (ribociclib) Kisqali (ribociclib) Kisqali (ribociclib)   Dose 600 mg 600 mg 600 mg 400 mg   Current Schedule Daily Daily Daily Daily   Cycle Details 3 weeks on, 1 week off 3 weeks on, 1 week off 3 weeks on, 1 week off 3 weeks on, 1 week off   Start Date of Last Cycle - 3/31/2022 3/31/2022 5/3/2022   Planned next cycle start date 3/31/2022 4/28/2022 4/28/2022 -   Doses missed in last 2 weeks - 0 1 1   Adherence Assessment - Adherent Adherent -   Adverse Effects - Nausea;Fatigue Nausea;Fatigue;Neutropenia;Thrombocytopenia -   Nausea - Grade 1 Grade 1 -   Pharmacist Intervention(nausea) - No No -   Fatigue - Grade 1 Grade 1 -   Pharmacist Intervention(fatigue) - No No -   Neutropenia - - Grade 3 -   Pharmacist Intervention(neutropenia) - - Yes -   Intervention(s) - - Recommend drug hold -   Thrombocytopenia - - Grade 1 -   Pharmacist Intervention(thrombocytopenia) - - No -   Any new drug interactions? No - - -   Is the dose as ordered appropriate for the patient? Yes - - -       Vitals:  BP:   BP Readings from Last 1 Encounters:   04/21/22 91/57     Wt Readings from Last 1 Encounters:   04/04/22 59 kg (130 lb)     Estimated body surface area is 1.64 meters squared as calculated from the following:    Height as of 4/4/22: 1.651 m (5' 5\").    Weight as of 4/4/22: 59 kg (130 lb).    Labs:  _  Result Component Current Result Ref Range   Sodium 137 (5/2/2022) 133 - 144 mmol/L     _  Result Component Current Result Ref Range   Potassium 4.6 (5/2/2022) 3.4 - 5.3 mmol/L     _  Result Component Current Result Ref " Range   Calcium 8.7 (5/2/2022) 8.5 - 10.1 mg/dL          _  Result Component Current Result Ref Range   Magnesium 2.3 (5/2/2022) 1.6 - 2.3 mg/dL     _  Result Component Current Result Ref Range   Phosphorus 3.2 (5/2/2022) 2.5 - 4.5 mg/dL     _  Result Component Current Result Ref Range   Albumin 2.9 (L) (5/2/2022) 3.4 - 5.0 g/dL     _  Result Component Current Result Ref Range   Urea Nitrogen 11 (5/2/2022) 7 - 30 mg/dL     _  Result Component Current Result Ref Range   Creatinine 0.45 (L) (5/2/2022) 0.52 - 1.04 mg/dL       _  Result Component Current Result Ref Range   AST 16 (5/2/2022) 0 - 45 U/L     _  Result Component Current Result Ref Range   ALT 32 (5/2/2022) 0 - 50 U/L     _  Result Component Current Result Ref Range   Bilirubin Total 0.2 (5/2/2022) 0.2 - 1.3 mg/dL       _  Result Component Current Result Ref Range   WBC Count 1.9 (L) (5/2/2022) 4.0 - 11.0 10e3/uL     _  Result Component Current Result Ref Range   Hemoglobin 9.4 (L) (5/2/2022) 11.7 - 15.7 g/dL     _  Result Component Current Result Ref Range   Platelet Count 185 (5/2/2022) 150 - 450 10e3/uL     _  Result Component Current Result Ref Range   Absolute Neutrophils 1.3 (L) (5/2/2022) 1.6 - 8.3 10e3/uL     Assessment/Plan:  ANC has improved to 1.3- spoke to Sha NP  Will restart the Ribociclib at a dose decrease of 400 mg daily.  Labs will be repeated in 2 weeks.  Mickie in aware of the change     Follow-Up:  Labs and with Dr Lozada 5/19    Isaiah Foy, PharmD, BCOP  May 3, 2022

## 2022-05-10 ENCOUNTER — DOCUMENTATION ONLY (OUTPATIENT)
Dept: LAB | Facility: CLINIC | Age: 57
End: 2022-05-10

## 2022-05-19 ENCOUNTER — LAB (OUTPATIENT)
Dept: INFUSION THERAPY | Facility: CLINIC | Age: 57
End: 2022-05-19
Attending: INTERNAL MEDICINE
Payer: COMMERCIAL

## 2022-05-19 ENCOUNTER — ONCOLOGY VISIT (OUTPATIENT)
Dept: ONCOLOGY | Facility: CLINIC | Age: 57
End: 2022-05-19
Attending: INTERNAL MEDICINE
Payer: COMMERCIAL

## 2022-05-19 VITALS
RESPIRATION RATE: 16 BRPM | HEART RATE: 70 BPM | TEMPERATURE: 98.6 F | DIASTOLIC BLOOD PRESSURE: 63 MMHG | SYSTOLIC BLOOD PRESSURE: 99 MMHG | OXYGEN SATURATION: 96 %

## 2022-05-19 DIAGNOSIS — G95.29 SPINAL CORD COMPRESSION DUE TO MALIGNANT NEOPLASM METASTATIC TO SPINE (H): Primary | ICD-10-CM

## 2022-05-19 DIAGNOSIS — G95.29 SPINAL CORD COMPRESSION DUE TO MALIGNANT NEOPLASM METASTATIC TO SPINE (H): ICD-10-CM

## 2022-05-19 DIAGNOSIS — C79.51 SPINAL CORD COMPRESSION DUE TO MALIGNANT NEOPLASM METASTATIC TO SPINE (H): Primary | ICD-10-CM

## 2022-05-19 DIAGNOSIS — C79.51 SPINAL CORD COMPRESSION DUE TO MALIGNANT NEOPLASM METASTATIC TO SPINE (H): ICD-10-CM

## 2022-05-19 DIAGNOSIS — C50.919 METASTATIC BREAST CANCER: ICD-10-CM

## 2022-05-19 DIAGNOSIS — C50.919 METASTATIC BREAST CANCER: Primary | ICD-10-CM

## 2022-05-19 LAB
ALBUMIN SERPL-MCNC: 2.9 G/DL (ref 3.4–5)
BASOPHILS # BLD AUTO: 0 10E3/UL (ref 0–0.2)
BASOPHILS NFR BLD AUTO: 1 %
CALCIUM SERPL-MCNC: 8.3 MG/DL (ref 8.5–10.1)
CREAT SERPL-MCNC: 0.5 MG/DL (ref 0.52–1.04)
EOSINOPHIL # BLD AUTO: 0.1 10E3/UL (ref 0–0.7)
EOSINOPHIL NFR BLD AUTO: 2 %
ERYTHROCYTE [DISTWIDTH] IN BLOOD BY AUTOMATED COUNT: 20.1 % (ref 10–15)
GFR SERPL CREATININE-BSD FRML MDRD: >90 ML/MIN/1.73M2
HCT VFR BLD AUTO: 28.2 % (ref 35–47)
HGB BLD-MCNC: 8.9 G/DL (ref 11.7–15.7)
IMM GRANULOCYTES # BLD: 0.1 10E3/UL
IMM GRANULOCYTES NFR BLD: 1 %
LYMPHOCYTES # BLD AUTO: 0.2 10E3/UL (ref 0.8–5.3)
LYMPHOCYTES NFR BLD AUTO: 6 %
MCH RBC QN AUTO: 32.1 PG (ref 26.5–33)
MCHC RBC AUTO-ENTMCNC: 31.6 G/DL (ref 31.5–36.5)
MCV RBC AUTO: 102 FL (ref 78–100)
MONOCYTES # BLD AUTO: 0.2 10E3/UL (ref 0–1.3)
MONOCYTES NFR BLD AUTO: 6 %
NEUTROPHILS # BLD AUTO: 3.5 10E3/UL (ref 1.6–8.3)
NEUTROPHILS NFR BLD AUTO: 84 %
NRBC # BLD AUTO: 0 10E3/UL
NRBC BLD AUTO-RTO: 1 /100
PLATELET # BLD AUTO: 261 10E3/UL (ref 150–450)
RBC # BLD AUTO: 2.77 10E6/UL (ref 3.8–5.2)
WBC # BLD AUTO: 4.2 10E3/UL (ref 4–11)

## 2022-05-19 PROCEDURE — 82565 ASSAY OF CREATININE: CPT | Performed by: INTERNAL MEDICINE

## 2022-05-19 PROCEDURE — 96365 THER/PROPH/DIAG IV INF INIT: CPT

## 2022-05-19 PROCEDURE — 36415 COLL VENOUS BLD VENIPUNCTURE: CPT | Performed by: INTERNAL MEDICINE

## 2022-05-19 PROCEDURE — 82040 ASSAY OF SERUM ALBUMIN: CPT | Performed by: INTERNAL MEDICINE

## 2022-05-19 PROCEDURE — 258N000003 HC RX IP 258 OP 636: Performed by: INTERNAL MEDICINE

## 2022-05-19 PROCEDURE — 85025 COMPLETE CBC W/AUTO DIFF WBC: CPT | Performed by: NURSE PRACTITIONER

## 2022-05-19 PROCEDURE — 250N000011 HC RX IP 250 OP 636: Performed by: INTERNAL MEDICINE

## 2022-05-19 PROCEDURE — 99215 OFFICE O/P EST HI 40 MIN: CPT | Performed by: INTERNAL MEDICINE

## 2022-05-19 PROCEDURE — 82310 ASSAY OF CALCIUM: CPT | Performed by: INTERNAL MEDICINE

## 2022-05-19 RX ORDER — HEPARIN SODIUM (PORCINE) LOCK FLUSH IV SOLN 100 UNIT/ML 100 UNIT/ML
5 SOLUTION INTRAVENOUS
Status: CANCELLED | OUTPATIENT
Start: 2022-06-27

## 2022-05-19 RX ORDER — LETROZOLE 2.5 MG/1
2.5 TABLET, FILM COATED ORAL EVERY MORNING
Qty: 28 TABLET | Refills: 11 | Status: SHIPPED | OUTPATIENT
Start: 2022-05-19 | End: 2022-07-15

## 2022-05-19 RX ORDER — ZOLEDRONIC ACID 0.04 MG/ML
4 INJECTION, SOLUTION INTRAVENOUS ONCE
Status: CANCELLED
Start: 2022-06-27 | End: 2022-06-16

## 2022-05-19 RX ORDER — LETROZOLE 2.5 MG/1
2.5 TABLET, FILM COATED ORAL EVERY MORNING
Qty: 28 TABLET | Refills: 0 | Status: SHIPPED | OUTPATIENT
Start: 2022-05-19 | End: 2022-07-15

## 2022-05-19 RX ORDER — ZOLEDRONIC ACID 0.04 MG/ML
4 INJECTION, SOLUTION INTRAVENOUS ONCE
Status: COMPLETED | OUTPATIENT
Start: 2022-05-19 | End: 2022-05-19

## 2022-05-19 RX ORDER — AMOXICILLIN 250 MG
1-2 CAPSULE ORAL 2 TIMES DAILY PRN
Qty: 180 TABLET | Refills: 3 | Status: SHIPPED | OUTPATIENT
Start: 2022-05-19 | End: 2022-11-29

## 2022-05-19 RX ORDER — HEPARIN SODIUM,PORCINE 10 UNIT/ML
5 VIAL (ML) INTRAVENOUS
Status: CANCELLED | OUTPATIENT
Start: 2022-06-27

## 2022-05-19 RX ADMIN — SODIUM CHLORIDE 250 ML: 9 INJECTION, SOLUTION INTRAVENOUS at 10:03

## 2022-05-19 RX ADMIN — ZOLEDRONIC ACID 4 MG: 0.04 INJECTION, SOLUTION INTRAVENOUS at 10:03

## 2022-05-19 ASSESSMENT — PAIN SCALES - GENERAL: PAINLEVEL: MODERATE PAIN (4)

## 2022-05-19 NOTE — PROGRESS NOTES
ONCOLOGY HISTORY:  Ms. Mcghee is a female with metastatic breast cancer. ER positive, OR positive and HER-2/halina negative.  -Foundation One reveals PIK3CA alteration.  -She had a stage III left breast cancer in 2007. ER positive and HER-2/halina negative.  She had bilateral mastectomy, chemotherapy, radiation, and anastrozole.     1.  On 03/18/2022, multiple imaging studies done because of bilateral lower extremity weakness:  -MRI of thoracic and lumbar spine revealed bone metastases with spinal cord compression.  -CT chest, abdomen, and pelvis on 03/19/2022 revealed bone metastasis, lung nodules and enlarged upper abdominal lymph node.  There is a hypodense nodule in the liver.  -Brain MRI on 03/19/2022 did not reveal any intracranial metastasis.  2.  CT-guided bone biopsy of left iliac bone on 03/21/2022 revealed metastatic breast cancer. ER positive, OR positive and HER-2/halina negative.  3.  Letrozole started on 03/25/2022.  -Ribociclib started on 03/31/2022.  -Zometa started on 03/24/2022.  -Radiation to thoracolumbar and sacral area between 03/22/2022 and 04/07/2022.  3000 cGy in 10 fractions.     SUBJECTIVE:  Ms. Mcghee is a 57-year-old female with metastatic breast cancer. ER positive and HER-2/halina negative.  She is on letrozole and ribociclib.    Her pain is better controlled.  She uses oxycodone infrequently.  She does get more bone pain when she gets Zometa and has to take oxycodone on that day.    Patient has bilateral leg weakness because of spinal cord compression.  Leg weakness has not improved.  She is in wheelchair.  Patient said that at times she can move her toes little more.  She feels that her legs are heavy.  Patient gets some aches and pain in different joints.  This started after seeing started letrozole.  I told her it is because of letrozole.    She has some fatigue.  No worsening.  No headache.  No dizziness.  No chest pain.  No shortness of breath.  No abdominal pain.  No nausea or vomiting.   Appetite has been good.  No urinary complaints.  She has is constipation.  No bleeding.  No recurrent infection.  All other review of system is negative.     PHYSICAL EXAMINATION:  GENERAL:  She is alert, oriented x 3. Not in any distress.   Rest of systems not examined.     ASSESSMENT:  1.  A 57-year-old female with metastatic breast cancer.  2.  Pain from cancer and also from letrozole.  3.  Spinal cord compression causing complete bilateral lower extremity paralysis.  4.  Normocytic anemia from metastatic breast cancer and radiation.     PLAN:  1.  Discussed regarding breast cancer.  Patient is on letrozole and ribociclib.  She is tolerating it well.  She will continue on it.    From letrozole, she gets mild aches and pain.  She will continue pain medication as needed.    Because of ribociclib, she had neutropenia.  Dose was reduced from 600 to 400 mg a day.  Our pharmacy will monitor CBC.  Dose of ribociclib may be increased if CBC is better.    Explained to the patient that in next 3 to 4 months, we will plan on follow-up scans.    Patient has spinal cord compression.  Her leg weakness is unlikely to improve.    She has been on dexamethasone.  That will be stopped now.    2.  Patient will continue on monthly Zometa.  She is tolerating it well. No dental or jaw related symptoms.  She will continue on calcium and vitamin D twice a day.    3. For pain, she will continue on oxycodone and Tylenol.  Side effect of oxycodone reviewed.  Patient uses infrequently.  She will continue on stool softener every day.    4.  Patient is anemic.  Anemia is from metastatic breast cancer and ribociclib.  Some of the worsening of anemia is because of recent radiation.  We will monitor CBC.  Further work-up will be done if it continues to get worse.    5.  She had few questions which were all answered.  I will see her in 2 months time.  In between she will see our nurse practitioner.  Advised her to call us with any questions or  concerns.     TOTAL VISIT TIME: 45 minutes. Time spent in today's visit, review of chart/investigations today, monitoring for toxicity of high risk drugs and documentation.

## 2022-05-19 NOTE — PROGRESS NOTES
Medical Assistant Note:  Mickie Mcghee presents today for lab draw.    Patient seen by provider today: Yes: Dr Lozada   present during visit today: Not Applicable.    Concerns: No Concerns.    Procedure:  Lab draw site: RAC, Needle type: BF, Gauge: 21. Gauze and coban applied    Post Assessment:  Labs drawn without difficulty: Yes.    Discharge Plan:  Departure Mode: Ambulatory.    Face to Face Time: 5.    Arielle Fry CMA

## 2022-05-19 NOTE — LETTER
"    5/19/2022         RE: Mickie Mcghee  [id number] [address]  [address]  [place] [address]        Dear Colleague,    Thank you for referring your patient, Mickie Mcghee, to the [facility]. Please see a copy of my visit note below.    Oncology Rooming Note    May 19, 2022 9:05 AM   Mickie Mcghee is a 57 year old female who presents for:    Chief Complaint   Patient presents with     Oncology Clinic Visit     Initial Vitals: BP 99/63   Pulse 70   Temp 98.6  F (37  C) (Oral)   Resp 16   SpO2 96%  Estimated body mass index is 21.63 kg/m  as calculated from the following:    Height as of 4/4/22: 1.651 m (5' 5\").    Weight as of 4/4/22: 59 kg (130 lb). There is no height or weight on file to calculate BSA.  Moderate Pain (4) Comment: Data Unavailable   No LMP recorded.  Allergies reviewed: Yes  Medications reviewed: Yes    Medications: MEDICATION REFILLS NEEDED TODAY. Provider was notified.  Pharmacy name entered into [employer]:    [id number] - [place], [place] - [address] [facility] - [place], [place] - [address]    Clinical concerns:  doctor was notified.    Refills on calcium , decadron, kisqali, senna. She would like them all tubed from hospital pharmacy    Dorothy Henao, [facility]              ONCOLOGY HISTORY:  Ms. Mcghee is a female with metastatic breast cancer. ER positive, NV positive and HER-2/halina negative.  -Foundation One reveals PIK3CA alteration.  -She had a stage III left breast cancer in 2007. ER positive and HER-2/halina negative.  She had bilateral mastectomy, chemotherapy, radiation, and anastrozole.     1.  On 03/18/2022, multiple imaging studies done because of bilateral lower extremity weakness:  -MRI of thoracic and lumbar spine revealed bone metastases with spinal cord compression.  -CT chest, abdomen, and pelvis on 03/19/2022 revealed bone metastasis, lung nodules and enlarged upper abdominal lymph node. "  There is a hypodense nodule in the liver.  -Brain MRI on 03/19/2022 did not reveal any intracranial metastasis.  2.  CT-guided bone biopsy of left iliac bone on 03/21/2022 revealed metastatic breast cancer. ER positive, OH positive and HER-2/halina negative.  3.  Letrozole started on 03/25/2022.  -Ribociclib started on 03/31/2022.  -Zometa started on 03/24/2022.  -Radiation to thoracolumbar and sacral area between 03/22/2022 and 04/07/2022.  3000 cGy in 10 fractions.     SUBJECTIVE:  Ms. Mcghee is a 57-year-old female with metastatic breast cancer. ER positive and HER-2/halina negative.  She is on letrozole and ribociclib.    Her pain is better controlled.  She uses oxycodone infrequently.  She does get more bone pain when she gets Zometa and has to take oxycodone on that day.    Patient has bilateral leg weakness because of spinal cord compression.  Leg weakness has not improved.  She is in wheelchair.  Patient said that at times she can move her toes little more.  She feels that her legs are heavy.  Patient gets some aches and pain in different joints.  This started after seeing started letrozole.  I told her it is because of letrozole.    She has some fatigue.  No worsening.  No headache.  No dizziness.  No chest pain.  No shortness of breath.  No abdominal pain.  No nausea or vomiting.  Appetite has been good.  No urinary complaints.  She has is constipation.  No bleeding.  No recurrent infection.  All other review of system is negative.     PHYSICAL EXAMINATION:  GENERAL:  She is alert, oriented x 3. Not in any distress.   Rest of systems not examined.     ASSESSMENT:  1.  A 57-year-old female with metastatic breast cancer.  2.  Pain from cancer and also from letrozole.  3.  Spinal cord compression causing complete bilateral lower extremity paralysis.  4.  Normocytic anemia from metastatic breast cancer and radiation.     PLAN:  1.  Discussed regarding breast cancer.  Patient is on letrozole and ribociclib.  She is  tolerating it well.  She will continue on it.    From letrozole, she gets mild aches and pain.  She will continue pain medication as needed.    Because of ribociclib, she had neutropenia.  Dose was reduced from 600 to 400 mg a day.  Our pharmacy will monitor CBC.  Dose of ribociclib may be increased if CBC is better.    Explained to the patient that in next 3 to 4 months, we will plan on follow-up scans.    Patient has spinal cord compression.  Her leg weakness is unlikely to improve.    She has been on dexamethasone.  That will be stopped now.    2.  Patient will continue on monthly Zometa.  She is tolerating it well. No dental or jaw related symptoms.  She will continue on calcium and vitamin D twice a day.    3. For pain, she will continue on oxycodone and Tylenol.  Side effect of oxycodone reviewed.  Patient uses infrequently.  She will continue on stool softener every day.    4.  Patient is anemic.  Anemia is from metastatic breast cancer and ribociclib.  Some of the worsening of anemia is because of recent radiation.  We will monitor CBC.  Further work-up will be done if it continues to get worse.    5.  She had few questions which were all answered.  I will see her in 2 months time.  In between she will see our nurse practitioner.  Advised her to call us with any questions or concerns.     TOTAL VISIT TIME: 45 minutes. Time spent in today's visit, review of chart/investigations today, monitoring for toxicity of high risk drugs and documentation.      Again, thank you for allowing me to participate in the care of your patient.        Sincerely,        Yury Lozada MD

## 2022-05-19 NOTE — PATIENT INSTRUCTIONS
1.  Continue letrozole and ribociclib.  2.  Continue monthly Zometa.  3.  Continue calcium and vitamin D twice a day.  4.  Labs as per treatment protocol.  5.  Take oxycodone as needed for pain.  Take a stool softener with it.  6.  See nurse practitioner when she comes for next Zometa.  7.  See me in 8 weeks.    Please call to schedule

## 2022-05-19 NOTE — ORAL ONC MGMT
Oral Chemotherapy Monitoring Program  Lab Follow Up     Patient currently on Ribociclib therapy.   Reviewed lab results from today.     Lab Results   Component Value Date    WBC 4.2 05/19/2022     Lab Results   Component Value Date    RBC 2.77 05/19/2022     Lab Results   Component Value Date    HGB 8.9 05/19/2022     Lab Results   Component Value Date    HCT 28.2 05/19/2022     Lab Results   Component Value Date     05/19/2022     Lab Results   Component Value Date    MCH 32.1 05/19/2022     Lab Results   Component Value Date    MCHC 31.6 05/19/2022     Lab Results   Component Value Date    RDW 20.1 05/19/2022     Lab Results   Component Value Date     05/19/2022       Last Comprehensive Metabolic Panel:  Sodium   Date Value Ref Range Status   05/02/2022 137 133 - 144 mmol/L Final     Potassium   Date Value Ref Range Status   05/02/2022 4.6 3.4 - 5.3 mmol/L Final     Chloride   Date Value Ref Range Status   05/02/2022 106 94 - 109 mmol/L Final     Carbon Dioxide (CO2)   Date Value Ref Range Status   05/02/2022 24 20 - 32 mmol/L Final     Anion Gap   Date Value Ref Range Status   05/02/2022 7 3 - 14 mmol/L Final     Glucose   Date Value Ref Range Status   05/02/2022 115 (H) 70 - 99 mg/dL Final     Urea Nitrogen   Date Value Ref Range Status   05/02/2022 11 7 - 30 mg/dL Final     Creatinine   Date Value Ref Range Status   05/19/2022 0.50 (L) 0.52 - 1.04 mg/dL Final     GFR Estimate   Date Value Ref Range Status   05/19/2022 >90 >60 mL/min/1.73m2 Final     Comment:     Effective December 21, 2021 eGFRcr in adults is calculated using the 2021 CKD-EPI creatinine equation which includes age and gender (Beth et al., NEJM, DOI: 10.1056/YWBJlc5282055)     Calcium   Date Value Ref Range Status   05/19/2022 8.3 (L) 8.5 - 10.1 mg/dL Final     Bilirubin Total   Date Value Ref Range Status   05/02/2022 0.2 0.2 - 1.3 mg/dL Final     Alkaline Phosphatase   Date Value Ref Range Status   05/02/2022 80 40 - 150 U/L  Final     ALT   Date Value Ref Range Status   05/02/2022 32 0 - 50 U/L Final     AST   Date Value Ref Range Status   05/02/2022 16 0 - 45 U/L Final     Assessment & Plan:  CBC reviewed, no new concerns (labs drawn during her active days-has about 5 days left of Ribociclib 400 mg daily then 7 days off). Per Dr. Lozada, wanting to dose escalate back to Ribociclib 600 mg x 21 days on then 7 days off. Labs need to be drawn every 4 weeks during her 7 days off of therapy, will try to align her Zoledronic Acid infusions with her every 4 week lab draw.       Follow-Up:  5/31 potential labs prior to next cycle of Ribociclib (may cancel/skip pending Dr. Lozada's decision).   6/1 start of Ribociclib 600 mg daily x 21 days then 7 days off.   6/27 Labs and Zoledronic acid infusion  6/29 start of next Ribociclib cycle pending lab results    Papito TompkinsD  [facility] Oral Chemotherapy  [phone]

## 2022-05-19 NOTE — PROGRESS NOTES
Infusion Nursing Note:  Castrotaran Litzy presents today for zometa.    Patient seen by provider today: Yes: Dr. Lzoada   present during visit today: Not Applicable.    Note: N/A.      Intravenous Access:  Peripheral IV placed.    Treatment Conditions:  Lab Results   Component Value Date     05/02/2022    POTASSIUM 4.6 05/02/2022    MAG 2.3 05/02/2022    CR 0.50 (L) 05/19/2022    OSMEL 8.3 (L) 05/19/2022    BILITOTAL 0.2 05/02/2022    ALBUMIN 2.9 (L) 05/19/2022    ALT 32 05/02/2022    AST 16 05/02/2022     Results reviewed, labs MET treatment parameters, ok to proceed with treatment.      Post Infusion Assessment:  Patient tolerated infusion without incident.  Blood return noted pre and post infusion.  Site patent and intact, free from redness, edema or discomfort.  No evidence of extravasations.  Access discontinued per protocol.       Discharge Plan:   Prescription refills given for senokot-s/calcium/letrozole.  Discharge instructions reviewed with: Patient.  Patient and/or family verbalized understanding of discharge instructions and all questions answered.  AVS to patient via [employer].  Patient will return 6/7/22 for next appointment.   Patient discharged in stable condition accompanied by: self.  Departure Mode: Wheelchair.      Jossy Leary RN

## 2022-05-19 NOTE — PROGRESS NOTES
"Oncology Rooming Note    May 19, 2022 9:05 AM   Mickie Mcghee is a 57 year old female who presents for:    Chief Complaint   Patient presents with     Oncology Clinic Visit     Initial Vitals: BP 99/63   Pulse 70   Temp 98.6  F (37  C) (Oral)   Resp 16   SpO2 96%  Estimated body mass index is 21.63 kg/m  as calculated from the following:    Height as of 4/4/22: 1.651 m (5' 5\").    Weight as of 4/4/22: 59 kg (130 lb). There is no height or weight on file to calculate BSA.  Moderate Pain (4) Comment: Data Unavailable   No LMP recorded.  Allergies reviewed: Yes  Medications reviewed: Yes    Medications: MEDICATION REFILLS NEEDED TODAY. Provider was notified.  Pharmacy name entered into [employer]:    [id number] - [place], [place] - [address] [facility] - [place], [place] - [address]    Clinical concerns:  doctor was notified.    Refills on calcium , decadron, kisqali, senna. She would like them all tubed from hospital pharmacy    Dorothy Henao, SHAMIKA            "

## 2022-05-20 ENCOUNTER — PATIENT OUTREACH (OUTPATIENT)
Dept: ONCOLOGY | Facility: CLINIC | Age: 57
End: 2022-05-20
Payer: COMMERCIAL

## 2022-05-20 NOTE — TELEPHONE ENCOUNTER
Writer called [facility] at [phone] and LVM with patient's  Maddy requesting a return call about lab draw.    Fax: [phone]    Jolynn Rajput RN

## 2022-05-27 ENCOUNTER — LAB (OUTPATIENT)
Dept: INFUSION THERAPY | Facility: CLINIC | Age: 57
End: 2022-05-27
Attending: INTERNAL MEDICINE
Payer: COMMERCIAL

## 2022-05-27 ENCOUNTER — DOCUMENTATION ONLY (OUTPATIENT)
Dept: ONCOLOGY | Facility: CLINIC | Age: 57
End: 2022-05-27

## 2022-05-27 DIAGNOSIS — C50.919 METASTATIC BREAST CANCER: ICD-10-CM

## 2022-05-27 LAB
ALBUMIN SERPL-MCNC: 2.8 G/DL (ref 3.4–5)
ALP SERPL-CCNC: 71 U/L (ref 40–150)
ALT SERPL W P-5'-P-CCNC: 25 U/L (ref 0–50)
ANION GAP SERPL CALCULATED.3IONS-SCNC: 3 MMOL/L (ref 3–14)
AST SERPL W P-5'-P-CCNC: 13 U/L (ref 0–45)
BASOPHILS # BLD MANUAL: 0 10E3/UL (ref 0–0.2)
BASOPHILS NFR BLD MANUAL: 2 %
BILIRUB DIRECT SERPL-MCNC: <0.1 MG/DL (ref 0–0.2)
BILIRUB SERPL-MCNC: 0.4 MG/DL (ref 0.2–1.3)
BUN SERPL-MCNC: 11 MG/DL (ref 7–30)
CALCIUM SERPL-MCNC: 8.8 MG/DL (ref 8.5–10.1)
CHLORIDE BLD-SCNC: 107 MMOL/L (ref 94–109)
CO2 SERPL-SCNC: 27 MMOL/L (ref 20–32)
CREAT SERPL-MCNC: 0.46 MG/DL (ref 0.52–1.04)
DACRYOCYTES BLD QL SMEAR: SLIGHT
EOSINOPHIL # BLD MANUAL: 0 10E3/UL (ref 0–0.7)
EOSINOPHIL NFR BLD MANUAL: 2 %
ERYTHROCYTE [DISTWIDTH] IN BLOOD BY AUTOMATED COUNT: 19.9 % (ref 10–15)
GFR SERPL CREATININE-BSD FRML MDRD: >90 ML/MIN/1.73M2
GLUCOSE BLD-MCNC: 108 MG/DL (ref 70–99)
HCT VFR BLD AUTO: 25.8 % (ref 35–47)
HGB BLD-MCNC: 8.3 G/DL (ref 11.7–15.7)
LYMPHOCYTES # BLD MANUAL: 0.1 10E3/UL (ref 0.8–5.3)
LYMPHOCYTES NFR BLD MANUAL: 6 %
MAGNESIUM SERPL-MCNC: 2.4 MG/DL (ref 1.6–2.3)
MCH RBC QN AUTO: 32.9 PG (ref 26.5–33)
MCHC RBC AUTO-ENTMCNC: 32.2 G/DL (ref 31.5–36.5)
MCV RBC AUTO: 102 FL (ref 78–100)
MONOCYTES # BLD MANUAL: 0.1 10E3/UL (ref 0–1.3)
MONOCYTES NFR BLD MANUAL: 4 %
NEUTROPHILS # BLD MANUAL: 1.3 10E3/UL (ref 1.6–8.3)
NEUTROPHILS NFR BLD MANUAL: 86 %
PHOSPHATE SERPL-MCNC: 3.2 MG/DL (ref 2.5–4.5)
PLAT MORPH BLD: ABNORMAL
PLATELET # BLD AUTO: 225 10E3/UL (ref 150–450)
POTASSIUM BLD-SCNC: 3.9 MMOL/L (ref 3.4–5.3)
PROT SERPL-MCNC: 6.4 G/DL (ref 6.8–8.8)
RBC # BLD AUTO: 2.52 10E6/UL (ref 3.8–5.2)
RBC MORPH BLD: ABNORMAL
SODIUM SERPL-SCNC: 137 MMOL/L (ref 133–144)
WBC # BLD AUTO: 1.5 10E3/UL (ref 4–11)

## 2022-05-27 PROCEDURE — 36415 COLL VENOUS BLD VENIPUNCTURE: CPT

## 2022-05-27 PROCEDURE — 82248 BILIRUBIN DIRECT: CPT | Performed by: NURSE PRACTITIONER

## 2022-05-27 PROCEDURE — 85027 COMPLETE CBC AUTOMATED: CPT | Performed by: NURSE PRACTITIONER

## 2022-05-27 PROCEDURE — 82310 ASSAY OF CALCIUM: CPT | Performed by: NURSE PRACTITIONER

## 2022-05-27 PROCEDURE — 84100 ASSAY OF PHOSPHORUS: CPT | Performed by: NURSE PRACTITIONER

## 2022-05-27 PROCEDURE — 85007 BL SMEAR W/DIFF WBC COUNT: CPT | Performed by: NURSE PRACTITIONER

## 2022-05-27 PROCEDURE — 83735 ASSAY OF MAGNESIUM: CPT | Performed by: NURSE PRACTITIONER

## 2022-05-27 NOTE — PROGRESS NOTES
Medical Assistant Note:  iMckie Mcghee presents today for blood draw.    Patient seen by provider today: No.   present during visit today: Not Applicable.    Concerns: No Concerns.    Procedure:  Lab draw site: rac, Needle type: bf, Gauge: 23.    Post Assessment:  Labs drawn without difficulty: Yes.    Discharge Plan:  Departure Mode: Ambulatory.    Face to Face Time: 4min.    Dorothy Henao, CMA

## 2022-05-28 ENCOUNTER — HEALTH MAINTENANCE LETTER (OUTPATIENT)
Age: 57
End: 2022-05-28

## 2022-06-13 ENCOUNTER — MEDICAL CORRESPONDENCE (OUTPATIENT)
Dept: HEALTH INFORMATION MANAGEMENT | Facility: CLINIC | Age: 57
End: 2022-06-13
Payer: COMMERCIAL

## 2022-06-15 DIAGNOSIS — C50.919 METASTATIC BREAST CANCER: ICD-10-CM

## 2022-06-15 RX ORDER — ACETAMINOPHEN 325 MG/1
650 TABLET ORAL EVERY 6 HOURS PRN
Qty: 30 TABLET | Refills: 0 | Status: CANCELLED | OUTPATIENT
Start: 2022-06-15

## 2022-06-21 DIAGNOSIS — C50.919 METASTATIC BREAST CANCER: Primary | ICD-10-CM

## 2022-06-21 RX ORDER — LETROZOLE 2.5 MG/1
2.5 TABLET, FILM COATED ORAL EVERY MORNING
Qty: 28 TABLET | Refills: 0 | Status: SHIPPED | OUTPATIENT
Start: 2022-06-22 | End: 2022-07-15

## 2022-06-27 ENCOUNTER — ONCOLOGY VISIT (OUTPATIENT)
Dept: ONCOLOGY | Facility: CLINIC | Age: 57
End: 2022-06-27
Attending: INTERNAL MEDICINE
Payer: COMMERCIAL

## 2022-06-27 ENCOUNTER — INFUSION THERAPY VISIT (OUTPATIENT)
Dept: INFUSION THERAPY | Facility: CLINIC | Age: 57
End: 2022-06-27
Attending: INTERNAL MEDICINE
Payer: COMMERCIAL

## 2022-06-27 VITALS
HEART RATE: 81 BPM | RESPIRATION RATE: 16 BRPM | SYSTOLIC BLOOD PRESSURE: 113 MMHG | TEMPERATURE: 98.3 F | DIASTOLIC BLOOD PRESSURE: 77 MMHG | OXYGEN SATURATION: 98 %

## 2022-06-27 DIAGNOSIS — C79.51 SPINAL CORD COMPRESSION DUE TO MALIGNANT NEOPLASM METASTATIC TO SPINE (H): ICD-10-CM

## 2022-06-27 DIAGNOSIS — C50.919 METASTATIC BREAST CANCER: ICD-10-CM

## 2022-06-27 DIAGNOSIS — C50.919 METASTATIC BREAST CANCER: Primary | ICD-10-CM

## 2022-06-27 DIAGNOSIS — G95.29 SPINAL CORD COMPRESSION DUE TO MALIGNANT NEOPLASM METASTATIC TO SPINE (H): ICD-10-CM

## 2022-06-27 LAB
ALBUMIN SERPL-MCNC: 3.1 G/DL (ref 3.4–5)
ALP SERPL-CCNC: 60 U/L (ref 40–150)
ALT SERPL W P-5'-P-CCNC: 14 U/L (ref 0–50)
ANION GAP SERPL CALCULATED.3IONS-SCNC: 5 MMOL/L (ref 3–14)
AST SERPL W P-5'-P-CCNC: 12 U/L (ref 0–45)
BASOPHILS # BLD AUTO: 0 10E3/UL (ref 0–0.2)
BASOPHILS NFR BLD AUTO: 0 %
BILIRUB SERPL-MCNC: 0.2 MG/DL (ref 0.2–1.3)
BUN SERPL-MCNC: 12 MG/DL (ref 7–30)
CALCIUM SERPL-MCNC: 9 MG/DL (ref 8.5–10.1)
CHLORIDE BLD-SCNC: 109 MMOL/L (ref 94–109)
CO2 SERPL-SCNC: 27 MMOL/L (ref 20–32)
CREAT SERPL-MCNC: 0.52 MG/DL (ref 0.52–1.04)
EOSINOPHIL # BLD AUTO: 0 10E3/UL (ref 0–0.7)
EOSINOPHIL NFR BLD AUTO: 1 %
ERYTHROCYTE [DISTWIDTH] IN BLOOD BY AUTOMATED COUNT: 16.6 % (ref 10–15)
GFR SERPL CREATININE-BSD FRML MDRD: >90 ML/MIN/1.73M2
GLUCOSE BLD-MCNC: 100 MG/DL (ref 70–99)
HCT VFR BLD AUTO: 29.3 % (ref 35–47)
HGB BLD-MCNC: 9.4 G/DL (ref 11.7–15.7)
IMM GRANULOCYTES # BLD: 0 10E3/UL
IMM GRANULOCYTES NFR BLD: 1 %
LYMPHOCYTES # BLD AUTO: 0.3 10E3/UL (ref 0.8–5.3)
LYMPHOCYTES NFR BLD AUTO: 22 %
MAGNESIUM SERPL-MCNC: 2.5 MG/DL (ref 1.6–2.3)
MCH RBC QN AUTO: 34.4 PG (ref 26.5–33)
MCHC RBC AUTO-ENTMCNC: 32.1 G/DL (ref 31.5–36.5)
MCV RBC AUTO: 107 FL (ref 78–100)
MONOCYTES # BLD AUTO: 0.2 10E3/UL (ref 0–1.3)
MONOCYTES NFR BLD AUTO: 16 %
NEUTROPHILS # BLD AUTO: 0.9 10E3/UL (ref 1.6–8.3)
NEUTROPHILS NFR BLD AUTO: 60 %
NRBC # BLD AUTO: 0 10E3/UL
NRBC BLD AUTO-RTO: 0 /100
PHOSPHATE SERPL-MCNC: 3 MG/DL (ref 2.5–4.5)
PLATELET # BLD AUTO: 191 10E3/UL (ref 150–450)
POTASSIUM BLD-SCNC: 4.4 MMOL/L (ref 3.4–5.3)
PROT SERPL-MCNC: 6.4 G/DL (ref 6.8–8.8)
RBC # BLD AUTO: 2.73 10E6/UL (ref 3.8–5.2)
SODIUM SERPL-SCNC: 141 MMOL/L (ref 133–144)
WBC # BLD AUTO: 1.4 10E3/UL (ref 4–11)

## 2022-06-27 PROCEDURE — 96374 THER/PROPH/DIAG INJ IV PUSH: CPT

## 2022-06-27 PROCEDURE — 85025 COMPLETE CBC W/AUTO DIFF WBC: CPT | Performed by: INTERNAL MEDICINE

## 2022-06-27 PROCEDURE — 99214 OFFICE O/P EST MOD 30 MIN: CPT | Performed by: NURSE PRACTITIONER

## 2022-06-27 PROCEDURE — 83735 ASSAY OF MAGNESIUM: CPT | Performed by: INTERNAL MEDICINE

## 2022-06-27 PROCEDURE — 82310 ASSAY OF CALCIUM: CPT | Performed by: INTERNAL MEDICINE

## 2022-06-27 PROCEDURE — G0463 HOSPITAL OUTPT CLINIC VISIT: HCPCS | Mod: 25

## 2022-06-27 PROCEDURE — 250N000011 HC RX IP 250 OP 636: Performed by: INTERNAL MEDICINE

## 2022-06-27 PROCEDURE — 84100 ASSAY OF PHOSPHORUS: CPT | Performed by: INTERNAL MEDICINE

## 2022-06-27 PROCEDURE — 36415 COLL VENOUS BLD VENIPUNCTURE: CPT

## 2022-06-27 RX ORDER — ZOLEDRONIC ACID 0.04 MG/ML
4 INJECTION, SOLUTION INTRAVENOUS ONCE
Status: COMPLETED | OUTPATIENT
Start: 2022-06-27 | End: 2022-06-27

## 2022-06-27 RX ADMIN — ZOLEDRONIC ACID 4 MG: 0.04 INJECTION, SOLUTION INTRAVENOUS at 11:13

## 2022-06-27 ASSESSMENT — PAIN SCALES - GENERAL: PAINLEVEL: NO PAIN (0)

## 2022-06-27 NOTE — PROGRESS NOTES
Medical Assistant Note:  Mickie Mcghee presents today for blood draw.    Patient seen by provider today: Yes: junito.   present during visit today: Not Applicable.    Concerns: No Concerns.    Procedure:  Lab draw site: rac, Needle type: bf, Gauge: 23.    Post Assessment:  Labs drawn without difficulty: Yes.    Discharge Plan:  Departure Mode: Ambulatory.    Face to Face Time: 5 min.    Dorothy Henao, CMA

## 2022-06-27 NOTE — ORAL ONC MGMT
Oral Chemotherapy Monitoring Program  Lab Follow Up     Patient currently on Ribociclib therapy.  Reviewed lab results from today.     Lab Results   Component Value Date    WBC 1.4 06/27/2022     Lab Results   Component Value Date    RBC 2.73 06/27/2022     Lab Results   Component Value Date    HGB 9.4 06/27/2022     Lab Results   Component Value Date    HCT 29.3 06/27/2022     Lab Results   Component Value Date     06/27/2022     Lab Results   Component Value Date    MCH 34.4 06/27/2022     Lab Results   Component Value Date    MCHC 32.1 06/27/2022     Lab Results   Component Value Date    RDW 16.6 06/27/2022     Lab Results   Component Value Date     06/27/2022       Last Comprehensive Metabolic Panel:  Sodium   Date Value Ref Range Status   06/27/2022 141 133 - 144 mmol/L Final     Potassium   Date Value Ref Range Status   06/27/2022 4.4 3.4 - 5.3 mmol/L Final     Chloride   Date Value Ref Range Status   06/27/2022 109 94 - 109 mmol/L Final     Carbon Dioxide (CO2)   Date Value Ref Range Status   06/27/2022 27 20 - 32 mmol/L Final     Anion Gap   Date Value Ref Range Status   06/27/2022 5 3 - 14 mmol/L Final     Glucose   Date Value Ref Range Status   06/27/2022 100 (H) 70 - 99 mg/dL Final     Urea Nitrogen   Date Value Ref Range Status   06/27/2022 12 7 - 30 mg/dL Final     Creatinine   Date Value Ref Range Status   06/27/2022 0.52 0.52 - 1.04 mg/dL Final     GFR Estimate   Date Value Ref Range Status   06/27/2022 >90 >60 mL/min/1.73m2 Final     Comment:     Effective December 21, 2021 eGFRcr in adults is calculated using the 2021 CKD-EPI creatinine equation which includes age and gender (Beth et al., NEJM, DOI: 10.1056/ZKXEfr3028298)     Calcium   Date Value Ref Range Status   06/27/2022 9.0 8.5 - 10.1 mg/dL Final     Bilirubin Total   Date Value Ref Range Status   06/27/2022 0.2 0.2 - 1.3 mg/dL Final     Alkaline Phosphatase   Date Value Ref Range Status   06/27/2022 60 40 - 150 U/L Final      ALT   Date Value Ref Range Status   06/27/2022 14 0 - 50 U/L Final     AST   Date Value Ref Range Status   06/27/2022 12 0 - 45 U/L Final     Assessment & Plan:  Provider note and labs (CMP & CBC) reviewed on 6/27/22. Patient is due to start next Ribociclib cycle on 6/29/22. ANC = 0.9 (grade 3). Provider would like to check labs again on 7/1/22 to see if ANC 1.0 or greater. If ANC 1.0 or greater may resume Ribociclib therapy. Patient has been dose reduced from 600 mg to 400 mg but recently increased back up to 600 mg for cycle that started on 6/1 d/t stable counts. May consider dose reduction again if counts continue to stay low. Will check labs again on 7/1.      Follow-Up:  7/1 Labs (CBC)  Labs due again end of July (ideally labs due end of off week) - appt not yet scheduled.    Papito TompkinsD  [facility]fusion [facility] and Oral Chemotherapy  [phone]

## 2022-06-27 NOTE — PROGRESS NOTES
Infusion Nursing Note:  Mickie Mcghee presents today for zometa.    Patient seen by provider today: Yes: Sha Martínez NP   present during visit today: Not Applicable.    Note: Ok to proceed with zometa today per Sha. Confirmed patient is taking calcium and vitamin D supplements per med rec.    Intravenous Access:  Peripheral IV placed.    Treatment Conditions:  Lab Results   Component Value Date     06/27/2022    POTASSIUM 4.4 06/27/2022    MAG 2.5 (H) 06/27/2022    CR 0.52 06/27/2022    OSMEL 9.0 06/27/2022    BILITOTAL 0.2 06/27/2022    ALBUMIN 3.1 (L) 06/27/2022    ALT 14 06/27/2022    AST 12 06/27/2022     Corrected calcium: 9.72  Results reviewed, labs MET treatment parameters, ok to proceed with treatment.    Post Infusion Assessment:  Patient tolerated infusion without incident.  Blood return noted pre and post infusion.  Site patent and intact, free from redness, edema or discomfort.  No evidence of extravasations.  Access discontinued per protocol.     Discharge Plan:   Discharge instructions reviewed with: Patient.  Patient and/or family verbalized understanding of discharge instructions and all questions answered.  AVS to patient via [employer].  Patient will return 7/1/22 for next appointment for recheck of CBC.  Patient discharged in stable condition accompanied by: self.  Departure Mode: Wheelchair.      Vani Singletary RN

## 2022-06-27 NOTE — LETTER
"    6/27/2022         RE: Mickie Mcghee  [address] [address]  [address]  [facility] [address]        Dear Colleague,    Thank you for referring your patient, Mickie Mcghee, to the [facility]. Please see a copy of my visit note below.    Oncology Rooming Note    June 27, 2022 10:13 AM   Mickie Mcghee is a 57 year old female who presents for:    Chief Complaint   Patient presents with     Oncology Clinic Visit     Initial Vitals: There were no vitals taken for this visit. Estimated body mass index is 21.63 kg/m  as calculated from the following:    Height as of 4/4/22: 1.651 m (5' 5\").    Weight as of 4/4/22: 59 kg (130 lb). There is no height or weight on file to calculate BSA.  Data Unavailable Comment: Data Unavailable   No LMP recorded.  Allergies reviewed: Yes  Medications reviewed: Yes    Medications: Medication refills not needed today.  Pharmacy name entered into [employer]:    [id number] - [place], [place] - [address] [facility] - [place], [place] - [address]    Clinical concerns: no      Mildred Clements CMA              Oncology/Hematology Visit Note  Jun 27, 2022    Reason for Visit: follow up of metastatic breast cancer  ER positive NJ positive HER2 negative  Diagnosed in 2007 status post bilateral mastectomy chemotherapy radiation anastrozole  -03/21/[id number] bone biopsy of left iliac bone reveals metastatic breast cancer ER positive NJ positive HER2 negative  03/31/2021-started Ribociclib 600 mg   Letrazole     03/22-04/07/2022-status post radiation to the L-spine and LS spine 10 fractions  05/03-due to neutropenia Ribociclib decreased to 400 mg  -Improvement in counts  06/01- Ribociclib increased back to 600 mg      Interval History:  Patient reports she is feeling well.  Denies fever chills sweats cough shortness of breath chest pain nausea vomiting diarrhea abdominal pain bleeding      Review of " Systems:  14 point ROS of systems including Constitutional, Eyes, Respiratory, Cardiovascular, Gastroenterology, Genitourinary, Integumentary, Muscularskeletal, Psychiatric were all negative except for pertinent positives noted in my HPI.    Physical Examination:  In wheelchair  General: The patient is a pleasant female in no acute distress.  HEENT: EOMI, PERRL. Sclerae are anicteric. Oral mucosa is pink and moist with no lesions or thrush.   Lymph: Neck is supple with no lymphadenopathy in the cervical or supraclavicular areas.   Heart: Regular rate and rhythm.   Lungs: Clear to auscultation bilaterally.   GI: Bowel sounds present, soft, nontender with no palpable hepatosplenomegaly or masses.   Extremities: No lower extremity edema noted bilaterally.   Skin: No rashes, petechiae, or bruising noted on exposed skin.    Laboratory Data:  CBC and CMP results reviewed    Assessment and Plan:  This is a 57-year-old female with    metastatic breast cancer  ER positive VT positive HER2 negative  Diagnosed in 2007 status post bilateral mastectomy chemotherapy radiation anastrozole  -03/21/20224294-[id number] bone biopsy of left iliac bone reveals metastatic breast cancer ER positive VT positive HER2 negative  03/31/2021-started Ribociclib 600 mg  And Letrazole   05/03-due to neutropenia Ribociclib decreased to 400 mg  -Improvement in counts  06/01- Ribociclib increased back to 600 mg  Labs reviewed abnormalities discussed with patient  ANC 0.9--we checked labs 2 days early  -Recheck CBC on Friday 07/01  -If the ANC is above 1 okay to proceed with Ribociclib 600 mg 21 d on 7 days off  -Continue with letrozole  -Schedule in July with Dr. Lozada with next cycle of treatment        Bone metastasis  03/22-04/07/2022-status post radiation to the L-spine and LS spine 10 fractions  Continue with monthly Zometa  Schedule for monthly Zometa  Call our clinic in the event of jaw pain or any dental issues    Cancer pain-well controlled with  occasional Tylenol now  She is not taking oxycodone    Neutropenia secondary to Ribociclib   -As above this is her week of  Ribociclib   Patient is afebrile and asymptomatic  -As above we will recheck CBC with differential on Friday 0701  Complications of neutropenia reviewed with patient including sepsis infection  Check temperature frequently denied fever chills sweats or any signs symptoms of infection patient is advised to go to ER     Anemia  Mild  Continue to monitor      Call our clinic with any changes in health condition or questions      SAMI Jacob CNP  [facility]     Chart documentation with Dragon Voice recognition Software. Although reviewed after completion, some words and grammatical errors may remain.            Again, thank you for allowing me to participate in the care of your patient.        Sincerely,        SAMI Jacob CNP

## 2022-06-27 NOTE — PROGRESS NOTES
"Oncology Rooming Note    June 27, 2022 10:13 AM   Mickie Mcghee is a 57 year old female who presents for:    Chief Complaint   Patient presents with     Oncology Clinic Visit     Initial Vitals: There were no vitals taken for this visit. Estimated body mass index is 21.63 kg/m  as calculated from the following:    Height as of 4/4/22: 1.651 m (5' 5\").    Weight as of 4/4/22: 59 kg (130 lb). There is no height or weight on file to calculate BSA.  Data Unavailable Comment: Data Unavailable   No LMP recorded.  Allergies reviewed: Yes  Medications reviewed: Yes    Medications: Medication refills not needed today.  Pharmacy name entered into [employer]:    [id number] - [place], [place] - [address] [facility] - [place], [place] - [address]    Clinical concerns: no      Mildred Clements CMA            "

## 2022-06-27 NOTE — PROGRESS NOTES
Oncology/Hematology Visit Note  Jun 27, 2022    Reason for Visit: follow up of metastatic breast cancer  ER positive ME positive HER2 negative  Diagnosed in 2007 status post bilateral mastectomy chemotherapy radiation anastrozole  -03/21/20220324-[id number] bone biopsy of left iliac bone reveals metastatic breast cancer ER positive ME positive HER2 negative  03/31/2021-started Ribociclib 600 mg   Letrazole     03/22-04/07/2022-status post radiation to the L-spine and LS spine 10 fractions  05/03-due to neutropenia Ribociclib decreased to 400 mg  -Improvement in counts  06/01- Ribociclib increased back to 600 mg      Interval History:  Patient reports she is feeling well.  Denies fever chills sweats cough shortness of breath chest pain nausea vomiting diarrhea abdominal pain bleeding      Review of Systems:  14 point ROS of systems including Constitutional, Eyes, Respiratory, Cardiovascular, Gastroenterology, Genitourinary, Integumentary, Muscularskeletal, Psychiatric were all negative except for pertinent positives noted in my HPI.    Physical Examination:  In wheelchair  General: The patient is a pleasant female in no acute distress.  HEENT: EOMI, PERRL. Sclerae are anicteric. Oral mucosa is pink and moist with no lesions or thrush.   Lymph: Neck is supple with no lymphadenopathy in the cervical or supraclavicular areas.   Heart: Regular rate and rhythm.   Lungs: Clear to auscultation bilaterally.   GI: Bowel sounds present, soft, nontender with no palpable hepatosplenomegaly or masses.   Extremities: No lower extremity edema noted bilaterally.   Skin: No rashes, petechiae, or bruising noted on exposed skin.    Laboratory Data:  CBC and CMP results reviewed    Assessment and Plan:  This is a 57-year-old female with    metastatic breast cancer  ER positive ME positive HER2 negative  Diagnosed in 2007 status post bilateral mastectomy chemotherapy radiation anastrozole  -03/21/20227386-[id number] bone biopsy of left iliac bone  reveals metastatic breast cancer ER positive AR positive HER2 negative  03/31/2021-started Ribociclib 600 mg  And Letrazole   05/03-due to neutropenia Ribociclib decreased to 400 mg  -Improvement in counts  06/01- Ribociclib increased back to 600 mg  Labs reviewed abnormalities discussed with patient  ANC 0.9--we checked labs 2 days early  -Recheck CBC on Friday 07/01  -If the ANC is above 1 okay to proceed with Ribociclib 600 mg 21 d on 7 days off  -Continue with letrozole  -Schedule in July with Dr. Lozada with next cycle of treatment        Bone metastasis  03/22-04/07/2022-status post radiation to the L-spine and LS spine 10 fractions  Continue with monthly Zometa  Schedule for monthly Zometa  Call our clinic in the event of jaw pain or any dental issues    Cancer pain-well controlled with occasional Tylenol now  She is not taking oxycodone    Neutropenia secondary to Ribociclib   -As above this is her week of  Ribociclib   Patient is afebrile and asymptomatic  -As above we will recheck CBC with differential on Friday 0701  Complications of neutropenia reviewed with patient including sepsis infection  Check temperature frequently denied fever chills sweats or any signs symptoms of infection patient is advised to go to ER     Anemia  Mild  Continue to monitor      Call our clinic with any changes in health condition or questions      SAMI Jcaob [facility] [place]     Chart documentation with Dragon Voice recognition Software. Although reviewed after completion, some words and grammatical errors may remain.

## 2022-07-01 ENCOUNTER — LAB (OUTPATIENT)
Dept: INFUSION THERAPY | Facility: CLINIC | Age: 57
End: 2022-07-01
Attending: INTERNAL MEDICINE
Payer: COMMERCIAL

## 2022-07-01 DIAGNOSIS — C50.919 METASTATIC BREAST CANCER: ICD-10-CM

## 2022-07-01 LAB
BASOPHILS # BLD MANUAL: 0 10E3/UL (ref 0–0.2)
BASOPHILS NFR BLD MANUAL: 2 %
EOSINOPHIL # BLD MANUAL: 0 10E3/UL (ref 0–0.7)
EOSINOPHIL NFR BLD MANUAL: 1 %
ERYTHROCYTE [DISTWIDTH] IN BLOOD BY AUTOMATED COUNT: 16.5 % (ref 10–15)
HCT VFR BLD AUTO: 30.4 % (ref 35–47)
HGB BLD-MCNC: 9.9 G/DL (ref 11.7–15.7)
LYMPHOCYTES # BLD MANUAL: 0.3 10E3/UL (ref 0.8–5.3)
LYMPHOCYTES NFR BLD MANUAL: 14 %
MCH RBC QN AUTO: 34.4 PG (ref 26.5–33)
MCHC RBC AUTO-ENTMCNC: 32.6 G/DL (ref 31.5–36.5)
MCV RBC AUTO: 106 FL (ref 78–100)
MONOCYTES # BLD MANUAL: 0.3 10E3/UL (ref 0–1.3)
MONOCYTES NFR BLD MANUAL: 15 %
NEUTROPHILS # BLD MANUAL: 1.5 10E3/UL (ref 1.6–8.3)
NEUTROPHILS NFR BLD MANUAL: 68 %
NRBC # BLD AUTO: 0 10E3/UL
NRBC BLD MANUAL-RTO: 1 %
PLAT MORPH BLD: ABNORMAL
PLATELET # BLD AUTO: 242 10E3/UL (ref 150–450)
RBC # BLD AUTO: 2.88 10E6/UL (ref 3.8–5.2)
RBC MORPH BLD: ABNORMAL
SMUDGE CELLS BLD QL SMEAR: PRESENT
WBC # BLD AUTO: 2.2 10E3/UL (ref 4–11)

## 2022-07-01 PROCEDURE — 85027 COMPLETE CBC AUTOMATED: CPT | Performed by: NURSE PRACTITIONER

## 2022-07-01 PROCEDURE — 85007 BL SMEAR W/DIFF WBC COUNT: CPT | Performed by: NURSE PRACTITIONER

## 2022-07-01 PROCEDURE — 36415 COLL VENOUS BLD VENIPUNCTURE: CPT

## 2022-07-01 NOTE — PROGRESS NOTES
Medical Assistant Note:  iMckie Mcghee presents today for blood draw.    Patient seen by provider today: No.   present during visit today: Not Applicable.    Concerns: No Concerns.    Procedure:  Lab draw site: right hand, Needle type: bf, Gauge: 23.    Post Assessment:  Labs drawn without difficulty: Yes.    Discharge Plan:  Departure Mode: Ambulatory.    Face to Face Time: 5 min.    Dorothy Henao, CMA

## 2022-07-01 NOTE — ORAL ONC MGMT
Oral Chemotherapy Monitoring Program  Lab Follow Up     Patient currently on Ribociclib therapy.  Reviewed lab results from today.     Lab Results   Component Value Date    WBC 2.2 07/01/2022     Lab Results   Component Value Date    RBC 2.88 07/01/2022     Lab Results   Component Value Date    HGB 9.9 07/01/2022     Lab Results   Component Value Date    HCT 30.4 07/01/2022     Lab Results   Component Value Date     07/01/2022     Lab Results   Component Value Date    MCH 34.4 07/01/2022     Lab Results   Component Value Date    MCHC 32.6 07/01/2022     Lab Results   Component Value Date    RDW 16.5 07/01/2022     Lab Results   Component Value Date     07/01/2022       Last Comprehensive Metabolic Panel:  Sodium   Date Value Ref Range Status   06/27/2022 141 133 - 144 mmol/L Final     Potassium   Date Value Ref Range Status   06/27/2022 4.4 3.4 - 5.3 mmol/L Final     Chloride   Date Value Ref Range Status   06/27/2022 109 94 - 109 mmol/L Final     Carbon Dioxide (CO2)   Date Value Ref Range Status   06/27/2022 27 20 - 32 mmol/L Final     Anion Gap   Date Value Ref Range Status   06/27/2022 5 3 - 14 mmol/L Final     Glucose   Date Value Ref Range Status   06/27/2022 100 (H) 70 - 99 mg/dL Final     Urea Nitrogen   Date Value Ref Range Status   06/27/2022 12 7 - 30 mg/dL Final     Creatinine   Date Value Ref Range Status   06/27/2022 0.52 0.52 - 1.04 mg/dL Final     GFR Estimate   Date Value Ref Range Status   06/27/2022 >90 >60 mL/min/1.73m2 Final     Comment:     Effective December 21, 2021 eGFRcr in adults is calculated using the 2021 CKD-EPI creatinine equation which includes age and gender (Beth et al., NEJM, DOI: 10.1056/NHETuv0781867)     Calcium   Date Value Ref Range Status   06/27/2022 9.0 8.5 - 10.1 mg/dL Final     Bilirubin Total   Date Value Ref Range Status   06/27/2022 0.2 0.2 - 1.3 mg/dL Final     Alkaline Phosphatase   Date Value Ref Range Status   06/27/2022 60 40 - 150 U/L Final      ALT   Date Value Ref Range Status   06/27/2022 14 0 - 50 U/L Final     AST   Date Value Ref Range Status   06/27/2022 12 0 - 45 U/L Final     Assessment & Plan:  CBC reviewed from today, ANC = 1.5. Ok to resume Ribociclib 600 mg daily x 21 days then off for 7 days. Patient was informed to resume via telephone on 7/1. She will start next cycle on 7/2 AM. Continue monthly labs per treatment plan. Consider dose adjustment depending on counts for next cycle. Next anticipated cycle to start 7/30 pending labs on 7/28.     Follow-Up:  7/28 lab appt and Dr. Lozada visit    Papito TompkinsD  [facility]  [phone]

## 2022-07-15 ENCOUNTER — TELEPHONE (OUTPATIENT)
Dept: PHARMACY | Facility: CLINIC | Age: 57
End: 2022-07-15

## 2022-07-15 DIAGNOSIS — C50.919 METASTATIC BREAST CANCER: Primary | ICD-10-CM

## 2022-07-15 RX ORDER — LETROZOLE 2.5 MG/1
2.5 TABLET, FILM COATED ORAL EVERY MORNING
Qty: 28 TABLET | Refills: 0 | Status: SHIPPED | OUTPATIENT
Start: 2022-07-20 | End: 2022-07-28

## 2022-07-21 ENCOUNTER — MEDICAL CORRESPONDENCE (OUTPATIENT)
Dept: ONCOLOGY | Facility: CLINIC | Age: 57
End: 2022-07-21

## 2022-07-26 NOTE — TELEPHONE ENCOUNTER
Prior Authorization Approval    Authorization Effective Date: 6/15/2022  Authorization Expiration Date: 7/15/2023  Medication: [id number]  Approved Dose/Quantity: 63 for 28 days  Reference #:     Insurance Company: [employer] - Phone [phone] Fax [phone]  Expected CoPay: $3.00     CoPay Card Available: No    Foundation Assistance Needed:    Which Pharmacy is filling the prescription (Not needed for infusion/clinic administered): [facility] - [place], [place] - [address]  Pharmacy Notified: Yes  Patient Notified: Yes

## 2022-07-28 ENCOUNTER — LAB (OUTPATIENT)
Dept: INFUSION THERAPY | Facility: CLINIC | Age: 57
End: 2022-07-28
Attending: INTERNAL MEDICINE
Payer: COMMERCIAL

## 2022-07-28 ENCOUNTER — ONCOLOGY VISIT (OUTPATIENT)
Dept: ONCOLOGY | Facility: CLINIC | Age: 57
End: 2022-07-28
Attending: INTERNAL MEDICINE
Payer: COMMERCIAL

## 2022-07-28 VITALS
BODY MASS INDEX: 24.16 KG/M2 | TEMPERATURE: 98.2 F | HEART RATE: 75 BPM | RESPIRATION RATE: 16 BRPM | SYSTOLIC BLOOD PRESSURE: 123 MMHG | DIASTOLIC BLOOD PRESSURE: 71 MMHG | HEIGHT: 65 IN | OXYGEN SATURATION: 98 % | WEIGHT: 145 LBS

## 2022-07-28 DIAGNOSIS — Z17.0 MALIGNANT NEOPLASM OF LOWER-OUTER QUADRANT OF LEFT BREAST OF FEMALE, ESTROGEN RECEPTOR POSITIVE (H): ICD-10-CM

## 2022-07-28 DIAGNOSIS — C50.512 MALIGNANT NEOPLASM OF LOWER-OUTER QUADRANT OF LEFT BREAST OF FEMALE, ESTROGEN RECEPTOR POSITIVE (H): ICD-10-CM

## 2022-07-28 DIAGNOSIS — G95.29 SPINAL CORD COMPRESSION DUE TO MALIGNANT NEOPLASM METASTATIC TO SPINE (H): ICD-10-CM

## 2022-07-28 DIAGNOSIS — C50.919 METASTATIC BREAST CANCER: ICD-10-CM

## 2022-07-28 DIAGNOSIS — C50.919 METASTATIC BREAST CANCER: Primary | ICD-10-CM

## 2022-07-28 DIAGNOSIS — C79.51 SPINAL CORD COMPRESSION DUE TO MALIGNANT NEOPLASM METASTATIC TO SPINE (H): ICD-10-CM

## 2022-07-28 LAB
ALBUMIN SERPL-MCNC: 3.8 G/DL (ref 3.4–5)
ALP SERPL-CCNC: 53 U/L (ref 40–150)
ALT SERPL W P-5'-P-CCNC: 25 U/L (ref 0–50)
ANION GAP SERPL CALCULATED.3IONS-SCNC: 4 MMOL/L (ref 3–14)
AST SERPL W P-5'-P-CCNC: 17 U/L (ref 0–45)
BASOPHILS # BLD AUTO: 0 10E3/UL (ref 0–0.2)
BASOPHILS NFR BLD AUTO: 1 %
BILIRUB SERPL-MCNC: 0.3 MG/DL (ref 0.2–1.3)
BUN SERPL-MCNC: 13 MG/DL (ref 7–30)
CALCIUM SERPL-MCNC: 9.3 MG/DL (ref 8.5–10.1)
CHLORIDE BLD-SCNC: 108 MMOL/L (ref 94–109)
CO2 SERPL-SCNC: 28 MMOL/L (ref 20–32)
CREAT SERPL-MCNC: 0.58 MG/DL (ref 0.52–1.04)
EOSINOPHIL # BLD AUTO: 0 10E3/UL (ref 0–0.7)
EOSINOPHIL NFR BLD AUTO: 1 %
ERYTHROCYTE [DISTWIDTH] IN BLOOD BY AUTOMATED COUNT: 14.6 % (ref 10–15)
GFR SERPL CREATININE-BSD FRML MDRD: >90 ML/MIN/1.73M2
GLUCOSE BLD-MCNC: 109 MG/DL (ref 70–99)
HCT VFR BLD AUTO: 31.4 % (ref 35–47)
HGB BLD-MCNC: 10.6 G/DL (ref 11.7–15.7)
IMM GRANULOCYTES # BLD: 0 10E3/UL
IMM GRANULOCYTES NFR BLD: 0 %
LYMPHOCYTES # BLD AUTO: 0.3 10E3/UL (ref 0.8–5.3)
LYMPHOCYTES NFR BLD AUTO: 25 %
MAGNESIUM SERPL-MCNC: 2.4 MG/DL (ref 1.6–2.3)
MCH RBC QN AUTO: 35.1 PG (ref 26.5–33)
MCHC RBC AUTO-ENTMCNC: 33.8 G/DL (ref 31.5–36.5)
MCV RBC AUTO: 104 FL (ref 78–100)
MONOCYTES # BLD AUTO: 0.3 10E3/UL (ref 0–1.3)
MONOCYTES NFR BLD AUTO: 26 %
NEUTROPHILS # BLD AUTO: 0.6 10E3/UL (ref 1.6–8.3)
NEUTROPHILS NFR BLD AUTO: 47 %
NRBC # BLD AUTO: 0 10E3/UL
NRBC BLD AUTO-RTO: 0 /100
PHOSPHATE SERPL-MCNC: 3.6 MG/DL (ref 2.5–4.5)
PLAT MORPH BLD: NORMAL
PLATELET # BLD AUTO: 223 10E3/UL (ref 150–450)
POTASSIUM BLD-SCNC: 4 MMOL/L (ref 3.4–5.3)
PROT SERPL-MCNC: 7 G/DL (ref 6.8–8.8)
RBC # BLD AUTO: 3.02 10E6/UL (ref 3.8–5.2)
RBC MORPH BLD: NORMAL
SODIUM SERPL-SCNC: 140 MMOL/L (ref 133–144)
WBC # BLD AUTO: 1.3 10E3/UL (ref 4–11)

## 2022-07-28 PROCEDURE — 82040 ASSAY OF SERUM ALBUMIN: CPT | Performed by: INTERNAL MEDICINE

## 2022-07-28 PROCEDURE — 85025 COMPLETE CBC W/AUTO DIFF WBC: CPT | Performed by: INTERNAL MEDICINE

## 2022-07-28 PROCEDURE — 80053 COMPREHEN METABOLIC PANEL: CPT | Performed by: INTERNAL MEDICINE

## 2022-07-28 PROCEDURE — 84100 ASSAY OF PHOSPHORUS: CPT | Performed by: INTERNAL MEDICINE

## 2022-07-28 PROCEDURE — 250N000011 HC RX IP 250 OP 636: Performed by: INTERNAL MEDICINE

## 2022-07-28 PROCEDURE — G0463 HOSPITAL OUTPT CLINIC VISIT: HCPCS | Mod: 25

## 2022-07-28 PROCEDURE — 83735 ASSAY OF MAGNESIUM: CPT | Performed by: INTERNAL MEDICINE

## 2022-07-28 PROCEDURE — 36415 COLL VENOUS BLD VENIPUNCTURE: CPT

## 2022-07-28 PROCEDURE — 99215 OFFICE O/P EST HI 40 MIN: CPT | Performed by: INTERNAL MEDICINE

## 2022-07-28 PROCEDURE — 96365 THER/PROPH/DIAG IV INF INIT: CPT

## 2022-07-28 RX ORDER — ZOLEDRONIC ACID 0.04 MG/ML
4 INJECTION, SOLUTION INTRAVENOUS ONCE
Status: COMPLETED | OUTPATIENT
Start: 2022-07-28 | End: 2022-07-28

## 2022-07-28 RX ORDER — ZOLEDRONIC ACID 0.04 MG/ML
4 INJECTION, SOLUTION INTRAVENOUS ONCE
Status: CANCELLED
Start: 2022-07-28 | End: 2022-07-28

## 2022-07-28 RX ORDER — HEPARIN SODIUM,PORCINE 10 UNIT/ML
5 VIAL (ML) INTRAVENOUS
Status: CANCELLED | OUTPATIENT
Start: 2022-07-28

## 2022-07-28 RX ORDER — HEPARIN SODIUM (PORCINE) LOCK FLUSH IV SOLN 100 UNIT/ML 100 UNIT/ML
5 SOLUTION INTRAVENOUS
Status: CANCELLED | OUTPATIENT
Start: 2022-07-28

## 2022-07-28 RX ORDER — LETROZOLE 2.5 MG/1
2.5 TABLET, FILM COATED ORAL EVERY MORNING
Qty: 90 TABLET | Refills: 3 | Status: SHIPPED | OUTPATIENT
Start: 2022-07-28 | End: 2022-09-14

## 2022-07-28 RX ADMIN — ZOLEDRONIC ACID 4 MG: 4 SOLUTION INTRAVENOUS at 11:08

## 2022-07-28 ASSESSMENT — PAIN SCALES - GENERAL: PAINLEVEL: NO PAIN (1)

## 2022-07-28 NOTE — LETTER
"    7/28/2022         RE: Mickie Mcghee  [address] [address]  [address]  [place] [address]        Dear Colleague,    Thank you for referring your patient, Mickie Mcghee, to the [facility]. Please see a copy of my visit note below.    Oncology Rooming Note    July 28, 2022 10:28 AM   Mickie Mcghee is a 57 year old female who presents for:    Chief Complaint   Patient presents with     Oncology Clinic Visit     Initial Vitals: There were no vitals taken for this visit. Estimated body mass index is 21.63 kg/m  as calculated from the following:    Height as of 4/4/22: 1.651 m (5' 5\").    Weight as of 4/4/22: 59 kg (130 lb). There is no height or weight on file to calculate BSA.  Data Unavailable Comment: Data Unavailable   No LMP recorded.  Allergies reviewed: Yes  Medications reviewed: Yes    Medications: Medication refills not needed today.  Pharmacy name entered into [employer]:    [id number] - [place], [place] - [address] [facility] - [place], [place] - [address]    Clinical concerns:  doctor was notified.      Blanquita Meyer MA              ONCOLOGY HISTORY:  Ms. Mcghee is a female with metastatic breast cancer. ER positive, MO positive and HER-2/halina negative.  -Foundation One reveals PIK3CA alteration.  -She had a stage III left breast cancer in 2007. ER positive and HER-2/halina negative.  She had bilateral mastectomy, chemotherapy, radiation, and anastrozole.     1.  On 03/18/2022, multiple imaging studies done because of bilateral lower extremity weakness:  -MRI of thoracic and lumbar spine revealed bone metastases with spinal cord compression.  -CT chest, abdomen, and pelvis on 03/19/2022 revealed bone metastasis, lung nodules and enlarged upper abdominal lymph node.  There is a hypodense nodule in the liver.  -Brain MRI on 03/19/2022 did not reveal any intracranial metastasis.  2.  CT-guided bone biopsy of left " iliac bone on 03/21/2022 revealed metastatic breast cancer. ER positive, TX positive and HER-2/halina negative.  3.  Letrozole started on 03/25/2022.  -Ribociclib started on 03/31/2022.  -Zometa started on 03/24/2022.  -Radiation to thoracolumbar and sacral area between 03/22/2022 and 04/07/2022.  3000 cGy in 10 fractions.     SUBJECTIVE:  Ms. Mcghee is a 57-year-old female with metastatic breast cancer on letrozole and ribociclib.    Her overall condition is stable.  Patient says that her leg is little stronger.  She can take few steps with the help of walker.  Her pain has improved significantly.  She occasionally has to take oxycodone.    No headache.  No dizziness.  No chest pain.  No bleeding from the breast.  No shortness of breath.  No abdominal pain.  No nausea or vomiting.  Appetite overall has been good.  No urinary complaint.  No bowel problem.  All other review of system is negative.    PHYSICAL EXAMINATION:  GENERAL:  She is alert, oriented x 3. Not in any distress.   Rest of systems not examined.     ASSESSMENT:  1.  A 57-year-old female with metastatic breast cancer.  Disease is clinically responding.  2.  Lower extremity weakness from spinal cord compression.  Weakness has improved mildly.  3.  Leukopenia from ribociclib.  4.  Normocytic anemia from metastatic breast cancer and ribociclib.     PLAN:  1.  Patient is overall doing well from metastatic breast cancer.  Clinically she is responding.  She will continue on letrozole and ribociclib.      Patient is leukopenic/neutropenic. Will hold Ribociclib for 7 more days and then recheck CBC. If ANC = or > 1.0, she can resume Ribociclib at 400 mg daily x 21 days then off for 7 days.    2.  Patient gets monthly Zometa.  She is tolerating it well.  No dental or jaw related symptoms.  She will get it today.    She will continue on calcium vitamin D twice a day.    3.  Pain has improved significantly.  She occasionally takes oxycodone.  Explained to her that this  is a good sign.  Typically pain improves when disease respond.    4.  Will get PET scan in a month.  I will see her after that.  She had few questions which were all answered.       TOTAL VISIT TIME: 45 minutes. Time spent in today's visit, review of chart/investigations today, monitoring for toxicity of high risk drugs and documentation.      Again, thank you for allowing me to participate in the care of your patient.        Sincerely,        Yury Lozada MD

## 2022-07-28 NOTE — ORAL ONC MGMT
Oral Chemotherapy Monitoring Program  Lab Follow Up     Patient currently on Ribociclib, she is just finishing her 7 days off and was supposed to resume on 7/29.  Reviewed lab results and provider visit from today.     Lab Results   Component Value Date    WBC 1.3 07/28/2022     Lab Results   Component Value Date    RBC 3.02 07/28/2022     Lab Results   Component Value Date    HGB 10.6 07/28/2022     Lab Results   Component Value Date    HCT 31.4 07/28/2022     Lab Results   Component Value Date     07/28/2022     Lab Results   Component Value Date    MCH 35.1 07/28/2022     Lab Results   Component Value Date    MCHC 33.8 07/28/2022     Lab Results   Component Value Date    RDW 14.6 07/28/2022     Lab Results   Component Value Date     07/28/2022       Last Comprehensive Metabolic Panel:  Sodium   Date Value Ref Range Status   07/28/2022 140 133 - 144 mmol/L Final     Potassium   Date Value Ref Range Status   07/28/2022 4.0 3.4 - 5.3 mmol/L Final     Chloride   Date Value Ref Range Status   07/28/2022 108 94 - 109 mmol/L Final     Carbon Dioxide (CO2)   Date Value Ref Range Status   07/28/2022 28 20 - 32 mmol/L Final     Anion Gap   Date Value Ref Range Status   07/28/2022 4 3 - 14 mmol/L Final     Glucose   Date Value Ref Range Status   07/28/2022 109 (H) 70 - 99 mg/dL Final     Urea Nitrogen   Date Value Ref Range Status   07/28/2022 13 7 - 30 mg/dL Final     Creatinine   Date Value Ref Range Status   07/28/2022 0.58 0.52 - 1.04 mg/dL Final     GFR Estimate   Date Value Ref Range Status   07/28/2022 >90 >60 mL/min/1.73m2 Final     Comment:     Effective December 21, 2021 eGFRcr in adults is calculated using the 2021 CKD-EPI creatinine equation which includes age and gender (Beth et al., NEJ, DOI: 10.1056/LJLCbs8922352)     Calcium   Date Value Ref Range Status   07/28/2022 9.3 8.5 - 10.1 mg/dL Final     Bilirubin Total   Date Value Ref Range Status   07/28/2022 0.3 0.2 - 1.3 mg/dL Final      Alkaline Phosphatase   Date Value Ref Range Status   07/28/2022 53 40 - 150 U/L Final     ALT   Date Value Ref Range Status   07/28/2022 25 0 - 50 U/L Final     AST   Date Value Ref Range Status   07/28/2022 17 0 - 45 U/L Final     Assessment & Plan:  CMP and CBC reviewed, CBC concerning for grade 3 neutropenia, will hold Ribociclib x7 more days and then recheck CBC. If ANC = or > 1.0 can resume Ribociclib at 400 mg daily x 21 days then off for 7 days. Coordinated plan with Dr. Lozada and patient. She understanding of the plan. Patient hoping to be scheduled at [facility] for labs next week 8/4 or 8/5.      Follow-Up:  7/28 continue holding Ribociclib   8/3 look for lab appt and reach out to scheduling if no appt yet.  8/4 or 8/5 repeat CBC w/ plts diff  When ANC 1.0 or greater then can resume Ribociclib 400 mg daily x 21 days then take 7 days off.     Papito TompkinsD  [facility] Chemotherapy  [phone]

## 2022-07-28 NOTE — PROGRESS NOTES
Infusion Nursing Note:  Mickie Mcghee presents today for zometa.    Patient seen by provider today: Yes: Dr. Lozada   present during visit today: Not Applicable.    Note: no new concerns.    Intravenous Access:  Peripheral IV placed.    Treatment Conditions:  Lab Results   Component Value Date     07/28/2022    POTASSIUM 4.0 07/28/2022    MAG 2.4 (H) 07/28/2022    CR 0.58 07/28/2022    OSMEL 9.3 07/28/2022    BILITOTAL 0.3 07/28/2022    ALBUMIN 3.8 07/28/2022    ALT 25 07/28/2022    AST 17 07/28/2022     Results reviewed, labs MET treatment parameters, ok to proceed with treatment.    Post Infusion Assessment:  Patient tolerated infusion without incident.  Blood return noted pre and post infusion.  Site patent and intact, free from redness, edema or discomfort.  No evidence of extravasations.  Access discontinued per protocol.     Discharge Plan:   Patient and/or family verbalized understanding of discharge instructions and all questions answered.  AVS to patient via [employer].  Patient will return 8/25 for next appointment.   Patient discharged in stable condition accompanied by: self.  Departure Mode: Ambulatory.      Kimmy Burnett RN

## 2022-07-28 NOTE — PROGRESS NOTES
Medical Assistant Note:  Mickie Mcghee presents today for blood draw.    Patient seen by provider today: Yes: karin.   present during visit today: Not Applicable.    Concerns: No Concerns.    Procedure:  Lab draw site: rac, Needle type: bf, Gauge: 23.    Post Assessment:  Labs drawn without difficulty: Yes.    Discharge Plan:  Departure Mode: Ambulatory.    Face to Face Time: 5 min.    Dorothy Henao, CMA

## 2022-07-28 NOTE — PROGRESS NOTES
"Oncology Rooming Note    July 28, 2022 10:28 AM   Mickie Mcghee is a 57 year old female who presents for:    Chief Complaint   Patient presents with     Oncology Clinic Visit     Initial Vitals: There were no vitals taken for this visit. Estimated body mass index is 21.63 kg/m  as calculated from the following:    Height as of 4/4/22: 1.651 m (5' 5\").    Weight as of 4/4/22: 59 kg (130 lb). There is no height or weight on file to calculate BSA.  Data Unavailable Comment: Data Unavailable   No LMP recorded.  Allergies reviewed: Yes  Medications reviewed: Yes    Medications: Medication refills not needed today.  Pharmacy name entered into [employer]:    [id number] [place], [place] - [address] [facility] - [place], [place] - [address]    Clinical concerns:  doctor was notified.      Blanquita Meyer MA            "

## 2022-07-28 NOTE — PATIENT INSTRUCTIONS
1.  Continue letrozole and ribociclib.  2.  Continue monthly Zometa.  3.  Take calcium and vitamin D twice a day.  4.  Take oxycodone as needed for pain.    5.  PET scan in 1 month.  6.  See me after PET scan.

## 2022-08-01 ENCOUNTER — TELEPHONE (OUTPATIENT)
Dept: ONCOLOGY | Facility: CLINIC | Age: 57
End: 2022-08-01

## 2022-08-01 LAB
PATH REPORT.ADDENDUM SPEC: NORMAL
PATH REPORT.COMMENTS IMP SPEC: NORMAL
PATH REPORT.COMMENTS IMP SPEC: NORMAL
PATH REPORT.FINAL DX SPEC: NORMAL
PATH REPORT.GROSS SPEC: NORMAL
PATH REPORT.MICROSCOPIC SPEC OTHER STN: NORMAL
PATH REPORT.MICROSCOPIC SPEC OTHER STN: NORMAL
PATH REPORT.RELEVANT HX SPEC: NORMAL
PHOTO IMAGE: NORMAL

## 2022-08-01 NOTE — PROGRESS NOTES
ONCOLOGY HISTORY:  Ms. Mcghee is a female with metastatic breast cancer. ER positive, GA positive and HER-2/halina negative.  -Foundation One reveals PIK3CA alteration.  -She had a stage III left breast cancer in 2007. ER positive and HER-2/halina negative.  She had bilateral mastectomy, chemotherapy, radiation, and anastrozole.     1.  On 03/18/2022, multiple imaging studies done because of bilateral lower extremity weakness:  -MRI of thoracic and lumbar spine revealed bone metastases with spinal cord compression.  -CT chest, abdomen, and pelvis on 03/19/2022 revealed bone metastasis, lung nodules and enlarged upper abdominal lymph node.  There is a hypodense nodule in the liver.  -Brain MRI on 03/19/2022 did not reveal any intracranial metastasis.  2.  CT-guided bone biopsy of left iliac bone on 03/21/2022 revealed metastatic breast cancer. ER positive, GA positive and HER-2/halina negative.  3.  Letrozole started on 03/25/2022.  -Ribociclib started on 03/31/2022.  -Zometa started on 03/24/2022.  -Radiation to thoracolumbar and sacral area between 03/22/2022 and 04/07/2022.  3000 cGy in 10 fractions.     SUBJECTIVE:  Ms. Mcghee is a 57-year-old female with metastatic breast cancer on letrozole and ribociclib.    Her overall condition is stable.  Patient says that her leg is little stronger.  She can take few steps with the help of walker.  Her pain has improved significantly.  She occasionally has to take oxycodone.    No headache.  No dizziness.  No chest pain.  No bleeding from the breast.  No shortness of breath.  No abdominal pain.  No nausea or vomiting.  Appetite overall has been good.  No urinary complaint.  No bowel problem.  All other review of system is negative.    PHYSICAL EXAMINATION:  GENERAL:  She is alert, oriented x 3. Not in any distress.   Rest of systems not examined.     ASSESSMENT:  1.  A 57-year-old female with metastatic breast cancer.  Disease is clinically responding.  2.  Lower extremity weakness from  spinal cord compression.  Weakness has improved mildly.  3.  Leukopenia from ribociclib.  4.  Normocytic anemia from metastatic breast cancer and ribociclib.     PLAN:  1.  Patient is overall doing well from metastatic breast cancer.  Clinically she is responding.  She will continue on letrozole and ribociclib.      Patient is leukopenic/neutropenic. Will hold Ribociclib for 7 more days and then recheck CBC. If ANC = or > 1.0, she can resume Ribociclib at 400 mg daily x 21 days then off for 7 days.    2.  Patient gets monthly Zometa.  She is tolerating it well.  No dental or jaw related symptoms.  She will get it today.    She will continue on calcium vitamin D twice a day.    3.  Pain has improved significantly.  She occasionally takes oxycodone.  Explained to her that this is a good sign.  Typically pain improves when disease respond.    4.  Will get PET scan in a month.  I will see her after that.  She had few questions which were all answered.       TOTAL VISIT TIME: 45 minutes. Time spent in today's visit, review of chart/investigations today, monitoring for toxicity of high risk drugs and documentation.

## 2022-08-01 NOTE — TELEPHONE ENCOUNTER
Writer called pathology department and requested for follow up HER2 result from 3/21/22 biopsy.     Message to be given to Dr. Salcedo pathologist to contact Dr. Lozada to discuss.    Jolynn Rajput RN

## 2022-08-01 NOTE — TELEPHONE ENCOUNTER
----- Message from Yury Lozada MD sent at 7/31/2022 10:05 PM CDT -----  Please, ask the pathology department for HER 2 RESULT.    BK

## 2022-08-04 ENCOUNTER — LAB (OUTPATIENT)
Dept: LAB | Facility: CLINIC | Age: 57
End: 2022-08-04
Payer: COMMERCIAL

## 2022-08-04 DIAGNOSIS — C50.919 METASTATIC BREAST CANCER: ICD-10-CM

## 2022-08-04 LAB
ALBUMIN SERPL BCG-MCNC: 4.2 G/DL (ref 3.5–5.2)
ALP SERPL-CCNC: 57 U/L (ref 35–104)
ALT SERPL W P-5'-P-CCNC: 31 U/L (ref 10–35)
ANION GAP SERPL CALCULATED.3IONS-SCNC: 12 MMOL/L (ref 7–15)
AST SERPL W P-5'-P-CCNC: 33 U/L (ref 10–35)
BASOPHILS # BLD MANUAL: 0.1 10E3/UL (ref 0–0.2)
BASOPHILS NFR BLD MANUAL: 4 %
BILIRUB SERPL-MCNC: 0.2 MG/DL
BUN SERPL-MCNC: 10.1 MG/DL (ref 6–20)
CALCIUM SERPL-MCNC: 9.5 MG/DL (ref 8.6–10)
CHLORIDE SERPL-SCNC: 102 MMOL/L (ref 98–107)
CREAT SERPL-MCNC: 0.57 MG/DL (ref 0.51–0.95)
DACRYOCYTES BLD QL SMEAR: SLIGHT
DEPRECATED HCO3 PLAS-SCNC: 25 MMOL/L (ref 22–29)
ELLIPTOCYTES BLD QL SMEAR: SLIGHT
EOSINOPHIL # BLD MANUAL: 0 10E3/UL (ref 0–0.7)
EOSINOPHIL NFR BLD MANUAL: 0 %
ERYTHROCYTE [DISTWIDTH] IN BLOOD BY AUTOMATED COUNT: 14.2 % (ref 10–15)
FRAGMENTS BLD QL SMEAR: SLIGHT
GFR SERPL CREATININE-BSD FRML MDRD: >90 ML/MIN/1.73M2
GLUCOSE SERPL-MCNC: 112 MG/DL (ref 70–99)
HCT VFR BLD AUTO: 33.8 % (ref 35–47)
HGB BLD-MCNC: 11 G/DL (ref 11.7–15.7)
LYMPHOCYTES # BLD MANUAL: 0.6 10E3/UL (ref 0.8–5.3)
LYMPHOCYTES NFR BLD MANUAL: 25 %
MAGNESIUM SERPL-MCNC: 2.1 MG/DL (ref 1.7–2.3)
MCH RBC QN AUTO: 34.6 PG (ref 26.5–33)
MCHC RBC AUTO-ENTMCNC: 32.5 G/DL (ref 31.5–36.5)
MCV RBC AUTO: 106 FL (ref 78–100)
MONOCYTES # BLD MANUAL: 0.3 10E3/UL (ref 0–1.3)
MONOCYTES NFR BLD MANUAL: 13 %
NEUTROPHILS # BLD MANUAL: 1.3 10E3/UL (ref 1.6–8.3)
NEUTROPHILS NFR BLD MANUAL: 58 %
PHOSPHATE SERPL-MCNC: 4.4 MG/DL (ref 2.5–4.5)
PLAT MORPH BLD: ABNORMAL
PLATELET # BLD AUTO: 256 10E3/UL (ref 150–450)
POTASSIUM SERPL-SCNC: 3.8 MMOL/L (ref 3.4–5.3)
PROT SERPL-MCNC: 6.5 G/DL (ref 6.4–8.3)
RBC # BLD AUTO: 3.18 10E6/UL (ref 3.8–5.2)
RBC MORPH BLD: ABNORMAL
SODIUM SERPL-SCNC: 139 MMOL/L (ref 136–145)
WBC # BLD AUTO: 2.3 10E3/UL (ref 4–11)

## 2022-08-04 PROCEDURE — 80053 COMPREHEN METABOLIC PANEL: CPT

## 2022-08-04 PROCEDURE — 84100 ASSAY OF PHOSPHORUS: CPT

## 2022-08-04 PROCEDURE — 85007 BL SMEAR W/DIFF WBC COUNT: CPT

## 2022-08-04 PROCEDURE — 36415 COLL VENOUS BLD VENIPUNCTURE: CPT

## 2022-08-04 PROCEDURE — 83735 ASSAY OF MAGNESIUM: CPT

## 2022-08-04 PROCEDURE — 85027 COMPLETE CBC AUTOMATED: CPT

## 2022-08-05 ENCOUNTER — DOCUMENTATION ONLY (OUTPATIENT)
Dept: PHARMACY | Facility: CLINIC | Age: 57
End: 2022-08-05

## 2022-08-05 NOTE — PROGRESS NOTES
Oral Chemotherapy Monitoring Program  Lab Follow Up    Reviewed lab results from 08/05/22.    ORAL CHEMOTHERAPY 5/3/2022 5/19/2022 6/27/2022 7/1/2022 7/15/2022 7/28/2022 8/5/2022   Assessment Type Chart Review;Lab Monitoring Chart Review;Lab Monitoring;Initial Follow up Chart Review;Lab Monitoring Chart Review;Lab Monitoring Refill Lab Monitoring;Chart Review Lab Monitoring   Diagnosis Code Breast Cancer Breast Cancer Breast Cancer Breast Cancer Breast Cancer Breast Cancer Breast Cancer   Providers Neville Lozada   Clinic Name/Location [facility]   Drug Name Kisqali (ribociclib) Kisqali (ribociclib) Kisqali (ribociclib) Kisqali (ribociclib) Kisqali (ribociclib) Kisqali (ribociclib) Kisqali (ribociclib)   Dose 400 mg 600 mg 600 mg 600 mg 600 mg 400 mg 400 mg   Current Schedule Daily Daily Daily Daily Daily Daily Daily   Cycle Details 3 weeks on, 1 week off 3 weeks on, 1 week off 3 weeks on, 1 week off 3 weeks on, 1 week off 3 weeks on, 1 week off Drug on Hold 3 weeks on, 1 week off   Start Date of Last Cycle 5/3/2022 5/3/2022 6/1/2022 7/2/2022 - - -   Planned next cycle start date - 6/1/2022 6/29/2022 7/30/2022 - 8/5/2022 8/6/2022   Doses missed in last 2 weeks 1 - - - - - -   Adherence Assessment - Adherent Adherent Adherent - - -   Adverse Effects - - Neutropenia Neutropenia - Neutropenia Neutropenia   Nausea - - - - - - -   Pharmacist Intervention(nausea) - - - - - - -   Fatigue - - - - - - -   Pharmacist Intervention(fatigue) - - - - - - -   Neutropenia - - Grade 3 Grade 1 - Grade 3 Grade 2   Pharmacist Intervention(neutropenia) - - Yes No - Yes No   Intervention(s) - - Increased lab monitoring - - Recommend drug hold -   Thrombocytopenia - - - - - - -   Pharmacist Intervention(thrombocytopenia) - - - - - - -   Any new drug interactions? - No No No - - -   Is the dose as ordered appropriate for the patient? -  Yes Yes Yes - - -       Labs:  _  Result Component Current Result Ref Range   Sodium 139 (8/4/2022) 136 - 145 mmol/L     _  Result Component Current Result Ref Range   Potassium 3.8 (8/4/2022) 3.4 - 5.3 mmol/L     _  Result Component Current Result Ref Range   Calcium 9.5 (8/4/2022) 8.6 - 10.0 mg/dL     _  Result Component Current Result Ref Range   Magnesium 2.1 (8/4/2022) 1.7 - 2.3 mg/dL     _  Result Component Current Result Ref Range   Phosphorus 4.4 (8/4/2022) 2.5 - 4.5 mg/dL     _  Result Component Current Result Ref Range   Albumin 4.2 (8/4/2022) 3.5 - 5.2 g/dL     _  Result Component Current Result Ref Range   Urea Nitrogen 10.1 (8/4/2022) 6.0 - 20.0 mg/dL     _  Result Component Current Result Ref Range   Creatinine 0.57 (8/4/2022) 0.51 - 0.95 mg/dL     _  Result Component Current Result Ref Range   AST 33 (8/4/2022) 10 - 35 U/L     _  Result Component Current Result Ref Range   ALT 31 (8/4/2022) 10 - 35 U/L     _  Result Component Current Result Ref Range   Bilirubin Total 0.2 (8/4/2022) <=1.2 mg/dL     _  Result Component Current Result Ref Range   WBC Count 2.3 (L) (8/4/2022) 4.0 - 11.0 10e3/uL     _  Result Component Current Result Ref Range   Hemoglobin 11.0 (L) (8/4/2022) 11.7 - 15.7 g/dL     _  Result Component Current Result Ref Range   Platelet Count 256 (8/4/2022) 150 - 450 10e3/uL     _  Result Component Current Result Ref Range   Absolute Neutrophils 1.3 (L) (8/4/2022) 1.6 - 8.3 10e3/uL       Assessment & Plan:  Results are concerning for grade 2 neutropenia. Drug was on hold due to grade 3 neutropenia but ANC meets parameters to resume therapy (ANC > 1.0). Continue to monitor. Patient was contacted regarding resuming therapy and stated they would start their next cycle on 08/06/22. Ok to proceed with ribociclib.    Questions answered to patient's satisfaction.    Follow-Up:  4 weeks with labs.    Rachel Moniz, Pharmacist Intern  August 05, 2022

## 2022-08-15 RX ORDER — RIBOCICLIB 200 MG/1
400 TABLET, FILM COATED ORAL DAILY
Qty: 42 TABLET | Refills: 0 | Status: SHIPPED | OUTPATIENT
Start: 2022-08-17 | End: 2022-09-07

## 2022-08-15 RX ORDER — LETROZOLE 2.5 MG/1
2.5 TABLET, FILM COATED ORAL EVERY MORNING
Qty: 28 TABLET | Refills: 0 | Status: SHIPPED | OUTPATIENT
Start: 2022-08-17 | End: 2022-12-28

## 2022-08-20 ENCOUNTER — MEDICAL CORRESPONDENCE (OUTPATIENT)
Dept: HEALTH INFORMATION MANAGEMENT | Facility: CLINIC | Age: 57
End: 2022-08-20

## 2022-08-29 ENCOUNTER — HOSPITAL ENCOUNTER (OUTPATIENT)
Dept: PET IMAGING | Facility: CLINIC | Age: 57
Discharge: HOME | End: 2022-08-29
Attending: INTERNAL MEDICINE | Admitting: INTERNAL MEDICINE
Payer: COMMERCIAL

## 2022-08-29 DIAGNOSIS — Z17.0 MALIGNANT NEOPLASM OF LOWER-OUTER QUADRANT OF LEFT BREAST OF FEMALE, ESTROGEN RECEPTOR POSITIVE (H): ICD-10-CM

## 2022-08-29 DIAGNOSIS — C50.512 MALIGNANT NEOPLASM OF LOWER-OUTER QUADRANT OF LEFT BREAST OF FEMALE, ESTROGEN RECEPTOR POSITIVE (H): ICD-10-CM

## 2022-08-29 DIAGNOSIS — C50.919 METASTATIC BREAST CANCER: ICD-10-CM

## 2022-08-29 PROCEDURE — A9552 F18 FDG: HCPCS | Performed by: INTERNAL MEDICINE

## 2022-08-29 PROCEDURE — 78816 PET IMAGE W/CT FULL BODY: CPT | Mod: PS

## 2022-08-29 PROCEDURE — 74177 CT ABD & PELVIS W/CONTRAST: CPT

## 2022-08-29 PROCEDURE — 343N000001 HC RX 343: Performed by: INTERNAL MEDICINE

## 2022-08-29 PROCEDURE — 250N000011 HC RX IP 250 OP 636: Performed by: INTERNAL MEDICINE

## 2022-08-29 RX ORDER — IOPAMIDOL 755 MG/ML
10-135 INJECTION, SOLUTION INTRAVASCULAR ONCE
Status: COMPLETED | OUTPATIENT
Start: 2022-08-29 | End: 2022-08-29

## 2022-08-29 RX ADMIN — FLUDEOXYGLUCOSE F-18 10.7 MCI.: 500 INJECTION, SOLUTION INTRAVENOUS at 08:36

## 2022-08-29 RX ADMIN — IOPAMIDOL 89 ML: 755 INJECTION, SOLUTION INTRAVENOUS at 08:38

## 2022-08-31 ENCOUNTER — LAB (OUTPATIENT)
Dept: INFUSION THERAPY | Facility: CLINIC | Age: 57
End: 2022-08-31
Attending: INTERNAL MEDICINE
Payer: COMMERCIAL

## 2022-08-31 ENCOUNTER — DOCUMENTATION ONLY (OUTPATIENT)
Dept: ONCOLOGY | Facility: CLINIC | Age: 57
End: 2022-08-31

## 2022-08-31 VITALS
OXYGEN SATURATION: 99 % | DIASTOLIC BLOOD PRESSURE: 59 MMHG | TEMPERATURE: 97.9 F | SYSTOLIC BLOOD PRESSURE: 110 MMHG | HEART RATE: 66 BPM | RESPIRATION RATE: 16 BRPM

## 2022-08-31 DIAGNOSIS — C79.51 SPINAL CORD COMPRESSION DUE TO MALIGNANT NEOPLASM METASTATIC TO SPINE (H): ICD-10-CM

## 2022-08-31 DIAGNOSIS — C50.919 METASTATIC BREAST CANCER: ICD-10-CM

## 2022-08-31 DIAGNOSIS — C50.919 METASTATIC BREAST CANCER: Primary | ICD-10-CM

## 2022-08-31 DIAGNOSIS — C79.51 SPINAL CORD COMPRESSION DUE TO MALIGNANT NEOPLASM METASTATIC TO SPINE (H): Primary | ICD-10-CM

## 2022-08-31 DIAGNOSIS — G95.29 SPINAL CORD COMPRESSION DUE TO MALIGNANT NEOPLASM METASTATIC TO SPINE (H): ICD-10-CM

## 2022-08-31 DIAGNOSIS — G95.29 SPINAL CORD COMPRESSION DUE TO MALIGNANT NEOPLASM METASTATIC TO SPINE (H): Primary | ICD-10-CM

## 2022-08-31 LAB
ALBUMIN SERPL-MCNC: 3.7 G/DL (ref 3.4–5)
ALP SERPL-CCNC: 46 U/L (ref 40–150)
ALT SERPL W P-5'-P-CCNC: 19 U/L (ref 0–50)
ANION GAP SERPL CALCULATED.3IONS-SCNC: 4 MMOL/L (ref 3–14)
AST SERPL W P-5'-P-CCNC: 14 U/L (ref 0–45)
BASOPHILS # BLD AUTO: 0 10E3/UL (ref 0–0.2)
BASOPHILS NFR BLD AUTO: 1 %
BILIRUB SERPL-MCNC: 0.3 MG/DL (ref 0.2–1.3)
BUN SERPL-MCNC: 16 MG/DL (ref 7–30)
CALCIUM SERPL-MCNC: 9.2 MG/DL (ref 8.5–10.1)
CALCIUM SERPL-MCNC: 9.2 MG/DL (ref 8.5–10.1)
CHLORIDE BLD-SCNC: 108 MMOL/L (ref 94–109)
CO2 SERPL-SCNC: 29 MMOL/L (ref 20–32)
CREAT SERPL-MCNC: 0.67 MG/DL (ref 0.52–1.04)
EOSINOPHIL # BLD AUTO: 0 10E3/UL (ref 0–0.7)
EOSINOPHIL NFR BLD AUTO: 2 %
ERYTHROCYTE [DISTWIDTH] IN BLOOD BY AUTOMATED COUNT: 14 % (ref 10–15)
GFR SERPL CREATININE-BSD FRML MDRD: >90 ML/MIN/1.73M2
GLUCOSE BLD-MCNC: 100 MG/DL (ref 70–99)
HCT VFR BLD AUTO: 30.5 % (ref 35–47)
HGB BLD-MCNC: 10.3 G/DL (ref 11.7–15.7)
IMM GRANULOCYTES # BLD: 0 10E3/UL
IMM GRANULOCYTES NFR BLD: 0 %
LYMPHOCYTES # BLD AUTO: 0.4 10E3/UL (ref 0.8–5.3)
LYMPHOCYTES NFR BLD AUTO: 26 %
MAGNESIUM SERPL-MCNC: 2.3 MG/DL (ref 1.6–2.3)
MCH RBC QN AUTO: 34.6 PG (ref 26.5–33)
MCHC RBC AUTO-ENTMCNC: 33.8 G/DL (ref 31.5–36.5)
MCV RBC AUTO: 102 FL (ref 78–100)
MONOCYTES # BLD AUTO: 0.2 10E3/UL (ref 0–1.3)
MONOCYTES NFR BLD AUTO: 16 %
NEUTROPHILS # BLD AUTO: 0.8 10E3/UL (ref 1.6–8.3)
NEUTROPHILS NFR BLD AUTO: 55 %
NRBC # BLD AUTO: 0 10E3/UL
NRBC BLD AUTO-RTO: 0 /100
PHOSPHATE SERPL-MCNC: 3.6 MG/DL (ref 2.5–4.5)
PLATELET # BLD AUTO: 202 10E3/UL (ref 150–450)
POTASSIUM BLD-SCNC: 4 MMOL/L (ref 3.4–5.3)
PROT SERPL-MCNC: 6.7 G/DL (ref 6.8–8.8)
RBC # BLD AUTO: 2.98 10E6/UL (ref 3.8–5.2)
SODIUM SERPL-SCNC: 141 MMOL/L (ref 133–144)
WBC # BLD AUTO: 1.4 10E3/UL (ref 4–11)

## 2022-08-31 PROCEDURE — 83735 ASSAY OF MAGNESIUM: CPT | Performed by: NURSE PRACTITIONER

## 2022-08-31 PROCEDURE — 85025 COMPLETE CBC W/AUTO DIFF WBC: CPT | Performed by: INTERNAL MEDICINE

## 2022-08-31 PROCEDURE — 82310 ASSAY OF CALCIUM: CPT | Performed by: NURSE PRACTITIONER

## 2022-08-31 PROCEDURE — 96365 THER/PROPH/DIAG IV INF INIT: CPT

## 2022-08-31 PROCEDURE — 84100 ASSAY OF PHOSPHORUS: CPT | Performed by: NURSE PRACTITIONER

## 2022-08-31 PROCEDURE — 36415 COLL VENOUS BLD VENIPUNCTURE: CPT | Performed by: NURSE PRACTITIONER

## 2022-08-31 PROCEDURE — 80053 COMPREHEN METABOLIC PANEL: CPT | Performed by: NURSE PRACTITIONER

## 2022-08-31 PROCEDURE — 250N000011 HC RX IP 250 OP 636: Performed by: NURSE PRACTITIONER

## 2022-08-31 RX ORDER — ZOLEDRONIC ACID 0.04 MG/ML
4 INJECTION, SOLUTION INTRAVENOUS ONCE
Status: CANCELLED
Start: 2022-08-31 | End: 2022-08-31

## 2022-08-31 RX ORDER — HEPARIN SODIUM (PORCINE) LOCK FLUSH IV SOLN 100 UNIT/ML 100 UNIT/ML
5 SOLUTION INTRAVENOUS
Status: CANCELLED | OUTPATIENT
Start: 2022-08-31

## 2022-08-31 RX ORDER — ZOLEDRONIC ACID 0.04 MG/ML
4 INJECTION, SOLUTION INTRAVENOUS ONCE
Status: COMPLETED | OUTPATIENT
Start: 2022-08-31 | End: 2022-08-31

## 2022-08-31 RX ORDER — HEPARIN SODIUM,PORCINE 10 UNIT/ML
5 VIAL (ML) INTRAVENOUS
Status: CANCELLED | OUTPATIENT
Start: 2022-08-31

## 2022-08-31 RX ADMIN — ZOLEDRONIC ACID 4 MG: 0.04 INJECTION, SOLUTION INTRAVENOUS at 11:09

## 2022-08-31 NOTE — PROGRESS NOTES
ANC is 0.8-patient is afebrile    Per pharmacy recommendation  We are holding her Ribociclib another week and repeat CBC. Then she may be due for a dose reduction once ANC 1.0 or above.

## 2022-08-31 NOTE — PROGRESS NOTES
Infusion Nursing Note:  Mickie Mcghee presents today for zometa.    Patient seen by provider today: No   present during visit today: Not Applicable.    Note: Pt reports she is getting stronger, able to walk with walker. Pt able to transfer from wheelchair to infusion chair by herself.     Intravenous Access:  Peripheral IV placed.    Treatment Conditions:  Lab Results   Component Value Date     08/31/2022    POTASSIUM 4.0 08/31/2022    MAG 2.3 08/31/2022    CR 0.67 08/31/2022    OSMEL 9.2 08/31/2022    OSMEL 9.2 08/31/2022    BILITOTAL 0.3 08/31/2022    ALBUMIN 3.7 08/31/2022    ALT 19 08/31/2022    AST 14 08/31/2022     Results reviewed, labs MET treatment parameters, ok to proceed with treatment.    Post Infusion Assessment:  Patient tolerated infusion without incident.  Blood return noted pre and post infusion.  Site patent and intact, free from redness, edema or discomfort.  No evidence of extravasations.  Access discontinued per protocol.     Discharge Plan:   Discharge instructions reviewed with: Patient.  Patient and/or family verbalized understanding of discharge instructions and all questions answered.  AVS to patient via [employer].  Patient will return 9/28/22 for next appointment.   Patient discharged in stable condition accompanied by: self.  Departure Mode: Wheelchair.      Jossy Leary RN

## 2022-08-31 NOTE — ORAL ONC MGMT
Oral Chemotherapy Monitoring Program  Lab Follow Up     Patient currently on Ribociclib therapy.  Reviewed lab results from today.     Lab Results   Component Value Date    WBC 1.4 08/31/2022     Lab Results   Component Value Date    RBC 2.98 08/31/2022     Lab Results   Component Value Date    HGB 10.3 08/31/2022     Lab Results   Component Value Date    HCT 30.5 08/31/2022     Lab Results   Component Value Date     08/31/2022     Lab Results   Component Value Date    MCH 34.6 08/31/2022     Lab Results   Component Value Date    MCHC 33.8 08/31/2022     Lab Results   Component Value Date    RDW 14.0 08/31/2022     Lab Results   Component Value Date     08/31/2022       Last Comprehensive Metabolic Panel:  Sodium   Date Value Ref Range Status   08/31/2022 141 133 - 144 mmol/L Final     Potassium   Date Value Ref Range Status   08/31/2022 4.0 3.4 - 5.3 mmol/L Final     Chloride   Date Value Ref Range Status   08/31/2022 108 94 - 109 mmol/L Final     Carbon Dioxide (CO2)   Date Value Ref Range Status   08/31/2022 29 20 - 32 mmol/L Final     Anion Gap   Date Value Ref Range Status   08/31/2022 4 3 - 14 mmol/L Final     Glucose   Date Value Ref Range Status   08/31/2022 100 (H) 70 - 99 mg/dL Final     Urea Nitrogen   Date Value Ref Range Status   08/31/2022 16 7 - 30 mg/dL Final     Creatinine   Date Value Ref Range Status   08/31/2022 0.67 0.52 - 1.04 mg/dL Final     GFR Estimate   Date Value Ref Range Status   08/31/2022 >90 >60 mL/min/1.73m2 Final     Comment:     Effective December 21, 2021 eGFRcr in adults is calculated using the 2021 CKD-EPI creatinine equation which includes age and gender (Beth et al., NEJM, DOI: 10.1056/LZRMxq4778296)     Calcium   Date Value Ref Range Status   08/31/2022 9.2 8.5 - 10.1 mg/dL Final   08/31/2022 9.2 8.5 - 10.1 mg/dL Final     Bilirubin Total   Date Value Ref Range Status   08/31/2022 0.3 0.2 - 1.3 mg/dL Final     Alkaline Phosphatase   Date Value Ref Range Status    08/31/2022 46 40 - 150 U/L Final     ALT   Date Value Ref Range Status   08/31/2022 19 0 - 50 U/L Final     AST   Date Value Ref Range Status   08/31/2022 14 0 - 45 U/L Final     Assessment & Plan:  Reviewed CMP and CBC, ANC = 0.8 (grade 3). She took her last dose of prior cycle on 8/26. Would be due to start next cycle on 9/3. She will hold the start of her next cycle. She will get repeat labs (CBC) on 9/7 or 9/8. Will then start next cycle if ANC at 1.0 or greater. Will consider dose decrease but will defer to Dr. Lozada once we know repeat CBC values on 9/7 or 9/8.     Follow-Up:  9/7 or 9/8 repeat CBC. If ANC 1.0 or greater will start next Ribociclib cycle. Will ask Dr. Lozada about possible dose reduction from 400 mg to 200 mg daily.     Papito TompkinsD  [facility]  [phone]

## 2022-08-31 NOTE — PROGRESS NOTES
Medical Assistant Note:  Mickie Mcghee presents today for lab draw.    Patient seen by provider today: No.   present during visit today: Not Applicable.    Concerns: No Concerns.    Procedure:  Lab draw site: RAC, Needle type: Butterfly, Gauge: 23.    Post Assessment:  Labs drawn without difficulty: Yes.    Discharge Plan:  Departure Mode: Wheelchair.    Face to Face Time: 4 min.    Shari Schoenberger, CMA

## 2022-09-07 ENCOUNTER — DOCUMENTATION ONLY (OUTPATIENT)
Dept: ONCOLOGY | Facility: CLINIC | Age: 57
End: 2022-09-07

## 2022-09-07 ENCOUNTER — LAB (OUTPATIENT)
Dept: LAB | Facility: CLINIC | Age: 57
End: 2022-09-07
Payer: COMMERCIAL

## 2022-09-07 DIAGNOSIS — C50.919 METASTATIC BREAST CANCER: ICD-10-CM

## 2022-09-07 LAB
BASOPHILS # BLD AUTO: 0 10E3/UL (ref 0–0.2)
BASOPHILS NFR BLD AUTO: 2 %
EOSINOPHIL # BLD AUTO: 0 10E3/UL (ref 0–0.7)
EOSINOPHIL NFR BLD AUTO: 1 %
ERYTHROCYTE [DISTWIDTH] IN BLOOD BY AUTOMATED COUNT: 14 % (ref 10–15)
HCT VFR BLD AUTO: 32.8 % (ref 35–47)
HGB BLD-MCNC: 10.8 G/DL (ref 11.7–15.7)
IMM GRANULOCYTES # BLD: 0 10E3/UL
IMM GRANULOCYTES NFR BLD: 0 %
LYMPHOCYTES # BLD AUTO: 0.6 10E3/UL (ref 0.8–5.3)
LYMPHOCYTES NFR BLD AUTO: 25 %
MCH RBC QN AUTO: 34.2 PG (ref 26.5–33)
MCHC RBC AUTO-ENTMCNC: 32.9 G/DL (ref 31.5–36.5)
MCV RBC AUTO: 104 FL (ref 78–100)
MONOCYTES # BLD AUTO: 0.4 10E3/UL (ref 0–1.3)
MONOCYTES NFR BLD AUTO: 17 %
NEUTROPHILS # BLD AUTO: 1.3 10E3/UL (ref 1.6–8.3)
NEUTROPHILS NFR BLD AUTO: 54 %
PLATELET # BLD AUTO: 242 10E3/UL (ref 150–450)
RBC # BLD AUTO: 3.16 10E6/UL (ref 3.8–5.2)
WBC # BLD AUTO: 2.4 10E3/UL (ref 4–11)

## 2022-09-07 PROCEDURE — 36415 COLL VENOUS BLD VENIPUNCTURE: CPT

## 2022-09-07 PROCEDURE — 85025 COMPLETE CBC W/AUTO DIFF WBC: CPT

## 2022-09-07 NOTE — PROGRESS NOTES
Oral Chemotherapy Monitoring Program.    Patient currently on ribocilib therapy.    Called to discuss lab results from 9/7/22. Results are concerning for grade 2 neutropenia. However, counts have improved from last week.    Plan: Ok to restart ribocilib at lower dose of 200mg daily x 21 days then off for 7 days. Explained to patient that due to multiple delays in cycles this is what the  recommends. Dr. Lozada agreeable with plan. She reports that she has a ton of extra tablets on hand and will utilize these first prior to needing a refill sent. She will call us with about a week of supply left.    Patient verbalized understanding of plan. She will start her cycle today, 9/7 and but due to start next cycle on 10/5.    Questions answered to patient's satisfaction.    Will follow up in 4 weeks with repeat labs.     Ashley Mcadams PharmD  September 7, 2022

## 2022-09-14 ENCOUNTER — ONCOLOGY VISIT (OUTPATIENT)
Dept: ONCOLOGY | Facility: CLINIC | Age: 57
End: 2022-09-14
Attending: INTERNAL MEDICINE
Payer: COMMERCIAL

## 2022-09-14 VITALS
SYSTOLIC BLOOD PRESSURE: 121 MMHG | RESPIRATION RATE: 16 BRPM | WEIGHT: 141.2 LBS | OXYGEN SATURATION: 99 % | BODY MASS INDEX: 23.5 KG/M2 | HEART RATE: 65 BPM | DIASTOLIC BLOOD PRESSURE: 80 MMHG | TEMPERATURE: 98.3 F

## 2022-09-14 DIAGNOSIS — C50.919 METASTATIC BREAST CANCER: ICD-10-CM

## 2022-09-14 DIAGNOSIS — C50.512 MALIGNANT NEOPLASM OF LOWER-OUTER QUADRANT OF LEFT BREAST OF FEMALE, ESTROGEN RECEPTOR POSITIVE (H): ICD-10-CM

## 2022-09-14 DIAGNOSIS — Z17.0 MALIGNANT NEOPLASM OF LOWER-OUTER QUADRANT OF LEFT BREAST OF FEMALE, ESTROGEN RECEPTOR POSITIVE (H): ICD-10-CM

## 2022-09-14 DIAGNOSIS — G95.29 SPINAL CORD COMPRESSION DUE TO MALIGNANT NEOPLASM METASTATIC TO SPINE (H): Primary | ICD-10-CM

## 2022-09-14 DIAGNOSIS — C79.51 SPINAL CORD COMPRESSION DUE TO MALIGNANT NEOPLASM METASTATIC TO SPINE (H): Primary | ICD-10-CM

## 2022-09-14 PROCEDURE — 99214 OFFICE O/P EST MOD 30 MIN: CPT | Performed by: INTERNAL MEDICINE

## 2022-09-14 PROCEDURE — G0463 HOSPITAL OUTPT CLINIC VISIT: HCPCS

## 2022-09-14 RX ORDER — HEPARIN SODIUM (PORCINE) LOCK FLUSH IV SOLN 100 UNIT/ML 100 UNIT/ML
5 SOLUTION INTRAVENOUS
Status: CANCELLED | OUTPATIENT
Start: 2022-09-28

## 2022-09-14 RX ORDER — HEPARIN SODIUM,PORCINE 10 UNIT/ML
5 VIAL (ML) INTRAVENOUS
Status: CANCELLED | OUTPATIENT
Start: 2022-09-28

## 2022-09-14 RX ORDER — ZOLEDRONIC ACID 0.04 MG/ML
4 INJECTION, SOLUTION INTRAVENOUS ONCE
Status: CANCELLED
Start: 2022-09-28 | End: 2022-09-28

## 2022-09-14 ASSESSMENT — PAIN SCALES - GENERAL: PAINLEVEL: NO PAIN (0)

## 2022-09-14 NOTE — LETTER
"    9/14/2022         RE: Mickie Mcghee  [address]  [place] [address]        Dear Colleague,    Thank you for referring your patient, Mickie Mcghee, to the [facility]. Please see a copy of my visit note below.    Oncology Rooming Note    September 14, 2022 9:44 AM   Mickie Mcghee is a 57 year old female who presents for:    Chief Complaint   Patient presents with     Oncology Clinic Visit     Initial Vitals: There were no vitals taken for this visit. Estimated body mass index is 24.13 kg/m  as calculated from the following:    Height as of 7/28/22: 1.651 m (5' 5\").    Weight as of 7/28/22: 65.8 kg (145 lb). There is no height or weight on file to calculate BSA.  Data Unavailable Comment: Data Unavailable   No LMP recorded.  Allergies reviewed: Yes  Medications reviewed: Yes    Medications: Medication refills not needed today.  Pharmacy name entered into [employer]:    [id number] - [place], [place] - [address] [facility] - [place], [place] - [address]    Clinical concerns: no       Shari J. Schoenberger, [facility]              ONCOLOGY HISTORY:  Ms. Mcghee is a female with metastatic breast cancer. ER positive, NE positive and HER-2/halina negative.  -Foundation One reveals PIK3CA alteration.  -She had a stage III left breast cancer in 2007. ER positive and HER-2/halina negative.  She had bilateral mastectomy, chemotherapy, radiation, and anastrozole.     1.  On 03/18/2022, multiple imaging studies done because of bilateral lower extremity weakness:  -MRI of thoracic and lumbar spine revealed bone metastases with spinal cord compression.  -CT chest, abdomen, and pelvis on 03/19/2022 revealed bone metastasis, lung nodules and enlarged upper abdominal lymph node.  There is a hypodense nodule in the liver.  -Brain MRI on 03/19/2022 did not reveal any intracranial metastasis.  2.  CT-guided bone biopsy of left iliac " bone on 03/21/2022 revealed metastatic breast cancer. ER positive, AL positive and HER-2/halina negative.  3.  Letrozole started on 03/25/2022.  -Ribociclib started on 03/31/2022.  -Zometa started on 03/24/2022.  -Radiation to thoracolumbar and sacral area between 03/22/2022 and 04/07/2022.  3000 cGy in 10 fractions.     SUBJECTIVE:  Ms. Mcghee is a 57-year-old female with metastatic breast cancer on letrozole and ribociclib.  She is tolerating them well.    She also gets monthly Zometa for bone metastasis.  She is tolerating them well.  No dental or jaw related symptoms.    Patient's leg weakness is better.  She has been getting physical therapy.  In the house, she is walking.  When she goes outside, she uses a cane or a walker.  Inside the house she is able to walk and even go up the steps.  Physical therapist her cleared her for driving.  Patient is going to soon start driving.  Patient says that she has control of her legs.  No abnormal movement of the lower extremities.    No headache.  No dizziness.  No chest pain.  No shortness of breath.  No abdominal pain.  No nausea or vomiting.  Appetite has been good.  No bleeding from any site.  No urinary or bowel complaints. All other review of system is negative.     PHYSICAL EXAMINATION:  GENERAL:  She is alert, oriented x 3. Not in any distress.   Rest of systems not examined.     ASSESSMENT:  1.  A 57-year-old female with metastatic breast cancer.  Disease is responding.  2.  Lower extremity weakness, improved.  3.  Leukopenia from ribociclib.  4.  Normocytic anemia from metastatic breast cancer and ribociclib.     PLAN:  1.  Patient is doing well from metastatic breast cancer.  PET scan done on 08/29/2022 was personally reviewed.  Explained to her it reveals improvement in disease.  Bone lesions are becoming sclerotic.  No new lesions.    For breast cancer, she is on letrozole and ribociclib.  She will continue on it.  Side effects reviewed.  Ribociclib dose being  reduced to 200 mg a day for 21 days and 7 days off because of leukopenia.  She is agreeable for it.     2.  Patient gets monthly Zometa.  She is tolerating it well.  No dental or jaw related symptoms.  She will continue on it. She will continue on calcium vitamin D twice a day.     3.  Pain has resolved.  She is no longer taking any pain medications.    4.  Patient wants disability parking.  She qualifies for it.  She has metastatic breast cancer with bone metastases and leg weakness.  She does use cane or walker.  Forms filled out.  Patient will soon start driving.  Physical therapist has cleared her for driving.    5.  She had few questions which were all answered.  I will see her in 2 to 3 months time.  In between she will see our nurse practitioner.  Advised her to call us with any questions or concerns.     4.  Will get PET scan in a month.  I will see her after that.  She had few questions which were all answered.      Total visit time of 30 minutes.  Time spent in today's visit, review of chart/investigations today, monitoring for toxicity of high risk drugs and documentation today.     TOTAL VISIT TIME: 30 minutes. Time spent in today's visit, review of chart/investigations today, monitoring for toxicity of high risk drugs and documentation.      Again, thank you for allowing me to participate in the care of your patient.        Sincerely,        Yury Lozada MD

## 2022-09-14 NOTE — PROGRESS NOTES
"Oncology Rooming Note    September 14, 2022 9:44 AM   Mickie Mcghee is a 57 year old female who presents for:    Chief Complaint   Patient presents with     Oncology Clinic Visit     Initial Vitals: There were no vitals taken for this visit. Estimated body mass index is 24.13 kg/m  as calculated from the following:    Height as of 7/28/22: 1.651 m (5' 5\").    Weight as of 7/28/22: 65.8 kg (145 lb). There is no height or weight on file to calculate BSA.  Data Unavailable Comment: Data Unavailable   No LMP recorded.  Allergies reviewed: Yes  Medications reviewed: Yes    Medications: Medication refills not needed today.  Pharmacy name entered into [employer]:    [id number] - [place], [place] - [address] [facility] - [place], [place] - [address]    Clinical concerns: no       Shari J. Schoenberger, CMA            "

## 2022-09-14 NOTE — PATIENT INSTRUCTIONS
1. Continue letrozole and ribociclib.  2.  Continue monthly Zometa.  3.  Continue calcium and vitamin D twice a day.  4.  See NP with next 2 zometa.  5.  See me in 2-3 months.

## 2022-09-14 NOTE — PROGRESS NOTES
ONCOLOGY HISTORY:  Ms. Mcghee is a female with metastatic breast cancer. ER positive, ME positive and HER-2/halina negative.  -Foundation One reveals PIK3CA alteration.  -She had a stage III left breast cancer in 2007. ER positive and HER-2/halina negative.  She had bilateral mastectomy, chemotherapy, radiation, and anastrozole.     1.  On 03/18/2022, multiple imaging studies done because of bilateral lower extremity weakness:  -MRI of thoracic and lumbar spine revealed bone metastases with spinal cord compression.  -CT chest, abdomen, and pelvis on 03/19/2022 revealed bone metastasis, lung nodules and enlarged upper abdominal lymph node.  There is a hypodense nodule in the liver.  -Brain MRI on 03/19/2022 did not reveal any intracranial metastasis.  2.  CT-guided bone biopsy of left iliac bone on 03/21/2022 revealed metastatic breast cancer. ER positive, ME positive and HER-2/halina negative.  3.  Letrozole started on 03/25/2022.  -Ribociclib started on 03/31/2022.  -Zometa started on 03/24/2022.  -Radiation to thoracolumbar and sacral area between 03/22/2022 and 04/07/2022.  3000 cGy in 10 fractions.     SUBJECTIVE:  Ms. Mcghee is a 57-year-old female with metastatic breast cancer on letrozole and ribociclib.  She is tolerating them well.    She also gets monthly Zometa for bone metastasis.  She is tolerating them well.  No dental or jaw related symptoms.    Patient's leg weakness is better.  She has been getting physical therapy.  In the house, she is walking.  When she goes outside, she uses a cane or a walker.  Inside the house she is able to walk and even go up the steps.  Physical therapist her cleared her for driving.  Patient is going to soon start driving.  Patient says that she has control of her legs.  No abnormal movement of the lower extremities.    No headache.  No dizziness.  No chest pain.  No shortness of breath.  No abdominal pain.  No nausea or vomiting.  Appetite has been good.  No bleeding from any site.  No  urinary or bowel complaints. All other review of system is negative.     PHYSICAL EXAMINATION:  GENERAL:  She is alert, oriented x 3. Not in any distress.   Rest of systems not examined.     ASSESSMENT:  1.  A 57-year-old female with metastatic breast cancer.  Disease is responding.  2.  Lower extremity weakness, improved.  3.  Leukopenia from ribociclib.  4.  Normocytic anemia from metastatic breast cancer and ribociclib.     PLAN:  1.  Patient is doing well from metastatic breast cancer.  PET scan done on 08/29/2022 was personally reviewed.  Explained to her it reveals improvement in disease.  Bone lesions are becoming sclerotic.  No new lesions.    For breast cancer, she is on letrozole and ribociclib.  She will continue on it.  Side effects reviewed.  Ribociclib dose being reduced to 200 mg a day for 21 days and 7 days off because of leukopenia.  She is agreeable for it.     2.  Patient gets monthly Zometa.  She is tolerating it well.  No dental or jaw related symptoms.  She will continue on it. She will continue on calcium vitamin D twice a day.     3.  Pain has resolved.  She is no longer taking any pain medications.    4.  Patient wants disability parking.  She qualifies for it.  She has metastatic breast cancer with bone metastases and leg weakness.  She does use cane or walker.  Forms filled out.  Patient will soon start driving.  Physical therapist has cleared her for driving.    5.  She had few questions which were all answered.  I will see her in 2 to 3 months time.  In between she will see our nurse practitioner.  Advised her to call us with any questions or concerns.     4.  Will get PET scan in a month.  I will see her after that.  She had few questions which were all answered.      Total visit time of 30 minutes.  Time spent in today's visit, review of chart/investigations today, monitoring for toxicity of high risk drugs and documentation today.     TOTAL VISIT TIME: 30 minutes. Time spent in  today's visit, review of chart/investigations today, monitoring for toxicity of high risk drugs and documentation.

## 2022-10-01 ENCOUNTER — HEALTH MAINTENANCE LETTER (OUTPATIENT)
Age: 57
End: 2022-10-01

## 2022-10-03 ENCOUNTER — LAB (OUTPATIENT)
Dept: INFUSION THERAPY | Facility: CLINIC | Age: 57
End: 2022-10-03
Attending: INTERNAL MEDICINE
Payer: COMMERCIAL

## 2022-10-03 VITALS
TEMPERATURE: 97.9 F | HEART RATE: 65 BPM | RESPIRATION RATE: 18 BRPM | SYSTOLIC BLOOD PRESSURE: 109 MMHG | DIASTOLIC BLOOD PRESSURE: 79 MMHG

## 2022-10-03 DIAGNOSIS — G95.29 SPINAL CORD COMPRESSION DUE TO MALIGNANT NEOPLASM METASTATIC TO SPINE (H): ICD-10-CM

## 2022-10-03 DIAGNOSIS — C50.919 METASTATIC BREAST CANCER: ICD-10-CM

## 2022-10-03 DIAGNOSIS — C79.51 SPINAL CORD COMPRESSION DUE TO MALIGNANT NEOPLASM METASTATIC TO SPINE (H): ICD-10-CM

## 2022-10-03 DIAGNOSIS — C50.919 METASTATIC BREAST CANCER: Primary | ICD-10-CM

## 2022-10-03 LAB
ALBUMIN SERPL-MCNC: 3.7 G/DL (ref 3.4–5)
ALP SERPL-CCNC: 44 U/L (ref 40–150)
ALT SERPL W P-5'-P-CCNC: 16 U/L (ref 0–50)
ANION GAP SERPL CALCULATED.3IONS-SCNC: 5 MMOL/L (ref 3–14)
AST SERPL W P-5'-P-CCNC: 18 U/L (ref 0–45)
BASOPHILS # BLD AUTO: 0 10E3/UL (ref 0–0.2)
BASOPHILS NFR BLD AUTO: 1 %
BILIRUB SERPL-MCNC: 0.4 MG/DL (ref 0.2–1.3)
BUN SERPL-MCNC: 11 MG/DL (ref 7–30)
CALCIUM SERPL-MCNC: 9.5 MG/DL (ref 8.5–10.1)
CHLORIDE BLD-SCNC: 107 MMOL/L (ref 94–109)
CO2 SERPL-SCNC: 28 MMOL/L (ref 20–32)
CREAT SERPL-MCNC: 0.69 MG/DL (ref 0.52–1.04)
EOSINOPHIL # BLD AUTO: 0.4 10E3/UL (ref 0–0.7)
EOSINOPHIL NFR BLD AUTO: 12 %
ERYTHROCYTE [DISTWIDTH] IN BLOOD BY AUTOMATED COUNT: 13.9 % (ref 10–15)
GFR SERPL CREATININE-BSD FRML MDRD: >90 ML/MIN/1.73M2
GLUCOSE BLD-MCNC: 107 MG/DL (ref 70–99)
HCT VFR BLD AUTO: 34.1 % (ref 35–47)
HGB BLD-MCNC: 11.1 G/DL (ref 11.7–15.7)
IMM GRANULOCYTES # BLD: 0 10E3/UL
IMM GRANULOCYTES NFR BLD: 0 %
LYMPHOCYTES # BLD AUTO: 0.7 10E3/UL (ref 0.8–5.3)
LYMPHOCYTES NFR BLD AUTO: 22 %
MAGNESIUM SERPL-MCNC: 2.4 MG/DL (ref 1.6–2.3)
MCH RBC QN AUTO: 33.7 PG (ref 26.5–33)
MCHC RBC AUTO-ENTMCNC: 32.6 G/DL (ref 31.5–36.5)
MCV RBC AUTO: 104 FL (ref 78–100)
MONOCYTES # BLD AUTO: 0.4 10E3/UL (ref 0–1.3)
MONOCYTES NFR BLD AUTO: 12 %
NEUTROPHILS # BLD AUTO: 1.6 10E3/UL (ref 1.6–8.3)
NEUTROPHILS NFR BLD AUTO: 53 %
NRBC # BLD AUTO: 0 10E3/UL
NRBC BLD AUTO-RTO: 0 /100
PHOSPHATE SERPL-MCNC: 3.8 MG/DL (ref 2.5–4.5)
PLATELET # BLD AUTO: 245 10E3/UL (ref 150–450)
POTASSIUM BLD-SCNC: 4.6 MMOL/L (ref 3.4–5.3)
PROT SERPL-MCNC: 6.8 G/DL (ref 6.8–8.8)
RBC # BLD AUTO: 3.29 10E6/UL (ref 3.8–5.2)
SODIUM SERPL-SCNC: 140 MMOL/L (ref 133–144)
WBC # BLD AUTO: 3 10E3/UL (ref 4–11)

## 2022-10-03 PROCEDURE — 84100 ASSAY OF PHOSPHORUS: CPT | Performed by: INTERNAL MEDICINE

## 2022-10-03 PROCEDURE — 91312 HC RX IP 250 OP 636: CPT | Performed by: INTERNAL MEDICINE

## 2022-10-03 PROCEDURE — 85025 COMPLETE CBC W/AUTO DIFF WBC: CPT | Performed by: INTERNAL MEDICINE

## 2022-10-03 PROCEDURE — G0008 ADMIN INFLUENZA VIRUS VAC: HCPCS | Performed by: INTERNAL MEDICINE

## 2022-10-03 PROCEDURE — 250N000011 HC RX IP 250 OP 636: Performed by: INTERNAL MEDICINE

## 2022-10-03 PROCEDURE — 91312 HC ADMIN COVID VAC PFIZER 12+ BIVAL ADDITIONAL: CPT | Performed by: INTERNAL MEDICINE

## 2022-10-03 PROCEDURE — 0124A HC ADMIN COVID VAC PFIZER 12+ BIVAL ADDITIONAL: CPT | Performed by: INTERNAL MEDICINE

## 2022-10-03 PROCEDURE — 36415 COLL VENOUS BLD VENIPUNCTURE: CPT

## 2022-10-03 PROCEDURE — 80053 COMPREHEN METABOLIC PANEL: CPT | Performed by: INTERNAL MEDICINE

## 2022-10-03 PROCEDURE — 96365 THER/PROPH/DIAG IV INF INIT: CPT

## 2022-10-03 PROCEDURE — 90682 RIV4 VACC RECOMBINANT DNA IM: CPT | Performed by: INTERNAL MEDICINE

## 2022-10-03 PROCEDURE — 83735 ASSAY OF MAGNESIUM: CPT | Performed by: INTERNAL MEDICINE

## 2022-10-03 PROCEDURE — 82040 ASSAY OF SERUM ALBUMIN: CPT | Performed by: INTERNAL MEDICINE

## 2022-10-03 RX ORDER — ZOLEDRONIC ACID 0.04 MG/ML
4 INJECTION, SOLUTION INTRAVENOUS ONCE
Status: COMPLETED | OUTPATIENT
Start: 2022-10-03 | End: 2022-10-03

## 2022-10-03 RX ADMIN — INFLUENZA A VIRUS A/WISCONSIN/588/2019 (H1N1) RECOMBINANT HEMAGGLUTININ ANTIGEN, INFLUENZA A VIRUS A/DARWIN/6/2021 (H3N2) RECOMBINANT HEMAGGLUTININ ANTIGEN, INFLUENZA B VIRUS B/AUSTRIA/1359417/2021 RECOMBINANT HEMAGGLUTININ ANTIGEN, AND INFLUENZA B VIRUS B/PHUKET/3073/2013 RECOMBINANT HEMAGGLUTININ ANTIGEN 0.5 ML: 45; 45; 45; 45 INJECTION INTRAMUSCULAR at 10:43

## 2022-10-03 RX ADMIN — ZOLEDRONIC ACID 4 MG: 0.04 INJECTION, SOLUTION INTRAVENOUS at 10:19

## 2022-10-03 RX ADMIN — BNT162B2 ORIGINAL AND OMICRON BA.4/BA.5 30 MCG: .1125; .1125 INJECTION, SUSPENSION INTRAMUSCULAR at 10:46

## 2022-10-03 NOTE — PROGRESS NOTES
Infusion Nursing Note:  Mickie Mcghee presents today for zometa.    Patient seen by provider today: No   present during visit today: Not Applicable.    Note: Flu and bivalent covid vaccines given.    Intravenous Access:  Peripheral IV placed.    Treatment Conditions:  Lab Results   Component Value Date    HGB 11.1 (L) 10/03/2022    WBC 3.0 (L) 10/03/2022    ANEU 1.3 (L) 08/04/2022    ANEUTAUTO 1.6 10/03/2022     10/03/2022      Lab Results   Component Value Date     10/03/2022    POTASSIUM 4.6 10/03/2022    MAG 2.4 (H) 10/03/2022    CR 0.69 10/03/2022    OSMEL 9.5 10/03/2022    BILITOTAL 0.4 10/03/2022    ALBUMIN 3.7 10/03/2022    ALT 16 10/03/2022    AST 18 10/03/2022     Results reviewed, labs MET treatment parameters, ok to proceed with treatment.    Post Infusion Assessment:  Patient tolerated infusion without incident.  Blood return noted pre and post infusion.  Site patent and intact, free from redness, edema or discomfort.  No evidence of extravasations.  Access discontinued per protocol.     Discharge Plan:   Discharge instructions reviewed with: Patient.  Patient and/or family verbalized understanding of discharge instructions and all questions answered.  Patient discharged in stable condition accompanied by: self.  Departure Mode: Ambulatory.      Destiny Loving RN

## 2022-10-03 NOTE — ORAL ONC MGMT
Oral Chemotherapy Monitoring Program  Lab Follow Up     Patient currently on Ribociclib.  Reviewed lab results from today.     Lab Results   Component Value Date    WBC 3.0 10/03/2022     Lab Results   Component Value Date    RBC 3.29 10/03/2022     Lab Results   Component Value Date    HGB 11.1 10/03/2022     Lab Results   Component Value Date    HCT 34.1 10/03/2022     Lab Results   Component Value Date     10/03/2022     Lab Results   Component Value Date    MCH 33.7 10/03/2022     Lab Results   Component Value Date    MCHC 32.6 10/03/2022     Lab Results   Component Value Date    RDW 13.9 10/03/2022     Lab Results   Component Value Date     10/03/2022       Last Comprehensive Metabolic Panel:  Sodium   Date Value Ref Range Status   10/03/2022 140 133 - 144 mmol/L Final     Potassium   Date Value Ref Range Status   10/03/2022 4.6 3.4 - 5.3 mmol/L Final     Chloride   Date Value Ref Range Status   10/03/2022 107 94 - 109 mmol/L Final     Carbon Dioxide (CO2)   Date Value Ref Range Status   10/03/2022 28 20 - 32 mmol/L Final     Anion Gap   Date Value Ref Range Status   10/03/2022 5 3 - 14 mmol/L Final     Glucose   Date Value Ref Range Status   10/03/2022 107 (H) 70 - 99 mg/dL Final     Urea Nitrogen   Date Value Ref Range Status   10/03/2022 11 7 - 30 mg/dL Final     Creatinine   Date Value Ref Range Status   10/03/2022 0.69 0.52 - 1.04 mg/dL Final     GFR Estimate   Date Value Ref Range Status   10/03/2022 >90 >60 mL/min/1.73m2 Final     Comment:     Effective December 21, 2021 eGFRcr in adults is calculated using the 2021 CKD-EPI creatinine equation which includes age and gender (Beth et al., NEJM, DOI: 10.1056/VCAGlc7335581)     Calcium   Date Value Ref Range Status   10/03/2022 9.5 8.5 - 10.1 mg/dL Final     Bilirubin Total   Date Value Ref Range Status   10/03/2022 0.4 0.2 - 1.3 mg/dL Final     Alkaline Phosphatase   Date Value Ref Range Status   10/03/2022 44 40 - 150 U/L Final     ALT    Date Value Ref Range Status   10/03/2022 16 0 - 50 U/L Final     AST   Date Value Ref Range Status   10/03/2022 18 0 - 45 U/L Final     Assessment & Plan:  CMP and CBC reviewed, no new concerns. Ok to start next cycle of Ribociclib on 10/5. Of note, patient on 200 mg daily x 21 days then 7 days off. Then next cycle anticipated on 11/2 pending labs.      Follow-Up:  10/31 lab appt  11/2 provider appt & Zometa infusion & start of next Ribociclib cycle (pending labs from 10/31).    Papito TompkinsD  [facility]  [phone]

## 2022-10-31 ENCOUNTER — LAB (OUTPATIENT)
Dept: LAB | Facility: CLINIC | Age: 57
End: 2022-10-31
Payer: COMMERCIAL

## 2022-10-31 DIAGNOSIS — C50.919 METASTATIC BREAST CANCER: ICD-10-CM

## 2022-10-31 LAB
ALBUMIN SERPL BCG-MCNC: 4.2 G/DL (ref 3.5–5.2)
ALP SERPL-CCNC: 46 U/L (ref 35–104)
ALT SERPL W P-5'-P-CCNC: 10 U/L (ref 10–35)
ANION GAP SERPL CALCULATED.3IONS-SCNC: 10 MMOL/L (ref 7–15)
AST SERPL W P-5'-P-CCNC: 19 U/L (ref 10–35)
BILIRUB SERPL-MCNC: 0.2 MG/DL
BUN SERPL-MCNC: 16.3 MG/DL (ref 6–20)
CALCIUM SERPL-MCNC: 9.9 MG/DL (ref 8.6–10)
CHLORIDE SERPL-SCNC: 106 MMOL/L (ref 98–107)
CREAT SERPL-MCNC: 0.76 MG/DL (ref 0.51–0.95)
DEPRECATED HCO3 PLAS-SCNC: 26 MMOL/L (ref 22–29)
GFR SERPL CREATININE-BSD FRML MDRD: >90 ML/MIN/1.73M2
GLUCOSE SERPL-MCNC: 91 MG/DL (ref 70–99)
MAGNESIUM SERPL-MCNC: 2 MG/DL (ref 1.7–2.3)
PHOSPHATE SERPL-MCNC: 3.6 MG/DL (ref 2.5–4.5)
POTASSIUM SERPL-SCNC: 4.4 MMOL/L (ref 3.4–5.3)
PROT SERPL-MCNC: 6.4 G/DL (ref 6.4–8.3)
SODIUM SERPL-SCNC: 142 MMOL/L (ref 136–145)

## 2022-10-31 PROCEDURE — 83735 ASSAY OF MAGNESIUM: CPT

## 2022-10-31 PROCEDURE — 36415 COLL VENOUS BLD VENIPUNCTURE: CPT

## 2022-10-31 PROCEDURE — 84100 ASSAY OF PHOSPHORUS: CPT

## 2022-10-31 PROCEDURE — 80053 COMPREHEN METABOLIC PANEL: CPT

## 2022-11-02 ENCOUNTER — ONCOLOGY VISIT (OUTPATIENT)
Dept: ONCOLOGY | Facility: CLINIC | Age: 57
End: 2022-11-02
Attending: NURSE PRACTITIONER
Payer: COMMERCIAL

## 2022-11-02 ENCOUNTER — INFUSION THERAPY VISIT (OUTPATIENT)
Dept: INFUSION THERAPY | Facility: CLINIC | Age: 57
End: 2022-11-02
Attending: NURSE PRACTITIONER
Payer: COMMERCIAL

## 2022-11-02 VITALS
HEIGHT: 65 IN | WEIGHT: 144.2 LBS | HEART RATE: 89 BPM | BODY MASS INDEX: 24.03 KG/M2 | SYSTOLIC BLOOD PRESSURE: 108 MMHG | RESPIRATION RATE: 16 BRPM | DIASTOLIC BLOOD PRESSURE: 76 MMHG | TEMPERATURE: 98.3 F | OXYGEN SATURATION: 97 %

## 2022-11-02 DIAGNOSIS — C50.919 METASTATIC BREAST CANCER: Primary | ICD-10-CM

## 2022-11-02 DIAGNOSIS — G95.29 SPINAL CORD COMPRESSION DUE TO MALIGNANT NEOPLASM METASTATIC TO SPINE (H): ICD-10-CM

## 2022-11-02 DIAGNOSIS — C79.51 SPINAL CORD COMPRESSION DUE TO MALIGNANT NEOPLASM METASTATIC TO SPINE (H): ICD-10-CM

## 2022-11-02 PROCEDURE — 99214 OFFICE O/P EST MOD 30 MIN: CPT | Performed by: NURSE PRACTITIONER

## 2022-11-02 PROCEDURE — 96365 THER/PROPH/DIAG IV INF INIT: CPT

## 2022-11-02 PROCEDURE — 250N000011 HC RX IP 250 OP 636: Performed by: NURSE PRACTITIONER

## 2022-11-02 PROCEDURE — G0463 HOSPITAL OUTPT CLINIC VISIT: HCPCS | Mod: 25

## 2022-11-02 RX ORDER — ZOLEDRONIC ACID 0.04 MG/ML
4 INJECTION, SOLUTION INTRAVENOUS ONCE
Status: COMPLETED | OUTPATIENT
Start: 2022-11-02 | End: 2022-11-02

## 2022-11-02 RX ADMIN — ZOLEDRONIC ACID 4 MG: 0.04 INJECTION, SOLUTION INTRAVENOUS at 09:37

## 2022-11-02 ASSESSMENT — PAIN SCALES - GENERAL: PAINLEVEL: NO PAIN (0)

## 2022-11-02 NOTE — LETTER
"    11/2/2022         RE: Mickie Mcghee  [address]  [place] [address]        Dear Colleague,    Thank you for referring your patient, Mickie Mcghee, to the [facility]. Please see a copy of my visit note below.    Oncology Rooming Note    November 2, 2022 8:54 AM   Mickie Mcghee is a 57 year old female who presents for:    Chief Complaint   Patient presents with     Oncology Clinic Visit     Metastatic breast cancer (H)     Initial Vitals: /76   Pulse 89   Temp 98.3  F (36.8  C) (Oral)   Resp 16   Ht 1.651 m (5' 5\")   Wt 65.4 kg (144 lb 3.2 oz)   SpO2 97%   BMI 24.00 kg/m   Estimated body mass index is 24 kg/m  as calculated from the following:    Height as of this encounter: 1.651 m (5' 5\").    Weight as of this encounter: 65.4 kg (144 lb 3.2 oz). Body surface area is 1.73 meters squared.  No Pain (0) Comment: Data Unavailable   No LMP recorded.  Allergies reviewed: Yes  Medications reviewed: Yes    Medications: Medication refills not needed today.  Pharmacy name entered into Smartling:    [id number] - [place], [place] - [address] AT [facility] - [place], [place] - [address]    Clinical concerns: None       Lisandra Augusitn MA                Oncology/Hematology Visit Note  Nov 2, 2022    Reason for Visit: follow up of metastatic breast cancer  ER positive NV positive HER2 negative  Diagnosed in 2007 status post bilateral mastectomy chemotherapy radiation anastrozole  -03/21/20221179-UK-basttr bone biopsy of left iliac bone reveals metastatic breast cancer ER positive NV positive HER2 negative  03/31/2021-started Ribociclib 600 mg   Letrazole     03/22-04/07/2022-status post radiation to the L-spine and LS spine 10 fractions  05/03-due to neutropenia Ribociclib decreased to 400 mg  -Improvement in counts  06/01- Ribociclib increased back to 600 mg  Due to persistent leukopenia Ribociclib " reduced to 200 mg daily for 21 days 7 days off      Interval History:  Patient reports she is feeling well.  She reports has been tolerating treatment well denies fever chills sweats cough shortness of breath chest pain nausea vomiting diarrhea abdominal pain or bleeding      Review of Systems:  14 point ROS of systems including Constitutional, Eyes, Respiratory, Cardiovascular, Gastroenterology, Genitourinary, Integumentary, Muscularskeletal, Psychiatric were all negative except for pertinent positives noted in my HPI.    Physical Examination:  General: The patient is a pleasant female in no acute distress.  HEENT: EOMI, PERRL. Sclerae are anicteric. Oral mucosa is pink and moist with no lesions or thrush.   Lymph: Neck is supple with no lymphadenopathy in the cervical or supraclavicular areas.   Heart: Regular rate and rhythm.   Lungs: Clear to auscultation bilaterally.   GI: Bowel sounds present, soft, nontender with no palpable hepatosplenomegaly or masses.   Extremities: No lower extremity edema noted bilaterally.   Skin: No rashes, petechiae, or bruising noted on exposed skin.    Laboratory Data:  CMP results reviewed    Assessment and Plan:  This is a 57-year-old female with    metastatic breast cancer  ER positive AR positive HER2 negative  Diagnosed in 2007 status post bilateral mastectomy chemotherapy radiation anastrozole  -03/21/20223269-RZ-pexsmw bone biopsy of left iliac bone reveals metastatic breast cancer ER positive AR positive HER2 negative  03/31/2021-started Ribociclib 600 mg  And Letrazole   Due to leukopenia Ribociclib reduced to 200 mg daily(21 days on 7 days off)  -Continue with Ribociclib 200 mg 21 days on 7 days off  -Continue with letrozole  -Schedule for PET scan prior to visit with Dr. Lozada on 11/29        Bone metastasis  03/22-04/07/2022-status post radiation to the L-spine and LS spine 10 fractions  Continue with monthly Zometa  -Okay to give Zometa today  Call our clinic in the event  of jaw pain or any dental issues    Cancer pain-well controlled with occasional Tylenol now  She is not taking oxycodone      Leukopenia secondary to  Ribociclib   Normal ANC  Continue to monitor  -She is due for CBC check on 11/29  Check temperature frequently in the event of fever chills sweats or any signs symptoms of infection patient is advised to call our clinic or go to ER     Anemia  Mild  Continue to monitor      Call our clinic with any changes in health condition or questions      SAMI Jacob CNP  [facility]     Chart documentation with Dragon Voice recognition Software. Although reviewed after completion, some words and grammatical errors may remain.            Again, thank you for allowing me to participate in the care of your patient.        Sincerely,        SAMI Jacob CNP

## 2022-11-02 NOTE — ORAL ONC MGMT
Oral Chemotherapy Monitoring Program  Lab Follow Up     Patient currently on Ribociclib and Letrozole therapy.  Reviewed lab results from today.     Lab Results   Component Value Date    WBC 3.0 10/03/2022     Lab Results   Component Value Date    RBC 3.29 10/03/2022     Lab Results   Component Value Date    HGB 11.1 10/03/2022     Lab Results   Component Value Date    HCT 34.1 10/03/2022     Lab Results   Component Value Date     10/03/2022     Lab Results   Component Value Date    MCH 33.7 10/03/2022     Lab Results   Component Value Date    MCHC 32.6 10/03/2022     Lab Results   Component Value Date    RDW 13.9 10/03/2022     Lab Results   Component Value Date     10/03/2022       Last Comprehensive Metabolic Panel:  Sodium   Date Value Ref Range Status   10/31/2022 142 136 - 145 mmol/L Final     Potassium   Date Value Ref Range Status   10/31/2022 4.4 3.4 - 5.3 mmol/L Final   10/03/2022 4.6 3.4 - 5.3 mmol/L Final     Chloride   Date Value Ref Range Status   10/31/2022 106 98 - 107 mmol/L Final   10/03/2022 107 94 - 109 mmol/L Final     Carbon Dioxide (CO2)   Date Value Ref Range Status   10/31/2022 26 22 - 29 mmol/L Final   10/03/2022 28 20 - 32 mmol/L Final     Anion Gap   Date Value Ref Range Status   10/31/2022 10 7 - 15 mmol/L Final   10/03/2022 5 3 - 14 mmol/L Final     Glucose   Date Value Ref Range Status   10/31/2022 91 70 - 99 mg/dL Final   10/03/2022 107 (H) 70 - 99 mg/dL Final     Urea Nitrogen   Date Value Ref Range Status   10/31/2022 16.3 6.0 - 20.0 mg/dL Final   10/03/2022 11 7 - 30 mg/dL Final     Creatinine   Date Value Ref Range Status   10/31/2022 0.76 0.51 - 0.95 mg/dL Final     GFR Estimate   Date Value Ref Range Status   10/31/2022 >90 >60 mL/min/1.73m2 Final     Comment:     Effective December 21, 2021 eGFRcr in adults is calculated using the 2021 CKD-EPI creatinine equation which includes age and gender (Beth et al., NEJM, DOI: 10.1056/KTAXrs5174499)     Calcium   Date  Value Ref Range Status   10/31/2022 9.9 8.6 - 10.0 mg/dL Final     Bilirubin Total   Date Value Ref Range Status   10/31/2022 0.2 <=1.2 mg/dL Final     Alkaline Phosphatase   Date Value Ref Range Status   10/31/2022 46 35 - 104 U/L Final     ALT   Date Value Ref Range Status   10/31/2022 10 10 - 35 U/L Final     AST   Date Value Ref Range Status   10/31/2022 19 10 - 35 U/L Final     Assessment & Plan:  CMP, Mag, and Phos checked today; no CBC checked. Will need to check prior to next cycle which is anticipated to start on 11/30. Continue monthly lab checks. Ok to continue Ribociclib at reduced dose of 200 mg daily x 21 days, then off for 7 days.     Follow-Up:  11/29 labs, Dr. Lozada, Zometa infusion, and start of next Ribociclib cycle pending labs will be on 11/30.    Papito TompkinsD  [facility]  [phone]

## 2022-11-02 NOTE — PROGRESS NOTES
Oncology/Hematology Visit Note  Nov 2, 2022    Reason for Visit: follow up of metastatic breast cancer  ER positive DC positive HER2 negative  Diagnosed in 2007 status post bilateral mastectomy chemotherapy radiation anastrozole  -03/21/20228919-CC-antbwe bone biopsy of left iliac bone reveals metastatic breast cancer ER positive DC positive HER2 negative  03/31/2021-started Ribociclib 600 mg   Letrazole     03/22-04/07/2022-status post radiation to the L-spine and LS spine 10 fractions  05/03-due to neutropenia Ribociclib decreased to 400 mg  -Improvement in counts  06/01- Ribociclib increased back to 600 mg  Due to persistent leukopenia Ribociclib reduced to 200 mg daily for 21 days 7 days off      Interval History:  Patient reports she is feeling well.  She reports has been tolerating treatment well denies fever chills sweats cough shortness of breath chest pain nausea vomiting diarrhea abdominal pain or bleeding      Review of Systems:  14 point ROS of systems including Constitutional, Eyes, Respiratory, Cardiovascular, Gastroenterology, Genitourinary, Integumentary, Muscularskeletal, Psychiatric were all negative except for pertinent positives noted in my HPI.    Physical Examination:  General: The patient is a pleasant female in no acute distress.  HEENT: EOMI, PERRL. Sclerae are anicteric. Oral mucosa is pink and moist with no lesions or thrush.   Lymph: Neck is supple with no lymphadenopathy in the cervical or supraclavicular areas.   Heart: Regular rate and rhythm.   Lungs: Clear to auscultation bilaterally.   GI: Bowel sounds present, soft, nontender with no palpable hepatosplenomegaly or masses.   Extremities: No lower extremity edema noted bilaterally.   Skin: No rashes, petechiae, or bruising noted on exposed skin.    Laboratory Data:  CMP results reviewed    Assessment and Plan:  This is a 57-year-old female with    metastatic breast cancer  ER positive DC positive HER2 negative  Diagnosed in 2007 status  post bilateral mastectomy chemotherapy radiation anastrozole  -03/21/[id number]-SP-bmrovj bone biopsy of left iliac bone reveals metastatic breast cancer ER positive ID positive HER2 negative  03/31/2021-started Ribociclib 600 mg  And Letrazole   Due to leukopenia Ribociclib reduced to 200 mg daily(21 days on 7 days off)  -Continue with Ribociclib 200 mg 21 days on 7 days off  -Continue with letrozole  -Schedule for PET scan prior to visit with Dr. Lozada on 11/29        Bone metastasis  03/22-04/07/2022-status post radiation to the L-spine and LS spine 10 fractions  Continue with monthly Zometa  -Okay to give Zometa today  Call our clinic in the event of jaw pain or any dental issues    Cancer pain-well controlled with occasional Tylenol now  She is not taking oxycodone      Leukopenia secondary to  Ribociclib   Normal ANC  Continue to monitor  -She is due for CBC check on 11/29  Check temperature frequently in the event of fever chills sweats or any signs symptoms of infection patient is advised to call our clinic or go to ER     Anemia  Mild  Continue to monitor      Call our clinic with any changes in health condition or questions      SAMI Jacob CNP  [facility] [place]     Chart documentation with Dragon Voice recognition Software. Although reviewed after completion, some words and grammatical errors may remain.

## 2022-11-02 NOTE — PROGRESS NOTES
Infusion Nursing Note:  Mickie Mcghee presents today for zometa.    Patient seen by provider today: Yes: TOMI Martínez NP   present during visit today: Not Applicable.    Note: pt states she is taking calcium with vit D at home    Intravenous Access:  Peripheral IV placed.    Treatment Conditions:  Lab Results   Component Value Date     10/31/2022    POTASSIUM 4.4 10/31/2022    MAG 2.0 10/31/2022    CR 0.76 10/31/2022    OSMEL 9.9 10/31/2022    BILITOTAL 0.2 10/31/2022    ALBUMIN 4.2 10/31/2022    ALT 10 10/31/2022    AST 19 10/31/2022     Results reviewed, labs MET treatment parameters, ok to proceed with treatment.    Post Infusion Assessment:  Patient tolerated infusion without incident.  Site patent and intact, free from redness, edema or discomfort.  No evidence of extravasations.  Access discontinued per protocol.     Discharge Plan:   Patient and/or family verbalized understanding of discharge instructions and all questions answered.  AVS to patient via StockleapT.  Patient will return 11/39 for next appointment.   Patient discharged in stable condition accompanied by: self.  Departure Mode: Ambulatory.      Diane Melissa RN

## 2022-11-02 NOTE — PROGRESS NOTES
"Oncology Rooming Note    November 2, 2022 8:54 AM   Mickie Mcghee is a 57 year old female who presents for:    Chief Complaint   Patient presents with     Oncology Clinic Visit     Metastatic breast cancer (H)     Initial Vitals: /76   Pulse 89   Temp 98.3  F (36.8  C) (Oral)   Resp 16   Ht 1.651 m (5' 5\")   Wt 65.4 kg (144 lb 3.2 oz)   SpO2 97%   BMI 24.00 kg/m   Estimated body mass index is 24 kg/m  as calculated from the following:    Height as of this encounter: 1.651 m (5' 5\").    Weight as of this encounter: 65.4 kg (144 lb 3.2 oz). Body surface area is 1.73 meters squared.  No Pain (0) Comment: Data Unavailable   No LMP recorded.  Allergies reviewed: Yes  Medications reviewed: Yes    Medications: Medication refills not needed today.  Pharmacy name entered into [employer]:    [id number] - [place], [place] - [address] [facility] - [place], [place] - [address]    Clinical concerns: None       Lisandra Augustin MA              "

## 2022-11-28 ENCOUNTER — HOSPITAL ENCOUNTER (OUTPATIENT)
Dept: PET IMAGING | Facility: CLINIC | Age: 57
Discharge: HOME | End: 2022-11-28
Attending: INTERNAL MEDICINE | Admitting: INTERNAL MEDICINE
Payer: COMMERCIAL

## 2022-11-28 DIAGNOSIS — C50.912 MALIGNANT NEOPLASM OF LEFT BREAST IN FEMALE, ESTROGEN RECEPTOR POSITIVE, UNSPECIFIED SITE OF BREAST (H): ICD-10-CM

## 2022-11-28 DIAGNOSIS — C50.919 METASTATIC BREAST CANCER: ICD-10-CM

## 2022-11-28 DIAGNOSIS — Z17.0 MALIGNANT NEOPLASM OF LEFT BREAST IN FEMALE, ESTROGEN RECEPTOR POSITIVE, UNSPECIFIED SITE OF BREAST (H): ICD-10-CM

## 2022-11-28 PROCEDURE — 343N000001 HC RX 343: Performed by: INTERNAL MEDICINE

## 2022-11-28 PROCEDURE — 78815 PET IMAGE W/CT SKULL-THIGH: CPT | Mod: PS

## 2022-11-28 PROCEDURE — A9552 F18 FDG: HCPCS | Performed by: INTERNAL MEDICINE

## 2022-11-28 RX ADMIN — FLUDEOXYGLUCOSE F-18 12.59 MCI.: 500 INJECTION, SOLUTION INTRAVENOUS at 09:42

## 2022-11-29 ENCOUNTER — INFUSION THERAPY VISIT (OUTPATIENT)
Dept: INFUSION THERAPY | Facility: CLINIC | Age: 57
End: 2022-11-29
Attending: INTERNAL MEDICINE
Payer: COMMERCIAL

## 2022-11-29 ENCOUNTER — ONCOLOGY VISIT (OUTPATIENT)
Dept: ONCOLOGY | Facility: CLINIC | Age: 57
End: 2022-11-29
Attending: INTERNAL MEDICINE
Payer: COMMERCIAL

## 2022-11-29 VITALS
BODY MASS INDEX: 23.63 KG/M2 | HEART RATE: 65 BPM | SYSTOLIC BLOOD PRESSURE: 126 MMHG | WEIGHT: 142 LBS | OXYGEN SATURATION: 99 % | DIASTOLIC BLOOD PRESSURE: 82 MMHG | RESPIRATION RATE: 16 BRPM | TEMPERATURE: 97.9 F

## 2022-11-29 DIAGNOSIS — G95.29 SPINAL CORD COMPRESSION DUE TO MALIGNANT NEOPLASM METASTATIC TO SPINE (H): ICD-10-CM

## 2022-11-29 DIAGNOSIS — C50.919 METASTATIC BREAST CANCER: ICD-10-CM

## 2022-11-29 DIAGNOSIS — G95.29 SPINAL CORD COMPRESSION DUE TO MALIGNANT NEOPLASM METASTATIC TO SPINE (H): Primary | ICD-10-CM

## 2022-11-29 DIAGNOSIS — C50.919 METASTATIC BREAST CANCER: Primary | ICD-10-CM

## 2022-11-29 DIAGNOSIS — C79.51 SPINAL CORD COMPRESSION DUE TO MALIGNANT NEOPLASM METASTATIC TO SPINE (H): ICD-10-CM

## 2022-11-29 DIAGNOSIS — C79.51 SPINAL CORD COMPRESSION DUE TO MALIGNANT NEOPLASM METASTATIC TO SPINE (H): Primary | ICD-10-CM

## 2022-11-29 LAB
ALBUMIN SERPL-MCNC: 3.8 G/DL (ref 3.4–5)
ALP SERPL-CCNC: 52 U/L (ref 40–150)
ALT SERPL W P-5'-P-CCNC: 22 U/L (ref 0–50)
ANION GAP SERPL CALCULATED.3IONS-SCNC: 5 MMOL/L (ref 3–14)
AST SERPL W P-5'-P-CCNC: 23 U/L (ref 0–45)
BASOPHILS # BLD AUTO: 0 10E3/UL (ref 0–0.2)
BASOPHILS NFR BLD AUTO: 1 %
BILIRUB SERPL-MCNC: 0.2 MG/DL (ref 0.2–1.3)
BUN SERPL-MCNC: 14 MG/DL (ref 7–30)
CALCIUM SERPL-MCNC: 9.6 MG/DL (ref 8.5–10.1)
CALCIUM SERPL-MCNC: 9.6 MG/DL (ref 8.5–10.1)
CHLORIDE BLD-SCNC: 107 MMOL/L (ref 94–109)
CO2 SERPL-SCNC: 29 MMOL/L (ref 20–32)
CREAT SERPL-MCNC: 0.66 MG/DL (ref 0.52–1.04)
EOSINOPHIL # BLD AUTO: 0.2 10E3/UL (ref 0–0.7)
EOSINOPHIL NFR BLD AUTO: 6 %
ERYTHROCYTE [DISTWIDTH] IN BLOOD BY AUTOMATED COUNT: 13.3 % (ref 10–15)
GFR SERPL CREATININE-BSD FRML MDRD: >90 ML/MIN/1.73M2
GLUCOSE BLD-MCNC: 98 MG/DL (ref 70–99)
HCT VFR BLD AUTO: 35.7 % (ref 35–47)
HGB BLD-MCNC: 11.7 G/DL (ref 11.7–15.7)
IMM GRANULOCYTES # BLD: 0 10E3/UL
IMM GRANULOCYTES NFR BLD: 0 %
LYMPHOCYTES # BLD AUTO: 0.7 10E3/UL (ref 0.8–5.3)
LYMPHOCYTES NFR BLD AUTO: 21 %
MAGNESIUM SERPL-MCNC: 2.4 MG/DL (ref 1.6–2.3)
MCH RBC QN AUTO: 32.1 PG (ref 26.5–33)
MCHC RBC AUTO-ENTMCNC: 32.8 G/DL (ref 31.5–36.5)
MCV RBC AUTO: 98 FL (ref 78–100)
MONOCYTES # BLD AUTO: 0.4 10E3/UL (ref 0–1.3)
MONOCYTES NFR BLD AUTO: 12 %
NEUTROPHILS # BLD AUTO: 1.9 10E3/UL (ref 1.6–8.3)
NEUTROPHILS NFR BLD AUTO: 60 %
NRBC # BLD AUTO: 0 10E3/UL
NRBC BLD AUTO-RTO: 0 /100
PHOSPHATE SERPL-MCNC: 3.2 MG/DL (ref 2.5–4.5)
PLATELET # BLD AUTO: 242 10E3/UL (ref 150–450)
POTASSIUM BLD-SCNC: 4.1 MMOL/L (ref 3.4–5.3)
PROT SERPL-MCNC: 7.2 G/DL (ref 6.8–8.8)
RBC # BLD AUTO: 3.65 10E6/UL (ref 3.8–5.2)
SODIUM SERPL-SCNC: 141 MMOL/L (ref 133–144)
WBC # BLD AUTO: 3.1 10E3/UL (ref 4–11)

## 2022-11-29 PROCEDURE — 36415 COLL VENOUS BLD VENIPUNCTURE: CPT | Performed by: INTERNAL MEDICINE

## 2022-11-29 PROCEDURE — 82040 ASSAY OF SERUM ALBUMIN: CPT | Performed by: INTERNAL MEDICINE

## 2022-11-29 PROCEDURE — 83735 ASSAY OF MAGNESIUM: CPT | Performed by: INTERNAL MEDICINE

## 2022-11-29 PROCEDURE — 96365 THER/PROPH/DIAG IV INF INIT: CPT

## 2022-11-29 PROCEDURE — 80053 COMPREHEN METABOLIC PANEL: CPT | Performed by: INTERNAL MEDICINE

## 2022-11-29 PROCEDURE — G0463 HOSPITAL OUTPT CLINIC VISIT: HCPCS | Mod: 25

## 2022-11-29 PROCEDURE — 250N000011 HC RX IP 250 OP 636: Performed by: INTERNAL MEDICINE

## 2022-11-29 PROCEDURE — 82310 ASSAY OF CALCIUM: CPT | Performed by: INTERNAL MEDICINE

## 2022-11-29 PROCEDURE — 84100 ASSAY OF PHOSPHORUS: CPT | Performed by: INTERNAL MEDICINE

## 2022-11-29 PROCEDURE — 99214 OFFICE O/P EST MOD 30 MIN: CPT | Performed by: INTERNAL MEDICINE

## 2022-11-29 PROCEDURE — 85025 COMPLETE CBC W/AUTO DIFF WBC: CPT | Performed by: INTERNAL MEDICINE

## 2022-11-29 PROCEDURE — 258N000003 HC RX IP 258 OP 636: Performed by: INTERNAL MEDICINE

## 2022-11-29 RX ORDER — HEPARIN SODIUM,PORCINE 10 UNIT/ML
5 VIAL (ML) INTRAVENOUS
Status: CANCELLED | OUTPATIENT
Start: 2022-11-29

## 2022-11-29 RX ORDER — ZOLEDRONIC ACID 0.04 MG/ML
4 INJECTION, SOLUTION INTRAVENOUS ONCE
Status: COMPLETED | OUTPATIENT
Start: 2022-11-29 | End: 2022-11-29

## 2022-11-29 RX ORDER — RIBOCICLIB 200 MG/1
200 TABLET, FILM COATED ORAL DAILY
Qty: 21 TABLET | Refills: 0 | Status: SHIPPED | OUTPATIENT
Start: 2022-11-29 | End: 2022-12-20

## 2022-11-29 RX ORDER — ZOLEDRONIC ACID 0.04 MG/ML
4 INJECTION, SOLUTION INTRAVENOUS ONCE
Status: CANCELLED
Start: 2022-12-28 | End: 2022-12-28

## 2022-11-29 RX ORDER — ZOLEDRONIC ACID 0.04 MG/ML
4 INJECTION, SOLUTION INTRAVENOUS ONCE
Status: CANCELLED
Start: 2023-01-25 | End: 2023-01-25

## 2022-11-29 RX ORDER — HEPARIN SODIUM (PORCINE) LOCK FLUSH IV SOLN 100 UNIT/ML 100 UNIT/ML
5 SOLUTION INTRAVENOUS
Status: CANCELLED | OUTPATIENT
Start: 2023-01-25

## 2022-11-29 RX ORDER — ZOLEDRONIC ACID 0.04 MG/ML
4 INJECTION, SOLUTION INTRAVENOUS ONCE
Status: CANCELLED
Start: 2022-11-29 | End: 2022-11-29

## 2022-11-29 RX ORDER — HEPARIN SODIUM (PORCINE) LOCK FLUSH IV SOLN 100 UNIT/ML 100 UNIT/ML
5 SOLUTION INTRAVENOUS
Status: CANCELLED | OUTPATIENT
Start: 2022-11-29

## 2022-11-29 RX ORDER — HEPARIN SODIUM (PORCINE) LOCK FLUSH IV SOLN 100 UNIT/ML 100 UNIT/ML
5 SOLUTION INTRAVENOUS
Status: CANCELLED | OUTPATIENT
Start: 2022-12-28

## 2022-11-29 RX ORDER — LETROZOLE 2.5 MG/1
2.5 TABLET, FILM COATED ORAL EVERY MORNING
Qty: 28 TABLET | Refills: 0 | Status: SHIPPED | OUTPATIENT
Start: 2022-11-29 | End: 2022-12-28

## 2022-11-29 RX ORDER — HEPARIN SODIUM,PORCINE 10 UNIT/ML
5 VIAL (ML) INTRAVENOUS
Status: CANCELLED | OUTPATIENT
Start: 2023-01-25

## 2022-11-29 RX ORDER — HEPARIN SODIUM,PORCINE 10 UNIT/ML
5 VIAL (ML) INTRAVENOUS
Status: CANCELLED | OUTPATIENT
Start: 2022-12-28

## 2022-11-29 RX ADMIN — ZOLEDRONIC ACID 4 MG: 0.04 INJECTION, SOLUTION INTRAVENOUS at 11:24

## 2022-11-29 RX ADMIN — SODIUM CHLORIDE 250 ML: 9 INJECTION, SOLUTION INTRAVENOUS at 11:24

## 2022-11-29 ASSESSMENT — PAIN SCALES - GENERAL: PAINLEVEL: NO PAIN (0)

## 2022-11-29 NOTE — PATIENT INSTRUCTIONS
1.  Continue letrozole and ribociclib.  2.  Continue monthly Zometa.  3.  Take calcium and vitamin D twice a day.  4.  See NP with next two zometa infusion.  5.  See me in 3 months.  6.  See dentist.

## 2022-11-29 NOTE — LETTER
"    11/29/2022         RE: Mickie Mcghee  [address]  [place] [address]        Dear Colleague,    Thank you for referring your patient, Mickie Mcghee, to the [facility]. Please see a copy of my visit note below.    Oncology Rooming Note    November 29, 2022 10:39 AM   Mickie Mcghee is a 57 year old female who presents for:    Chief Complaint   Patient presents with     Oncology Clinic Visit     Initial Vitals: /82   Pulse 65   Temp 97.9  F (36.6  C) (Oral)   Resp 16   Wt 64.4 kg (142 lb)   SpO2 99%   BMI 23.63 kg/m   Estimated body mass index is 23.63 kg/m  as calculated from the following:    Height as of 11/2/22: 1.651 m (5' 5\").    Weight as of this encounter: 64.4 kg (142 lb). Body surface area is 1.72 meters squared.  No Pain (0) Comment: Data Unavailable   No LMP recorded.  Allergies reviewed: Yes  Medications reviewed: Yes    Medications: Medication refills not needed today.  Pharmacy name entered into Watch Over Me:    [id number] - [place], [place] - [address] AT [facility] - [place], [place] - [address]    Clinical concerns: no      Mildred Clements, [facility]              ONCOLOGY HISTORY:  Ms. Mcghee is a female with metastatic breast cancer. ER positive, NM positive and HER-2/halina negative.  -Foundation One reveals PIK3CA alteration.  -She had a stage III left breast cancer in 2007. ER positive and HER-2/halina negative.  She had bilateral mastectomy, chemotherapy, radiation, and anastrozole.     1.  On 03/18/2022, multiple imaging studies done because of bilateral lower extremity weakness:  -MRI of thoracic and lumbar spine revealed bone metastases with spinal cord compression.  -CT chest, abdomen, and pelvis on 03/19/2022 revealed bone metastasis, lung nodules and enlarged upper abdominal lymph node.  There is a hypodense nodule in the liver.  -Brain MRI on 03/19/2022 did not reveal any " intracranial metastasis.  2.  CT-guided bone biopsy of left iliac bone on 03/21/2022 revealed metastatic breast cancer. ER positive, PA positive and HER-2/halina negative.  3.  Letrozole started on 03/25/2022.  -Ribociclib started on 03/31/2022. Decreased to 200 mg a day in 09/2022.  -Zometa started on 03/24/2022.  -Radiation to thoracolumbar and sacral area between 03/22/2022 and 04/07/2022.  3000 cGy in 10 fractions.     SUBJECTIVE:  Ms. Mcghee is a 57-year-old female with metastatic breast cancer on letrozole and ribociclib.  She is tolerating it well.     She also gets monthly Zometa for bone metastasis.  She is tolerating them well.  No dental or jaw related symptoms.    PET scan on 11/28/2022 reveals similar to slightly decreased FDG avidity within numerous osseous lesions and right middle lobe lung nodule.     Patient's leg weakness has continued to get better.  She has been doing physical therapy. Her walking is better.  When she goes outside, she uses a cane. She does not use cane inside the house.     No headache.  No dizziness.  No chest pain.  No shortness of breath.  No abdominal pain.  No nausea or vomiting.  Appetite has been good.  No bleeding. No urinary or bowel complaints. No pain. All other review of system is negative.     PHYSICAL EXAMINATION:  She is alert and oriented x 3. Not in any distress.   Rest of systems not examined.     ASSESSMENT:  1.  A 57-year-old female with metastatic breast cancer on letrozole and ribociclib. Disease is responding.  2.  Lower extremity weakness, improving.  3.  Leukopenia from ribociclib.     PLAN:  1.  Patient is doing well from metastatic breast cancer.  She is pretty asymptomatic.  PET scan was personally reviewed. It reveals improvement in disease.    She is on letrozole and ribociclib.  She is tolerating it well since dose has been decreased. She will continue on it.     2.  Patient gets monthly Zometa.  She is tolerating it well.  No dental or jaw related  symptoms.  She will continue on it. She will continue on calcium vitamin D twice a day.  Advised her to follow-up with dentist.     3.  Labs reviewed.  She has mild leukopenia.  Anemia has resolved.  We will continue to monitor CBC and CMP.    4.  Patient's leg weakness is improving.  She will continue with her physical therapy.    5.  I will see her in 3 months.  In between she will see our nurse practitioner.  Advised her to call us with any questions or concerns.    Total visit time of 30 minutes.  Time spent in today's visit, review of chart/investigations today, monitoring for toxicity of high risk drugs and documentation today.     TOTAL VISIT TIME: 30 minutes. Time spent in today's visit, review of chart/investigations today, monitoring for toxicity of high risk drugs and documentation.      Again, thank you for allowing me to participate in the care of your patient.        Sincerely,        Yury Lozada MD

## 2022-11-29 NOTE — PROGRESS NOTES
Infusion Nursing Note:  Mickie Mcghee presents today for Zometa.    Patient seen by provider today: Yes: Dr. Lozada   present during visit today: Not Applicable.    Note: Patient stated she reached out to pharmacy and her oral meds will be delivered by tomorrow.    Intravenous Access:  Peripheral IV placed.    Treatment Conditions:  Lab Results   Component Value Date    HGB 11.7 11/29/2022    WBC 3.1 (L) 11/29/2022    ANEU 1.3 (L) 08/04/2022    ANEUTAUTO 1.9 11/29/2022     11/29/2022      Lab Results   Component Value Date     11/29/2022    POTASSIUM 4.1 11/29/2022    MAG 2.4 (H) 11/29/2022    CR 0.66 11/29/2022    OSMEL 9.6 11/29/2022    OSMEL 9.6 11/29/2022    BILITOTAL 0.2 11/29/2022    ALBUMIN 3.8 11/29/2022    ALT 22 11/29/2022    AST 23 11/29/2022     Results reviewed, labs MET treatment parameters, ok to proceed with treatment.    Post Infusion Assessment:  Patient tolerated infusion without incident.  Blood return noted pre and post infusion.  Site patent and intact, free from redness, edema or discomfort.  No evidence of extravasations.  Access discontinued per protocol.     Discharge Plan:   AVS to patient via MYCHART.  Patient will return 12/28/22 for next appointment.   Patient discharged in stable condition accompanied by: self.  Departure Mode: Ambulatory.      Jennifer Anton RN

## 2022-11-29 NOTE — PROGRESS NOTES
"Oncology Rooming Note    November 29, 2022 10:39 AM   Mickie Mcghee is a 57 year old female who presents for:    Chief Complaint   Patient presents with     Oncology Clinic Visit     Initial Vitals: /82   Pulse 65   Temp 97.9  F (36.6  C) (Oral)   Resp 16   Wt 64.4 kg (142 lb)   SpO2 99%   BMI 23.63 kg/m   Estimated body mass index is 23.63 kg/m  as calculated from the following:    Height as of 11/2/22: 1.651 m (5' 5\").    Weight as of this encounter: 64.4 kg (142 lb). Body surface area is 1.72 meters squared.  No Pain (0) Comment: Data Unavailable   No LMP recorded.  Allergies reviewed: Yes  Medications reviewed: Yes    Medications: Medication refills not needed today.  Pharmacy name entered into [employer]:    [id number] - [place], [place] - [address] AT [facility] [place] [facility] - [place], [place] - [address]    Clinical concerns: no      Mildred Clements CMA            "

## 2022-11-30 NOTE — RESULT ENCOUNTER NOTE
Dear Ms. Mcghee,    PET scan reveals improvement in cancer. This is good.    Please, call me with any questions.    Yury Lozada MD

## 2022-12-01 NOTE — PROGRESS NOTES
ONCOLOGY HISTORY:  Ms. Mcghee is a female with metastatic breast cancer. ER positive, AK positive and HER-2/halina negative.  -Foundation One reveals PIK3CA alteration.  -She had a stage III left breast cancer in 2007. ER positive and HER-2/halina negative.  She had bilateral mastectomy, chemotherapy, radiation, and anastrozole.     1.  On 03/18/2022, multiple imaging studies done because of bilateral lower extremity weakness:  -MRI of thoracic and lumbar spine revealed bone metastases with spinal cord compression.  -CT chest, abdomen, and pelvis on 03/19/2022 revealed bone metastasis, lung nodules and enlarged upper abdominal lymph node.  There is a hypodense nodule in the liver.  -Brain MRI on 03/19/2022 did not reveal any intracranial metastasis.  2.  CT-guided bone biopsy of left iliac bone on 03/21/2022 revealed metastatic breast cancer. ER positive, AK positive and HER-2/halina negative.  3.  Letrozole started on 03/25/2022.  -Ribociclib started on 03/31/2022. Decreased to 200 mg a day in 09/2022.  -Zometa started on 03/24/2022.  -Radiation to thoracolumbar and sacral area between 03/22/2022 and 04/07/2022.  3000 cGy in 10 fractions.     SUBJECTIVE:  Ms. Mcghee is a 57-year-old female with metastatic breast cancer on letrozole and ribociclib.  She is tolerating it well.     She also gets monthly Zometa for bone metastasis.  She is tolerating them well.  No dental or jaw related symptoms.    PET scan on 11/28/2022 reveals similar to slightly decreased FDG avidity within numerous osseous lesions and right middle lobe lung nodule.     Patient's leg weakness has continued to get better.  She has been doing physical therapy. Her walking is better.  When she goes outside, she uses a cane. She does not use cane inside the house.     No headache.  No dizziness.  No chest pain.  No shortness of breath.  No abdominal pain.  No nausea or vomiting.  Appetite has been good.  No bleeding. No urinary or bowel complaints. No pain. All other  review of system is negative.     PHYSICAL EXAMINATION:  She is alert and oriented x 3. Not in any distress.   Rest of systems not examined.     ASSESSMENT:  1.  A 57-year-old female with metastatic breast cancer on letrozole and ribociclib. Disease is responding.  2.  Lower extremity weakness, improving.  3.  Leukopenia from ribociclib.     PLAN:  1.  Patient is doing well from metastatic breast cancer.  She is pretty asymptomatic.  PET scan was personally reviewed. It reveals improvement in disease.    She is on letrozole and ribociclib.  She is tolerating it well since dose has been decreased. She will continue on it.     2.  Patient gets monthly Zometa.  She is tolerating it well.  No dental or jaw related symptoms.  She will continue on it. She will continue on calcium vitamin D twice a day.  Advised her to follow-up with dentist.     3.  Labs reviewed.  She has mild leukopenia.  Anemia has resolved.  We will continue to monitor CBC and CMP.    4.  Patient's leg weakness is improving.  She will continue with her physical therapy.    5.  I will see her in 3 months.  In between she will see our nurse practitioner.  Advised her to call us with any questions or concerns.    Total visit time of 30 minutes.  Time spent in today's visit, review of chart/investigations today, monitoring for toxicity of high risk drugs and documentation today.     TOTAL VISIT TIME: 30 minutes. Time spent in today's visit, review of chart/investigations today, monitoring for toxicity of high risk drugs and documentation.

## 2022-12-16 DIAGNOSIS — C50.919 METASTATIC BREAST CANCER: Primary | ICD-10-CM

## 2022-12-16 RX ORDER — RIBOCICLIB 200 MG/1
200 TABLET, FILM COATED ORAL DAILY
Qty: 21 TABLET | Refills: 0 | Status: CANCELLED | OUTPATIENT
Start: 2022-12-16 | End: 2023-01-06

## 2022-12-16 RX ORDER — RIBOCICLIB 200 MG/1
200 TABLET, FILM COATED ORAL DAILY
Qty: 21 TABLET | Refills: 0 | Status: SHIPPED | OUTPATIENT
Start: 2022-12-28 | End: 2023-01-16

## 2022-12-16 RX ORDER — LETROZOLE 2.5 MG/1
2.5 TABLET, FILM COATED ORAL EVERY MORNING
Qty: 28 TABLET | Refills: 0 | Status: SHIPPED | OUTPATIENT
Start: 2022-12-28 | End: 2023-03-14

## 2022-12-16 RX ORDER — LETROZOLE 2.5 MG/1
2.5 TABLET, FILM COATED ORAL EVERY MORNING
Qty: 28 TABLET | Refills: 0 | Status: CANCELLED | OUTPATIENT
Start: 2022-12-16 | End: 2023-01-13

## 2022-12-28 ENCOUNTER — ONCOLOGY VISIT (OUTPATIENT)
Dept: ONCOLOGY | Facility: CLINIC | Age: 57
End: 2022-12-28
Attending: INTERNAL MEDICINE
Payer: COMMERCIAL

## 2022-12-28 ENCOUNTER — INFUSION THERAPY VISIT (OUTPATIENT)
Dept: INFUSION THERAPY | Facility: CLINIC | Age: 57
End: 2022-12-28
Attending: INTERNAL MEDICINE
Payer: COMMERCIAL

## 2022-12-28 VITALS
WEIGHT: 144 LBS | DIASTOLIC BLOOD PRESSURE: 80 MMHG | HEIGHT: 65 IN | RESPIRATION RATE: 16 BRPM | TEMPERATURE: 98.4 F | OXYGEN SATURATION: 97 % | HEART RATE: 71 BPM | BODY MASS INDEX: 23.99 KG/M2 | SYSTOLIC BLOOD PRESSURE: 122 MMHG

## 2022-12-28 DIAGNOSIS — C50.919 METASTATIC BREAST CANCER: Primary | ICD-10-CM

## 2022-12-28 DIAGNOSIS — C79.51 SPINAL CORD COMPRESSION DUE TO MALIGNANT NEOPLASM METASTATIC TO SPINE (H): ICD-10-CM

## 2022-12-28 DIAGNOSIS — G95.29 SPINAL CORD COMPRESSION DUE TO MALIGNANT NEOPLASM METASTATIC TO SPINE (H): ICD-10-CM

## 2022-12-28 LAB
ALBUMIN SERPL-MCNC: 3.6 G/DL (ref 3.4–5)
BASOPHILS # BLD AUTO: 0 10E3/UL (ref 0–0.2)
BASOPHILS NFR BLD AUTO: 1 %
CALCIUM SERPL-MCNC: 9.5 MG/DL (ref 8.5–10.1)
CREAT SERPL-MCNC: 0.69 MG/DL (ref 0.52–1.04)
EOSINOPHIL # BLD AUTO: 0.2 10E3/UL (ref 0–0.7)
EOSINOPHIL NFR BLD AUTO: 5 %
ERYTHROCYTE [DISTWIDTH] IN BLOOD BY AUTOMATED COUNT: 13.2 % (ref 10–15)
GFR SERPL CREATININE-BSD FRML MDRD: >90 ML/MIN/1.73M2
HCT VFR BLD AUTO: 33.8 % (ref 35–47)
HGB BLD-MCNC: 11.1 G/DL (ref 11.7–15.7)
IMM GRANULOCYTES # BLD: 0 10E3/UL
IMM GRANULOCYTES NFR BLD: 0 %
LYMPHOCYTES # BLD AUTO: 0.9 10E3/UL (ref 0.8–5.3)
LYMPHOCYTES NFR BLD AUTO: 26 %
MCH RBC QN AUTO: 31.4 PG (ref 26.5–33)
MCHC RBC AUTO-ENTMCNC: 32.8 G/DL (ref 31.5–36.5)
MCV RBC AUTO: 96 FL (ref 78–100)
MONOCYTES # BLD AUTO: 0.4 10E3/UL (ref 0–1.3)
MONOCYTES NFR BLD AUTO: 12 %
NEUTROPHILS # BLD AUTO: 1.9 10E3/UL (ref 1.6–8.3)
NEUTROPHILS NFR BLD AUTO: 56 %
NRBC # BLD AUTO: 0 10E3/UL
NRBC BLD AUTO-RTO: 0 /100
PLATELET # BLD AUTO: 219 10E3/UL (ref 150–450)
RBC # BLD AUTO: 3.53 10E6/UL (ref 3.8–5.2)
WBC # BLD AUTO: 3.3 10E3/UL (ref 4–11)

## 2022-12-28 PROCEDURE — 36415 COLL VENOUS BLD VENIPUNCTURE: CPT | Performed by: INTERNAL MEDICINE

## 2022-12-28 PROCEDURE — 85004 AUTOMATED DIFF WBC COUNT: CPT | Performed by: INTERNAL MEDICINE

## 2022-12-28 PROCEDURE — 82565 ASSAY OF CREATININE: CPT | Performed by: INTERNAL MEDICINE

## 2022-12-28 PROCEDURE — 96365 THER/PROPH/DIAG IV INF INIT: CPT

## 2022-12-28 PROCEDURE — 99214 OFFICE O/P EST MOD 30 MIN: CPT | Performed by: NURSE PRACTITIONER

## 2022-12-28 PROCEDURE — G0463 HOSPITAL OUTPT CLINIC VISIT: HCPCS | Mod: 25 | Performed by: NURSE PRACTITIONER

## 2022-12-28 PROCEDURE — 82040 ASSAY OF SERUM ALBUMIN: CPT | Performed by: INTERNAL MEDICINE

## 2022-12-28 PROCEDURE — 250N000011 HC RX IP 250 OP 636: Performed by: INTERNAL MEDICINE

## 2022-12-28 PROCEDURE — 82310 ASSAY OF CALCIUM: CPT | Performed by: INTERNAL MEDICINE

## 2022-12-28 RX ORDER — ZOLEDRONIC ACID 0.04 MG/ML
4 INJECTION, SOLUTION INTRAVENOUS ONCE
Status: COMPLETED | OUTPATIENT
Start: 2022-12-28 | End: 2022-12-28

## 2022-12-28 RX ADMIN — ZOLEDRONIC ACID 4 MG: 0.04 INJECTION, SOLUTION INTRAVENOUS at 10:01

## 2022-12-28 ASSESSMENT — PAIN SCALES - GENERAL: PAINLEVEL: NO PAIN (0)

## 2022-12-28 NOTE — PROGRESS NOTES
Infusion Nursing Note:  Mickie Mcghee presents today for zometa.    Patient seen by provider today: Yes: Sha Martínez NP   present during visit today: Not Applicable.    Note: Per Sha Martínez NP, ok to proceed with zometa today. Patient confirmed she is taking calcium and vitamin D supplements as ordered.    Intravenous Access:  Peripheral IV placed.    Treatment Conditions:  Lab Results   Component Value Date     11/29/2022    POTASSIUM 4.1 11/29/2022    MAG 2.4 (H) 11/29/2022    CR 0.69 12/28/2022    OSMEL 9.5 12/28/2022    BILITOTAL 0.2 11/29/2022    ALBUMIN 3.6 12/28/2022    ALT 22 11/29/2022    AST 23 11/29/2022     Corrected calcium: 9.82  Results reviewed, labs MET treatment parameters, ok to proceed with treatment.    Post Infusion Assessment:  Patient tolerated infusion without incident.  Blood return noted pre and post infusion.  Site patent and intact, free from redness, edema or discomfort.  No evidence of extravasations.  Access discontinued per protocol.     Discharge Plan:   Discharge instructions reviewed with: Patient.  Patient and/or family verbalized understanding of discharge instructions and all questions answered.  AVS to patient via [employer].  Patient will return in 1 month for next appointment-not yet scheduled, patient aware to watch [employer] for appointments.   Patient discharged in stable condition accompanied by: self.  Departure Mode: Ambulatory.      Vani Singletary RN

## 2022-12-28 NOTE — PROGRESS NOTES
"Oncology Rooming Note    December 28, 2022 9:22 AM   Mickie Mcghee is a 57 year old female who presents for:    Chief Complaint   Patient presents with     Oncology Clinic Visit     Initial Vitals: There were no vitals taken for this visit. Estimated body mass index is 23.63 kg/m  as calculated from the following:    Height as of 11/2/22: 1.651 m (5' 5\").    Weight as of 11/29/22: 64.4 kg (142 lb). There is no height or weight on file to calculate BSA.  Data Unavailable Comment: Data Unavailable   No LMP recorded.  Allergies reviewed: Yes  Medications reviewed: Yes    Medications: Medication refills not needed today.  Pharmacy name entered into [employer]:    [id number] - [place], [place] - [address] AT [facility] [place] - [place], [place] - [address]    Clinical concerns:  doctor was notified.      Blanquita Meyer MA            "

## 2022-12-28 NOTE — LETTER
"    12/28/2022         RE: Mickie Mcghee  [address]  [place] [address]        Dear Colleague,    Thank you for referring your patient, Mickie Mcghee, to the [facility]. Please see a copy of my visit note below.    Oncology Rooming Note    December 28, 2022 9:22 AM   Mickie Mcghee is a 57 year old female who presents for:    Chief Complaint   Patient presents with     Oncology Clinic Visit     Initial Vitals: There were no vitals taken for this visit. Estimated body mass index is 23.63 kg/m  as calculated from the following:    Height as of 11/2/22: 1.651 m (5' 5\").    Weight as of 11/29/22: 64.4 kg (142 lb). There is no height or weight on file to calculate BSA.  Data Unavailable Comment: Data Unavailable   No LMP recorded.  Allergies reviewed: Yes  Medications reviewed: Yes    Medications: Medication refills not needed today.  Pharmacy name entered into [employer]:    [id number] - [place], [place] - [address] [facility] [place] - [place], [place] - [address]    Clinical concerns:  doctor was notified.      Blanquita Meyer MA              Oncology/Hematology Visit Note  Dec 28, 2022    Reason for Visit: follow up of metastatic breast cancer  ER positive VT positive HER2 negative  Diagnosed in 2007 status post bilateral mastectomy chemotherapy radiation anastrozole  -03/21/[id number] bone biopsy of left iliac bone reveals metastatic breast cancer ER positive VT positive HER2 negative  03/31/2021-started Ribociclib 600 mg   Letrazole     03/22-04/07/2022-status post radiation to the L-spine and LS spine 10 fractions  05/03-due to neutropenia Ribociclib decreased to 400 mg  -Improvement in counts  06/01- Ribociclib increased back to 600 mg  Due to persistent leukopenia Ribociclib reduced to 200 mg daily for 21 days 7 days off      Interval History:  Patient reports she continues to feel well.  She " reports she is exercising an hour and a half daily.  Patient denies fever chills sweats cough shortness of breath chest pain nausea vomiting diarrhea abdominal pain or bleeding.  Patient reports she had 1 episode of lightheadedness last week but reports it has resolved she does not have any lightheadedness for now denies headache dizziness seizures changes in gait or vision      Review of Systems:  14 point ROS of systems including Constitutional, Eyes, Respiratory, Cardiovascular, Gastroenterology, Genitourinary, Integumentary, Muscularskeletal, Psychiatric were all negative except for pertinent positives noted in my HPI.    Physical Examination:  General: The patient is a pleasant female in no acute distress.  HEENT: EOMI, PERRL. Sclerae are anicteric. Oral mucosa is pink and moist with no lesions or thrush.   Lymph: Neck is supple with no lymphadenopathy in the cervical or supraclavicular areas.   Heart: Regular rate and rhythm.   Lungs: Clear to auscultation bilaterally.   GI: Bowel sounds present, soft, nontender with no palpable hepatosplenomegaly or masses.   Extremities: No lower extremity edema noted bilaterally.   Skin: No rashes, petechiae, or bruising noted on exposed skin.    Laboratory Data:  CMP and TSH     Assessment and Plan:  This is a 57-year-old female with    metastatic breast cancer  ER positive SC positive HER2 negative  Diagnosed in 2007 status post bilateral mastectomy chemotherapy radiation anastrozole  -03/21/20225132-[id number] bone biopsy of left iliac bone reveals metastatic breast cancer ER positive SC positive HER2 negative  03/31/2021-started Ribociclib 600 mg  And Letrazole   Due to leukopenia Ribociclib reduced to 200 mg daily(21 days on 7 days off)  -11/28/2022-PET scan revealed improvement in disease  -Continue with Ribociclib 200 mg 21 days on 7 days off  -Continue with letrozole  Continue with monthly lab checks  Schedule with me next month  Schedule with Dr. Lozada in  March        Bone metastasis  03/22-04/07/2022-status post radiation to the L-spine and LS spine 10 fractions  Continue with monthly Zometa  -Okay to give Zometa today  Call our clinic in the event of jaw pain or any dental issues        Leukopenia secondary to  Ribociclib   Normal ANC  Continue to monitor  Advised patient to call our clinic in the event of fever chills sweats or any signs symptoms of infection     Anemia  Mild  Continue to monitor    One  episode of lightheadedness last week  -Resolved now  -Differential diagnosis reviewed with patient  Patient denies headache dizziness seizures or neurological deficits  Advised patient to call our clinic if symptom returns      Call our clinic with any changes in health condition or questions      SAMI Jacob CNP  [facility]     Chart documentation with Dragon Voice recognition Software. Although reviewed after completion, some words and grammatical errors may remain.            Again, thank you for allowing me to participate in the care of your patient.        Sincerely,        SAMI Jacob CNP

## 2023-01-16 DIAGNOSIS — C50.919 METASTATIC BREAST CANCER: Primary | ICD-10-CM

## 2023-01-16 RX ORDER — LETROZOLE 2.5 MG/1
2.5 TABLET, FILM COATED ORAL EVERY MORNING
Qty: 28 TABLET | Refills: 0 | Status: SHIPPED | OUTPATIENT
Start: 2023-01-25 | End: 2023-03-14

## 2023-01-16 RX ORDER — RIBOCICLIB 200 MG/1
200 TABLET, FILM COATED ORAL DAILY
Qty: 21 TABLET | Refills: 0 | Status: SHIPPED | OUTPATIENT
Start: 2023-01-25 | End: 2023-03-14

## 2023-01-24 ENCOUNTER — DOCUMENTATION ONLY (OUTPATIENT)
Dept: PHARMACY | Facility: CLINIC | Age: 58
End: 2023-01-24

## 2023-01-24 ENCOUNTER — INFUSION THERAPY VISIT (OUTPATIENT)
Dept: INFUSION THERAPY | Facility: CLINIC | Age: 58
End: 2023-01-24
Attending: INTERNAL MEDICINE
Payer: COMMERCIAL

## 2023-01-24 ENCOUNTER — ONCOLOGY VISIT (OUTPATIENT)
Dept: ONCOLOGY | Facility: CLINIC | Age: 58
End: 2023-01-24
Attending: INTERNAL MEDICINE
Payer: COMMERCIAL

## 2023-01-24 VITALS
RESPIRATION RATE: 14 BRPM | SYSTOLIC BLOOD PRESSURE: 106 MMHG | WEIGHT: 145.4 LBS | DIASTOLIC BLOOD PRESSURE: 75 MMHG | BODY MASS INDEX: 24.2 KG/M2 | HEART RATE: 77 BPM | TEMPERATURE: 98.4 F | OXYGEN SATURATION: 96 %

## 2023-01-24 DIAGNOSIS — C79.51 SPINAL CORD COMPRESSION DUE TO MALIGNANT NEOPLASM METASTATIC TO SPINE (H): ICD-10-CM

## 2023-01-24 DIAGNOSIS — C50.919 METASTATIC BREAST CANCER: Primary | ICD-10-CM

## 2023-01-24 DIAGNOSIS — G95.29 SPINAL CORD COMPRESSION DUE TO MALIGNANT NEOPLASM METASTATIC TO SPINE (H): ICD-10-CM

## 2023-01-24 LAB
ALBUMIN SERPL BCG-MCNC: 4.5 G/DL (ref 3.5–5.2)
ALBUMIN SERPL BCG-MCNC: 4.5 G/DL (ref 3.5–5.2)
ALP SERPL-CCNC: 52 U/L (ref 35–104)
ALT SERPL W P-5'-P-CCNC: 17 U/L (ref 10–35)
ANION GAP SERPL CALCULATED.3IONS-SCNC: 12 MMOL/L (ref 7–15)
AST SERPL W P-5'-P-CCNC: 26 U/L (ref 10–35)
BASOPHILS # BLD AUTO: 0 10E3/UL (ref 0–0.2)
BASOPHILS NFR BLD AUTO: 1 %
BILIRUB SERPL-MCNC: <0.2 MG/DL
BUN SERPL-MCNC: 14.8 MG/DL (ref 6–20)
CALCIUM SERPL-MCNC: 10 MG/DL (ref 8.6–10)
CALCIUM SERPL-MCNC: 10 MG/DL (ref 8.6–10)
CHLORIDE SERPL-SCNC: 103 MMOL/L (ref 98–107)
CREAT SERPL-MCNC: 0.81 MG/DL (ref 0.51–0.95)
CREAT SERPL-MCNC: 0.81 MG/DL (ref 0.51–0.95)
DEPRECATED HCO3 PLAS-SCNC: 26 MMOL/L (ref 22–29)
EOSINOPHIL # BLD AUTO: 0.1 10E3/UL (ref 0–0.7)
EOSINOPHIL NFR BLD AUTO: 2 %
ERYTHROCYTE [DISTWIDTH] IN BLOOD BY AUTOMATED COUNT: 13.7 % (ref 10–15)
GFR SERPL CREATININE-BSD FRML MDRD: 85 ML/MIN/1.73M2
GFR SERPL CREATININE-BSD FRML MDRD: 85 ML/MIN/1.73M2
GLUCOSE SERPL-MCNC: 137 MG/DL (ref 70–99)
HCT VFR BLD AUTO: 35.1 % (ref 35–47)
HGB BLD-MCNC: 11.3 G/DL (ref 11.7–15.7)
HOLD SPECIMEN: NORMAL
IMM GRANULOCYTES # BLD: 0 10E3/UL
IMM GRANULOCYTES NFR BLD: 1 %
LYMPHOCYTES # BLD AUTO: 0.7 10E3/UL (ref 0.8–5.3)
LYMPHOCYTES NFR BLD AUTO: 23 %
MCH RBC QN AUTO: 31.3 PG (ref 26.5–33)
MCHC RBC AUTO-ENTMCNC: 32.2 G/DL (ref 31.5–36.5)
MCV RBC AUTO: 97 FL (ref 78–100)
MONOCYTES # BLD AUTO: 0.3 10E3/UL (ref 0–1.3)
MONOCYTES NFR BLD AUTO: 11 %
NEUTROPHILS # BLD AUTO: 1.9 10E3/UL (ref 1.6–8.3)
NEUTROPHILS NFR BLD AUTO: 62 %
NRBC # BLD AUTO: 0 10E3/UL
NRBC BLD AUTO-RTO: 0 /100
PLATELET # BLD AUTO: 230 10E3/UL (ref 150–450)
POTASSIUM SERPL-SCNC: 4.6 MMOL/L (ref 3.4–5.3)
PROT SERPL-MCNC: 6.9 G/DL (ref 6.4–8.3)
RBC # BLD AUTO: 3.61 10E6/UL (ref 3.8–5.2)
SODIUM SERPL-SCNC: 141 MMOL/L (ref 136–145)
WBC # BLD AUTO: 3 10E3/UL (ref 4–11)

## 2023-01-24 PROCEDURE — 82310 ASSAY OF CALCIUM: CPT | Performed by: INTERNAL MEDICINE

## 2023-01-24 PROCEDURE — 258N000003 HC RX IP 258 OP 636: Performed by: INTERNAL MEDICINE

## 2023-01-24 PROCEDURE — 36415 COLL VENOUS BLD VENIPUNCTURE: CPT

## 2023-01-24 PROCEDURE — 85025 COMPLETE CBC W/AUTO DIFF WBC: CPT | Performed by: INTERNAL MEDICINE

## 2023-01-24 PROCEDURE — 80053 COMPREHEN METABOLIC PANEL: CPT | Performed by: INTERNAL MEDICINE

## 2023-01-24 PROCEDURE — 99214 OFFICE O/P EST MOD 30 MIN: CPT | Performed by: NURSE PRACTITIONER

## 2023-01-24 PROCEDURE — G0463 HOSPITAL OUTPT CLINIC VISIT: HCPCS | Mod: 25 | Performed by: NURSE PRACTITIONER

## 2023-01-24 PROCEDURE — 36415 COLL VENOUS BLD VENIPUNCTURE: CPT | Performed by: INTERNAL MEDICINE

## 2023-01-24 PROCEDURE — 82565 ASSAY OF CREATININE: CPT | Performed by: INTERNAL MEDICINE

## 2023-01-24 PROCEDURE — 96374 THER/PROPH/DIAG INJ IV PUSH: CPT

## 2023-01-24 PROCEDURE — 250N000011 HC RX IP 250 OP 636: Performed by: INTERNAL MEDICINE

## 2023-01-24 RX ORDER — ZOLEDRONIC ACID 0.04 MG/ML
4 INJECTION, SOLUTION INTRAVENOUS ONCE
Status: COMPLETED | OUTPATIENT
Start: 2023-01-24 | End: 2023-01-24

## 2023-01-24 RX ADMIN — ZOLEDRONIC ACID 4 MG: 0.04 INJECTION, SOLUTION INTRAVENOUS at 14:22

## 2023-01-24 RX ADMIN — SODIUM CHLORIDE 250 ML: 9 INJECTION, SOLUTION INTRAVENOUS at 14:22

## 2023-01-24 ASSESSMENT — PAIN SCALES - GENERAL: PAINLEVEL: NO PAIN (0)

## 2023-01-24 NOTE — LETTER
"    1/24/2023         RE: Mickie Mcghee  [address]  [place] [address]        Dear Colleague,    Thank you for referring your patient, Mickie Mcghee, to the [facility]. Please see a copy of my visit note below.    Oncology Rooming Note    January 24, 2023 12:47 PM   Mickie Mcghee is a 57 year old female who presents for:    Chief Complaint   Patient presents with     Oncology Clinic Visit     Initial Vitals: /75   Pulse 77   Temp 98.4  F (36.9  C) (Oral)   Resp 14   Wt 66 kg (145 lb 6.4 oz)   SpO2 96%   BMI 24.20 kg/m   Estimated body mass index is 24.2 kg/m  as calculated from the following:    Height as of 12/28/22: 1.651 m (5' 5\").    Weight as of this encounter: 66 kg (145 lb 6.4 oz). Body surface area is 1.74 meters squared.  No Pain (0) Comment: Data Unavailable   No LMP recorded.  Allergies reviewed: Yes  Medications reviewed: Yes    Medications: Medication refills not needed today.  Pharmacy name entered into [employer]:    [id number] - [place], [place] - [address] AT [facility] - [place], [place] - [address]    Clinical concerns: no       Shari J. Schoenberger, CMA                Oncology/Hematology Visit Note  Jan 24, 2023    Reason for Visit: follow up of metastatic breast cancer  ER positive AK positive HER2 negative  Diagnosed in 2007 status post bilateral mastectomy chemotherapy radiation anastrozole  -03/21/[id number] bone biopsy of left iliac bone reveals metastatic breast cancer ER positive AK positive HER2 negative  03/31/2021-started Ribociclib 600 mg   Letrazole     03/22-04/07/2022-status post radiation to the L-spine and LS spine 10 fractions  05/03-due to neutropenia Ribociclib decreased to 400 mg  -Improvement in counts  06/01- Ribociclib increased back to 600 mg  Due to persistent leukopenia Ribociclib reduced to 200 mg daily for 21 days 7 days off      Interval " History:  Patient reports overall she is well.  She reports she treatment well.  Denies fevers chills sweats cough shortness of breath chest pain nausea vomiting diarrhea abdominal pain bleeding denies headache dizziness    Review of Systems:  14 point ROS of systems including Constitutional, Eyes, Respiratory, Cardiovascular, Gastroenterology, Genitourinary, Integumentary, Muscularskeletal, Psychiatric were all negative except for pertinent positives noted in my HPI.    Physical Examination:  General: The patient is a pleasant female in no acute distress.  HEENT: EOMI, PERRL. Sclerae are anicteric. Oral mucosa is pink and moist with no lesions or thrush.   Lymph: Neck is supple with no lymphadenopathy in the cervical or supraclavicular areas.   Heart: Regular rate and rhythm.   Lungs: Clear to auscultation bilaterally.   GI: Bowel sounds present, soft, nontender with no palpable hepatosplenomegaly or masses.   Extremities: No lower extremity edema noted bilaterally.   Skin: No rashes, petechiae, or bruising noted on exposed skin.    Laboratory Data:  CMP and TSH     Assessment and Plan:  This is a 57-year-old female with    metastatic breast cancer  ER positive FL positive HER2 negative  Diagnosed in 2007 status post bilateral mastectomy chemotherapy radiation anastrozole  -03/21/20224132-[id number] bone biopsy of left iliac bone reveals metastatic breast cancer ER positive FL positive HER2 negative  03/31/2021-started Ribociclib 600 mg  And Letrazole   Due to leukopenia Ribociclib reduced to 200 mg daily(21 days on 7 days off)  -11/28/2022-PET scan revealed improvement in disease  -Continue with Ribociclib 200 mg 21 days on 7 days off  -Continue with letrozole  Continue with monthly lab checks        Bone metastasis  03/22-04/07/2022-status post radiation to the L-spine and LS spine 10 fractions  Continue with monthly Zometa  -Okay to give Zometa today  Call our clinic in the event of jaw pain or any dental  issues        Leukopenia secondary to  Ribociclib   Normal ANC  Continue to monitor  Advised patient to call our clinic in the event of fever chills sweats or any signs symptoms of infection     Anemia  Mild  Continue to monitor        Please call our clinic with any changes in health condition or questions      SAMI Jacob CNP  [facility]     Chart documentation with Dragon Voice recognition Software. Although reviewed after completion, some words and grammatical errors may remain.            Again, thank you for allowing me to participate in the care of your patient.        Sincerely,        SAMI Jacob CNP

## 2023-01-24 NOTE — PROGRESS NOTES
Oral Chemotherapy Monitoring Program  Lab Follow Up    Reviewed lab results from 1/24/23.    ORAL CHEMOTHERAPY 8/5/2022 8/31/2022 10/3/2022 11/2/2022 12/16/2022 1/16/2023 1/24/2023   Assessment Type Lab Monitoring Lab Monitoring Chart Review;Lab Monitoring Chart Review;Lab Monitoring Refill Refill Lab Monitoring   Diagnosis Code Breast Cancer Breast Cancer Breast Cancer Breast Cancer Breast Cancer Breast Cancer Breast Cancer   Providers Dr. Neville Lozada   Clinic Name/Location [facility]   Drug Name Kisqali (ribociclib) Kisqali (ribociclib) Kisqali (ribociclib) Kisqali (ribociclib) Kisqali (ribociclib) Kisqali (ribociclib) Kisqali (ribociclib)   Dose 400 mg 400 mg 200 mg 200 mg 200 mg 200 mg 200 mg   Current Schedule Daily Daily Daily Daily Daily Daily Daily   Cycle Details 3 weeks on, 1 week off Drug on Hold 3 weeks on, 1 week off 3 weeks on, 1 week off 3 weeks on, 1 week off 3 weeks on, 1 week off 3 weeks on, 1 week off   Start Date of Last Cycle - - 9/7/2022 11/2/2022 - - -   Planned next cycle start date 8/6/2022 9/3/2022 10/5/2022 11/30/2022 - - -   Doses missed in last 2 weeks - - - - - - -   Adherence Assessment - - Adherent Adherent - - -   Adverse Effects Neutropenia Neutropenia Anemia - - - -   Nausea - - - - - - -   Pharmacist Intervention(nausea) - - - - - - -   Anemia - - Grade 1 - - - -   Pharmacist Intervention(anemia) - - No - - - -   Fatigue - - - - - - -   Pharmacist Intervention(fatigue) - - - - - - -   Neutropenia Grade 2 Grade 2 - - - - -   Pharmacist Intervention(neutropenia) No No - - - - -   Intervention(s) - - - - - - -   Thrombocytopenia - - - - - - -   Pharmacist Intervention(thrombocytopenia) - - - - - - -   Any new drug interactions? - - No No - - -   Is the dose as ordered appropriate for the patient? - - Yes Yes - - -       Labs:  No results found for NA within last 30 days.      No results found for K within last 30 days.     _  Result Component Current Result Ref Range   Calcium 9.5 (12/28/2022) 8.5 - 10.1 mg/dL     No results found for Mag within last 30 days.     No results found for Phos within last 30 days.     _  Result Component Current Result Ref Range   Albumin 3.6 (12/28/2022) 3.4 - 5.0 g/dL     No results found for BUN within last 30 days.     _  Result Component Current Result Ref Range   Creatinine 0.69 (12/28/2022) 0.52 - 1.04 mg/dL     No results found for AST within last 30 days.     No results found for ALT within last 30 days.     No results found for BILITOTAL within last 30 days.     _  Result Component Current Result Ref Range   WBC Count 3.0 (L) (1/24/2023) 4.0 - 11.0 10e3/uL     _  Result Component Current Result Ref Range   Hemoglobin 11.3 (L) (1/24/2023) 11.7 - 15.7 g/dL     _  Result Component Current Result Ref Range   Platelet Count 230 (1/24/2023) 150 - 450 10e3/uL     No results found for ANC within last 30 days.     _  Result Component Current Result Ref Range   Absolute Neutrophils 1.9 (1/24/2023) 1.6 - 8.3 10e3/uL        Assessment & Plan:  No concerning abnormalities. Proceed with next cycle of ribociclib.    Questions answered to patient's satisfaction.    Follow-Up:  4 weeks with labs.     Ashley Mcadams PharmD  January 24, 2023

## 2023-01-24 NOTE — PROGRESS NOTES
Infusion Nursing Note:  Mickie Mcghee presents today for zometa.    Patient seen by provider today: Yes: ALEXSANDRA Dalton   present during visit today: Not Applicable.    Note: N/A.    Intravenous Access:  Peripheral IV placed.    Treatment Conditions:  Lab Results   Component Value Date     01/24/2023    POTASSIUM 4.6 01/24/2023    MAG 2.4 (H) 11/29/2022    CR 0.81 01/24/2023    CR 0.81 01/24/2023    OSMEL 10.0 01/24/2023    OSMEL 10.0 01/24/2023    BILITOTAL <0.2 01/24/2023    ALBUMIN 4.5 01/24/2023    ALBUMIN 4.5 01/24/2023    ALT 17 01/24/2023    AST 26 01/24/2023     Results reviewed, labs MET treatment parameters, ok to proceed with treatment.    Post Infusion Assessment:  Patient tolerated infusion without incident.  Blood return noted pre and post infusion.  Site patent and intact, free from redness, edema or discomfort.  No evidence of extravasations.  Access discontinued per protocol.     Discharge Plan:   AVS to patient via [employer].  Patient will return as [facility] for next appointment.   Patient discharged in stable condition accompanied by: self.  Departure Mode: Ambulatory.      George Metz RN

## 2023-01-24 NOTE — PROGRESS NOTES
Medical Assistant Note:  Mickie Mcghee presents today for lab draw.    Patient seen by provider today: Yes: Sha Martínez.   present during visit today: Not Applicable.    Concerns: No Concerns.    Procedure:  Lab draw site: RAC, Needle type: Butterfly, Gauge: 23.    Post Assessment:  Labs drawn without difficulty: Yes.    Discharge Plan:  Departure Mode: Ambulatory.    Face to Face Time: 4 min.    Shari Schoenberger, CMA

## 2023-01-24 NOTE — PROGRESS NOTES
"Oncology Rooming Note    January 24, 2023 12:47 PM   Mickie Mcghee is a 57 year old female who presents for:    Chief Complaint   Patient presents with     Oncology Clinic Visit     Initial Vitals: /75   Pulse 77   Temp 98.4  F (36.9  C) (Oral)   Resp 14   Wt 66 kg (145 lb 6.4 oz)   SpO2 96%   BMI 24.20 kg/m   Estimated body mass index is 24.2 kg/m  as calculated from the following:    Height as of 12/28/22: 1.651 m (5' 5\").    Weight as of this encounter: 66 kg (145 lb 6.4 oz). Body surface area is 1.74 meters squared.  No Pain (0) Comment: Data Unavailable   No LMP recorded.  Allergies reviewed: Yes  Medications reviewed: Yes    Medications: Medication refills not needed today.  Pharmacy name entered into [employer]:    [id number] - [place], [place] - [address] [facility] - [place], [place] - [address]    Clinical concerns: no       Shari J. Schoenberger, CMA              "

## 2023-01-24 NOTE — PROGRESS NOTES
Oncology/Hematology Visit Note  Jan 24, 2023    Reason for Visit: follow up of metastatic breast cancer  ER positive WA positive HER2 negative  Diagnosed in 2007 status post bilateral mastectomy chemotherapy radiation anastrozole  -03/21/20223675-[id number] bone biopsy of left iliac bone reveals metastatic breast cancer ER positive WA positive HER2 negative  03/31/2021-started Ribociclib 600 mg   Letrazole     03/22-04/07/2022-status post radiation to the L-spine and LS spine 10 fractions  05/03-due to neutropenia Ribociclib decreased to 400 mg  -Improvement in counts  06/01- Ribociclib increased back to 600 mg  Due to persistent leukopenia Ribociclib reduced to 200 mg daily for 21 days 7 days off      Interval History:  Patient reports overall she is well.  She reports she treatment well.  Denies fevers chills sweats cough shortness of breath chest pain nausea vomiting diarrhea abdominal pain bleeding denies headache dizziness    Review of Systems:  14 point ROS of systems including Constitutional, Eyes, Respiratory, Cardiovascular, Gastroenterology, Genitourinary, Integumentary, Muscularskeletal, Psychiatric were all negative except for pertinent positives noted in my HPI.    Physical Examination:  General: The patient is a pleasant female in no acute distress.  HEENT: EOMI, PERRL. Sclerae are anicteric. Oral mucosa is pink and moist with no lesions or thrush.   Lymph: Neck is supple with no lymphadenopathy in the cervical or supraclavicular areas.   Heart: Regular rate and rhythm.   Lungs: Clear to auscultation bilaterally.   GI: Bowel sounds present, soft, nontender with no palpable hepatosplenomegaly or masses.   Extremities: No lower extremity edema noted bilaterally.   Skin: No rashes, petechiae, or bruising noted on exposed skin.    Laboratory Data:  CMP and TSH     Assessment and Plan:  This is a 57-year-old female with    metastatic breast cancer  ER positive WA positive HER2 negative  Diagnosed in 2007 status  post bilateral mastectomy chemotherapy radiation anastrozole  -03/21/[id number] bone biopsy of left iliac bone reveals metastatic breast cancer ER positive NM positive HER2 negative  03/31/2021-started Ribociclib 600 mg  And Letrazole   Due to leukopenia Ribociclib reduced to 200 mg daily(21 days on 7 days off)  -11/28/2022-PET scan revealed improvement in disease  -Continue with Ribociclib 200 mg 21 days on 7 days off  -Continue with letrozole  Continue with monthly lab checks        Bone metastasis  03/22-04/07/2022-status post radiation to the L-spine and LS spine 10 fractions  Continue with monthly Zometa  -Okay to give Zometa today  Call our clinic in the event of jaw pain or any dental issues        Leukopenia secondary to  Ribociclib   Normal ANC  Continue to monitor  Advised patient to call our clinic in the event of fever chills sweats or any signs symptoms of infection     Anemia  Mild  Continue to monitor        Please call our clinic with any changes in health condition or questions      SAMI Jacob [facility] [place]     Chart documentation with Dragon Voice recognition Software. Although reviewed after completion, some words and grammatical errors may remain.

## 2023-02-13 DIAGNOSIS — C50.919 METASTATIC BREAST CANCER: Primary | ICD-10-CM

## 2023-02-13 RX ORDER — RIBOCICLIB 200 MG/1
200 TABLET, FILM COATED ORAL DAILY
Qty: 21 TABLET | Refills: 0 | Status: SHIPPED | OUTPATIENT
Start: 2023-02-22 | End: 2023-03-14

## 2023-02-13 RX ORDER — LETROZOLE 2.5 MG/1
2.5 TABLET, FILM COATED ORAL EVERY MORNING
Qty: 28 TABLET | Refills: 0 | Status: SHIPPED | OUTPATIENT
Start: 2023-02-22 | End: 2023-03-14

## 2023-02-21 ENCOUNTER — LAB (OUTPATIENT)
Dept: INFUSION THERAPY | Facility: CLINIC | Age: 58
End: 2023-02-21
Attending: PHYSICIAN ASSISTANT
Payer: COMMERCIAL

## 2023-02-21 ENCOUNTER — INFUSION THERAPY VISIT (OUTPATIENT)
Dept: INFUSION THERAPY | Facility: CLINIC | Age: 58
End: 2023-02-21
Attending: INTERNAL MEDICINE
Payer: COMMERCIAL

## 2023-02-21 ENCOUNTER — ONCOLOGY VISIT (OUTPATIENT)
Dept: ONCOLOGY | Facility: CLINIC | Age: 58
End: 2023-02-21
Attending: PHYSICIAN ASSISTANT
Payer: COMMERCIAL

## 2023-02-21 VITALS
HEART RATE: 72 BPM | BODY MASS INDEX: 24.36 KG/M2 | DIASTOLIC BLOOD PRESSURE: 83 MMHG | SYSTOLIC BLOOD PRESSURE: 127 MMHG | WEIGHT: 146.4 LBS | RESPIRATION RATE: 16 BRPM | OXYGEN SATURATION: 99 % | TEMPERATURE: 98 F

## 2023-02-21 DIAGNOSIS — C79.51 SPINAL CORD COMPRESSION DUE TO MALIGNANT NEOPLASM METASTATIC TO SPINE (H): ICD-10-CM

## 2023-02-21 DIAGNOSIS — C50.919 METASTATIC BREAST CANCER: Primary | ICD-10-CM

## 2023-02-21 DIAGNOSIS — G95.29 SPINAL CORD COMPRESSION DUE TO MALIGNANT NEOPLASM METASTATIC TO SPINE (H): ICD-10-CM

## 2023-02-21 LAB
ALBUMIN SERPL BCG-MCNC: 4.2 G/DL (ref 3.5–5.2)
ALP SERPL-CCNC: 58 U/L (ref 35–104)
ALT SERPL W P-5'-P-CCNC: 14 U/L (ref 10–35)
ANION GAP SERPL CALCULATED.3IONS-SCNC: 8 MMOL/L (ref 7–15)
AST SERPL W P-5'-P-CCNC: 26 U/L (ref 10–35)
BASOPHILS # BLD AUTO: 0 10E3/UL (ref 0–0.2)
BASOPHILS NFR BLD AUTO: 1 %
BILIRUB SERPL-MCNC: 0.2 MG/DL
BUN SERPL-MCNC: 16.3 MG/DL (ref 6–20)
CALCIUM SERPL-MCNC: 9.7 MG/DL (ref 8.6–10)
CALCIUM SERPL-MCNC: 9.8 MG/DL (ref 8.6–10)
CHLORIDE SERPL-SCNC: 103 MMOL/L (ref 98–107)
CREAT SERPL-MCNC: 0.8 MG/DL (ref 0.51–0.95)
DEPRECATED HCO3 PLAS-SCNC: 29 MMOL/L (ref 22–29)
EOSINOPHIL # BLD AUTO: 0.1 10E3/UL (ref 0–0.7)
EOSINOPHIL NFR BLD AUTO: 2 %
ERYTHROCYTE [DISTWIDTH] IN BLOOD BY AUTOMATED COUNT: 14.1 % (ref 10–15)
GFR SERPL CREATININE-BSD FRML MDRD: 86 ML/MIN/1.73M2
GLUCOSE SERPL-MCNC: 91 MG/DL (ref 70–99)
HCT VFR BLD AUTO: 31.8 % (ref 35–47)
HGB BLD-MCNC: 10.9 G/DL (ref 11.7–15.7)
IMM GRANULOCYTES # BLD: 0 10E3/UL
IMM GRANULOCYTES NFR BLD: 0 %
LYMPHOCYTES # BLD AUTO: 0.8 10E3/UL (ref 0.8–5.3)
LYMPHOCYTES NFR BLD AUTO: 25 %
MAGNESIUM SERPL-MCNC: 2.3 MG/DL (ref 1.7–2.3)
MCH RBC QN AUTO: 32.2 PG (ref 26.5–33)
MCHC RBC AUTO-ENTMCNC: 34.3 G/DL (ref 31.5–36.5)
MCV RBC AUTO: 94 FL (ref 78–100)
MONOCYTES # BLD AUTO: 0.4 10E3/UL (ref 0–1.3)
MONOCYTES NFR BLD AUTO: 12 %
NEUTROPHILS # BLD AUTO: 1.9 10E3/UL (ref 1.6–8.3)
NEUTROPHILS NFR BLD AUTO: 60 %
NRBC # BLD AUTO: 0 10E3/UL
NRBC BLD AUTO-RTO: 0 /100
PHOSPHATE SERPL-MCNC: 3.8 MG/DL (ref 2.5–4.5)
PLATELET # BLD AUTO: 211 10E3/UL (ref 150–450)
POTASSIUM SERPL-SCNC: 4.4 MMOL/L (ref 3.4–5.3)
PROT SERPL-MCNC: 6.9 G/DL (ref 6.4–8.3)
RBC # BLD AUTO: 3.39 10E6/UL (ref 3.8–5.2)
SODIUM SERPL-SCNC: 140 MMOL/L (ref 136–145)
WBC # BLD AUTO: 3.1 10E3/UL (ref 4–11)

## 2023-02-21 PROCEDURE — 36415 COLL VENOUS BLD VENIPUNCTURE: CPT

## 2023-02-21 PROCEDURE — 83735 ASSAY OF MAGNESIUM: CPT | Performed by: INTERNAL MEDICINE

## 2023-02-21 PROCEDURE — 85025 COMPLETE CBC W/AUTO DIFF WBC: CPT | Performed by: INTERNAL MEDICINE

## 2023-02-21 PROCEDURE — 96365 THER/PROPH/DIAG IV INF INIT: CPT

## 2023-02-21 PROCEDURE — 80053 COMPREHEN METABOLIC PANEL: CPT | Performed by: PHYSICIAN ASSISTANT

## 2023-02-21 PROCEDURE — 84100 ASSAY OF PHOSPHORUS: CPT | Performed by: INTERNAL MEDICINE

## 2023-02-21 PROCEDURE — 99214 OFFICE O/P EST MOD 30 MIN: CPT | Performed by: PHYSICIAN ASSISTANT

## 2023-02-21 PROCEDURE — 82310 ASSAY OF CALCIUM: CPT | Performed by: INTERNAL MEDICINE

## 2023-02-21 PROCEDURE — 250N000011 HC RX IP 250 OP 636: Performed by: PHYSICIAN ASSISTANT

## 2023-02-21 PROCEDURE — G0463 HOSPITAL OUTPT CLINIC VISIT: HCPCS | Mod: 25 | Performed by: PHYSICIAN ASSISTANT

## 2023-02-21 RX ORDER — HEPARIN SODIUM,PORCINE 10 UNIT/ML
5 VIAL (ML) INTRAVENOUS
Status: CANCELLED | OUTPATIENT
Start: 2023-02-22

## 2023-02-21 RX ORDER — HEPARIN SODIUM (PORCINE) LOCK FLUSH IV SOLN 100 UNIT/ML 100 UNIT/ML
5 SOLUTION INTRAVENOUS
Status: CANCELLED | OUTPATIENT
Start: 2023-02-22

## 2023-02-21 RX ORDER — ZOLEDRONIC ACID 0.04 MG/ML
4 INJECTION, SOLUTION INTRAVENOUS ONCE
Status: CANCELLED
Start: 2023-02-22 | End: 2023-02-22

## 2023-02-21 RX ORDER — ZOLEDRONIC ACID 0.04 MG/ML
4 INJECTION, SOLUTION INTRAVENOUS ONCE
Status: COMPLETED | OUTPATIENT
Start: 2023-02-21 | End: 2023-02-21

## 2023-02-21 RX ADMIN — ZOLEDRONIC ACID 4 MG: 0.04 INJECTION, SOLUTION INTRAVENOUS at 09:21

## 2023-02-21 ASSESSMENT — PAIN SCALES - GENERAL: PAINLEVEL: NO PAIN (0)

## 2023-02-21 NOTE — PROGRESS NOTES
Medical Assistant Note:  Mickie Mcghee presents today for blood draw.    Patient seen by provider today: Yes: cristy.   present during visit today: Not Applicable.    Concerns: No Concerns.    Procedure:  Lab draw site: rac, Needle type: bf, Gauge: 23.    Post Assessment:  Labs drawn without difficulty: Yes.    Discharge Plan:  Departure Mode: Ambulatory.    Face to Face Time: 5 min.    Dorothy Henao, CMA

## 2023-02-21 NOTE — PROGRESS NOTES
"Oncology Rooming Note    February 21, 2023 8:20 AM   Mickie Mcghee is a 57 year old female who presents for:    Chief Complaint   Patient presents with     Oncology Clinic Visit     Initial Vitals: There were no vitals taken for this visit. Estimated body mass index is 24.2 kg/m  as calculated from the following:    Height as of 12/28/22: 1.651 m (5' 5\").    Weight as of 1/24/23: 66 kg (145 lb 6.4 oz). There is no height or weight on file to calculate BSA.  Data Unavailable Comment: Data Unavailable   No LMP recorded.  Allergies reviewed: Yes  Medications reviewed: Yes    Medications: Medication refills not needed today.  Pharmacy name entered into Epoch:    [id number] - [place], [place] - [address] [facility] - [place], [place] - [address]    Clinical concerns: no      Mildred Clements CMA            "

## 2023-02-21 NOTE — PROGRESS NOTES
Infusion Nursing Note:  Mickie Mcghee presents today for zometa.    Patient seen by provider today: Yes: JAKCY Best   present during visit today: Not Applicable.    Note: N/A.    Intravenous Access:  Peripheral IV placed.    Treatment Conditions:  Lab Results   Component Value Date    HGB 10.9 (L) 02/21/2023    WBC 3.1 (L) 02/21/2023    ANEU 1.3 (L) 08/04/2022    ANEUTAUTO 1.9 02/21/2023     02/21/2023      Lab Results   Component Value Date     02/21/2023    POTASSIUM 4.4 02/21/2023    MAG 2.3 02/21/2023    CR 0.80 02/21/2023    OSMEL 9.8 02/21/2023    OSMEL 9.7 02/21/2023    BILITOTAL 0.2 02/21/2023    ALBUMIN 4.2 02/21/2023    ALT 14 02/21/2023    AST 26 02/21/2023     Results reviewed, labs MET treatment parameters, ok to proceed with treatment.    Post Infusion Assessment:  Patient tolerated infusion without incident.  Blood return noted pre and post infusion.  Site patent and intact, free from redness, edema or discomfort.  No evidence of extravasations.  Access discontinued per protocol.     Discharge Plan:   AVS to patient via MYCHART.  Patient will return as [facility] for next appointment.   Patient discharged in stable condition accompanied by: self.  Departure Mode: Ambulatory with cane.      George Metz RN

## 2023-02-21 NOTE — PROGRESS NOTES
Oncology/Hematology Visit Note  Feb 21, 2023    Reason for Visit: Follow up of metastatic breast cancer    Primary Oncologist: Dr. Lozada    Oncologic History:    Ms. Mcghee is a female with metastatic breast cancer. ER positive, CT positive and HER-2/halina negative.  -Foundation One reveals PIK3CA alteration.  -She had a stage III left breast cancer in 2007. ER positive and HER-2/halina negative.  She had bilateral mastectomy, chemotherapy, radiation, and anastrozole.     1.  On 03/18/2022, multiple imaging studies done because of bilateral lower extremity weakness:  -MRI of thoracic and lumbar spine revealed bone metastases with spinal cord compression.  -CT chest, abdomen, and pelvis on 03/19/2022 revealed bone metastasis, lung nodules and enlarged upper abdominal lymph node.  There is a hypodense nodule in the liver.  -Brain MRI on 03/19/2022 did not reveal any intracranial metastasis.  2.  CT-guided bone biopsy of left iliac bone on 03/21/2022 revealed metastatic breast cancer. ER positive, CT positive and HER-2/halina negative.  3.  Letrozole started on 03/25/2022.  -Ribociclib started on 03/31/2022. Decreased to 200 mg a day in 09/2022.  -Zometa started on 03/24/2022.  -Radiation to thoracolumbar and sacral area between 03/22/2022 and 04/07/2022.  3000 cGy in 10 fractions.  4. PET 11/28/22 with decreased FDG activity within numerous osseous lesions and RML lung nodule, partial response to therapy    Interval History:  Doing well. She notes she has been able to gain some muscle mass since treatment has controlled disease. Denies bone pain. Ambulates with can for safety. Completing all ADLs without difficulty, including cooking for son when he is home. No adverse effects from either treatment. No recurrent dizzy/lightheadedness.     Review of Systems:  A complete review of systems was negative except as noted in the HPI.     Past medical, Surgical, and social history:  Reviewed    Physical Examination:  /83   Pulse  72   Temp 98  F (36.7  C) (Oral)   Resp 16   Wt 66.4 kg (146 lb 6.4 oz)   SpO2 99%   BMI 24.36 kg/m    Wt Readings from Last 10 Encounters:   02/21/23 66.4 kg (146 lb 6.4 oz)   01/24/23 66 kg (145 lb 6.4 oz)   12/28/22 65.3 kg (144 lb)   11/29/22 64.4 kg (142 lb)   11/02/22 65.4 kg (144 lb 3.2 oz)   09/14/22 64 kg (141 lb 3.2 oz)   07/28/22 65.8 kg (145 lb)   04/04/22 59 kg (130 lb)   03/19/22 55.3 kg (122 lb)   03/18/22 63.5 kg (140 lb)     General: Pleasant patient in no acute distress.  Skin: Warm and dry. No abnormal ecchymosis or rashes. Line clean, dry, intact.  Eyes: Anicteric.  ENT: Oral mucosa pink and moist without erythema, lesions, thrush, or petechia.  Heart: Normal rate and rhythm without murmur.  Lungs: Clear to auscultation in all fields with no adventitious lung sounds. Normal work of breathing.  Abdomen: Soft, non-tender, non-distended without hepatosplenomegaly. Normoactive bowel sounds.  Extremities: No peripheral edema. Gross movement intact without difficulty.  Mental: Calm, cooperative, appropriate. Mood euthymic.  Neuro: Cranial nerves and coordination grossly intact. Alert and oriented x 3.    Laboratory Data:  CBC, CMP reviewed.       Assessment and Plan:  Mickie Mcghee is a 57 year old female with metastatic (bone, liver) PIK3C mutated ER/DE+ breast cancer on ribociclib, letrozole, and Zometa.    # Breast Cancer  # Cytopenias secondary to chemotherapy  - Continue ribociclib and letrozole  - Follow-up monthly with labs and exam  - Repeat imaging TBD    # Bone Mets  - Continue monthly Zometa and Ca+D    # Neutropenia  - ribociclib dose reduced to 200 mg daily for 21 of 28 days   - Improved, no infectious complications    # Drug Monitoring  - Last ECG 4/2022 QTc WNL    30 minutes spent on the date of the encounter doing chart review, review of test results, interpretation of tests, patient visit, documentation and discussion with other provider(s)     Estephania Zaldivar PA-C  [facility]    [facility]   [phone]    Chart documentation with Dragon Voice recognition Software. Although reviewed after completion, some words and grammatical errors may remain.

## 2023-02-21 NOTE — RESULT ENCOUNTER NOTE
Dear Ms. Mcghee,    Blood tests are stable.    Please, call me with any questions.    Yury Lozada MD

## 2023-02-22 DIAGNOSIS — C50.919 METASTATIC BREAST CANCER: Primary | ICD-10-CM

## 2023-03-14 DIAGNOSIS — C50.919 METASTATIC BREAST CANCER: Primary | ICD-10-CM

## 2023-03-14 RX ORDER — LETROZOLE 2.5 MG/1
2.5 TABLET, FILM COATED ORAL EVERY MORNING
Qty: 28 TABLET | Refills: 0 | Status: SHIPPED | OUTPATIENT
Start: 2023-03-22 | End: 2023-07-19

## 2023-03-14 RX ORDER — RIBOCICLIB 200 MG/1
200 TABLET, FILM COATED ORAL DAILY
Qty: 21 TABLET | Refills: 0 | Status: SHIPPED | OUTPATIENT
Start: 2023-03-22 | End: 2023-04-12

## 2023-03-15 ENCOUNTER — ANCILLARY PROCEDURE (OUTPATIENT)
Dept: CT IMAGING | Facility: CLINIC | Age: 58
End: 2023-03-15
Attending: PHYSICIAN ASSISTANT
Payer: COMMERCIAL

## 2023-03-15 DIAGNOSIS — C50.919 METASTATIC BREAST CANCER: ICD-10-CM

## 2023-03-15 PROCEDURE — 255N000002 HC RX 255 OP 636: Performed by: PHYSICIAN ASSISTANT

## 2023-03-15 PROCEDURE — 74177 CT ABD & PELVIS W/CONTRAST: CPT

## 2023-03-15 RX ADMIN — IOHEXOL 100 ML: 350 INJECTION, SOLUTION INTRAVENOUS at 08:08

## 2023-03-21 ENCOUNTER — DOCUMENTATION ONLY (OUTPATIENT)
Dept: ONCOLOGY | Facility: CLINIC | Age: 58
End: 2023-03-21

## 2023-03-21 ENCOUNTER — LAB (OUTPATIENT)
Dept: INFUSION THERAPY | Facility: CLINIC | Age: 58
End: 2023-03-21
Attending: INTERNAL MEDICINE
Payer: COMMERCIAL

## 2023-03-21 ENCOUNTER — ONCOLOGY VISIT (OUTPATIENT)
Dept: ONCOLOGY | Facility: CLINIC | Age: 58
End: 2023-03-21
Attending: INTERNAL MEDICINE
Payer: COMMERCIAL

## 2023-03-21 VITALS
RESPIRATION RATE: 16 BRPM | WEIGHT: 144.6 LBS | HEART RATE: 77 BPM | DIASTOLIC BLOOD PRESSURE: 74 MMHG | BODY MASS INDEX: 24.06 KG/M2 | OXYGEN SATURATION: 98 % | SYSTOLIC BLOOD PRESSURE: 122 MMHG | TEMPERATURE: 98.1 F

## 2023-03-21 DIAGNOSIS — C50.919 METASTATIC BREAST CANCER: ICD-10-CM

## 2023-03-21 DIAGNOSIS — C50.919 METASTATIC BREAST CANCER: Primary | ICD-10-CM

## 2023-03-21 DIAGNOSIS — C79.51 SPINAL CORD COMPRESSION DUE TO MALIGNANT NEOPLASM METASTATIC TO SPINE (H): ICD-10-CM

## 2023-03-21 DIAGNOSIS — G95.29 SPINAL CORD COMPRESSION DUE TO MALIGNANT NEOPLASM METASTATIC TO SPINE (H): ICD-10-CM

## 2023-03-21 DIAGNOSIS — R93.429 ABNORMAL RADIOLOGIC FINDINGS ON DIAGNOSTIC IMAGING OF UNSPECIFIED KIDNEY: ICD-10-CM

## 2023-03-21 LAB
ALBUMIN SERPL BCG-MCNC: 4.1 G/DL (ref 3.5–5.2)
ALP SERPL-CCNC: 53 U/L (ref 35–104)
ALT SERPL W P-5'-P-CCNC: 16 U/L (ref 10–35)
ANION GAP SERPL CALCULATED.3IONS-SCNC: 7 MMOL/L (ref 7–15)
AST SERPL W P-5'-P-CCNC: 24 U/L (ref 10–35)
BASOPHILS # BLD AUTO: 0 10E3/UL (ref 0–0.2)
BASOPHILS NFR BLD AUTO: 1 %
BILIRUB SERPL-MCNC: 0.2 MG/DL
BUN SERPL-MCNC: 14.3 MG/DL (ref 6–20)
CALCIUM SERPL-MCNC: 10.1 MG/DL (ref 8.6–10)
CHLORIDE SERPL-SCNC: 103 MMOL/L (ref 98–107)
CREAT SERPL-MCNC: 0.85 MG/DL (ref 0.51–0.95)
DEPRECATED HCO3 PLAS-SCNC: 29 MMOL/L (ref 22–29)
EOSINOPHIL # BLD AUTO: 0.1 10E3/UL (ref 0–0.7)
EOSINOPHIL NFR BLD AUTO: 2 %
ERYTHROCYTE [DISTWIDTH] IN BLOOD BY AUTOMATED COUNT: 13.8 % (ref 10–15)
GFR SERPL CREATININE-BSD FRML MDRD: 79 ML/MIN/1.73M2
GLUCOSE SERPL-MCNC: 87 MG/DL (ref 70–99)
HCT VFR BLD AUTO: 33 % (ref 35–47)
HGB BLD-MCNC: 10.8 G/DL (ref 11.7–15.7)
IMM GRANULOCYTES # BLD: 0 10E3/UL
IMM GRANULOCYTES NFR BLD: 0 %
LYMPHOCYTES # BLD AUTO: 0.8 10E3/UL (ref 0.8–5.3)
LYMPHOCYTES NFR BLD AUTO: 30 %
MAGNESIUM SERPL-MCNC: 2.2 MG/DL (ref 1.7–2.3)
MCH RBC QN AUTO: 31.5 PG (ref 26.5–33)
MCHC RBC AUTO-ENTMCNC: 32.7 G/DL (ref 31.5–36.5)
MCV RBC AUTO: 96 FL (ref 78–100)
MONOCYTES # BLD AUTO: 0.3 10E3/UL (ref 0–1.3)
MONOCYTES NFR BLD AUTO: 13 %
NEUTROPHILS # BLD AUTO: 1.4 10E3/UL (ref 1.6–8.3)
NEUTROPHILS NFR BLD AUTO: 54 %
NRBC # BLD AUTO: 0 10E3/UL
NRBC BLD AUTO-RTO: 0 /100
PHOSPHATE SERPL-MCNC: 3.9 MG/DL (ref 2.5–4.5)
PLATELET # BLD AUTO: 222 10E3/UL (ref 150–450)
POTASSIUM SERPL-SCNC: 4.2 MMOL/L (ref 3.4–5.3)
PROT SERPL-MCNC: 6.8 G/DL (ref 6.4–8.3)
RBC # BLD AUTO: 3.43 10E6/UL (ref 3.8–5.2)
SODIUM SERPL-SCNC: 139 MMOL/L (ref 136–145)
WBC # BLD AUTO: 2.6 10E3/UL (ref 4–11)

## 2023-03-21 PROCEDURE — 96374 THER/PROPH/DIAG INJ IV PUSH: CPT

## 2023-03-21 PROCEDURE — 99214 OFFICE O/P EST MOD 30 MIN: CPT | Performed by: INTERNAL MEDICINE

## 2023-03-21 PROCEDURE — 84100 ASSAY OF PHOSPHORUS: CPT | Performed by: INTERNAL MEDICINE

## 2023-03-21 PROCEDURE — 85048 AUTOMATED LEUKOCYTE COUNT: CPT | Performed by: INTERNAL MEDICINE

## 2023-03-21 PROCEDURE — 36415 COLL VENOUS BLD VENIPUNCTURE: CPT

## 2023-03-21 PROCEDURE — 250N000011 HC RX IP 250 OP 636: Performed by: INTERNAL MEDICINE

## 2023-03-21 PROCEDURE — 83735 ASSAY OF MAGNESIUM: CPT | Performed by: INTERNAL MEDICINE

## 2023-03-21 PROCEDURE — 80053 COMPREHEN METABOLIC PANEL: CPT | Performed by: INTERNAL MEDICINE

## 2023-03-21 PROCEDURE — G0463 HOSPITAL OUTPT CLINIC VISIT: HCPCS | Mod: 25 | Performed by: INTERNAL MEDICINE

## 2023-03-21 RX ORDER — HEPARIN SODIUM (PORCINE) LOCK FLUSH IV SOLN 100 UNIT/ML 100 UNIT/ML
5 SOLUTION INTRAVENOUS
Status: CANCELLED | OUTPATIENT
Start: 2023-03-22

## 2023-03-21 RX ORDER — ZOLEDRONIC ACID 0.04 MG/ML
4 INJECTION, SOLUTION INTRAVENOUS ONCE
Status: COMPLETED | OUTPATIENT
Start: 2023-03-21 | End: 2023-03-21

## 2023-03-21 RX ORDER — ZOLEDRONIC ACID 0.04 MG/ML
4 INJECTION, SOLUTION INTRAVENOUS ONCE
Status: CANCELLED
Start: 2023-03-22 | End: 2023-03-22

## 2023-03-21 RX ORDER — HEPARIN SODIUM,PORCINE 10 UNIT/ML
5 VIAL (ML) INTRAVENOUS
Status: CANCELLED | OUTPATIENT
Start: 2023-03-22

## 2023-03-21 RX ADMIN — ZOLEDRONIC ACID 4 MG: 0.04 INJECTION, SOLUTION INTRAVENOUS at 09:10

## 2023-03-21 ASSESSMENT — PAIN SCALES - GENERAL: PAINLEVEL: NO PAIN (0)

## 2023-03-21 NOTE — LETTER
"    3/21/2023         RE: Mickie Mcghee  [id number]  [place] [address]        Dear Colleague,    Thank you for referring your patient, Mickie Mcghee, to the [facility]. Please see a copy of my visit note below.    Oncology Rooming Note    March 21, 2023 8:27 AM   Mickie Mcghee is a 58 year old female who presents for:    Chief Complaint   Patient presents with     Oncology Clinic Visit     Initial Vitals: There were no vitals taken for this visit. Estimated body mass index is 24.36 kg/m  as calculated from the following:    Height as of 12/28/22: 1.651 m (5' 5\").    Weight as of 2/21/23: 66.4 kg (146 lb 6.4 oz). There is no height or weight on file to calculate BSA.  Data Unavailable Comment: Data Unavailable   No LMP recorded.  Allergies reviewed: Yes  Medications reviewed: Yes    Medications: Medication refills not needed today.  Pharmacy name entered into Qardio:    [id number] - [place], [place] - [address] AT [facility] - [place], [place] - [address]    Clinical concerns:  doctor was notified.      Dorothy Henao, [facility]              ONCOLOGY HISTORY:  Ms. Mcghee is a female with metastatic breast cancer. ER positive, SD positive and HER-2/halina negative.  -Foundation One reveals PIK3CA alteration.  -She had a stage III left breast cancer in 2007. ER positive and HER-2/halina negative.  She had bilateral mastectomy, chemotherapy, radiation, and anastrozole.     1.  On 03/18/2022, multiple imaging studies done because of bilateral lower extremity weakness:  -MRI of thoracic and lumbar spine revealed bone metastases with spinal cord compression.  -CT chest, abdomen, and pelvis on 03/19/2022 revealed bone metastasis, lung nodules and enlarged upper abdominal lymph node.  There is a hypodense nodule in the liver.  -Brain MRI on 03/19/2022 did not reveal any intracranial metastasis.  2.  CT-guided bone biopsy of " left iliac bone on 03/21/2022 revealed metastatic breast cancer. ER positive, GA positive and HER-2/halina negative.  3.  Letrozole started on 03/25/2022.  -Ribociclib started on 03/31/2022. Decreased to 200 mg a day in 09/2022.  -Zometa started on 03/24/2022.  -Radiation to thoracolumbar and sacral area between 03/22/2022 and 04/07/2022.  3000 cGy in 10 fractions.     SUBJECTIVE:  Ms. Mcghee is a 58-year-old female with metastatic breast cancer on letrozole and ribociclib.  She is tolerating it well.     She is also on Zometa for bone metastasis.  She is tolerating them well.  No dental or jaw related symptoms.    CT scan on 03/15/2023 reveals stable disease.  1.  Multifocal bony metastatic disease again identified. The sizes appears similar, though some lesions have increased sclerosis and this may be seen with treatment response.  2.  Stable pulmonary nodules including a dominant right middle lobe nodule.  3.  New finding of patchy hypoenhancement within the bilateral kidneys. This could potentially be seen with pyelonephritis. Correlate with renal abnormality. Suggest further assessment with renal MRI.  4.  No new disease otherwise seen.     Patient's leg weakness is stable.  She has been doing physical therapy. When she goes outside, she uses a cane. She does not use cane inside the house.     No headache.  No dizziness.  No chest pain.  No shortness of breath.  No abdominal pain.  No nausea or vomiting.  Appetite is good.  No bleeding. No urinary or bowel complaints. No bone pain. All other review of system is negative.     PHYSICAL EXAMINATION:  She is alert and oriented x 3. Not in any distress.   Rest of systems not examined.    LABS: CBC and CMP reviewed.     ASSESSMENT:  1.  A 58-year-old female with metastatic breast cancer on letrozole and ribociclib. Disease is stable.  2.  Lower extremity weakness, stable.  3.  Leukopenia from ribociclib.  4.  Anemia, stable.  5.  Abnormal appearance of kidney on CT  scan.    PLAN:  1.  Patient is clinically doing well from breast cancer.  CT scan was reviewed with her.  Explained to her that her disease is stable.  She was happy to know that.     She is on letrozole and ribociclib.  She is tolerating it well. She will continue on it.     2.  Patient is on Zometa.  She is tolerating it well.  No dental or jaw related symptoms.  She will continue on it. She will continue on calcium and vitamin D twice a day.  Advised her to follow-up with dentist.    3.  On CT scan, kidneys look abnormal.  For further evaluation we will get renal ultrasound.  She may need to see a nephrologist if ultrasound is abnormal.     4.  She has mild leukopenia and anemia.  Calcium is minimally elevated from oral calcium. It will improve with zometa. We will continue to monitor CBC and CMP.     5.    Her leg weakness is stable.  She will continue with physical therapy.       6.  I will see her in 3 months.  In between she will see our nurse practitioner.  Advised her to call us with any questions or concerns.     Total visit time of 30 minutes.  Time spent in today's visit, review of chart/investigations today, monitoring for toxicity of high risk drugs and documentation today.         Again, thank you for allowing me to participate in the care of your patient.        Sincerely,        Yury Lozada MD

## 2023-03-21 NOTE — PROGRESS NOTES
Infusion Nursing Note:  Mickie Mcghee presents today for Zometa.    Patient seen by provider today: Yes: Dr. Lozada   present during visit today: Not Applicable.    Note: N/A.    Intravenous Access:  Peripheral IV placed.    Treatment Conditions:  Lab Results   Component Value Date    HGB 10.8 (L) 03/21/2023    WBC 2.6 (L) 03/21/2023    ANEU 1.3 (L) 08/04/2022    ANEUTAUTO 1.4 (L) 03/21/2023     03/21/2023      Lab Results   Component Value Date     03/21/2023    POTASSIUM 4.2 03/21/2023    MAG 2.2 03/21/2023    CR 0.85 03/21/2023    OSMEL 10.1 (H) 03/21/2023    BILITOTAL 0.2 03/21/2023    ALBUMIN 4.1 03/21/2023    ALT 16 03/21/2023    AST 24 03/21/2023     Results reviewed, labs MET treatment parameters, ok to proceed with treatment.    Post Infusion Assessment:  Patient tolerated infusion without incident.  Blood return noted pre and post infusion.  Site patent and intact, free from redness, edema or discomfort.  No evidence of extravasations.  Access discontinued per protocol.     Discharge Plan:   Patient declined prescription refills.  Discharge instructions reviewed with: Patient.  Patient and/or family verbalized understanding of discharge instructions and all questions answered.  AVS to patient via [employer].  Patient will return 4/18 for next appointment.   Patient discharged in stable condition accompanied by: self.  Departure Mode: Ambulatory.      Colt Hardy RN

## 2023-03-21 NOTE — PROGRESS NOTES
"Oncology Rooming Note    March 21, 2023 8:27 AM   Mickie Mcghee is a 58 year old female who presents for:    Chief Complaint   Patient presents with     Oncology Clinic Visit     Initial Vitals: There were no vitals taken for this visit. Estimated body mass index is 24.36 kg/m  as calculated from the following:    Height as of 12/28/22: 1.651 m (5' 5\").    Weight as of 2/21/23: 66.4 kg (146 lb 6.4 oz). There is no height or weight on file to calculate BSA.  Data Unavailable Comment: Data Unavailable   No LMP recorded.  Allergies reviewed: Yes  Medications reviewed: Yes    Medications: Medication refills not needed today.  Pharmacy name entered into Crocod[employer]:    [id number] - [place], [place] - [address] [facility] - [place], [place] - [address]    Clinical concerns:  doctor was notified.      Dorothy Henao CMA            "

## 2023-03-21 NOTE — PATIENT INSTRUCTIONS
1.  Continue letrozole and ribociclib.  2.  Continue monthly Zometa.  3.  Take calcium and vitamin D twice a day.  4.  See NP with next two zometa infusion.  5.  See me in 3 months.  6. US kidney.

## 2023-03-22 NOTE — PROGRESS NOTES
ONCOLOGY HISTORY:  Ms. Mcghee is a female with metastatic breast cancer. ER positive, ID positive and HER-2/halina negative.  -Foundation One reveals PIK3CA alteration.  -She had a stage III left breast cancer in 2007. ER positive and HER-2/halina negative.  She had bilateral mastectomy, chemotherapy, radiation, and anastrozole.     1.  On 03/18/2022, multiple imaging studies done because of bilateral lower extremity weakness:  -MRI of thoracic and lumbar spine revealed bone metastases with spinal cord compression.  -CT chest, abdomen, and pelvis on 03/19/2022 revealed bone metastasis, lung nodules and enlarged upper abdominal lymph node.  There is a hypodense nodule in the liver.  -Brain MRI on 03/19/2022 did not reveal any intracranial metastasis.  2.  CT-guided bone biopsy of left iliac bone on 03/21/2022 revealed metastatic breast cancer. ER positive, ID positive and HER-2/halina negative.  3.  Letrozole started on 03/25/2022.  -Ribociclib started on 03/31/2022. Decreased to 200 mg a day in 09/2022.  -Zometa started on 03/24/2022.  -Radiation to thoracolumbar and sacral area between 03/22/2022 and 04/07/2022.  3000 cGy in 10 fractions.     SUBJECTIVE:  Ms. Mcghee is a 58-year-old female with metastatic breast cancer on letrozole and ribociclib.  She is tolerating it well.     She is also on Zometa for bone metastasis.  She is tolerating them well.  No dental or jaw related symptoms.    CT scan on 03/15/2023 reveals stable disease.  1.  Multifocal bony metastatic disease again identified. The sizes appears similar, though some lesions have increased sclerosis and this may be seen with treatment response.  2.  Stable pulmonary nodules including a dominant right middle lobe nodule.  3.  New finding of patchy hypoenhancement within the bilateral kidneys. This could potentially be seen with pyelonephritis. Correlate with renal abnormality. Suggest further assessment with renal MRI.  4.  No new disease otherwise  seen.     Patient's leg weakness is stable.  She has been doing physical therapy. When she goes outside, she uses a cane. She does not use cane inside the house.     No headache.  No dizziness.  No chest pain.  No shortness of breath.  No abdominal pain.  No nausea or vomiting.  Appetite is good.  No bleeding. No urinary or bowel complaints. No bone pain. All other review of system is negative.     PHYSICAL EXAMINATION:  She is alert and oriented x 3. Not in any distress.   Rest of systems not examined.    LABS: CBC and CMP reviewed.     ASSESSMENT:  1.  A 58-year-old female with metastatic breast cancer on letrozole and ribociclib. Disease is stable.  2.  Lower extremity weakness, stable.  3.  Leukopenia from ribociclib.  4.  Anemia, stable.  5.  Abnormal appearance of kidney on CT scan.    PLAN:  1.  Patient is clinically doing well from breast cancer.  CT scan was reviewed with her.  Explained to her that her disease is stable.  She was happy to know that.     She is on letrozole and ribociclib.  She is tolerating it well. She will continue on it.     2.  Patient is on Zometa.  She is tolerating it well.  No dental or jaw related symptoms.  She will continue on it. She will continue on calcium and vitamin D twice a day.  Advised her to follow-up with dentist.    3.  On CT scan, kidneys look abnormal.  For further evaluation we will get renal ultrasound.  She may need to see a nephrologist if ultrasound is abnormal.     4.  She has mild leukopenia and anemia.  Calcium is minimally elevated from oral calcium. It will improve with zometa. We will continue to monitor CBC and CMP.     5.    Her leg weakness is stable.  She will continue with physical therapy.       6.  I will see her in 3 months.  In between she will see our nurse practitioner.  Advised her to call us with any questions or concerns.     Total visit time of 30 minutes.  Time spent in today's visit, review of chart/investigations today, monitoring for  toxicity of high risk drugs and documentation today.

## 2023-04-12 DIAGNOSIS — C50.919 PRIMARY MALIGNANT NEOPLASM OF BREAST WITH METASTASIS (H): Primary | ICD-10-CM

## 2023-04-12 RX ORDER — LETROZOLE 2.5 MG/1
2.5 TABLET, FILM COATED ORAL EVERY MORNING
Qty: 28 TABLET | Refills: 0 | Status: SHIPPED | OUTPATIENT
Start: 2023-04-19 | End: 2023-05-16

## 2023-04-12 RX ORDER — RIBOCICLIB 200 MG/1
200 TABLET, FILM COATED ORAL DAILY
Qty: 21 TABLET | Refills: 0 | Status: SHIPPED | OUTPATIENT
Start: 2023-04-19 | End: 2023-05-16

## 2023-04-17 DIAGNOSIS — C50.919 PRIMARY MALIGNANT NEOPLASM OF BREAST WITH METASTASIS (H): Primary | ICD-10-CM

## 2023-04-17 DIAGNOSIS — G95.29 SPINAL CORD COMPRESSION DUE TO MALIGNANT NEOPLASM METASTATIC TO SPINE (H): ICD-10-CM

## 2023-04-17 DIAGNOSIS — C79.51 SPINAL CORD COMPRESSION DUE TO MALIGNANT NEOPLASM METASTATIC TO SPINE (H): ICD-10-CM

## 2023-04-17 RX ORDER — HEPARIN SODIUM,PORCINE 10 UNIT/ML
5 VIAL (ML) INTRAVENOUS
Status: CANCELLED | OUTPATIENT
Start: 2023-04-19

## 2023-04-17 RX ORDER — HEPARIN SODIUM (PORCINE) LOCK FLUSH IV SOLN 100 UNIT/ML 100 UNIT/ML
5 SOLUTION INTRAVENOUS
Status: CANCELLED | OUTPATIENT
Start: 2023-04-19

## 2023-04-17 RX ORDER — ZOLEDRONIC ACID 0.04 MG/ML
4 INJECTION, SOLUTION INTRAVENOUS ONCE
Status: CANCELLED
Start: 2023-04-19 | End: 2023-04-19

## 2023-04-18 ENCOUNTER — LAB (OUTPATIENT)
Dept: INFUSION THERAPY | Facility: CLINIC | Age: 58
End: 2023-04-18
Attending: INTERNAL MEDICINE
Payer: COMMERCIAL

## 2023-04-18 ENCOUNTER — ONCOLOGY VISIT (OUTPATIENT)
Dept: ONCOLOGY | Facility: CLINIC | Age: 58
End: 2023-04-18
Attending: INTERNAL MEDICINE
Payer: COMMERCIAL

## 2023-04-18 VITALS
OXYGEN SATURATION: 98 % | TEMPERATURE: 98.1 F | RESPIRATION RATE: 20 BRPM | SYSTOLIC BLOOD PRESSURE: 126 MMHG | DIASTOLIC BLOOD PRESSURE: 84 MMHG | HEART RATE: 79 BPM | WEIGHT: 144.2 LBS | BODY MASS INDEX: 24 KG/M2

## 2023-04-18 DIAGNOSIS — C50.919 PRIMARY MALIGNANT NEOPLASM OF BREAST WITH METASTASIS (H): Primary | ICD-10-CM

## 2023-04-18 DIAGNOSIS — C79.51 SPINAL CORD COMPRESSION DUE TO MALIGNANT NEOPLASM METASTATIC TO SPINE (H): ICD-10-CM

## 2023-04-18 DIAGNOSIS — G95.29 SPINAL CORD COMPRESSION DUE TO MALIGNANT NEOPLASM METASTATIC TO SPINE (H): ICD-10-CM

## 2023-04-18 LAB
ALBUMIN SERPL BCG-MCNC: 4.5 G/DL (ref 3.5–5.2)
ALP SERPL-CCNC: 57 U/L (ref 35–104)
ALT SERPL W P-5'-P-CCNC: 16 U/L (ref 10–35)
ANION GAP SERPL CALCULATED.3IONS-SCNC: 9 MMOL/L (ref 7–15)
AST SERPL W P-5'-P-CCNC: 21 U/L (ref 10–35)
BASOPHILS # BLD AUTO: 0 10E3/UL (ref 0–0.2)
BASOPHILS NFR BLD AUTO: 1 %
BILIRUB SERPL-MCNC: 0.2 MG/DL
BUN SERPL-MCNC: 15.7 MG/DL (ref 6–20)
CALCIUM SERPL-MCNC: 9.6 MG/DL (ref 8.6–10)
CHLORIDE SERPL-SCNC: 104 MMOL/L (ref 98–107)
CREAT SERPL-MCNC: 0.84 MG/DL (ref 0.51–0.95)
DEPRECATED HCO3 PLAS-SCNC: 27 MMOL/L (ref 22–29)
EOSINOPHIL # BLD AUTO: 0.1 10E3/UL (ref 0–0.7)
EOSINOPHIL NFR BLD AUTO: 2 %
ERYTHROCYTE [DISTWIDTH] IN BLOOD BY AUTOMATED COUNT: 13.5 % (ref 10–15)
GFR SERPL CREATININE-BSD FRML MDRD: 81 ML/MIN/1.73M2
GLUCOSE SERPL-MCNC: 100 MG/DL (ref 70–99)
HCT VFR BLD AUTO: 34.7 % (ref 35–47)
HGB BLD-MCNC: 11.3 G/DL (ref 11.7–15.7)
IMM GRANULOCYTES # BLD: 0 10E3/UL
IMM GRANULOCYTES NFR BLD: 0 %
LYMPHOCYTES # BLD AUTO: 0.9 10E3/UL (ref 0.8–5.3)
LYMPHOCYTES NFR BLD AUTO: 29 %
MAGNESIUM SERPL-MCNC: 2.2 MG/DL (ref 1.7–2.3)
MCH RBC QN AUTO: 31.4 PG (ref 26.5–33)
MCHC RBC AUTO-ENTMCNC: 32.6 G/DL (ref 31.5–36.5)
MCV RBC AUTO: 96 FL (ref 78–100)
MONOCYTES # BLD AUTO: 0.3 10E3/UL (ref 0–1.3)
MONOCYTES NFR BLD AUTO: 11 %
NEUTROPHILS # BLD AUTO: 1.8 10E3/UL (ref 1.6–8.3)
NEUTROPHILS NFR BLD AUTO: 57 %
NRBC # BLD AUTO: 0 10E3/UL
NRBC BLD AUTO-RTO: 0 /100
PHOSPHATE SERPL-MCNC: 4 MG/DL (ref 2.5–4.5)
PLATELET # BLD AUTO: 212 10E3/UL (ref 150–450)
POTASSIUM SERPL-SCNC: 3.9 MMOL/L (ref 3.4–5.3)
PROT SERPL-MCNC: 7 G/DL (ref 6.4–8.3)
RBC # BLD AUTO: 3.6 10E6/UL (ref 3.8–5.2)
SODIUM SERPL-SCNC: 140 MMOL/L (ref 136–145)
WBC # BLD AUTO: 3.1 10E3/UL (ref 4–11)

## 2023-04-18 PROCEDURE — 258N000003 HC RX IP 258 OP 636: Performed by: NURSE PRACTITIONER

## 2023-04-18 PROCEDURE — 80053 COMPREHEN METABOLIC PANEL: CPT | Performed by: INTERNAL MEDICINE

## 2023-04-18 PROCEDURE — 96365 THER/PROPH/DIAG IV INF INIT: CPT

## 2023-04-18 PROCEDURE — 84100 ASSAY OF PHOSPHORUS: CPT | Performed by: INTERNAL MEDICINE

## 2023-04-18 PROCEDURE — 36415 COLL VENOUS BLD VENIPUNCTURE: CPT | Performed by: PHYSICIAN ASSISTANT

## 2023-04-18 PROCEDURE — 85025 COMPLETE CBC W/AUTO DIFF WBC: CPT | Performed by: INTERNAL MEDICINE

## 2023-04-18 PROCEDURE — G0463 HOSPITAL OUTPT CLINIC VISIT: HCPCS | Mod: 25 | Performed by: PHYSICIAN ASSISTANT

## 2023-04-18 PROCEDURE — 250N000011 HC RX IP 250 OP 636: Performed by: NURSE PRACTITIONER

## 2023-04-18 PROCEDURE — 99214 OFFICE O/P EST MOD 30 MIN: CPT | Performed by: PHYSICIAN ASSISTANT

## 2023-04-18 PROCEDURE — 83735 ASSAY OF MAGNESIUM: CPT | Performed by: INTERNAL MEDICINE

## 2023-04-18 RX ORDER — ZOLEDRONIC ACID 0.04 MG/ML
4 INJECTION, SOLUTION INTRAVENOUS ONCE
Status: COMPLETED | OUTPATIENT
Start: 2023-04-18 | End: 2023-04-18

## 2023-04-18 RX ADMIN — SODIUM CHLORIDE 250 ML: 9 INJECTION, SOLUTION INTRAVENOUS at 09:47

## 2023-04-18 RX ADMIN — ZOLEDRONIC ACID 4 MG: 0.04 INJECTION, SOLUTION INTRAVENOUS at 09:47

## 2023-04-18 ASSESSMENT — PAIN SCALES - GENERAL: PAINLEVEL: NO PAIN (0)

## 2023-04-18 NOTE — PROGRESS NOTES
Medical Assistant Note:  Mickie Mcghee presents today for lab draw.    Patient seen by provider today: No.   present during visit today: Not Applicable.    Concerns: No Concerns.    Procedure:  Lab draw site: RAC, Needle type: BF, Gauge: 23. Gauze and coban applied    Post Assessment:  Labs drawn without difficulty: Yes.    Discharge Plan:  Departure Mode: Ambulatory.    Face to Face Time: 5.    Arielle Fry CMA

## 2023-04-18 NOTE — LETTER
4/18/2023         RE: Mickie Mcghee  [address]  [place] [address]        Dear Colleague,    Thank you for referring your patient, Mickie Mcghee, to the [facility]. Please see a copy of my visit note below.    Oncology/Hematology Visit Note  Apr 18, 2023    Reason for Visit: Follow up of metastatic breast cancer    Primary Oncologist: Dr. Lozada    Oncologic History:    Ms. Mcghee is a female with metastatic breast cancer. ER positive, MA positive and HER-2/halina negative.  -Foundation One reveals PIK3CA alteration.  -She had a stage III left breast cancer in 2007. ER positive and HER-2/halina negative.  She had bilateral mastectomy, chemotherapy, radiation, and anastrozole.     1.  On 03/18/2022, multiple imaging studies done because of bilateral lower extremity weakness:  -MRI of thoracic and lumbar spine revealed bone metastases with spinal cord compression.  -CT chest, abdomen, and pelvis on 03/19/2022 revealed bone metastasis, lung nodules and enlarged upper abdominal lymph node.  There is a hypodense nodule in the liver.  -Brain MRI on 03/19/2022 did not reveal any intracranial metastasis.  2.  CT-guided bone biopsy of left iliac bone on 03/21/2022 revealed metastatic breast cancer. ER positive, MA positive and HER-2/halina negative.  3.  Letrozole started on 03/25/2022.  -Ribociclib started on 03/31/2022. Decreased to 200 mg a day in 09/2022.  -Zometa started on 03/24/2022.  -Radiation to thoracolumbar and sacral area between 03/22/2022 and 04/07/2022.  3000 cGy in 10 fractions.  4. PET 11/28/22 with decreased FDG activity within numerous osseous lesions and RML lung nodule, partial response to therapy  5. CT C/A/P 3/15/23 with multifocal bony mets, some with increased sclerosis, sable pulmonary nodules, new patchy hypoenhancement of kidneys    Interval History:  Doing well. She is experiencing some outer lower back/hip pain and this improved with stopping lunges. She notes  some nausea after eating. Persistent numbness to feet bottoms. Otherwise no changes.     Review of Systems:  A complete review of systems was negative except as noted in the HPI.     Past medical, Surgical, and social history:  Reviewed    Physical Examination:  /84 (BP Location: Right arm, Patient Position: Sitting, Cuff Size: Adult Regular)   Pulse 79   Temp 98.1  F (36.7  C) (Oral)   Resp 20   Wt 65.4 kg (144 lb 3.2 oz)   SpO2 98%   BMI 24.00 kg/m    Wt Readings from Last 10 Encounters:   04/18/23 65.4 kg (144 lb 3.2 oz)   03/21/23 65.6 kg (144 lb 9.6 oz)   02/21/23 66.4 kg (146 lb 6.4 oz)   01/24/23 66 kg (145 lb 6.4 oz)   12/28/22 65.3 kg (144 lb)   11/29/22 64.4 kg (142 lb)   11/02/22 65.4 kg (144 lb 3.2 oz)   09/14/22 64 kg (141 lb 3.2 oz)   07/28/22 65.8 kg (145 lb)   04/04/22 59 kg (130 lb)     General: Pleasant patient in no acute distress.  Skin: Warm and dry. No abnormal ecchymosis or rashes. Line clean, dry, intact.  Eyes: Anicteric.  ENT: Oral mucosa pink and moist without erythema, lesions, thrush, or petechia.  Heart: Normal rate and rhythm without murmur.  Lungs: Clear to auscultation in all fields with no adventitious lung sounds. Normal work of breathing.  Abdomen: Soft, non-tender, non-distended without hepatosplenomegaly. Normoactive bowel sounds.  Extremities: No peripheral edema. Gross movement intact without difficulty.  Mental: Calm, cooperative, appropriate. Mood euthymic.  Neuro: Cranial nerves and coordination grossly intact. Alert and oriented x 3.    Laboratory Data:  CBC, CMP reviewed.       Assessment and Plan:  Mickie Mcghee is a 57 year old female with metastatic (bone, liver, lung) PIK3C mutated ER/KY+ breast cancer on ribociclib, letrozole, and Zometa.    # Breast Cancer  # Cytopenias secondary to chemotherapy  - Continue ribociclib and letrozole  - Follow-up monthly with labs and exam, in 2 months with Dr. Lozada    # Bone Mets  - Continue monthly Zometa and Ca+D    #  Neutropenia, resolved  - ribociclib previously dose reduced to 200 mg daily for 21 of 28 days   - Improved, no infectious complications    # Nausea  - She may consider using antiemetics    # Leg Weakness  # Foot Numbness  - Overall improving  - PT discontinued last year as insurance will no longer cover    # Renal Abnormalities  - Opacities noted on CT  - Per Dr. Lozada, renal US ordered last visit    30 minutes spent on the date of the encounter doing chart review, review of test results, interpretation of tests, patient visit, documentation and discussion with other provider(s)     Estephania Zaldivar PA-C  [facility]   [phone]    Chart documentation with Dragon Voice recognition Software. Although reviewed after completion, some words and grammatical errors may remain.        Again, thank you for allowing me to participate in the care of your patient.        Sincerely,        ESTEPHANIA ZALDIVAR PA-C

## 2023-04-18 NOTE — PROGRESS NOTES
Oncology/Hematology Visit Note  Apr 18, 2023    Reason for Visit: Follow up of metastatic breast cancer    Primary Oncologist: Dr. Lozada    Oncologic History:    Ms. Mcghee is a female with metastatic breast cancer. ER positive, WI positive and HER-2/halina negative.  -Foundation One reveals PIK3CA alteration.  -She had a stage III left breast cancer in 2007. ER positive and HER-2/halina negative.  She had bilateral mastectomy, chemotherapy, radiation, and anastrozole.     1.  On 03/18/2022, multiple imaging studies done because of bilateral lower extremity weakness:  -MRI of thoracic and lumbar spine revealed bone metastases with spinal cord compression.  -CT chest, abdomen, and pelvis on 03/19/2022 revealed bone metastasis, lung nodules and enlarged upper abdominal lymph node.  There is a hypodense nodule in the liver.  -Brain MRI on 03/19/2022 did not reveal any intracranial metastasis.  2.  CT-guided bone biopsy of left iliac bone on 03/21/2022 revealed metastatic breast cancer. ER positive, WI positive and HER-2/halina negative.  3.  Letrozole started on 03/25/2022.  -Ribociclib started on 03/31/2022. Decreased to 200 mg a day in 09/2022.  -Zometa started on 03/24/2022.  -Radiation to thoracolumbar and sacral area between 03/22/2022 and 04/07/2022.  3000 cGy in 10 fractions.  4. PET 11/28/22 with decreased FDG activity within numerous osseous lesions and RML lung nodule, partial response to therapy  5. CT C/A/P 3/15/23 with multifocal bony mets, some with increased sclerosis, sable pulmonary nodules, new patchy hypoenhancement of kidneys    Interval History:  Doing well. She is experiencing some outer lower back/hip pain and this improved with stopping lunges. She notes some nausea after eating. Persistent numbness to feet bottoms. Otherwise no changes.     Review of Systems:  A complete review of systems was negative except as noted in the HPI.     Past medical, Surgical, and social history:  Reviewed    Physical  Examination:  /84 (BP Location: Right arm, Patient Position: Sitting, Cuff Size: Adult Regular)   Pulse 79   Temp 98.1  F (36.7  C) (Oral)   Resp 20   Wt 65.4 kg (144 lb 3.2 oz)   SpO2 98%   BMI 24.00 kg/m    Wt Readings from Last 10 Encounters:   04/18/23 65.4 kg (144 lb 3.2 oz)   03/21/23 65.6 kg (144 lb 9.6 oz)   02/21/23 66.4 kg (146 lb 6.4 oz)   01/24/23 66 kg (145 lb 6.4 oz)   12/28/22 65.3 kg (144 lb)   11/29/22 64.4 kg (142 lb)   11/02/22 65.4 kg (144 lb 3.2 oz)   09/14/22 64 kg (141 lb 3.2 oz)   07/28/22 65.8 kg (145 lb)   04/04/22 59 kg (130 lb)     General: Pleasant patient in no acute distress.  Skin: Warm and dry. No abnormal ecchymosis or rashes. Line clean, dry, intact.  Eyes: Anicteric.  ENT: Oral mucosa pink and moist without erythema, lesions, thrush, or petechia.  Heart: Normal rate and rhythm without murmur.  Lungs: Clear to auscultation in all fields with no adventitious lung sounds. Normal work of breathing.  Abdomen: Soft, non-tender, non-distended without hepatosplenomegaly. Normoactive bowel sounds.  Extremities: No peripheral edema. Gross movement intact without difficulty.  Mental: Calm, cooperative, appropriate. Mood euthymic.  Neuro: Cranial nerves and coordination grossly intact. Alert and oriented x 3.    Laboratory Data:  CBC, CMP reviewed.       Assessment and Plan:  Mickie Mcghee is a 57 year old female with metastatic (bone, liver, lung) PIK3C mutated ER/ID+ breast cancer on ribociclib, letrozole, and Zometa.    # Breast Cancer  # Cytopenias secondary to chemotherapy  - Continue ribociclib and letrozole  - Follow-up monthly with labs and exam, in 2 months with Dr. Lozada    # Bone Mets  - Continue monthly Zometa and Ca+D    # Neutropenia, resolved  - ribociclib previously dose reduced to 200 mg daily for 21 of 28 days   - Improved, no infectious complications    # Nausea  - She may consider using antiemetics    # Leg Weakness  # Foot Numbness  - Overall improving  - PT  discontinued last year as insurance will no longer cover    # Renal Abnormalities  - Opacities noted on CT  - Per Dr. Lozada, renal US ordered last visit    30 minutes spent on the date of the encounter doing chart review, review of test results, interpretation of tests, patient visit, documentation and discussion with other provider(s)     Estephania Zaldivar PA-C  [facility]   [phone]    Chart documentation with Dragon Voice recognition Software. Although reviewed after completion, some words and grammatical errors may remain.

## 2023-04-18 NOTE — PROGRESS NOTES
Infusion Nursing Note:  Mickie Mcghee presents today for zometa.    Patient seen by provider today: Yes: JOSE DANIEL Waller   present during visit today: Not Applicable.    Note: N/A.      Intravenous Access:  Peripheral IV placed.    Treatment Conditions:  Lab Results   Component Value Date     04/18/2023    POTASSIUM 3.9 04/18/2023    MAG 2.2 04/18/2023    CR 0.84 04/18/2023    OSMEL 9.6 04/18/2023    BILITOTAL 0.2 04/18/2023    ALBUMIN 4.5 04/18/2023    ALT 16 04/18/2023    AST 21 04/18/2023     Results reviewed, labs MET treatment parameters, ok to proceed with treatment.      Post Infusion Assessment:  Patient tolerated infusion without incident.  Blood return noted pre and post infusion.  Site patent and intact, free from redness, edema or discomfort.  No evidence of extravasations.  Access discontinued per protocol.       Discharge Plan:   Discharge instructions reviewed with: Patient.  Patient and/or family verbalized understanding of discharge instructions and all questions answered.  AVS to patient via [employer].  Patient will return 5/16/23 for next appointment.   Patient discharged in stable condition accompanied by: self.  Departure Mode: Ambulatory.      Jossy Leary RN

## 2023-05-05 NOTE — PROGRESS NOTES
Oncology/Hematology Visit Note  Dec 28, 2022    Reason for Visit: follow up of metastatic breast cancer  ER positive MT positive HER2 negative  Diagnosed in 2007 status post bilateral mastectomy chemotherapy radiation anastrozole  -03/21/20227202-EE-[id number] bone biopsy of left iliac bone reveals metastatic breast cancer ER positive MT positive HER2 negative  03/31/2021-started Ribociclib 600 mg   Letrazole     03/22-04/07/2022-status post radiation to the L-spine and LS spine 10 fractions  05/03-due to neutropenia Ribociclib decreased to 400 mg  -Improvement in counts  06/01- Ribociclib increased back to 600 mg  Due to persistent leukopenia Ribociclib reduced to 200 mg daily for 21 days 7 days off      Interval History:  Patient reports she continues to feel well.  She reports she is exercising an hour and a half daily.  Patient denies fever chills sweats cough shortness of breath chest pain nausea vomiting diarrhea abdominal pain or bleeding.  Patient reports she had 1 episode of lightheadedness last week but reports it has resolved she does not have any lightheadedness for now denies headache dizziness seizures changes in gait or vision      Review of Systems:  14 point ROS of systems including Constitutional, Eyes, Respiratory, Cardiovascular, Gastroenterology, Genitourinary, Integumentary, Muscularskeletal, Psychiatric were all negative except for pertinent positives noted in my HPI.    Physical Examination:  General: The patient is a pleasant female in no acute distress.  HEENT: EOMI, PERRL. Sclerae are anicteric. Oral mucosa is pink and moist with no lesions or thrush.   Lymph: Neck is supple with no lymphadenopathy in the cervical or supraclavicular areas.   Heart: Regular rate and rhythm.   Lungs: Clear to auscultation bilaterally.   GI: Bowel sounds present, soft, nontender with no palpable hepatosplenomegaly or masses.   Extremities: No lower extremity edema noted bilaterally.   Skin: No rashes, petechiae, or  bruising noted on exposed skin.    Laboratory Data:  CMP and TSH     Assessment and Plan:  This is a 57-year-old female with    metastatic breast cancer  ER positive OR positive HER2 negative  Diagnosed in 2007 status post bilateral mastectomy chemotherapy radiation anastrozole  -03/21/20226722-MC-fospct bone biopsy of left iliac bone reveals metastatic breast cancer ER positive OR positive HER2 negative  03/31/2021-started Ribociclib 600 mg  And Letrazole   Due to leukopenia Ribociclib reduced to 200 mg daily(21 days on 7 days off)  -11/28/2022-PET scan revealed improvement in disease  -Continue with Ribociclib 200 mg 21 days on 7 days off  -Continue with letrozole  Continue with monthly lab checks  Schedule with me next month  Schedule with Dr. Lozada in March        Bone metastasis  03/22-04/07/2022-status post radiation to the L-spine and LS spine 10 fractions  Continue with monthly Zometa  -Okay to give Zometa today  Call our clinic in the event of jaw pain or any dental issues        Leukopenia secondary to  Ribociclib   Normal ANC  Continue to monitor  Advised patient to call our clinic in the event of fever chills sweats or any signs symptoms of infection     Anemia  Mild  Continue to monitor    One  episode of lightheadedness last week  -Resolved now  -Differential diagnosis reviewed with patient  Patient denies headache dizziness seizures or neurological deficits  Advised patient to call our clinic if symptom returns      Call our clinic with any changes in health condition or questions      SAMI Jacob [facility]     Chart documentation with Dragon Voice recognition Software. Although reviewed after completion, some words and grammatical errors may remain.         No deformities present

## 2023-05-09 RX ORDER — RIBOCICLIB 200 MG/1
200 TABLET, FILM COATED ORAL DAILY
Qty: 21 TABLET | Refills: 0 | Status: SHIPPED | OUTPATIENT
Start: 2023-05-17 | End: 2023-06-06

## 2023-05-09 RX ORDER — LETROZOLE 2.5 MG/1
2.5 TABLET, FILM COATED ORAL EVERY MORNING
Qty: 28 TABLET | Refills: 0 | Status: SHIPPED | OUTPATIENT
Start: 2023-05-17 | End: 2023-07-19

## 2023-05-16 ENCOUNTER — INFUSION THERAPY VISIT (OUTPATIENT)
Dept: INFUSION THERAPY | Facility: CLINIC | Age: 58
End: 2023-05-16
Attending: INTERNAL MEDICINE
Payer: COMMERCIAL

## 2023-05-16 ENCOUNTER — ONCOLOGY VISIT (OUTPATIENT)
Dept: ONCOLOGY | Facility: CLINIC | Age: 58
End: 2023-05-16
Attending: INTERNAL MEDICINE
Payer: COMMERCIAL

## 2023-05-16 VITALS
HEIGHT: 65 IN | TEMPERATURE: 98.1 F | SYSTOLIC BLOOD PRESSURE: 120 MMHG | HEART RATE: 67 BPM | OXYGEN SATURATION: 97 % | WEIGHT: 145 LBS | BODY MASS INDEX: 24.16 KG/M2 | RESPIRATION RATE: 16 BRPM | DIASTOLIC BLOOD PRESSURE: 80 MMHG

## 2023-05-16 DIAGNOSIS — C50.919 CARCINOMA OF BREAST METASTATIC TO BONE, UNSPECIFIED LATERALITY (H): ICD-10-CM

## 2023-05-16 DIAGNOSIS — G95.29 SPINAL CORD COMPRESSION DUE TO MALIGNANT NEOPLASM METASTATIC TO SPINE (H): Primary | ICD-10-CM

## 2023-05-16 DIAGNOSIS — G95.29 SPINAL CORD COMPRESSION DUE TO MALIGNANT NEOPLASM METASTATIC TO SPINE (H): ICD-10-CM

## 2023-05-16 DIAGNOSIS — C79.51 SPINAL CORD COMPRESSION DUE TO MALIGNANT NEOPLASM METASTATIC TO SPINE (H): ICD-10-CM

## 2023-05-16 DIAGNOSIS — C50.919 PRIMARY MALIGNANT NEOPLASM OF BREAST WITH METASTASIS (H): ICD-10-CM

## 2023-05-16 DIAGNOSIS — C50.919 PRIMARY MALIGNANT NEOPLASM OF BREAST WITH METASTASIS (H): Primary | ICD-10-CM

## 2023-05-16 DIAGNOSIS — C79.51 BREAST CANCER METASTASIZED TO BONE (H): ICD-10-CM

## 2023-05-16 DIAGNOSIS — C79.51 SPINAL CORD COMPRESSION DUE TO MALIGNANT NEOPLASM METASTATIC TO SPINE (H): Primary | ICD-10-CM

## 2023-05-16 DIAGNOSIS — C50.919 BREAST CANCER METASTASIZED TO BONE (H): ICD-10-CM

## 2023-05-16 DIAGNOSIS — C79.51 CARCINOMA OF BREAST METASTATIC TO BONE, UNSPECIFIED LATERALITY (H): ICD-10-CM

## 2023-05-16 LAB
ALBUMIN SERPL BCG-MCNC: 4.2 G/DL (ref 3.5–5.2)
ALP SERPL-CCNC: 50 U/L (ref 35–104)
ALT SERPL W P-5'-P-CCNC: 18 U/L (ref 10–35)
ANION GAP SERPL CALCULATED.3IONS-SCNC: 11 MMOL/L (ref 7–15)
AST SERPL W P-5'-P-CCNC: 22 U/L (ref 10–35)
BASOPHILS # BLD AUTO: 0 10E3/UL (ref 0–0.2)
BASOPHILS NFR BLD AUTO: 1 %
BILIRUB SERPL-MCNC: 0.2 MG/DL
BUN SERPL-MCNC: 15.3 MG/DL (ref 6–20)
CALCIUM SERPL-MCNC: 9.8 MG/DL (ref 8.6–10)
CALCIUM SERPL-MCNC: 9.8 MG/DL (ref 8.6–10)
CHLORIDE SERPL-SCNC: 105 MMOL/L (ref 98–107)
CREAT SERPL-MCNC: 0.85 MG/DL (ref 0.51–0.95)
DEPRECATED HCO3 PLAS-SCNC: 24 MMOL/L (ref 22–29)
EOSINOPHIL # BLD AUTO: 0.1 10E3/UL (ref 0–0.7)
EOSINOPHIL NFR BLD AUTO: 4 %
ERYTHROCYTE [DISTWIDTH] IN BLOOD BY AUTOMATED COUNT: 13.4 % (ref 10–15)
GFR SERPL CREATININE-BSD FRML MDRD: 79 ML/MIN/1.73M2
GLUCOSE SERPL-MCNC: 91 MG/DL (ref 70–99)
HCT VFR BLD AUTO: 33.3 % (ref 35–47)
HGB BLD-MCNC: 11 G/DL (ref 11.7–15.7)
IMM GRANULOCYTES # BLD: 0 10E3/UL
IMM GRANULOCYTES NFR BLD: 0 %
LYMPHOCYTES # BLD AUTO: 1 10E3/UL (ref 0.8–5.3)
LYMPHOCYTES NFR BLD AUTO: 30 %
MAGNESIUM SERPL-MCNC: 2.2 MG/DL (ref 1.7–2.3)
MCH RBC QN AUTO: 31.4 PG (ref 26.5–33)
MCHC RBC AUTO-ENTMCNC: 33 G/DL (ref 31.5–36.5)
MCV RBC AUTO: 95 FL (ref 78–100)
MONOCYTES # BLD AUTO: 0.4 10E3/UL (ref 0–1.3)
MONOCYTES NFR BLD AUTO: 12 %
NEUTROPHILS # BLD AUTO: 1.8 10E3/UL (ref 1.6–8.3)
NEUTROPHILS NFR BLD AUTO: 53 %
NRBC # BLD AUTO: 0 10E3/UL
NRBC BLD AUTO-RTO: 0 /100
PHOSPHATE SERPL-MCNC: 3.5 MG/DL (ref 2.5–4.5)
PLATELET # BLD AUTO: 188 10E3/UL (ref 150–450)
POTASSIUM SERPL-SCNC: 4.4 MMOL/L (ref 3.4–5.3)
PROT SERPL-MCNC: 6.6 G/DL (ref 6.4–8.3)
RBC # BLD AUTO: 3.5 10E6/UL (ref 3.8–5.2)
SODIUM SERPL-SCNC: 140 MMOL/L (ref 136–145)
WBC # BLD AUTO: 3.4 10E3/UL (ref 4–11)

## 2023-05-16 PROCEDURE — 84100 ASSAY OF PHOSPHORUS: CPT | Performed by: INTERNAL MEDICINE

## 2023-05-16 PROCEDURE — 96365 THER/PROPH/DIAG IV INF INIT: CPT

## 2023-05-16 PROCEDURE — 80053 COMPREHEN METABOLIC PANEL: CPT | Performed by: INTERNAL MEDICINE

## 2023-05-16 PROCEDURE — 83735 ASSAY OF MAGNESIUM: CPT | Performed by: INTERNAL MEDICINE

## 2023-05-16 PROCEDURE — 85025 COMPLETE CBC W/AUTO DIFF WBC: CPT | Performed by: INTERNAL MEDICINE

## 2023-05-16 PROCEDURE — 82310 ASSAY OF CALCIUM: CPT | Performed by: INTERNAL MEDICINE

## 2023-05-16 PROCEDURE — 36415 COLL VENOUS BLD VENIPUNCTURE: CPT | Performed by: INTERNAL MEDICINE

## 2023-05-16 PROCEDURE — 99214 OFFICE O/P EST MOD 30 MIN: CPT | Performed by: INTERNAL MEDICINE

## 2023-05-16 PROCEDURE — 250N000011 HC RX IP 250 OP 636: Performed by: INTERNAL MEDICINE

## 2023-05-16 PROCEDURE — 258N000003 HC RX IP 258 OP 636: Performed by: INTERNAL MEDICINE

## 2023-05-16 PROCEDURE — G0463 HOSPITAL OUTPT CLINIC VISIT: HCPCS | Mod: 25 | Performed by: INTERNAL MEDICINE

## 2023-05-16 RX ORDER — HEPARIN SODIUM,PORCINE 10 UNIT/ML
5 VIAL (ML) INTRAVENOUS
Status: CANCELLED | OUTPATIENT
Start: 2023-05-16

## 2023-05-16 RX ORDER — FERROUS SULFATE 325(65) MG
325 TABLET, DELAYED RELEASE (ENTERIC COATED) ORAL DAILY
COMMUNITY
End: 2023-07-19

## 2023-05-16 RX ORDER — HEPARIN SODIUM (PORCINE) LOCK FLUSH IV SOLN 100 UNIT/ML 100 UNIT/ML
5 SOLUTION INTRAVENOUS
Status: CANCELLED | OUTPATIENT
Start: 2023-06-13

## 2023-05-16 RX ORDER — ZOLEDRONIC ACID 0.04 MG/ML
4 INJECTION, SOLUTION INTRAVENOUS ONCE
Status: CANCELLED
Start: 2023-06-13 | End: 2023-06-14

## 2023-05-16 RX ORDER — HEPARIN SODIUM,PORCINE 10 UNIT/ML
5 VIAL (ML) INTRAVENOUS
Status: CANCELLED | OUTPATIENT
Start: 2023-06-13

## 2023-05-16 RX ORDER — ZOLEDRONIC ACID 0.04 MG/ML
4 INJECTION, SOLUTION INTRAVENOUS ONCE
Status: CANCELLED
Start: 2023-05-16 | End: 2023-05-17

## 2023-05-16 RX ORDER — HEPARIN SODIUM (PORCINE) LOCK FLUSH IV SOLN 100 UNIT/ML 100 UNIT/ML
5 SOLUTION INTRAVENOUS
Status: CANCELLED | OUTPATIENT
Start: 2023-05-16

## 2023-05-16 RX ORDER — ZOLEDRONIC ACID 0.04 MG/ML
4 INJECTION, SOLUTION INTRAVENOUS ONCE
Status: COMPLETED | OUTPATIENT
Start: 2023-05-16 | End: 2023-05-16

## 2023-05-16 RX ADMIN — ZOLEDRONIC ACID 4 MG: 0.04 INJECTION, SOLUTION INTRAVENOUS at 09:45

## 2023-05-16 RX ADMIN — SODIUM CHLORIDE 250 ML: 9 INJECTION, SOLUTION INTRAVENOUS at 09:44

## 2023-05-16 ASSESSMENT — PAIN SCALES - GENERAL: PAINLEVEL: NO PAIN (0)

## 2023-05-16 NOTE — PROGRESS NOTES
"Oncology Rooming Note    May 16, 2023 8:56 AM   Mickie Mcghee is a 58 year old female who presents for:    Chief Complaint   Patient presents with     Oncology Clinic Visit     Initial Vitals: There were no vitals taken for this visit. Estimated body mass index is 24 kg/m  as calculated from the following:    Height as of 12/28/22: 1.651 m (5' 5\").    Weight as of 4/18/23: 65.4 kg (144 lb 3.2 oz). There is no height or weight on file to calculate BSA.  Data Unavailable Comment: Data Unavailable   No LMP recorded.  Allergies reviewed: Yes  Medications reviewed: Yes    Medications: Medication refills not needed today.  Pharmacy name entered into [employer]:    [id number] - [place], [place] - [address] [facility] - [place], [place] - [address]    Clinical concerns:  doctor was notified.      Blanquita Meyer MA            "

## 2023-05-16 NOTE — LETTER
"    5/16/2023         RE: Mickie Mcghee  [id number] [address]  [place] [address]        Dear Colleague,    Thank you for referring your patient, Mickie Mcghee, to the [facility]. Please see a copy of my visit note below.    Oncology Rooming Note    May 16, 2023 8:56 AM   Mickie Mcghee is a 58 year old female who presents for:    Chief Complaint   Patient presents with     Oncology Clinic Visit     Initial Vitals: There were no vitals taken for this visit. Estimated body mass index is 24 kg/m  as calculated from the following:    Height as of 12/28/22: 1.651 m (5' 5\").    Weight as of 4/18/23: 65.4 kg (144 lb 3.2 oz). There is no height or weight on file to calculate BSA.  Data Unavailable Comment: Data Unavailable   No LMP recorded.  Allergies reviewed: Yes  Medications reviewed: Yes    Medications: Medication refills not needed today.  Pharmacy name entered into [employer]:    [id number] - [place], [place] - [address] AT [facility] - [place], [place] - [address]    Clinical concerns:  doctor was notified.      Blanquita Meyer MA              ONCOLOGY HISTORY:  Ms. Mcghee is a female with metastatic breast cancer. ER positive, NV positive and HER-2/halina negative.  -Foundation One reveals PIK3CA alteration.  -She had a stage III left breast cancer in 2007. ER positive and HER-2/halina negative.  She had bilateral mastectomy, chemotherapy, radiation, and anastrozole.     1.  On 03/18/2022, multiple imaging studies done because of bilateral lower extremity weakness:  -MRI of thoracic and lumbar spine revealed bone metastases with spinal cord compression.  -CT chest, abdomen, and pelvis on 03/19/2022 revealed bone metastasis, lung nodules and enlarged upper abdominal lymph node.  There is a hypodense nodule in the liver.  -Brain MRI on 03/19/2022 did not reveal any intracranial metastasis.  2.  CT-guided bone biopsy of left " iliac bone on 03/21/2022 revealed metastatic breast cancer. ER positive, VT positive and HER-2/halina negative.  3.  Letrozole started on 03/25/2022.  -Ribociclib started on 03/31/2022. Decreased to 200 mg a day in 09/2022.  -Zometa started on 03/24/2022.  -Radiation to thoracolumbar and sacral area between 03/22/2022 and 04/07/2022.  3000 cGy in 10 fractions.     SUBJECTIVE:  Ms. Mcghee is a 58-year-old female with metastatic breast cancer on letrozole and ribociclib.  She is tolerating it well. Disease has been stable.     She is also on Zometa for bone metastasis.  She is tolerating them well.  No dental or jaw related symptoms.     Her leg weakness is little better. She continues doing physical therapy. She does not use cane inside the house. When she goes outside, she uses a cane.      No headache.  No dizziness.  No chest pain.  No shortness of breath.  No abdominal pain.  No nausea or vomiting.  Appetite is good.  No bleeding. No urinary or bowel complaints. No incontinence. No bone pain. All other review of system is negative.     PHYSICAL EXAMINATION:  She is alert and oriented x 3. Not in any distress.   Rest of systems not examined.     LABS: CBC and CMP reviewed.     ASSESSMENT:  1.  A 58-year-old female with metastatic breast cancer on letrozole and ribociclib. Disease is stable.  2.  Lower extremity weakness, stable.  3.  Leukopenia from ribociclib. Stable.  4.  Anemia, stable.     PLAN:  1.  Discussed regarding breast cancer.  She is doing well.  No evidence of progression of disease.  Disease is clinically stable.    She will continue on letrozole and ribociclib.  She is tolerating it well.    Discussed regarding imaging studies.  We will get CT chest, abdomen and pelvis in 2 months.     2.  Patient is on Zometa.  She is tolerating it well.  No dental or jaw related symptoms.  She will continue on zometa. She will continue on calcium and vitamin D twice a day.  Advised her to follow-up with dentist on  regular basis.    In future we will consider changing it to every 3 months.     3.  She has mild leukopenia and anemia from ribociclib.  It is stable. Will monitor CBC.    4.  On last CT scan, there was some abnormality in the kidney.  She was supposed to get ultrasound. It has not been done. She will be getting CT scan for breast cancer.  It will be monitored on that.    5.   Her leg weakness is stable to slightly better.  She will continue with physical therapy.       6.  I will see her in 3 months.  In between she will see our nurse practitioner.  Advised her to call us with any questions or concerns.     Total visit time of 30 minutes.  Time spent in today's visit, review of chart/investigations today, monitoring for toxicity of high risk drugs and documentation today.      Again, thank you for allowing me to participate in the care of your patient.        Sincerely,        Yury Lozada MD

## 2023-05-16 NOTE — PATIENT INSTRUCTIONS
1.  Continue letrozole and ribociclib.  2.  Continue monthly Zometa.  3.  Take calcium and vitamin D twice a day.  4.  See NP with next zometa infusion.  5.  CT scan in 2 months.  6.  See me in 2 months.

## 2023-05-16 NOTE — PROGRESS NOTES
Infusion Nursing Note:  Mickie Mcghee presents today for Zometa.    Patient seen by provider today: Yes: Dr. Lozada   present during visit today: Not Applicable.    Note: Patient reports to feeling well with no new concerns since seeing Dr. Lozada.  Patient reports to taking Calcium and vitamin D supplements as prescribed.      Intravenous Access:  Peripheral IV placed.    Treatment Conditions:  Lab Results   Component Value Date    HGB 11.0 (L) 05/16/2023    WBC 3.4 (L) 05/16/2023    ANEU 1.3 (L) 08/04/2022    ANEUTAUTO 1.8 05/16/2023     05/16/2023      Lab Results   Component Value Date     05/16/2023    POTASSIUM 4.4 05/16/2023    MAG 2.2 05/16/2023    CR 0.85 05/16/2023    OSMEL 9.8 05/16/2023    OSMEL 9.8 05/16/2023    BILITOTAL 0.2 05/16/2023    ALBUMIN 4.2 05/16/2023    ALT 18 05/16/2023    AST 22 05/16/2023     Results reviewed, labs MET treatment parameters, ok to proceed with treatment.      Post Infusion Assessment:  Patient tolerated infusion without incident.  Blood return noted pre and post infusion.  Site patent and intact, free from redness, edema or discomfort.  No evidence of extravasations.  Access discontinued per protocol.       Discharge Plan:   Patient declined prescription refills.  Discharge instructions reviewed with: Patient.  Patient and/or family verbalized understanding of discharge instructions and all questions answered.  AVS to patient via [employer].  Patient will return 6/13/23 for labs, to see Sha and IV Zometa for next appointment.   Patient discharged in stable condition accompanied by: self.  Departure Mode: Ambulatory.      Annabelle Hoffmann RN

## 2023-05-21 NOTE — PROGRESS NOTES
ONCOLOGY HISTORY:  Ms. Mcghee is a female with metastatic breast cancer. ER positive, HI positive and HER-2/halina negative.  -Foundation One reveals PIK3CA alteration.  -She had a stage III left breast cancer in 2007. ER positive and HER-2/halina negative.  She had bilateral mastectomy, chemotherapy, radiation, and anastrozole.     1.  On 03/18/2022, multiple imaging studies done because of bilateral lower extremity weakness:  -MRI of thoracic and lumbar spine revealed bone metastases with spinal cord compression.  -CT chest, abdomen, and pelvis on 03/19/2022 revealed bone metastasis, lung nodules and enlarged upper abdominal lymph node.  There is a hypodense nodule in the liver.  -Brain MRI on 03/19/2022 did not reveal any intracranial metastasis.  2.  CT-guided bone biopsy of left iliac bone on 03/21/2022 revealed metastatic breast cancer. ER positive, HI positive and HER-2/halina negative.  3.  Letrozole started on 03/25/2022.  -Ribociclib started on 03/31/2022. Decreased to 200 mg a day in 09/2022.  -Zometa started on 03/24/2022.  -Radiation to thoracolumbar and sacral area between 03/22/2022 and 04/07/2022.  3000 cGy in 10 fractions.     SUBJECTIVE:  Ms. Mcghee is a 58-year-old female with metastatic breast cancer on letrozole and ribociclib.  She is tolerating it well. Disease has been stable.     She is also on Zometa for bone metastasis.  She is tolerating them well.  No dental or jaw related symptoms.     Her leg weakness is little better. She continues doing physical therapy. She does not use cane inside the house. When she goes outside, she uses a cane.      No headache.  No dizziness.  No chest pain.  No shortness of breath.  No abdominal pain.  No nausea or vomiting.  Appetite is good.  No bleeding. No urinary or bowel complaints. No incontinence. No bone pain. All other review of system is negative.     PHYSICAL EXAMINATION:  She is alert and oriented x 3. Not in any distress.   Rest of systems not  examined.     LABS: CBC and CMP reviewed.     ASSESSMENT:  1.  A 58-year-old female with metastatic breast cancer on letrozole and ribociclib. Disease is stable.  2.  Lower extremity weakness, stable.  3.  Leukopenia from ribociclib. Stable.  4.  Anemia, stable.     PLAN:  1.  Discussed regarding breast cancer.  She is doing well.  No evidence of progression of disease.  Disease is clinically stable.    She will continue on letrozole and ribociclib.  She is tolerating it well.    Discussed regarding imaging studies.  We will get CT chest, abdomen and pelvis in 2 months.     2.  Patient is on Zometa.  She is tolerating it well.  No dental or jaw related symptoms.  She will continue on zometa. She will continue on calcium and vitamin D twice a day.  Advised her to follow-up with dentist on regular basis.    In future we will consider changing it to every 3 months.     3.  She has mild leukopenia and anemia from ribociclib.  It is stable. Will monitor CBC.    4.  On last CT scan, there was some abnormality in the kidney.  She was supposed to get ultrasound. It has not been done. She will be getting CT scan for breast cancer.  It will be monitored on that.    5.   Her leg weakness is stable to slightly better.  She will continue with physical therapy.       6.  I will see her in 3 months.  In between she will see our nurse practitioner.  Advised her to call us with any questions or concerns.     Total visit time of 30 minutes.  Time spent in today's visit, review of chart/investigations today, monitoring for toxicity of high risk drugs and documentation today.

## 2023-06-04 ENCOUNTER — HEALTH MAINTENANCE LETTER (OUTPATIENT)
Age: 58
End: 2023-06-04

## 2023-06-06 DIAGNOSIS — C50.919 PRIMARY MALIGNANT NEOPLASM OF BREAST WITH METASTASIS (H): Primary | ICD-10-CM

## 2023-06-06 RX ORDER — LETROZOLE 2.5 MG/1
2.5 TABLET, FILM COATED ORAL EVERY MORNING
Qty: 28 TABLET | Refills: 0 | Status: SHIPPED | OUTPATIENT
Start: 2023-06-14 | End: 2023-07-19

## 2023-06-06 RX ORDER — RIBOCICLIB 200 MG/1
200 TABLET, FILM COATED ORAL DAILY
Qty: 21 TABLET | Refills: 0 | Status: SHIPPED | OUTPATIENT
Start: 2023-06-14 | End: 2023-07-05

## 2023-06-13 ENCOUNTER — INFUSION THERAPY VISIT (OUTPATIENT)
Dept: INFUSION THERAPY | Facility: CLINIC | Age: 58
End: 2023-06-13
Attending: INTERNAL MEDICINE
Payer: COMMERCIAL

## 2023-06-13 ENCOUNTER — ONCOLOGY VISIT (OUTPATIENT)
Dept: ONCOLOGY | Facility: CLINIC | Age: 58
End: 2023-06-13
Attending: NURSE PRACTITIONER
Payer: COMMERCIAL

## 2023-06-13 VITALS
HEART RATE: 65 BPM | BODY MASS INDEX: 21.83 KG/M2 | RESPIRATION RATE: 16 BRPM | WEIGHT: 131 LBS | HEIGHT: 65 IN | SYSTOLIC BLOOD PRESSURE: 120 MMHG | OXYGEN SATURATION: 98 % | TEMPERATURE: 98.4 F | DIASTOLIC BLOOD PRESSURE: 79 MMHG

## 2023-06-13 DIAGNOSIS — C50.919 PRIMARY MALIGNANT NEOPLASM OF BREAST WITH METASTASIS (H): Primary | ICD-10-CM

## 2023-06-13 DIAGNOSIS — G95.29 SPINAL CORD COMPRESSION DUE TO MALIGNANT NEOPLASM METASTATIC TO SPINE (H): ICD-10-CM

## 2023-06-13 DIAGNOSIS — G95.29 SPINAL CORD COMPRESSION DUE TO MALIGNANT NEOPLASM METASTATIC TO SPINE (H): Primary | ICD-10-CM

## 2023-06-13 DIAGNOSIS — C79.51 SPINAL CORD COMPRESSION DUE TO MALIGNANT NEOPLASM METASTATIC TO SPINE (H): ICD-10-CM

## 2023-06-13 DIAGNOSIS — C79.51 SPINAL CORD COMPRESSION DUE TO MALIGNANT NEOPLASM METASTATIC TO SPINE (H): Primary | ICD-10-CM

## 2023-06-13 DIAGNOSIS — C50.919 PRIMARY MALIGNANT NEOPLASM OF BREAST WITH METASTASIS (H): ICD-10-CM

## 2023-06-13 LAB
ALBUMIN SERPL BCG-MCNC: 4.3 G/DL (ref 3.5–5.2)
BASOPHILS # BLD AUTO: 0 10E3/UL (ref 0–0.2)
BASOPHILS NFR BLD AUTO: 1 %
CALCIUM SERPL-MCNC: 10.1 MG/DL (ref 8.6–10)
CREAT SERPL-MCNC: 0.87 MG/DL (ref 0.51–0.95)
EOSINOPHIL # BLD AUTO: 0.1 10E3/UL (ref 0–0.7)
EOSINOPHIL NFR BLD AUTO: 5 %
ERYTHROCYTE [DISTWIDTH] IN BLOOD BY AUTOMATED COUNT: 14.1 % (ref 10–15)
GFR SERPL CREATININE-BSD FRML MDRD: 77 ML/MIN/1.73M2
HCT VFR BLD AUTO: 34.6 % (ref 35–47)
HGB BLD-MCNC: 11.4 G/DL (ref 11.7–15.7)
HOLD SPECIMEN: NORMAL
IMM GRANULOCYTES # BLD: 0 10E3/UL
IMM GRANULOCYTES NFR BLD: 0 %
LYMPHOCYTES # BLD AUTO: 1 10E3/UL (ref 0.8–5.3)
LYMPHOCYTES NFR BLD AUTO: 30 %
MCH RBC QN AUTO: 31.3 PG (ref 26.5–33)
MCHC RBC AUTO-ENTMCNC: 32.9 G/DL (ref 31.5–36.5)
MCV RBC AUTO: 95 FL (ref 78–100)
MONOCYTES # BLD AUTO: 0.4 10E3/UL (ref 0–1.3)
MONOCYTES NFR BLD AUTO: 11 %
NEUTROPHILS # BLD AUTO: 1.6 10E3/UL (ref 1.6–8.3)
NEUTROPHILS NFR BLD AUTO: 53 %
NRBC # BLD AUTO: 0 10E3/UL
NRBC BLD AUTO-RTO: 0 /100
PLATELET # BLD AUTO: 233 10E3/UL (ref 150–450)
RBC # BLD AUTO: 3.64 10E6/UL (ref 3.8–5.2)
WBC # BLD AUTO: 3.1 10E3/UL (ref 4–11)

## 2023-06-13 PROCEDURE — 99214 OFFICE O/P EST MOD 30 MIN: CPT | Performed by: NURSE PRACTITIONER

## 2023-06-13 PROCEDURE — 258N000003 HC RX IP 258 OP 636: Performed by: INTERNAL MEDICINE

## 2023-06-13 PROCEDURE — 250N000011 HC RX IP 250 OP 636: Performed by: INTERNAL MEDICINE

## 2023-06-13 PROCEDURE — 82565 ASSAY OF CREATININE: CPT | Performed by: INTERNAL MEDICINE

## 2023-06-13 PROCEDURE — 82310 ASSAY OF CALCIUM: CPT | Performed by: INTERNAL MEDICINE

## 2023-06-13 PROCEDURE — 36415 COLL VENOUS BLD VENIPUNCTURE: CPT | Performed by: INTERNAL MEDICINE

## 2023-06-13 PROCEDURE — G0463 HOSPITAL OUTPT CLINIC VISIT: HCPCS | Performed by: NURSE PRACTITIONER

## 2023-06-13 PROCEDURE — 82040 ASSAY OF SERUM ALBUMIN: CPT | Performed by: INTERNAL MEDICINE

## 2023-06-13 PROCEDURE — 85025 COMPLETE CBC W/AUTO DIFF WBC: CPT | Performed by: INTERNAL MEDICINE

## 2023-06-13 RX ORDER — ZOLEDRONIC ACID 0.04 MG/ML
4 INJECTION, SOLUTION INTRAVENOUS ONCE
Status: COMPLETED | OUTPATIENT
Start: 2023-06-13 | End: 2023-06-13

## 2023-06-13 RX ADMIN — SODIUM CHLORIDE 250 ML: 9 INJECTION, SOLUTION INTRAVENOUS at 10:16

## 2023-06-13 RX ADMIN — ZOLEDRONIC ACID 4 MG: 0.04 INJECTION, SOLUTION INTRAVENOUS at 10:16

## 2023-06-13 ASSESSMENT — PAIN SCALES - GENERAL: PAINLEVEL: NO PAIN (0)

## 2023-06-13 NOTE — PROGRESS NOTES
Infusion Nursing Note:  Mickie Mcghee presents today for zometa.    Patient seen by provider today: Yes: Sha   present during visit today: Not Applicable.    Note: N/A.      Intravenous Access:  Peripheral IV placed.    Treatment Conditions:  Lab Results   Component Value Date     05/16/2023    POTASSIUM 4.4 05/16/2023    MAG 2.2 05/16/2023    CR 0.87 06/13/2023    OSMEL 10.1 (H) 06/13/2023    BILITOTAL 0.2 05/16/2023    ALBUMIN 4.3 06/13/2023    ALT 18 05/16/2023    AST 22 05/16/2023     Results reviewed, labs MET treatment parameters, ok to proceed with treatment.      Post Infusion Assessment:  Patient tolerated infusion without incident.  Blood return noted pre and post infusion.  Site patent and intact, free from redness, edema or discomfort.  No evidence of extravasations.  Access discontinued per protocol.       Discharge Plan:   Discharge instructions reviewed with: Patient.  Patient and/or family verbalized understanding of discharge instructions and all questions answered.  Patient discharged in stable condition accompanied by: self.  Departure Mode: Ambulatory.      Destiny Loving RN

## 2023-06-13 NOTE — PROGRESS NOTES
"Oncology Rooming Note    June 13, 2023 9:24 AM   Mickie Mcghee is a 58 year old female who presents for:    Chief Complaint   Patient presents with     Oncology Clinic Visit     Metastatic breast cancer     Initial Vitals: /79   Pulse 65   Temp 98.4  F (36.9  C) (Oral)   Resp 16   Ht 1.651 m (5' 5\")   Wt 59.4 kg (131 lb)   SpO2 98%   BMI 21.80 kg/m   Estimated body mass index is 21.8 kg/m  as calculated from the following:    Height as of this encounter: 1.651 m (5' 5\").    Weight as of this encounter: 59.4 kg (131 lb). Body surface area is 1.65 meters squared.  No Pain (0) Comment: Data Unavailable   No LMP recorded.  Allergies reviewed: Yes  Medications reviewed: Yes    Medications: Medication refills not needed today.  Pharmacy name entered into [employer]:    [id number] - [place], [place] - [address] [facility] - [place], [place] - [address]    Clinical concerns: None       Lisandra Augustin MA              "

## 2023-06-13 NOTE — PROGRESS NOTES
Oncology/Hematology Visit Note  Jun 13, 2023    Reason for Visit: follow up of metastatic breast cancer  ER positive LA positive HER2 negative  Diagnosed in 2007 status post bilateral mastectomy chemotherapy radiation anastrozole  -03/21/20228584-[id number] bone biopsy of left iliac bone reveals metastatic breast cancer ER positive LA positive HER2 negative  03/31/2021-started Ribociclib 600 mg   Letrazole   03/22-04/07/2022-status post radiation to the L-spine and LS spine 10 fractions  05/03-due to neutropenia Ribociclib decreased to 400 mg  -Improvement in counts  06/01- Ribociclib increased back to 600 mg  Due to persistent leukopenia Ribociclib reduced to 200 mg daily for 21 days 7 days off      Interval History:  Patient reports she continues to feel well.  Reports has been tolerating treatment well.  Denies fever chills sweats cough shortness of breath chest pain nausea vomiting diarrhea abdominal pain or bleeding.  Denies pain     Review of Systems:  14 point ROS of systems including Constitutional, Eyes, Respiratory, Cardiovascular, Gastroenterology, Genitourinary, Integumentary, Muscularskeletal, Psychiatric were all negative except for pertinent positives noted in my HPI.    Physical Examination:  General: The patient is a pleasant female in no acute distress.  HEENT: EOMI, PERRL. Sclerae are anicteric. Oral mucosa is pink and moist with no lesions or thrush.   Lymph: Neck is supple with no lymphadenopathy in the cervical or supraclavicular areas.   Heart: Regular rate and rhythm.   Lungs: Clear to auscultation bilaterally.   GI: Bowel sounds present, soft, nontender with no palpable hepatosplenomegaly or masses.   Extremities: No lower extremity edema noted bilaterally.   Skin: No rashes, petechiae, or bruising noted on exposed skin.    Laboratory Data:  CMP and TSH     Assessment and Plan:      metastatic breast cancer  Patient of Dr. Lozada  ER positive LA positive HER2 negative  Diagnosed in 2007 status  post bilateral mastectomy chemotherapy radiation anastrozole  -03/21/[id number] bone biopsy of left iliac bone reveals metastatic breast cancer ER positive OR positive HER2 negative  03/31/2021-started Ribociclib 600 mg  And Letrazole   Due to leukopenia Ribociclib reduced to 200 mg daily(21 days on 7 days off)  -11/28/2022-PET scan revealed improvement in disease  -Continue with Ribociclib 200 mg 21 days on 7 days off  -Continue with letrozole  Continue with monthly lab checks  Patient is scheduled for CT scan in July and follow-up with Dr. Lozada        Bone metastasis  03/22-04/07/2022-status post radiation to the L-spine and LS spine 10 fractions  Continue with monthly Zometa  -Okay to give Zometa today  Call our clinic in the event of jaw pain or any dental issues        Leukopenia secondary to  Ribociclib   Normal ANC  Continue to monitor  Advised patient to call our clinic in the event of fever chills sweats or any signs symptoms of infection     Anemia  Mild  Continue to monitor    Mild hypercalcemia  Patient is on calcium supplement  Patient will get Zometa today  Increase fluid intake    Please call our clinic with any changes in health condition or questions      SAMI Jacob [facility] [place]     Chart documentation with Dragon Voice recognition Software. Although reviewed after completion, some words and grammatical errors may remain.

## 2023-06-13 NOTE — LETTER
"    6/13/2023         RE: Mickie Mcghee  [address]  [place] [address]        Dear Colleague,    Thank you for referring your patient, Mickie Mcghee, to the [facility]. Please see a copy of my visit note below.    Oncology Rooming Note    June 13, 2023 9:24 AM   Mickie Mcghee is a 58 year old female who presents for:    Chief Complaint   Patient presents with     Oncology Clinic Visit     Metastatic breast cancer     Initial Vitals: /79   Pulse 65   Temp 98.4  F (36.9  C) (Oral)   Resp 16   Ht 1.651 m (5' 5\")   Wt 59.4 kg (131 lb)   SpO2 98%   BMI 21.80 kg/m   Estimated body mass index is 21.8 kg/m  as calculated from the following:    Height as of this encounter: 1.651 m (5' 5\").    Weight as of this encounter: 59.4 kg (131 lb). Body surface area is 1.65 meters squared.  No Pain (0) Comment: Data Unavailable   No LMP recorded.  Allergies reviewed: Yes  Medications reviewed: Yes    Medications: Medication refills not needed today.  Pharmacy name entered into [employer]:    [id number] - [place], [place] - [address] [facility] - [place], [place] - [address]    Clinical concerns: None       Lisandra Augustin MA                  Oncology/Hematology Visit Note  Jun 13, 2023    Reason for Visit: follow up of metastatic breast cancer  ER positive OR positive HER2 negative  Diagnosed in 2007 status post bilateral mastectomy chemotherapy radiation anastrozole  -03/21/[id number] bone biopsy of left iliac bone reveals metastatic breast cancer ER positive OR positive HER2 negative  03/31/2021-started Ribociclib 600 mg   Letrazole   03/22-04/07/2022-status post radiation to the L-spine and LS spine 10 fractions  05/03-due to neutropenia Ribociclib decreased to 400 mg  -Improvement in counts  06/01- Ribociclib increased back to 600 mg  Due to persistent leukopenia Ribociclib reduced to 200 mg daily " for 21 days 7 days off      Interval History:  Patient reports she continues to feel well.  Reports has been tolerating treatment well.  Denies fever chills sweats cough shortness of breath chest pain nausea vomiting diarrhea abdominal pain or bleeding.  Denies pain     Review of Systems:  14 point ROS of systems including Constitutional, Eyes, Respiratory, Cardiovascular, Gastroenterology, Genitourinary, Integumentary, Muscularskeletal, Psychiatric were all negative except for pertinent positives noted in my HPI.    Physical Examination:  General: The patient is a pleasant female in no acute distress.  HEENT: EOMI, PERRL. Sclerae are anicteric. Oral mucosa is pink and moist with no lesions or thrush.   Lymph: Neck is supple with no lymphadenopathy in the cervical or supraclavicular areas.   Heart: Regular rate and rhythm.   Lungs: Clear to auscultation bilaterally.   GI: Bowel sounds present, soft, nontender with no palpable hepatosplenomegaly or masses.   Extremities: No lower extremity edema noted bilaterally.   Skin: No rashes, petechiae, or bruising noted on exposed skin.    Laboratory Data:  CMP and TSH     Assessment and Plan:      metastatic breast cancer  Patient of Dr. Lozada  ER positive [place] positive HER2 negative  Diagnosed in 2007 status post bilateral mastectomy chemotherapy radiation anastrozole  -03/21/20220291-[id number] bone biopsy of left iliac bone reveals metastatic breast cancer ER positive [place] positive HER2 negative  03/31/2021-started Ribociclib 600 mg  And Letrazole   Due to leukopenia Ribociclib reduced to 200 mg daily(21 days on 7 days off)  -11/28/2022-PET scan revealed improvement in disease  -Continue with Ribociclib 200 mg 21 days on 7 days off  -Continue with letrozole  Continue with monthly lab checks  Patient is scheduled for CT scan in July and follow-up with Dr. Lozada        Bone metastasis  03/22-04/07/2022-status post radiation to the L-spine and LS spine 10 fractions  Continue with  monthly Zometa  -Okay to give Zometa today  Call our clinic in the event of jaw pain or any dental issues        Leukopenia secondary to  Ribociclib   Normal ANC  Continue to monitor  Advised patient to call our clinic in the event of fever chills sweats or any signs symptoms of infection     Anemia  Mild  Continue to monitor    Mild hypercalcemia  Patient is on calcium supplement  Patient will get Zometa today  Increase fluid intake    Please call our clinic with any changes in health condition or questions      SAMI Jacob CNP  [facility] [place]     Chart documentation with Dragon Voice recognition Software. Although reviewed after completion, some words and grammatical errors may remain.            Again, thank you for allowing me to participate in the care of your patient.        Sincerely,        SAMI Jacob CNP

## 2023-06-30 ENCOUNTER — TELEPHONE (OUTPATIENT)
Dept: ONCOLOGY | Facility: CLINIC | Age: 58
End: 2023-06-30
Payer: COMMERCIAL

## 2023-07-05 DIAGNOSIS — C50.919 PRIMARY MALIGNANT NEOPLASM OF BREAST WITH METASTASIS (H): Primary | ICD-10-CM

## 2023-07-05 RX ORDER — RIBOCICLIB 200 MG/1
200 TABLET, FILM COATED ORAL DAILY
Qty: 21 TABLET | Refills: 0 | Status: SHIPPED | OUTPATIENT
Start: 2023-07-12 | End: 2023-10-10

## 2023-07-05 RX ORDER — LETROZOLE 2.5 MG/1
2.5 TABLET, FILM COATED ORAL EVERY MORNING
Qty: 28 TABLET | Refills: 0 | Status: SHIPPED | OUTPATIENT
Start: 2023-07-12 | End: 2023-10-10

## 2023-07-06 ENCOUNTER — HOSPITAL ENCOUNTER (OUTPATIENT)
Dept: CT IMAGING | Facility: CLINIC | Age: 58
Discharge: HOME | End: 2023-07-06
Attending: INTERNAL MEDICINE | Admitting: INTERNAL MEDICINE
Payer: COMMERCIAL

## 2023-07-06 DIAGNOSIS — C50.919 PRIMARY MALIGNANT NEOPLASM OF BREAST WITH METASTASIS (H): ICD-10-CM

## 2023-07-06 PROCEDURE — 74177 CT ABD & PELVIS W/CONTRAST: CPT

## 2023-07-06 PROCEDURE — 250N000009 HC RX 250: Performed by: INTERNAL MEDICINE

## 2023-07-06 PROCEDURE — 250N000011 HC RX IP 250 OP 636: Performed by: INTERNAL MEDICINE

## 2023-07-06 RX ORDER — IOPAMIDOL 755 MG/ML
64 INJECTION, SOLUTION INTRAVASCULAR ONCE
Status: COMPLETED | OUTPATIENT
Start: 2023-07-06 | End: 2023-07-06

## 2023-07-06 RX ADMIN — SODIUM CHLORIDE 58 ML: 9 INJECTION, SOLUTION INTRAVENOUS at 09:58

## 2023-07-06 RX ADMIN — IOPAMIDOL 64 ML: 755 INJECTION, SOLUTION INTRAVENOUS at 09:58

## 2023-07-06 NOTE — RESULT ENCOUNTER NOTE
Dear Ms. Mcghee,    CT scan reveals cancer to be stable. This is good.    Please, call me with any questions.    Yury Lozada MD

## 2023-07-06 NOTE — TELEPHONE ENCOUNTER
Prior Authorization Approval    Medication: KISQALI (200 MG DOSE) PO  Authorization Effective Date: 6/6/2023  Authorization Expiration Date: 7/5/2024  Approved Dose/Quantity: 21 for 28 days  Reference #:     Insurance Company: [employer] - Phone [phone] Fax [phone]  Expected CoPay: $35.00     CoPay Card Available: No    Financial Assistance Needed: no grants available. Pt has PMAP plan so copay should be affordable  Which Pharmacy is filling the prescription: [facility] - [place], [place] - [address]  Pharmacy Notified: Yes  Patient Notified: Yes

## 2023-07-10 RX ORDER — HEPARIN SODIUM (PORCINE) LOCK FLUSH IV SOLN 100 UNIT/ML 100 UNIT/ML
5 SOLUTION INTRAVENOUS
Status: CANCELLED | OUTPATIENT
Start: 2023-07-11

## 2023-07-10 RX ORDER — ZOLEDRONIC ACID 0.04 MG/ML
4 INJECTION, SOLUTION INTRAVENOUS ONCE
Status: CANCELLED
Start: 2023-07-11 | End: 2023-07-11

## 2023-07-10 RX ORDER — HEPARIN SODIUM,PORCINE 10 UNIT/ML
5 VIAL (ML) INTRAVENOUS
Status: CANCELLED | OUTPATIENT
Start: 2023-07-11

## 2023-07-11 ENCOUNTER — INFUSION THERAPY VISIT (OUTPATIENT)
Dept: INFUSION THERAPY | Facility: CLINIC | Age: 58
End: 2023-07-11
Attending: NURSE PRACTITIONER
Payer: COMMERCIAL

## 2023-07-11 ENCOUNTER — DOCUMENTATION ONLY (OUTPATIENT)
Dept: PHARMACY | Facility: CLINIC | Age: 58
End: 2023-07-11

## 2023-07-11 VITALS
RESPIRATION RATE: 16 BRPM | OXYGEN SATURATION: 95 % | DIASTOLIC BLOOD PRESSURE: 71 MMHG | HEART RATE: 66 BPM | TEMPERATURE: 98.2 F | SYSTOLIC BLOOD PRESSURE: 117 MMHG

## 2023-07-11 DIAGNOSIS — G95.29 SPINAL CORD COMPRESSION DUE TO MALIGNANT NEOPLASM METASTATIC TO SPINE (H): Primary | ICD-10-CM

## 2023-07-11 DIAGNOSIS — C50.919 PRIMARY MALIGNANT NEOPLASM OF BREAST WITH METASTASIS (H): ICD-10-CM

## 2023-07-11 DIAGNOSIS — C79.51 SPINAL CORD COMPRESSION DUE TO MALIGNANT NEOPLASM METASTATIC TO SPINE (H): Primary | ICD-10-CM

## 2023-07-11 DIAGNOSIS — C50.919 PRIMARY MALIGNANT NEOPLASM OF BREAST WITH METASTASIS (H): Primary | ICD-10-CM

## 2023-07-11 DIAGNOSIS — G95.29 SPINAL CORD COMPRESSION DUE TO MALIGNANT NEOPLASM METASTATIC TO SPINE (H): ICD-10-CM

## 2023-07-11 DIAGNOSIS — C79.51 SPINAL CORD COMPRESSION DUE TO MALIGNANT NEOPLASM METASTATIC TO SPINE (H): ICD-10-CM

## 2023-07-11 LAB
ALBUMIN SERPL BCG-MCNC: 4.6 G/DL (ref 3.5–5.2)
ALP SERPL-CCNC: 50 U/L (ref 35–104)
ALT SERPL W P-5'-P-CCNC: 13 U/L (ref 0–50)
ANION GAP SERPL CALCULATED.3IONS-SCNC: 11 MMOL/L (ref 7–15)
AST SERPL W P-5'-P-CCNC: 22 U/L (ref 0–45)
BASOPHILS # BLD AUTO: 0 10E3/UL (ref 0–0.2)
BASOPHILS NFR BLD AUTO: 1 %
BILIRUB SERPL-MCNC: 0.2 MG/DL
BUN SERPL-MCNC: 13.6 MG/DL (ref 6–20)
CALCIUM SERPL-MCNC: 9.9 MG/DL (ref 8.6–10)
CALCIUM SERPL-MCNC: 9.9 MG/DL (ref 8.6–10)
CHLORIDE SERPL-SCNC: 106 MMOL/L (ref 98–107)
CREAT SERPL-MCNC: 0.89 MG/DL (ref 0.51–0.95)
DEPRECATED HCO3 PLAS-SCNC: 24 MMOL/L (ref 22–29)
EOSINOPHIL # BLD AUTO: 0.1 10E3/UL (ref 0–0.7)
EOSINOPHIL NFR BLD AUTO: 3 %
ERYTHROCYTE [DISTWIDTH] IN BLOOD BY AUTOMATED COUNT: 14.2 % (ref 10–15)
GFR SERPL CREATININE-BSD FRML MDRD: 75 ML/MIN/1.73M2
GLUCOSE SERPL-MCNC: 100 MG/DL (ref 70–99)
HCT VFR BLD AUTO: 34.3 % (ref 35–47)
HGB BLD-MCNC: 11.5 G/DL (ref 11.7–15.7)
IMM GRANULOCYTES # BLD: 0 10E3/UL
IMM GRANULOCYTES NFR BLD: 0 %
LYMPHOCYTES # BLD AUTO: 0.9 10E3/UL (ref 0.8–5.3)
LYMPHOCYTES NFR BLD AUTO: 30 %
MAGNESIUM SERPL-MCNC: 2.4 MG/DL (ref 1.7–2.3)
MCH RBC QN AUTO: 32.2 PG (ref 26.5–33)
MCHC RBC AUTO-ENTMCNC: 33.5 G/DL (ref 31.5–36.5)
MCV RBC AUTO: 96 FL (ref 78–100)
MONOCYTES # BLD AUTO: 0.3 10E3/UL (ref 0–1.3)
MONOCYTES NFR BLD AUTO: 10 %
NEUTROPHILS # BLD AUTO: 1.7 10E3/UL (ref 1.6–8.3)
NEUTROPHILS NFR BLD AUTO: 56 %
NRBC # BLD AUTO: 0 10E3/UL
NRBC BLD AUTO-RTO: 1 /100
PHOSPHATE SERPL-MCNC: 3.9 MG/DL (ref 2.5–4.5)
PLATELET # BLD AUTO: 229 10E3/UL (ref 150–450)
POTASSIUM SERPL-SCNC: 4.3 MMOL/L (ref 3.4–5.3)
PROT SERPL-MCNC: 7.1 G/DL (ref 6.4–8.3)
RBC # BLD AUTO: 3.57 10E6/UL (ref 3.8–5.2)
SODIUM SERPL-SCNC: 141 MMOL/L (ref 136–145)
WBC # BLD AUTO: 3.1 10E3/UL (ref 4–11)

## 2023-07-11 PROCEDURE — 36415 COLL VENOUS BLD VENIPUNCTURE: CPT | Performed by: NURSE PRACTITIONER

## 2023-07-11 PROCEDURE — 80053 COMPREHEN METABOLIC PANEL: CPT | Performed by: NURSE PRACTITIONER

## 2023-07-11 PROCEDURE — 82310 ASSAY OF CALCIUM: CPT | Performed by: NURSE PRACTITIONER

## 2023-07-11 PROCEDURE — 84100 ASSAY OF PHOSPHORUS: CPT | Performed by: NURSE PRACTITIONER

## 2023-07-11 PROCEDURE — 85025 COMPLETE CBC W/AUTO DIFF WBC: CPT | Performed by: INTERNAL MEDICINE

## 2023-07-11 PROCEDURE — 250N000011 HC RX IP 250 OP 636: Mod: JZ | Performed by: NURSE PRACTITIONER

## 2023-07-11 PROCEDURE — 96365 THER/PROPH/DIAG IV INF INIT: CPT

## 2023-07-11 PROCEDURE — 258N000003 HC RX IP 258 OP 636: Performed by: NURSE PRACTITIONER

## 2023-07-11 PROCEDURE — 83735 ASSAY OF MAGNESIUM: CPT | Performed by: NURSE PRACTITIONER

## 2023-07-11 RX ORDER — ZOLEDRONIC ACID 0.04 MG/ML
4 INJECTION, SOLUTION INTRAVENOUS ONCE
Status: COMPLETED | OUTPATIENT
Start: 2023-07-11 | End: 2023-07-11

## 2023-07-11 RX ADMIN — ZOLEDRONIC ACID 4 MG: 0.04 INJECTION, SOLUTION INTRAVENOUS at 11:36

## 2023-07-11 RX ADMIN — SODIUM CHLORIDE 250 ML: 9 INJECTION, SOLUTION INTRAVENOUS at 11:39

## 2023-07-11 NOTE — PROGRESS NOTES
Infusion Nursing Note:  Mickie Mcghee presents today for Zometa.    Patient seen by provider today: No   present during visit today: Not Applicable.    Note: Pt reports no new health changes and concerns.      Intravenous Access:  Peripheral IV placed.    Treatment Conditions:  Lab Results   Component Value Date    HGB 11.5 (L) 07/11/2023    WBC 3.1 (L) 07/11/2023    ANEU 1.3 (L) 08/04/2022    ANEUTAUTO 1.7 07/11/2023     07/11/2023      Lab Results   Component Value Date     07/11/2023    POTASSIUM 4.3 07/11/2023    MAG 2.4 (H) 07/11/2023    CR 0.89 07/11/2023    OSMEL 9.9 07/11/2023    OSMEL 9.9 07/11/2023    BILITOTAL 0.2 07/11/2023    ALBUMIN 4.6 07/11/2023    ALT 13 07/11/2023    AST 22 07/11/2023     Results reviewed, labs MET treatment parameters, ok to proceed with treatment.      Post Infusion Assessment:  Patient tolerated infusion without incident.  Blood return noted pre and post infusion.  Site patent and intact, free from redness, edema or discomfort.  No evidence of extravasations.  Access discontinued per protocol.       Discharge Plan:   Patient declined prescription refills.  Discharge instructions reviewed with: Patient.  Patient and/or family verbalized understanding of discharge instructions and all questions answered.  AVS to patient via [employer]. Patient will return 7/19/23 for next appointment.   Patient discharged in stable condition accompanied by: self.  Departure Mode: Ambulatory.    Heather Crabtree RN

## 2023-07-11 NOTE — PROGRESS NOTES
Oral Chemotherapy Monitoring Program  Lab Follow Up    Reviewed lab results from 07/11/23.        2/13/2023     5:00 PM 3/14/2023     2:00 PM 3/21/2023     9:00 AM 4/12/2023    11:00 AM 6/6/2023     3:00 PM 7/5/2023    12:00 PM 7/11/2023    11:00 AM   ORAL CHEMOTHERAPY   Assessment Type Refill;Chart Review Refill Lab Monitoring;Chart Review Refill Refill Refill Lab Monitoring   Diagnosis Code Breast Cancer Breast Cancer Breast Cancer Breast Cancer Breast Cancer Breast Cancer Breast Cancer   Providers Dr. Nevilel Lozada   Clinic Name/Location [facility]   Drug Name Kisqali (ribociclib) Kisqali (ribociclib) Kisqali (ribociclib) Kisqali (ribociclib) Kisqali (ribociclib) Kisqali (ribociclib) Kisqali (ribociclib)   Dose 200 mg 200 mg 200 mg 200 mg 200 mg 200 mg 200 mg   Current Schedule Daily Daily Daily Daily Daily Daily Daily   Cycle Details 3 weeks on, 1 week off 3 weeks on, 1 week off 3 weeks on, 1 week off 3 weeks on, 1 week off 3 weeks on, 1 week off 3 weeks on, 1 week off 3 weeks on, 1 week off   Adverse Effects   Anemia    No AE identified during assessment   Anemia   Grade 1       Pharmacist Intervention(anemia)   No       Is the dose as ordered appropriate for the patient?  Yes Yes           Labs:  _  Result Component Current Result Ref Range   Sodium 141 (7/11/2023) 136 - 145 mmol/L     _  Result Component Current Result Ref Range   Potassium 4.3 (7/11/2023) 3.4 - 5.3 mmol/L     _  Result Component Current Result Ref Range   Calcium 9.9 (7/11/2023) 8.6 - 10.0 mg/dL    9.9 (7/11/2023) 8.6 - 10.0 mg/dL     _  Result Component Current Result Ref Range   Magnesium 2.4 (H) (7/11/2023) 1.7 - 2.3 mg/dL     _  Result Component Current Result Ref Range   Phosphorus 3.9 (7/11/2023) 2.5 - 4.5 mg/dL     _  Result Component Current Result Ref Range   Albumin 4.6 (7/11/2023) 3.5 - 5.2 g/dL     _  Result Component  Current Result Ref Range   Urea Nitrogen 13.6 (7/11/2023) 6.0 - 20.0 mg/dL     _  Result Component Current Result Ref Range   Creatinine 0.89 (7/11/2023) 0.51 - 0.95 mg/dL     _  Result Component Current Result Ref Range   AST 22 (7/11/2023) 0 - 45 U/L     _  Result Component Current Result Ref Range   ALT 13 (7/11/2023) 0 - 50 U/L     _  Result Component Current Result Ref Range   Bilirubin Total 0.2 (7/11/2023) <=1.2 mg/dL     _  Result Component Current Result Ref Range   WBC Count 3.1 (L) (7/11/2023) 4.0 - 11.0 10e3/uL     _  Result Component Current Result Ref Range   Hemoglobin 11.5 (L) (7/11/2023) 11.7 - 15.7 g/dL     _  Result Component Current Result Ref Range   Platelet Count 229 (7/11/2023) 150 - 450 10e3/uL     _  Result Component Current Result Ref Range   Absolute Neutrophils 1.7 (7/11/2023) 1.6 - 8.3 10e3/uL        Assessment & Plan:  No concerning abnormalities. Ok to proceed with ribociclib.     Questions answered to patient's satisfaction.    Follow-Up:  7/19 - Appointment with Dr. Lozada  8/8 - Labs    Rachel Moniz, PharmD  Hematology/Oncology Pharmacist  [facility]  [phone]

## 2023-07-19 ENCOUNTER — VIRTUAL VISIT (OUTPATIENT)
Dept: ONCOLOGY | Facility: CLINIC | Age: 58
End: 2023-07-19
Attending: INTERNAL MEDICINE
Payer: COMMERCIAL

## 2023-07-19 VITALS — HEIGHT: 65 IN | WEIGHT: 131 LBS | BODY MASS INDEX: 21.83 KG/M2

## 2023-07-19 DIAGNOSIS — C79.51 CARCINOMA OF BREAST METASTATIC TO BONE, UNSPECIFIED LATERALITY (H): ICD-10-CM

## 2023-07-19 DIAGNOSIS — C50.919 CARCINOMA OF BREAST METASTATIC TO BONE, UNSPECIFIED LATERALITY (H): ICD-10-CM

## 2023-07-19 DIAGNOSIS — C50.919 PRIMARY MALIGNANT NEOPLASM OF BREAST WITH METASTASIS (H): Primary | ICD-10-CM

## 2023-07-19 PROCEDURE — 99214 OFFICE O/P EST MOD 30 MIN: CPT | Mod: 95 | Performed by: INTERNAL MEDICINE

## 2023-07-19 ASSESSMENT — PAIN SCALES - GENERAL: PAINLEVEL: NO PAIN (0)

## 2023-07-19 NOTE — PROGRESS NOTES
Virtual Visit Details    Type of service:  Video Visit     Originating Location (pt. Location): Home    Distant Location (provider location):  On-site  Platform used for Video Visit: [facility]     ONCOLOGY HISTORY:  Ms. Mcghee is a female with metastatic breast cancer. ER positive, OH positive and HER-2/halina negative.  -Foundation One reveals PIK3CA alteration.  -She had a stage III left breast cancer in 2007. ER positive and HER-2/halina negative.  She had bilateral mastectomy, chemotherapy, radiation, and anastrozole.     1.  On 03/18/2022, multiple imaging studies done because of bilateral lower extremity weakness:  -MRI of thoracic and lumbar spine revealed bone metastases with spinal cord compression.  -CT chest, abdomen, and pelvis on 03/19/2022 revealed bone metastasis, lung nodules and enlarged upper abdominal lymph node.  There is a hypodense nodule in the liver.  -Brain MRI on 03/19/2022 did not reveal any intracranial metastasis.  2.  CT-guided bone biopsy of left iliac bone on 03/21/2022 revealed metastatic breast cancer. ER positive, OH positive and HER-2/halina negative.  3.  Letrozole started on 03/25/2022.  -Ribociclib started on 03/31/2022. Decreased to 200 mg a day in 09/2022.  -Zometa started on 03/24/2022.  -Radiation to thoracolumbar and sacral area between 03/22/2022 and 04/07/2022.  3000 cGy in 10 fractions.     SUBJECTIVE:  Ms. Mcghee is a 58-year-old female with metastatic breast cancer on letrozole and ribociclib.  She is tolerating it well. Disease has been stable. CT scan on 07/06/2023 reveals stable disease.  1.  Stable bony metastatic disease.  2.  Stable pulmonary nodules.  3.  No new lesions.     She is also on Zometa for bone metastasis.  She is tolerating it well.  No dental or jaw related symptoms.     She has mild generalized weakness. Her leg weakness is improving. She continues with physical therapy. Walking is improving.      No headache.  No dizziness.  No chest pain.  No shortness of breath.   No abdominal pain.  No nausea or vomiting.  Appetite is good.  No bleeding. No urinary or bowel complaints. No bone pain. All other review of system is negative.     PHYSICAL EXAMINATION:  She is alert and oriented x 3. Not in any distress.   Rest of systems not examined as this is a virtual visit.     LABS: CBC and CMP reviewed.     ASSESSMENT:  1.  A 58-year-old female with metastatic breast cancer on letrozole and ribociclib. Disease is stable.  2.  Leukopenia from ribociclib. Stable.  3.  Anemia from cancer and ribociclib. Stable.     PLAN:  1.  Patient is overall doing well.  No new symptoms.  Her generalized weakness and leg weakness is improving.    Discussed regarding breast cancer.  CT scan was reviewed.  Explained to her that disease is stable.  She was happy to know that.    2.  For breast cancer, she will continue on letrozole and ribociclib.  She is tolerating it well.    3.  She will continue on Zometa and calcium and vitamin D twice a day.  After she has been on monthly Zometa for 2 years, we will plan on changing it to every 3 months.    3.  Labs were reviewed with her.  She has mild leukopenia and anemia which is stable.  We will continue to monitor it.     4.  I will see her in 3 months.  In between she will see our nurse practitioner.  Advised her to call us with any questions or concerns.     Total video visit time of 30 minutes.  Time spent in today's visit, review of chart/investigations today, monitoring for toxicity of high risk drugs and documentation today.

## 2023-07-19 NOTE — NURSING NOTE
Is the patient currently in the state of [place]? YES    Visit mode:VIDEO    If the visit is dropped, the patient can be reconnected by: VIDEO VISIT: Text to cell phone: [phone]    Will anyone else be joining the visit? NO      How would you like to obtain your AVS? MyChart    Are changes needed to the allergy or medication list? NO    Isabel GRANADOS    Reason for visit: RECHECK

## 2023-07-19 NOTE — PATIENT INSTRUCTIONS
1.  Continue letrozole and ribociclib.  2.  Continue monthly Zometa.  3.  Take calcium and vitamin D twice a day.  4.  See NP with next two zometa infusion.  5.  See me in 3 months.

## 2023-07-19 NOTE — LETTER
7/19/2023         RE: Mickie Mcghee  [id number] [address]  [place] [address]        Dear Colleague,    Thank you for referring your patient, Mickie Mcghee, to the [facility]. Please see a copy of my visit note below.    Virtual Visit Details    Type of service:  Video Visit     Originating Location (pt. Location): Home    Distant Location (provider location):  On-site  Platform used for Video Visit: [facility]     ONCOLOGY HISTORY:  Ms. Mcghee is a female with metastatic breast cancer. ER positive, DE positive and HER-2/halina negative.  -Foundation One reveals PIK3CA alteration.  -She had a stage III left breast cancer in 2007. ER positive and HER-2/halina negative.  She had bilateral mastectomy, chemotherapy, radiation, and anastrozole.     1.  On 03/18/2022, multiple imaging studies done because of bilateral lower extremity weakness:  -MRI of thoracic and lumbar spine revealed bone metastases with spinal cord compression.  -CT chest, abdomen, and pelvis on 03/19/2022 revealed bone metastasis, lung nodules and enlarged upper abdominal lymph node.  There is a hypodense nodule in the liver.  -Brain MRI on 03/19/2022 did not reveal any intracranial metastasis.  2.  CT-guided bone biopsy of left iliac bone on 03/21/2022 revealed metastatic breast cancer. ER positive, DE positive and HER-2/halina negative.  3.  Letrozole started on 03/25/2022.  -Ribociclib started on 03/31/2022. Decreased to 200 mg a day in 09/2022.  -Zometa started on 03/24/2022.  -Radiation to thoracolumbar and sacral area between 03/22/2022 and 04/07/2022.  3000 cGy in 10 fractions.     SUBJECTIVE:  Ms. Mcghee is a 58-year-old female with metastatic breast cancer on letrozole and ribociclib.  She is tolerating it well. Disease has been stable. CT scan on 07/06/2023 reveals stable disease.  1.  Stable bony metastatic disease.  2.  Stable pulmonary nodules.  3.  No new lesions.     She is also on Zometa for bone metastasis.  She is  tolerating it well.  No dental or jaw related symptoms.     She has mild generalized weakness. Her leg weakness is improving. She continues with physical therapy. Walking is improving.      No headache.  No dizziness.  No chest pain.  No shortness of breath.  No abdominal pain.  No nausea or vomiting.  Appetite is good.  No bleeding. No urinary or bowel complaints. No bone pain. All other review of system is negative.     PHYSICAL EXAMINATION:  She is alert and oriented x 3. Not in any distress.   Rest of systems not examined as this is a virtual visit.     LABS: CBC and CMP reviewed.     ASSESSMENT:  1.  A 58-year-old female with metastatic breast cancer on letrozole and ribociclib. Disease is stable.  2.  Leukopenia from ribociclib. Stable.  3.  Anemia from cancer and ribociclib. Stable.     PLAN:  1.  Patient is overall doing well.  No new symptoms.  Her generalized weakness and leg weakness is improving.    Discussed regarding breast cancer.  CT scan was reviewed.  Explained to her that disease is stable.  She was happy to know that.    2.  For breast cancer, she will continue on letrozole and ribociclib.  She is tolerating it well.    3.  She will continue on Zometa and calcium and vitamin D twice a day.  After she has been on monthly Zometa for 2 years, we will plan on changing it to every 3 months.    3.  Labs were reviewed with her.  She has mild leukopenia and anemia which is stable.  We will continue to monitor it.     4.  I will see her in 3 months.  In between she will see our nurse practitioner.  Advised her to call us with any questions or concerns.     Total video visit time of 30 minutes.  Time spent in today's visit, review of chart/investigations today, monitoring for toxicity of high risk drugs and documentation today.      Again, thank you for allowing me to participate in the care of your patient.        Sincerely,        Yury Lozada MD

## 2023-07-19 NOTE — LETTER
7/19/2023         RE: Mickie Mcghee  [address] [address]  [place] [address]        Dear Colleague,    Thank you for referring your patient, Mickie Mcghee, to the [facility]. Please see a copy of my visit note below.    Virtual Visit Details    Type of service:  Video Visit     Originating Location (pt. Location): Home    Distant Location (provider location):  On-site  Platform used for Video Visit: [facility]     ONCOLOGY HISTORY:  Ms. Mcghee is a female with metastatic breast cancer. ER positive, LA positive and HER-2/halina negative.  -Foundation One reveals PIK3CA alteration.  -She had a stage III left breast cancer in 2007. ER positive and HER-2/halina negative.  She had bilateral mastectomy, chemotherapy, radiation, and anastrozole.     1.  On 03/18/2022, multiple imaging studies done because of bilateral lower extremity weakness:  -MRI of thoracic and lumbar spine revealed bone metastases with spinal cord compression.  -CT chest, abdomen, and pelvis on 03/19/2022 revealed bone metastasis, lung nodules and enlarged upper abdominal lymph node.  There is a hypodense nodule in the liver.  -Brain MRI on 03/19/2022 did not reveal any intracranial metastasis.  2.  CT-guided bone biopsy of left iliac bone on 03/21/2022 revealed metastatic breast cancer. ER positive, LA positive and HER-2/halina negative.  3.  Letrozole started on 03/25/2022.  -Ribociclib started on 03/31/2022. Decreased to 200 mg a day in 09/2022.  -Zometa started on 03/24/2022.  -Radiation to thoracolumbar and sacral area between 03/22/2022 and 04/07/2022.  3000 cGy in 10 fractions.     SUBJECTIVE:  Ms. Mcghee is a 58-year-old female with metastatic breast cancer on letrozole and ribociclib.  She is tolerating it well. Disease has been stable. CT scan on 07/06/2023 reveals stable disease.  1.  Stable bony metastatic disease.  2.  Stable pulmonary nodules.  3.  No new lesions.     She is also on Zometa for bone metastasis.  She is  tolerating it well.  No dental or jaw related symptoms.     She has mild generalized weakness. Her leg weakness is improving. She continues with physical therapy. Walking is improving.      No headache.  No dizziness.  No chest pain.  No shortness of breath.  No abdominal pain.  No nausea or vomiting.  Appetite is good.  No bleeding. No urinary or bowel complaints. No bone pain. All other review of system is negative.     PHYSICAL EXAMINATION:  She is alert and oriented x 3. Not in any distress.   Rest of systems not examined as this is a virtual visit.     LABS: CBC and CMP reviewed.     ASSESSMENT:  1.  A 58-year-old female with metastatic breast cancer on letrozole and ribociclib. Disease is stable.  2.  Leukopenia from ribociclib. Stable.  3.  Anemia from cancer and ribociclib. Stable.     PLAN:  1.  Patient is overall doing well.  No new symptoms.  Her generalized weakness and leg weakness is improving.    Discussed regarding breast cancer.  CT scan was reviewed.  Explained to her that disease is stable.  She was happy to know that.    2.  For breast cancer, she will continue on letrozole and ribociclib.  She is tolerating it well.    3.  She will continue on Zometa and calcium and vitamin D twice a day.  After she has been on monthly Zometa for 2 years, we will plan on changing it to every 3 months.    3.  Labs were reviewed with her.  She has mild leukopenia and anemia which is stable.  We will continue to monitor it.     4.  I will see her in 3 months.  In between she will see our nurse practitioner.  Advised her to call us with any questions or concerns.     Total video visit time of 30 minutes.  Time spent in today's visit, review of chart/investigations today, monitoring for toxicity of high risk drugs and documentation today.      Again, thank you for allowing me to participate in the care of your patient.        Sincerely,        Yury Lozada MD

## 2023-08-02 DIAGNOSIS — C50.919 CARCINOMA OF BREAST METASTATIC TO BONE, UNSPECIFIED LATERALITY (H): Primary | ICD-10-CM

## 2023-08-02 DIAGNOSIS — C79.51 CARCINOMA OF BREAST METASTATIC TO BONE, UNSPECIFIED LATERALITY (H): Primary | ICD-10-CM

## 2023-08-03 RX ORDER — LETROZOLE 2.5 MG/1
2.5 TABLET, FILM COATED ORAL EVERY MORNING
Qty: 28 TABLET | Refills: 0 | Status: SHIPPED | OUTPATIENT
Start: 2023-08-09 | End: 2023-10-10

## 2023-08-03 RX ORDER — RIBOCICLIB 200 MG/1
200 TABLET, FILM COATED ORAL DAILY
Qty: 21 TABLET | Refills: 0 | Status: SHIPPED | OUTPATIENT
Start: 2023-08-09 | End: 2023-10-10

## 2023-08-08 ENCOUNTER — ONCOLOGY VISIT (OUTPATIENT)
Dept: ONCOLOGY | Facility: CLINIC | Age: 58
End: 2023-08-08
Attending: INTERNAL MEDICINE
Payer: COMMERCIAL

## 2023-08-08 ENCOUNTER — INFUSION THERAPY VISIT (OUTPATIENT)
Dept: INFUSION THERAPY | Facility: CLINIC | Age: 58
End: 2023-08-08
Attending: INTERNAL MEDICINE
Payer: COMMERCIAL

## 2023-08-08 VITALS
TEMPERATURE: 98.8 F | WEIGHT: 140.4 LBS | DIASTOLIC BLOOD PRESSURE: 79 MMHG | BODY MASS INDEX: 23.36 KG/M2 | HEART RATE: 67 BPM | RESPIRATION RATE: 16 BRPM | SYSTOLIC BLOOD PRESSURE: 126 MMHG | OXYGEN SATURATION: 97 %

## 2023-08-08 DIAGNOSIS — C79.51 BREAST CANCER METASTASIZED TO BONE (H): ICD-10-CM

## 2023-08-08 DIAGNOSIS — C79.51 CARCINOMA OF BREAST METASTATIC TO BONE, UNSPECIFIED LATERALITY (H): Primary | ICD-10-CM

## 2023-08-08 DIAGNOSIS — C50.919 CARCINOMA OF BREAST METASTATIC TO BONE, UNSPECIFIED LATERALITY (H): Primary | ICD-10-CM

## 2023-08-08 DIAGNOSIS — C79.51 SPINAL CORD COMPRESSION DUE TO MALIGNANT NEOPLASM METASTATIC TO SPINE (H): ICD-10-CM

## 2023-08-08 DIAGNOSIS — C50.919 CARCINOMA OF BREAST METASTATIC TO BONE, UNSPECIFIED LATERALITY (H): ICD-10-CM

## 2023-08-08 DIAGNOSIS — C79.51 CARCINOMA OF BREAST METASTATIC TO BONE, UNSPECIFIED LATERALITY (H): ICD-10-CM

## 2023-08-08 DIAGNOSIS — C50.919 BREAST CANCER METASTASIZED TO BONE (H): ICD-10-CM

## 2023-08-08 DIAGNOSIS — G95.29 SPINAL CORD COMPRESSION DUE TO MALIGNANT NEOPLASM METASTATIC TO SPINE (H): ICD-10-CM

## 2023-08-08 LAB
ALBUMIN SERPL BCG-MCNC: 4.7 G/DL (ref 3.5–5.2)
ALP SERPL-CCNC: 50 U/L (ref 35–104)
ALT SERPL W P-5'-P-CCNC: 17 U/L (ref 0–50)
ANION GAP SERPL CALCULATED.3IONS-SCNC: 10 MMOL/L (ref 7–15)
AST SERPL W P-5'-P-CCNC: 30 U/L (ref 0–45)
BASOPHILS # BLD AUTO: 0 10E3/UL (ref 0–0.2)
BASOPHILS NFR BLD AUTO: 0 %
BILIRUB SERPL-MCNC: 0.2 MG/DL
BUN SERPL-MCNC: 13.2 MG/DL (ref 6–20)
CALCIUM SERPL-MCNC: 10.1 MG/DL (ref 8.6–10)
CHLORIDE SERPL-SCNC: 104 MMOL/L (ref 98–107)
CREAT SERPL-MCNC: 0.85 MG/DL (ref 0.51–0.95)
DEPRECATED HCO3 PLAS-SCNC: 27 MMOL/L (ref 22–29)
EOSINOPHIL # BLD AUTO: 0.1 10E3/UL (ref 0–0.7)
EOSINOPHIL NFR BLD AUTO: 2 %
ERYTHROCYTE [DISTWIDTH] IN BLOOD BY AUTOMATED COUNT: 14.2 % (ref 10–15)
GFR SERPL CREATININE-BSD FRML MDRD: 79 ML/MIN/1.73M2
GLUCOSE SERPL-MCNC: 110 MG/DL (ref 70–99)
HCT VFR BLD AUTO: 34.6 % (ref 35–47)
HGB BLD-MCNC: 11.3 G/DL (ref 11.7–15.7)
IMM GRANULOCYTES # BLD: 0 10E3/UL
IMM GRANULOCYTES NFR BLD: 0 %
LYMPHOCYTES # BLD AUTO: 0.9 10E3/UL (ref 0.8–5.3)
LYMPHOCYTES NFR BLD AUTO: 34 %
MAGNESIUM SERPL-MCNC: 2.2 MG/DL (ref 1.7–2.3)
MCH RBC QN AUTO: 31.3 PG (ref 26.5–33)
MCHC RBC AUTO-ENTMCNC: 32.7 G/DL (ref 31.5–36.5)
MCV RBC AUTO: 96 FL (ref 78–100)
MONOCYTES # BLD AUTO: 0.3 10E3/UL (ref 0–1.3)
MONOCYTES NFR BLD AUTO: 13 %
NEUTROPHILS # BLD AUTO: 1.3 10E3/UL (ref 1.6–8.3)
NEUTROPHILS NFR BLD AUTO: 51 %
NRBC # BLD AUTO: 0 10E3/UL
NRBC BLD AUTO-RTO: 0 /100
PHOSPHATE SERPL-MCNC: 4 MG/DL (ref 2.5–4.5)
PLATELET # BLD AUTO: 227 10E3/UL (ref 150–450)
POTASSIUM SERPL-SCNC: 4.5 MMOL/L (ref 3.4–5.3)
PROT SERPL-MCNC: 7.2 G/DL (ref 6.4–8.3)
RBC # BLD AUTO: 3.61 10E6/UL (ref 3.8–5.2)
SODIUM SERPL-SCNC: 141 MMOL/L (ref 136–145)
WBC # BLD AUTO: 2.6 10E3/UL (ref 4–11)

## 2023-08-08 PROCEDURE — 250N000011 HC RX IP 250 OP 636: Mod: JZ | Performed by: NURSE PRACTITIONER

## 2023-08-08 PROCEDURE — 83735 ASSAY OF MAGNESIUM: CPT | Performed by: INTERNAL MEDICINE

## 2023-08-08 PROCEDURE — 85025 COMPLETE CBC W/AUTO DIFF WBC: CPT | Performed by: INTERNAL MEDICINE

## 2023-08-08 PROCEDURE — 36415 COLL VENOUS BLD VENIPUNCTURE: CPT

## 2023-08-08 PROCEDURE — 96374 THER/PROPH/DIAG INJ IV PUSH: CPT

## 2023-08-08 PROCEDURE — 80053 COMPREHEN METABOLIC PANEL: CPT | Performed by: INTERNAL MEDICINE

## 2023-08-08 PROCEDURE — 84100 ASSAY OF PHOSPHORUS: CPT | Performed by: INTERNAL MEDICINE

## 2023-08-08 PROCEDURE — G0463 HOSPITAL OUTPT CLINIC VISIT: HCPCS | Mod: 25 | Performed by: NURSE PRACTITIONER

## 2023-08-08 PROCEDURE — 258N000003 HC RX IP 258 OP 636: Performed by: NURSE PRACTITIONER

## 2023-08-08 PROCEDURE — 99214 OFFICE O/P EST MOD 30 MIN: CPT | Performed by: NURSE PRACTITIONER

## 2023-08-08 RX ORDER — ZOLEDRONIC ACID 0.04 MG/ML
4 INJECTION, SOLUTION INTRAVENOUS ONCE
Status: COMPLETED | OUTPATIENT
Start: 2023-08-08 | End: 2023-08-08

## 2023-08-08 RX ORDER — HEPARIN SODIUM (PORCINE) LOCK FLUSH IV SOLN 100 UNIT/ML 100 UNIT/ML
5 SOLUTION INTRAVENOUS
Status: CANCELLED | OUTPATIENT
Start: 2023-08-08

## 2023-08-08 RX ORDER — ZOLEDRONIC ACID 0.04 MG/ML
4 INJECTION, SOLUTION INTRAVENOUS ONCE
Status: CANCELLED
Start: 2023-08-08 | End: 2023-08-08

## 2023-08-08 RX ORDER — HEPARIN SODIUM,PORCINE 10 UNIT/ML
5 VIAL (ML) INTRAVENOUS
Status: CANCELLED | OUTPATIENT
Start: 2023-08-08

## 2023-08-08 RX ORDER — DIAPER,BRIEF,INFANT-TODD,DISP
1 EACH MISCELLANEOUS 2 TIMES DAILY WITH MEALS
Qty: 180 TABLET | Refills: 3 | Status: SHIPPED | OUTPATIENT
Start: 2023-08-08 | End: 2023-11-06

## 2023-08-08 RX ADMIN — ZOLEDRONIC ACID 4 MG: 0.04 INJECTION, SOLUTION INTRAVENOUS at 09:40

## 2023-08-08 RX ADMIN — SODIUM CHLORIDE 250 ML: 9 INJECTION, SOLUTION INTRAVENOUS at 09:39

## 2023-08-08 ASSESSMENT — PAIN SCALES - GENERAL: PAINLEVEL: NO PAIN (0)

## 2023-08-08 NOTE — PROGRESS NOTES
Infusion Nursing Note:  Mickie Mcghee presents today for zometa.    Patient seen by provider today: Yes: Sha   present during visit today: Not Applicable.    Note: N/A.      Intravenous Access:  Peripheral IV placed.    Treatment Conditions:  Lab Results   Component Value Date     08/08/2023    POTASSIUM 4.5 08/08/2023    MAG 2.2 08/08/2023    CR 0.85 08/08/2023    OSMEL 10.1 (H) 08/08/2023    BILITOTAL 0.2 08/08/2023    ALBUMIN 4.7 08/08/2023    ALT 17 08/08/2023    AST 30 08/08/2023       Results reviewed, labs MET treatment parameters, ok to proceed with treatment.      Post Infusion Assessment:  Patient tolerated infusion without incident.  Blood return noted pre and post infusion.  Site patent and intact, free from redness, edema or discomfort.  No evidence of extravasations.  Access discontinued per protocol.       Discharge Plan:   Discharge instructions reviewed with: Patient.  Patient and/or family verbalized understanding of discharge instructions and all questions answered.  Patient discharged in stable condition accompanied by: self.  Departure Mode: Ambulatory.      Destiny Loving RN

## 2023-08-08 NOTE — LETTER
"    8/8/2023         RE: Mickie Mcghee  [address] [address]  [place] [address]        Dear Colleague,    Thank you for referring your patient, Mickie Mcghee, to the [facility]. Please see a copy of my visit note below.    Oncology Rooming Note    August 8, 2023 8:11 AM   Mickie Mcghee is a 58 year old female who presents for:    Chief Complaint   Patient presents with     Oncology Clinic Visit     Initial Vitals: /79   Pulse 67   Temp 98.8  F (37.1  C) (Oral)   Resp 16   Wt 63.7 kg (140 lb 6.4 oz)   SpO2 97%   BMI 23.36 kg/m   Estimated body mass index is 23.36 kg/m  as calculated from the following:    Height as of 7/19/23: 1.651 m (5' 5\").    Weight as of this encounter: 63.7 kg (140 lb 6.4 oz). Body surface area is 1.71 meters squared.  No Pain (0) Comment: Data Unavailable   No LMP recorded.  Allergies reviewed: Yes  Medications reviewed: Yes    Medications: MEDICATION REFILLS NEEDED TODAY. Provider was notified.  Pharmacy name entered into ybuy:    [id number] - [place], [place] - [address] AT [facility] - [place], [place] - [address]    Clinical concerns:   NP was notified.      Dorothy Henao CMA                Oncology/Hematology Visit Note  Aug 8, 2023    Reason for Visit: follow up of metastatic breast cancer  ER positive [place] positive HER2 negative  Diagnosed in 2007 status post bilateral mastectomy chemotherapy radiation anastrozole  -03/21/20221528-[id number] bone biopsy of left iliac bone reveals metastatic breast cancer ER positive NC positive HER2 negative  03/31/2021-started Ribociclib 600 mg   Letrazole   03/22-04/07/2022-status post radiation to the L-spine and LS spine 10 fractions  -due to neutropenia Ribociclib decreased to 400 mg  -Improvement in counts  06/01/2021- Ribociclib increased back to 600 mg  Due to persistent leukopenia Ribociclib reduced to 200 mg daily for 21 days 7 " days off  07/2023-CT chest abdomen pelvis revealed stable disease    Interval History:  Patient reports she is feeling well.  Denies fever chills sweats cough shortness of breath chest pain nausea vomiting diarrhea abdominal pain or bleeding.  Reports energy is improving        Review of Systems:  14 point ROS of systems including Constitutional, Eyes, Respiratory, Cardiovascular, Gastroenterology, Genitourinary, Integumentary, Muscularskeletal, Psychiatric were all negative except for pertinent positives noted in my HPI.    Physical Examination:  General: The patient is a pleasant female in no acute distress.  HEENT: EOMI, PERRL. Sclerae are anicteric. Oral mucosa is pink and moist with no lesions or thrush.   Lymph: Neck is supple with no lymphadenopathy in the cervical or supraclavicular areas.   Heart: Regular rate and rhythm.   Lungs: Clear to auscultation bilaterally.   GI: Bowel sounds present, soft, nontender with no palpable hepatosplenomegaly or masses.   Extremities: No lower extremity edema noted bilaterally.   Skin: No rashes, petechiae, or bruising noted on exposed skin.    Laboratory Data:  CBC CMP results reviewed    Assessment and Plan:      metastatic breast cancer  Patient of Dr. Lozada  ER positive [place] positive HER2 negative  Diagnosed in 2007 status post bilateral mastectomy chemotherapy radiation anastrozole  -03/21/20226528-[id number] bone biopsy of left iliac bone reveals metastatic breast cancer ER positive [place] positive HER2 negative  03/31/2021-started Ribociclib 600 mg  And Letrazole   Due to leukopenia Ribociclib reduced to 200 mg daily(21 days on 7 days off)  07/06/2023-CT scan chest abdomen pelvis reveals stable disease  Labs reviewed abnormalities discussed continue with Ribociclib and letrozole      Bone metastasis  03/22-04/07/2022-status post radiation to the L-spine and LS spine 10 fractions  Continue with monthly Zometa  -Okay to give Zometa today  Call our clinic in the event of jaw pain  or any dental issues      Leukopenia /neutropenia secondary to  Ribociclib   Patient is afebrile and asymptomatic .  Continue to monitor  Neutropenic precautions -complications reviewed  check temperature frequently in the event of fever chills sweats or any signs symptoms of infection call clinic or go to ER     Anemia  Mild  Continue to monitor    Mild hypercalcemia  Patient is on calcium supplement  Patient will get Zometa today  Increase fluid intake    Please call our clinic with any changes in health condition or questions      SAMI Jacob CNP  [facility]     Chart documentation with Dragon Voice recognition Software. Although reviewed after completion, some words and grammatical errors may remain.        Again, thank you for allowing me to participate in the care of your patient.        Sincerely,        SAMI Jacob CNP

## 2023-08-08 NOTE — PROGRESS NOTES
"Oncology Rooming Note    August 8, 2023 8:11 AM   Mickie Mcghee is a 58 year old female who presents for:    Chief Complaint   Patient presents with    Oncology Clinic Visit     Initial Vitals: /79   Pulse 67   Temp 98.8  F (37.1  C) (Oral)   Resp 16   Wt 63.7 kg (140 lb 6.4 oz)   SpO2 97%   BMI 23.36 kg/m   Estimated body mass index is 23.36 kg/m  as calculated from the following:    Height as of 7/19/23: 1.651 m (5' 5\").    Weight as of this encounter: 63.7 kg (140 lb 6.4 oz). Body surface area is 1.71 meters squared.  No Pain (0) Comment: Data Unavailable   No LMP recorded.  Allergies reviewed: Yes  Medications reviewed: Yes    Medications: MEDICATION REFILLS NEEDED TODAY. Provider was notified.  Pharmacy name entered into [employer]:    [id number] - [place], [place] - [address] [facility] - [place], [place] - [address]    Clinical concerns:   NP was notified.      Dorothy Henao CMA            "

## 2023-08-09 NOTE — RESULT ENCOUNTER NOTE
Dear Ms. Mcghee,    Blood tests are overall stable.    Please, call me with any questions.    Yury Lozada MD

## 2023-08-31 DIAGNOSIS — C50.919 CARCINOMA OF BREAST METASTATIC TO BONE, UNSPECIFIED LATERALITY (H): Primary | ICD-10-CM

## 2023-08-31 DIAGNOSIS — C79.51 CARCINOMA OF BREAST METASTATIC TO BONE, UNSPECIFIED LATERALITY (H): Primary | ICD-10-CM

## 2023-08-31 RX ORDER — LETROZOLE 2.5 MG/1
2.5 TABLET, FILM COATED ORAL EVERY MORNING
Qty: 28 TABLET | Refills: 0 | Status: SHIPPED | OUTPATIENT
Start: 2023-09-06 | End: 2023-10-10

## 2023-08-31 RX ORDER — RIBOCICLIB 200 MG/1
200 TABLET, FILM COATED ORAL DAILY
Qty: 21 TABLET | Refills: 0 | Status: SHIPPED | OUTPATIENT
Start: 2023-09-06 | End: 2023-10-10

## 2023-09-05 ENCOUNTER — LAB (OUTPATIENT)
Dept: INFUSION THERAPY | Facility: CLINIC | Age: 58
End: 2023-09-05
Attending: INTERNAL MEDICINE
Payer: COMMERCIAL

## 2023-09-05 ENCOUNTER — ONCOLOGY VISIT (OUTPATIENT)
Dept: ONCOLOGY | Facility: CLINIC | Age: 58
End: 2023-09-05
Attending: INTERNAL MEDICINE
Payer: COMMERCIAL

## 2023-09-05 VITALS
HEART RATE: 71 BPM | BODY MASS INDEX: 23 KG/M2 | OXYGEN SATURATION: 95 % | SYSTOLIC BLOOD PRESSURE: 119 MMHG | WEIGHT: 138.2 LBS | DIASTOLIC BLOOD PRESSURE: 80 MMHG | TEMPERATURE: 98.7 F

## 2023-09-05 DIAGNOSIS — C79.51 CARCINOMA OF BREAST METASTATIC TO BONE, UNSPECIFIED LATERALITY (H): ICD-10-CM

## 2023-09-05 DIAGNOSIS — G95.29 SPINAL CORD COMPRESSION DUE TO MALIGNANT NEOPLASM METASTATIC TO SPINE (H): Primary | ICD-10-CM

## 2023-09-05 DIAGNOSIS — C50.919 CARCINOMA OF BREAST METASTATIC TO BONE, UNSPECIFIED LATERALITY (H): ICD-10-CM

## 2023-09-05 DIAGNOSIS — C79.51 SPINAL CORD COMPRESSION DUE TO MALIGNANT NEOPLASM METASTATIC TO SPINE (H): Primary | ICD-10-CM

## 2023-09-05 LAB
ALBUMIN SERPL BCG-MCNC: 4.6 G/DL (ref 3.5–5.2)
ALP SERPL-CCNC: 50 U/L (ref 35–104)
ALT SERPL W P-5'-P-CCNC: 15 U/L (ref 0–50)
ANION GAP SERPL CALCULATED.3IONS-SCNC: 12 MMOL/L (ref 7–15)
AST SERPL W P-5'-P-CCNC: 24 U/L (ref 0–45)
BASOPHILS # BLD AUTO: 0 10E3/UL (ref 0–0.2)
BASOPHILS NFR BLD AUTO: 1 %
BILIRUB SERPL-MCNC: 0.2 MG/DL
BUN SERPL-MCNC: 14.3 MG/DL (ref 6–20)
CALCIUM SERPL-MCNC: 9.7 MG/DL (ref 8.6–10)
CHLORIDE SERPL-SCNC: 104 MMOL/L (ref 98–107)
CREAT SERPL-MCNC: 0.88 MG/DL (ref 0.51–0.95)
DEPRECATED HCO3 PLAS-SCNC: 25 MMOL/L (ref 22–29)
EOSINOPHIL # BLD AUTO: 0.1 10E3/UL (ref 0–0.7)
EOSINOPHIL NFR BLD AUTO: 3 %
ERYTHROCYTE [DISTWIDTH] IN BLOOD BY AUTOMATED COUNT: 13.7 % (ref 10–15)
GFR SERPL CREATININE-BSD FRML MDRD: 76 ML/MIN/1.73M2
GLUCOSE SERPL-MCNC: 101 MG/DL (ref 70–99)
HCT VFR BLD AUTO: 34.9 % (ref 35–47)
HGB BLD-MCNC: 11.4 G/DL (ref 11.7–15.7)
IMM GRANULOCYTES # BLD: 0 10E3/UL
IMM GRANULOCYTES NFR BLD: 0 %
LYMPHOCYTES # BLD AUTO: 1 10E3/UL (ref 0.8–5.3)
LYMPHOCYTES NFR BLD AUTO: 32 %
MAGNESIUM SERPL-MCNC: 2.2 MG/DL (ref 1.7–2.3)
MCH RBC QN AUTO: 31.4 PG (ref 26.5–33)
MCHC RBC AUTO-ENTMCNC: 32.7 G/DL (ref 31.5–36.5)
MCV RBC AUTO: 96 FL (ref 78–100)
MONOCYTES # BLD AUTO: 0.4 10E3/UL (ref 0–1.3)
MONOCYTES NFR BLD AUTO: 12 %
NEUTROPHILS # BLD AUTO: 1.7 10E3/UL (ref 1.6–8.3)
NEUTROPHILS NFR BLD AUTO: 52 %
NRBC # BLD AUTO: 0 10E3/UL
NRBC BLD AUTO-RTO: 0 /100
PHOSPHATE SERPL-MCNC: 4 MG/DL (ref 2.5–4.5)
PLATELET # BLD AUTO: 231 10E3/UL (ref 150–450)
POTASSIUM SERPL-SCNC: 4.4 MMOL/L (ref 3.4–5.3)
PROT SERPL-MCNC: 7.4 G/DL (ref 6.4–8.3)
RBC # BLD AUTO: 3.63 10E6/UL (ref 3.8–5.2)
SODIUM SERPL-SCNC: 141 MMOL/L (ref 136–145)
WBC # BLD AUTO: 3.1 10E3/UL (ref 4–11)

## 2023-09-05 PROCEDURE — 250N000011 HC RX IP 250 OP 636: Mod: JZ | Performed by: NURSE PRACTITIONER

## 2023-09-05 PROCEDURE — 96365 THER/PROPH/DIAG IV INF INIT: CPT

## 2023-09-05 PROCEDURE — 36415 COLL VENOUS BLD VENIPUNCTURE: CPT

## 2023-09-05 PROCEDURE — 84100 ASSAY OF PHOSPHORUS: CPT | Performed by: INTERNAL MEDICINE

## 2023-09-05 PROCEDURE — 80053 COMPREHEN METABOLIC PANEL: CPT | Performed by: INTERNAL MEDICINE

## 2023-09-05 PROCEDURE — G0463 HOSPITAL OUTPT CLINIC VISIT: HCPCS | Performed by: NURSE PRACTITIONER

## 2023-09-05 PROCEDURE — 99214 OFFICE O/P EST MOD 30 MIN: CPT | Performed by: NURSE PRACTITIONER

## 2023-09-05 PROCEDURE — 83735 ASSAY OF MAGNESIUM: CPT | Performed by: INTERNAL MEDICINE

## 2023-09-05 PROCEDURE — 85014 HEMATOCRIT: CPT | Performed by: INTERNAL MEDICINE

## 2023-09-05 RX ORDER — HEPARIN SODIUM,PORCINE 10 UNIT/ML
5 VIAL (ML) INTRAVENOUS
Status: CANCELLED | OUTPATIENT
Start: 2023-09-05

## 2023-09-05 RX ORDER — ZOLEDRONIC ACID 0.04 MG/ML
4 INJECTION, SOLUTION INTRAVENOUS ONCE
Status: CANCELLED
Start: 2023-10-03 | End: 2023-10-03

## 2023-09-05 RX ORDER — HEPARIN SODIUM,PORCINE 10 UNIT/ML
5 VIAL (ML) INTRAVENOUS
Status: CANCELLED | OUTPATIENT
Start: 2023-10-03

## 2023-09-05 RX ORDER — HEPARIN SODIUM (PORCINE) LOCK FLUSH IV SOLN 100 UNIT/ML 100 UNIT/ML
5 SOLUTION INTRAVENOUS
Status: CANCELLED | OUTPATIENT
Start: 2023-09-05

## 2023-09-05 RX ORDER — ZOLEDRONIC ACID 0.04 MG/ML
4 INJECTION, SOLUTION INTRAVENOUS ONCE
Status: COMPLETED | OUTPATIENT
Start: 2023-09-05 | End: 2023-09-05

## 2023-09-05 RX ORDER — ZOLEDRONIC ACID 0.04 MG/ML
4 INJECTION, SOLUTION INTRAVENOUS ONCE
Status: CANCELLED
Start: 2023-09-05 | End: 2023-09-05

## 2023-09-05 RX ORDER — HEPARIN SODIUM (PORCINE) LOCK FLUSH IV SOLN 100 UNIT/ML 100 UNIT/ML
5 SOLUTION INTRAVENOUS
Status: CANCELLED | OUTPATIENT
Start: 2023-10-03

## 2023-09-05 RX ADMIN — ZOLEDRONIC ACID 4 MG: 0.04 INJECTION, SOLUTION INTRAVENOUS at 13:23

## 2023-09-05 NOTE — PROGRESS NOTES
Oncology/Hematology Visit Note  Sep 5, 2023    Reason for Visit: follow up of metastatic breast cancer  ER positive IN positive HER2 negative  Diagnosed in 2007 status post bilateral mastectomy chemotherapy radiation anastrozole  -03/21/[id number] bone biopsy of left iliac bone reveals metastatic breast cancer ER positive IN positive HER2 negative  03/31/2021-started Ribociclib 600 mg   Letrazole   03/22-04/07/2022-status post radiation to the L-spine and LS spine 10 fractions  -due to neutropenia Ribociclib decreased to 400 mg  -Improvement in counts  06/01/2021- Ribociclib increased back to 600 mg  Due to persistent leukopenia Ribociclib reduced to 200 mg daily for 21 days 7 days off  07/2023-CT chest abdomen pelvis revealed stable disease    Interval History:  Patient reports she continues to feel well.  Denies fever chills sweats cough shortness of breath chest pain nausea vomiting diarrhea abdominal pain bleeding or bruising   denies headache dizziness seizures denies edema        Review of Systems:  14 point ROS of systems including Constitutional, Eyes, Respiratory, Cardiovascular, Gastroenterology, Genitourinary, Integumentary, Muscularskeletal, Psychiatric were all negative except for pertinent positives noted in my HPI.    Physical Examination:  General: The patient is a pleasant female in no acute distress.  HEENT: EOMI, PERRL. Sclerae are anicteric. Oral mucosa is pink and moist with no lesions or thrush.   Lymph: Neck is supple with no lymphadenopathy in the cervical or supraclavicular areas.   Heart: Regular rate and rhythm.   Lungs: Clear to auscultation bilaterally.   GI: Bowel sounds present, soft, nontender with no palpable hepatosplenomegaly or masses.   Extremities: No lower extremity edema noted bilaterally.   Skin: No rashes, petechiae, or bruising noted on exposed skin.    Laboratory Data:  CBC CMP results reviewed    Assessment and Plan:      metastatic breast cancer  Patient of   [place]  ER positive [place] positive HER2 negative  Diagnosed in 2007 status post bilateral mastectomy chemotherapy radiation anastrozole  -03/21/[id number] bone biopsy of left iliac bone reveals metastatic breast cancer ER positive [place] positive HER2 negative  03/31/2021-started Ribociclib 600 mg  And Letrazole   Due to leukopenia Ribociclib reduced to 200 mg daily(21 days on 7 days off)  07/06/2023-CT scan chest abdomen pelvis reveals stable disease  Labs reviewed abnormalities discussed continue with Ribociclib and letrozole      Bone metastasis  03/22-04/07/2022-status post radiation to the L-spine and LS spine 10 fractions  Continue with monthly Zometa  -Okay to give Zometa today  Schedule for monthly Zometa  Call our clinic in the event of jaw pain or any dental issues      Leukopenia secondary to  Ribociclib   Patient is afebrile and asymptomatic .  Continue to monitor  Neutropenic precautions -complications reviewed  check temperature frequently in the event of fever chills sweats or any signs symptoms of infection call clinic or go to ER     Anemia  Mild  Continue to monitor        Please call our clinic with any changes in health condition or questions      SAMI Jacob [facility] [place]     Chart documentation with Dragon Voice recognition Software. Although reviewed after completion, some words and grammatical errors may remain.

## 2023-09-05 NOTE — LETTER
9/5/2023         RE: Mickie Mcghee  [address]  [place] [address]        Dear Colleague,    Thank you for referring your patient, Mickie Mcghee, to the [facility]. Please see a copy of my visit note below.      Oncology/Hematology Visit Note  Sep 5, 2023    Reason for Visit: follow up of metastatic breast cancer  ER positive [place] positive HER2 negative  Diagnosed in 2007 status post bilateral mastectomy chemotherapy radiation anastrozole  -03/21/[id number] bone biopsy of left iliac bone reveals metastatic breast cancer ER positive [place] positive HER2 negative  03/31/2021-started Ribociclib 600 mg   Letrazole   03/22-04/07/2022-status post radiation to the L-spine and LS spine 10 fractions  -due to neutropenia Ribociclib decreased to 400 mg  -Improvement in counts  06/01/2021- Ribociclib increased back to 600 mg  Due to persistent leukopenia Ribociclib reduced to 200 mg daily for 21 days 7 days off  07/2023-CT chest abdomen pelvis revealed stable disease    Interval History:  Patient reports she continues to feel well.  Denies fever chills sweats cough shortness of breath chest pain nausea vomiting diarrhea abdominal pain bleeding or bruising   denies headache dizziness seizures denies edema        Review of Systems:  14 point ROS of systems including Constitutional, Eyes, Respiratory, Cardiovascular, Gastroenterology, Genitourinary, Integumentary, Muscularskeletal, Psychiatric were all negative except for pertinent positives noted in my HPI.    Physical Examination:  General: The patient is a pleasant female in no acute distress.  HEENT: EOMI, PERRL. Sclerae are anicteric. Oral mucosa is pink and moist with no lesions or thrush.   Lymph: Neck is supple with no lymphadenopathy in the cervical or supraclavicular areas.   Heart: Regular rate and rhythm.   Lungs: Clear to auscultation bilaterally.   GI: Bowel sounds present, soft, nontender with no palpable hepatosplenomegaly  or masses.   Extremities: No lower extremity edema noted bilaterally.   Skin: No rashes, petechiae, or bruising noted on exposed skin.    Laboratory Data:  CBC CMP results reviewed    Assessment and Plan:      metastatic breast cancer  Patient of Dr. Lozada  ER positive [place] positive HER2 negative  Diagnosed in 2007 status post bilateral mastectomy chemotherapy radiation anastrozole  -03/21/[id number] bone biopsy of left iliac bone reveals metastatic breast cancer ER positive [place] positive HER2 negative  03/31/2021-started Ribociclib 600 mg  And Letrazole   Due to leukopenia Ribociclib reduced to 200 mg daily(21 days on 7 days off)  07/06/2023-CT scan chest abdomen pelvis reveals stable disease  Labs reviewed abnormalities discussed continue with Ribociclib and letrozole      Bone metastasis  03/22-04/07/2022-status post radiation to the L-spine and LS spine 10 fractions  Continue with monthly Zometa  -Okay to give Zometa today  Schedule for monthly Zometa  Call our clinic in the event of jaw pain or any dental issues      Leukopenia secondary to  Ribociclib   Patient is afebrile and asymptomatic .  Continue to monitor  Neutropenic precautions -complications reviewed  check temperature frequently in the event of fever chills sweats or any signs symptoms of infection call clinic or go to ER     Anemia  Mild  Continue to monitor        Please call our clinic with any changes in health condition or questions      SAMI Jacob [facility] [place]     Chart documentation with Dragon Voice recognition Software. Although reviewed after completion, some words and grammatical errors may remain.      Oncology Rooming Note    September 5, 2023 12:17 PM   Mickie Mcghee is a 58 year old female who presents for:    Chief Complaint   Patient presents with     Oncology Clinic Visit     Initial Vitals: /80   Pulse 71   Temp 98.7  F (37.1  C) (Oral)   Wt 62.7 kg (138 lb 3.2 oz)   SpO2 95%   BMI  "23.00 kg/m   Estimated body mass index is 23 kg/m  as calculated from the following:    Height as of 7/19/23: 1.651 m (5' 5\").    Weight as of this encounter: 62.7 kg (138 lb 3.2 oz). Body surface area is 1.7 meters squared.  Data Unavailable Comment: Data Unavailable   No LMP recorded.      Pharmacy name entered into [facility]:    [id number] - [place], [place] - [address] [facility] - [place], [place] - [address], [facility]            Again, thank you for allowing me to participate in the care of your patient.        Sincerely,        SAMI Jacob CNP  "

## 2023-09-05 NOTE — PROGRESS NOTES
Infusion Nursing Note:  Mickie Mcghee presents today for zometa.    Patient seen by provider today: Yes: Sha Martínez NP   present during visit today: Not Applicable.    Note: Per Sha Martínez NP, ok to proceed with zometa today. Patient confirmed she is taking calcium and vitamin D supplements as ordered.      Intravenous Access:  Peripheral IV placed.    Treatment Conditions:  Lab Results   Component Value Date     09/05/2023    POTASSIUM 4.4 09/05/2023    MAG 2.2 09/05/2023    CR 0.88 09/05/2023    OSMEL 9.7 09/05/2023    BILITOTAL 0.2 09/05/2023    ALBUMIN 4.6 09/05/2023    ALT 15 09/05/2023    AST 24 09/05/2023     Corrected calcium: 9.22  Results reviewed, labs MET treatment parameters, ok to proceed with treatment.      Post Infusion Assessment:  Patient tolerated infusion without incident.  Blood return noted pre and post infusion.  Site patent and intact, free from redness, edema or discomfort.  No evidence of extravasations.  Access discontinued per protocol.       Discharge Plan:   Discharge instructions reviewed with: Patient.  Patient and/or family verbalized understanding of discharge instructions and all questions answered.  AVS to patient via [employer].  Patient will return 10/3/23 for next appointment.   Patient discharged in stable condition accompanied by: self.  Departure Mode: Ambulatory.      Vani Singletary RN

## 2023-09-05 NOTE — PROGRESS NOTES
"Oncology Rooming Note    September 5, 2023 12:17 PM   Mickie Mcghee is a 58 year old female who presents for:    Chief Complaint   Patient presents with    Oncology Clinic Visit     Initial Vitals: /80   Pulse 71   Temp 98.7  F (37.1  C) (Oral)   Wt 62.7 kg (138 lb 3.2 oz)   SpO2 95%   BMI 23.00 kg/m   Estimated body mass index is 23 kg/m  as calculated from the following:    Height as of 7/19/23: 1.651 m (5' 5\").    Weight as of this encounter: 62.7 kg (138 lb 3.2 oz). Body surface area is 1.7 meters squared.  Data Unavailable Comment: Data Unavailable   No LMP recorded.      Pharmacy name entered into [employer]:    [id number] - [place], [place] - [address] AT [facility] - [place], [place] - [address], [facility]            "

## 2023-09-06 NOTE — RESULT ENCOUNTER NOTE
Dear Ms. Mcghee,    Blood test is stable.    Please, call me with any questions.    Yury Lozada MD

## 2023-09-26 DIAGNOSIS — C79.51 CARCINOMA OF BREAST METASTATIC TO BONE, UNSPECIFIED LATERALITY (H): Primary | ICD-10-CM

## 2023-09-26 DIAGNOSIS — C50.919 CARCINOMA OF BREAST METASTATIC TO BONE, UNSPECIFIED LATERALITY (H): Primary | ICD-10-CM

## 2023-09-27 DIAGNOSIS — C50.919 CARCINOMA OF BREAST METASTATIC TO BONE, UNSPECIFIED LATERALITY (H): Primary | ICD-10-CM

## 2023-09-27 DIAGNOSIS — C79.51 CARCINOMA OF BREAST METASTATIC TO BONE, UNSPECIFIED LATERALITY (H): Primary | ICD-10-CM

## 2023-09-27 RX ORDER — RIBOCICLIB 200 MG/1
200 TABLET, FILM COATED ORAL DAILY
Qty: 21 TABLET | Refills: 0 | Status: SHIPPED | OUTPATIENT
Start: 2023-10-04 | End: 2023-12-19

## 2023-09-27 RX ORDER — LETROZOLE 2.5 MG/1
2.5 TABLET, FILM COATED ORAL EVERY MORNING
Qty: 28 TABLET | Refills: 0 | Status: SHIPPED | OUTPATIENT
Start: 2023-10-04 | End: 2024-02-12

## 2023-10-03 ENCOUNTER — LAB (OUTPATIENT)
Dept: INFUSION THERAPY | Facility: CLINIC | Age: 58
End: 2023-10-03
Attending: INTERNAL MEDICINE
Payer: COMMERCIAL

## 2023-10-03 ENCOUNTER — DOCUMENTATION ONLY (OUTPATIENT)
Dept: PHARMACY | Facility: CLINIC | Age: 58
End: 2023-10-03

## 2023-10-03 VITALS
TEMPERATURE: 99.2 F | DIASTOLIC BLOOD PRESSURE: 74 MMHG | HEART RATE: 73 BPM | SYSTOLIC BLOOD PRESSURE: 102 MMHG | RESPIRATION RATE: 16 BRPM

## 2023-10-03 DIAGNOSIS — C50.919 CARCINOMA OF BREAST METASTATIC TO BONE, UNSPECIFIED LATERALITY (H): ICD-10-CM

## 2023-10-03 DIAGNOSIS — C79.51 SPINAL CORD COMPRESSION DUE TO MALIGNANT NEOPLASM METASTATIC TO SPINE (H): Primary | ICD-10-CM

## 2023-10-03 DIAGNOSIS — C79.51 CARCINOMA OF BREAST METASTATIC TO BONE, UNSPECIFIED LATERALITY (H): ICD-10-CM

## 2023-10-03 DIAGNOSIS — G95.29 SPINAL CORD COMPRESSION DUE TO MALIGNANT NEOPLASM METASTATIC TO SPINE (H): Primary | ICD-10-CM

## 2023-10-03 LAB
ALBUMIN SERPL BCG-MCNC: 4.4 G/DL (ref 3.5–5.2)
ALP SERPL-CCNC: 50 U/L (ref 35–104)
ALT SERPL W P-5'-P-CCNC: 15 U/L (ref 0–50)
ANION GAP SERPL CALCULATED.3IONS-SCNC: 10 MMOL/L (ref 7–15)
AST SERPL W P-5'-P-CCNC: 20 U/L (ref 0–45)
BASO+EOS+MONOS # BLD AUTO: ABNORMAL 10*3/UL
BASO+EOS+MONOS NFR BLD AUTO: ABNORMAL %
BASOPHILS # BLD AUTO: 0 10E3/UL (ref 0–0.2)
BASOPHILS NFR BLD AUTO: 1 %
BILIRUB SERPL-MCNC: 0.2 MG/DL
BUN SERPL-MCNC: 15.4 MG/DL (ref 6–20)
CALCIUM SERPL-MCNC: 9.4 MG/DL (ref 8.6–10)
CHLORIDE SERPL-SCNC: 104 MMOL/L (ref 98–107)
CREAT SERPL-MCNC: 0.85 MG/DL (ref 0.51–0.95)
DEPRECATED HCO3 PLAS-SCNC: 24 MMOL/L (ref 22–29)
EGFRCR SERPLBLD CKD-EPI 2021: 79 ML/MIN/1.73M2
EOSINOPHIL # BLD AUTO: 0.1 10E3/UL (ref 0–0.7)
EOSINOPHIL NFR BLD AUTO: 3 %
ERYTHROCYTE [DISTWIDTH] IN BLOOD BY AUTOMATED COUNT: 13.7 % (ref 10–15)
GLUCOSE SERPL-MCNC: 91 MG/DL (ref 70–99)
HCT VFR BLD AUTO: 34.9 % (ref 35–47)
HGB BLD-MCNC: 11.5 G/DL (ref 11.7–15.7)
IMM GRANULOCYTES # BLD: 0 10E3/UL
IMM GRANULOCYTES NFR BLD: 0 %
LYMPHOCYTES # BLD AUTO: 1.2 10E3/UL (ref 0.8–5.3)
LYMPHOCYTES NFR BLD AUTO: 34 %
MAGNESIUM SERPL-MCNC: 2.2 MG/DL (ref 1.7–2.3)
MCH RBC QN AUTO: 31.5 PG (ref 26.5–33)
MCHC RBC AUTO-ENTMCNC: 33 G/DL (ref 31.5–36.5)
MCV RBC AUTO: 96 FL (ref 78–100)
MONOCYTES # BLD AUTO: 0.5 10E3/UL (ref 0–1.3)
MONOCYTES NFR BLD AUTO: 13 %
NEUTROPHILS # BLD AUTO: 1.8 10E3/UL (ref 1.6–8.3)
NEUTROPHILS NFR BLD AUTO: 49 %
NRBC # BLD AUTO: 0 10E3/UL
NRBC BLD AUTO-RTO: 0 /100
PHOSPHATE SERPL-MCNC: 3.5 MG/DL (ref 2.5–4.5)
PLATELET # BLD AUTO: 222 10E3/UL (ref 150–450)
POTASSIUM SERPL-SCNC: 4 MMOL/L (ref 3.4–5.3)
PROT SERPL-MCNC: 7.2 G/DL (ref 6.4–8.3)
RBC # BLD AUTO: 3.65 10E6/UL (ref 3.8–5.2)
SODIUM SERPL-SCNC: 138 MMOL/L (ref 135–145)
WBC # BLD AUTO: 3.6 10E3/UL (ref 4–11)

## 2023-10-03 PROCEDURE — 96365 THER/PROPH/DIAG IV INF INIT: CPT

## 2023-10-03 PROCEDURE — 250N000011 HC RX IP 250 OP 636: Mod: JZ | Performed by: NURSE PRACTITIONER

## 2023-10-03 PROCEDURE — 85025 COMPLETE CBC W/AUTO DIFF WBC: CPT | Performed by: INTERNAL MEDICINE

## 2023-10-03 PROCEDURE — 80053 COMPREHEN METABOLIC PANEL: CPT | Performed by: INTERNAL MEDICINE

## 2023-10-03 PROCEDURE — 84100 ASSAY OF PHOSPHORUS: CPT | Performed by: INTERNAL MEDICINE

## 2023-10-03 PROCEDURE — 36415 COLL VENOUS BLD VENIPUNCTURE: CPT

## 2023-10-03 PROCEDURE — 83735 ASSAY OF MAGNESIUM: CPT | Performed by: INTERNAL MEDICINE

## 2023-10-03 RX ORDER — ZOLEDRONIC ACID 0.04 MG/ML
4 INJECTION, SOLUTION INTRAVENOUS ONCE
Status: COMPLETED | OUTPATIENT
Start: 2023-10-03 | End: 2023-10-03

## 2023-10-03 RX ADMIN — ZOLEDRONIC ACID 4 MG: 0.04 INJECTION, SOLUTION INTRAVENOUS at 11:11

## 2023-10-03 NOTE — PROGRESS NOTES
Infusion Nursing Note:  Mickie Mcghee presents today for Zometa.    Patient seen by provider today: No   present during visit today: Not Applicable.    Note: N/A.      Intravenous Access:  Peripheral IV placed.    Treatment Conditions:  Lab Results   Component Value Date    HGB 11.5 (L) 10/03/2023    WBC 3.6 (L) 10/03/2023    ANEU 1.3 (L) 08/04/2022    ANEUTAUTO 1.8 10/03/2023     10/03/2023        Lab Results   Component Value Date     10/03/2023    POTASSIUM 4.0 10/03/2023    MAG 2.2 10/03/2023    CR 0.85 10/03/2023    OSMEL 9.4 10/03/2023    BILITOTAL 0.2 10/03/2023    ALBUMIN 4.4 10/03/2023    ALT 15 10/03/2023    AST 20 10/03/2023       Results reviewed, labs MET treatment parameters, ok to proceed with treatment.      Post Infusion Assessment:  Patient tolerated infusion without incident.  Blood return noted pre and post infusion.  Site patent and intact, free from redness, edema or discomfort.  No evidence of extravasations.  Access discontinued per protocol.       Discharge Plan:   Patient declined prescription refills.  Discharge instructions reviewed with: Patient.  Patient verbalized understanding of discharge instructions and all questions answered.  AVS to patient via [employer].  Patient will return 10/10/23 for next appointment.   Patient discharged in stable condition accompanied by: self.  Departure Mode: Ambulatory.      Shawna Henson RN

## 2023-10-03 NOTE — PROGRESS NOTES
Medical Assistant Note:  Mickie Mcghee presents today for blood draw.    Patient seen by provider today: No.   present during visit today: Not Applicable.    Concerns: No Concerns.    Procedure:  Lab draw site: right hand, Needle type: bf, Gauge: 23.    Post Assessment:  Labs drawn without difficulty: Yes.    Discharge Plan:  Departure Mode: Ambulatory.    Face to Face Time: 5 min.    Dorothy Henao, CMA

## 2023-10-03 NOTE — PROGRESS NOTES
Oral Chemotherapy Monitoring Program  Lab Follow Up    Reviewed lab results from 10/3/23.        3/21/2023     9:00 AM 4/12/2023    11:00 AM 6/6/2023     3:00 PM 7/5/2023    12:00 PM 7/11/2023    11:00 AM 9/27/2023    10:00 AM 10/3/2023    11:00 AM   ORAL CHEMOTHERAPY   Assessment Type Lab Monitoring;Chart Review Refill Refill Refill Lab Monitoring Refill Lab Monitoring   Diagnosis Code Breast Cancer Breast Cancer Breast Cancer Breast Cancer Breast Cancer Breast Cancer Breast Cancer   Providers Dr. Neville Lozada   Clinic Name/Location [facility]   Drug Name Kisqali (ribociclib) Kisqali (ribociclib) Kisqali (ribociclib) Kisqali (ribociclib) Kisqali (ribociclib) Kisqali (ribociclib) Kisqali (ribociclib)   Dose 200 mg 200 mg 200 mg 200 mg 200 mg 200 mg 200 mg   Current Schedule Daily Daily Daily Daily Daily Daily Daily   Cycle Details 3 weeks on, 1 week off 3 weeks on, 1 week off 3 weeks on, 1 week off 3 weeks on, 1 week off 3 weeks on, 1 week off 3 weeks on, 1 week off 3 weeks on, 1 week off   Start Date of Last Cycle       10/4/2023   Planned next cycle start date      10/4/2023 11/1/2023   Adverse Effects Anemia    No AE identified during assessment     Anemia Grade 1         Pharmacist Intervention(anemia) No         Is the dose as ordered appropriate for the patient? Yes             Labs:  _  Result Component Current Result Ref Range   Sodium 138 (10/3/2023) 135 - 145 mmol/L     _  Result Component Current Result Ref Range   Potassium 4.0 (10/3/2023) 3.4 - 5.3 mmol/L     _  Result Component Current Result Ref Range   Calcium 9.4 (10/3/2023) 8.6 - 10.0 mg/dL     _  Result Component Current Result Ref Range   Magnesium 2.2 (10/3/2023) 1.7 - 2.3 mg/dL     _  Result Component Current Result Ref Range   Phosphorus 3.5 (10/3/2023) 2.5 - 4.5 mg/dL     _  Result Component Current Result Ref Range   Albumin 4.4  (10/3/2023) 3.5 - 5.2 g/dL     _  Result Component Current Result Ref Range   Urea Nitrogen 15.4 (10/3/2023) 6.0 - 20.0 mg/dL     _  Result Component Current Result Ref Range   Creatinine 0.85 (10/3/2023) 0.51 - 0.95 mg/dL     _  Result Component Current Result Ref Range   AST 20 (10/3/2023) 0 - 45 U/L     _  Result Component Current Result Ref Range   ALT 15 (10/3/2023) 0 - 50 U/L     _  Result Component Current Result Ref Range   Bilirubin Total 0.2 (10/3/2023) <=1.2 mg/dL     _  Result Component Current Result Ref Range   WBC Count 3.6 (L) (10/3/2023) 4.0 - 11.0 10e3/uL     _  Result Component Current Result Ref Range   Hemoglobin 11.5 (L) (10/3/2023) 11.7 - 15.7 g/dL     _  Result Component Current Result Ref Range   Platelet Count 222 (10/3/2023) 150 - 450 10e3/uL     No results found for ANC within last 30 days.     _  Result Component Current Result Ref Range   Absolute Neutrophils 1.8 (10/3/2023) 1.6 - 8.3 10e3/uL        Assessment & Plan:  No concerning abnormalities. OK to proceed with the next cycle of ribocilib on 10/4 as planned. Patient to see BK on 10/10. Repeat labs every 4 weeks prior to cycle start.    Questions answered to patient's satisfaction.    Follow-Up:  10/10 with Dr. Lozada  Due for labs 10/30 or 10/31 prior to 11/1 start    Ashley Mcadams PharmD  October 3, 2023

## 2023-10-10 ENCOUNTER — VIRTUAL VISIT (OUTPATIENT)
Dept: ONCOLOGY | Facility: CLINIC | Age: 58
End: 2023-10-10
Attending: INTERNAL MEDICINE
Payer: COMMERCIAL

## 2023-10-10 VITALS — HEIGHT: 65 IN | BODY MASS INDEX: 26.66 KG/M2 | WEIGHT: 160 LBS

## 2023-10-10 DIAGNOSIS — G95.29 SPINAL CORD COMPRESSION DUE TO MALIGNANT NEOPLASM METASTATIC TO SPINE (H): ICD-10-CM

## 2023-10-10 DIAGNOSIS — C79.51 CARCINOMA OF BREAST METASTATIC TO BONE, UNSPECIFIED LATERALITY (H): Primary | ICD-10-CM

## 2023-10-10 DIAGNOSIS — G89.29 CHRONIC LEFT SHOULDER PAIN: ICD-10-CM

## 2023-10-10 DIAGNOSIS — C79.51 SPINAL CORD COMPRESSION DUE TO MALIGNANT NEOPLASM METASTATIC TO SPINE (H): ICD-10-CM

## 2023-10-10 DIAGNOSIS — M25.512 CHRONIC LEFT SHOULDER PAIN: ICD-10-CM

## 2023-10-10 DIAGNOSIS — C50.919 CARCINOMA OF BREAST METASTATIC TO BONE, UNSPECIFIED LATERALITY (H): Primary | ICD-10-CM

## 2023-10-10 PROCEDURE — 99214 OFFICE O/P EST MOD 30 MIN: CPT | Mod: 95 | Performed by: INTERNAL MEDICINE

## 2023-10-10 RX ORDER — ZOLEDRONIC ACID 0.04 MG/ML
4 INJECTION, SOLUTION INTRAVENOUS ONCE
Status: CANCELLED
Start: 2023-10-31 | End: 2023-10-31

## 2023-10-10 RX ORDER — HEPARIN SODIUM (PORCINE) LOCK FLUSH IV SOLN 100 UNIT/ML 100 UNIT/ML
5 SOLUTION INTRAVENOUS
Status: CANCELLED | OUTPATIENT
Start: 2023-10-31

## 2023-10-10 RX ORDER — HEPARIN SODIUM,PORCINE 10 UNIT/ML
5 VIAL (ML) INTRAVENOUS
Status: CANCELLED | OUTPATIENT
Start: 2023-10-31

## 2023-10-10 ASSESSMENT — PAIN SCALES - GENERAL: PAINLEVEL: EXTREME PAIN (8)

## 2023-10-10 NOTE — LETTER
10/10/2023         RE: Mickie Mcghee  [address]  [place] [address]        Dear Colleague,    Thank you for referring your patient, Mickie Mcghee, to the [facility]. Please see a copy of my visit note below.    Virtual Visit Details    Type of service:  Video Visit     Originating Location (pt. Location): Home    Distant Location (provider location):  On-site  Platform used for Video Visit: [facility]    ONCOLOGY HISTORY:  Ms. Mcghee is a female with metastatic breast cancer. ER positive, KS positive and HER-2/halina negative.  -Foundation One reveals PIK3CA alteration.  -She had a stage III left breast cancer in 2007. ER positive and HER-2/halina negative.  She had bilateral mastectomy, chemotherapy, radiation, and anastrozole.     1.  On 03/18/2022, multiple imaging studies done because of bilateral lower extremity weakness:  -MRI of thoracic and lumbar spine revealed bone metastases with spinal cord compression.  -CT chest, abdomen, and pelvis on 03/19/2022 revealed bone metastasis, lung nodules and enlarged upper abdominal lymph node.  There is a hypodense nodule in the liver.  -Brain MRI on 03/19/2022 did not reveal any intracranial metastasis.  2.  CT-guided bone biopsy of left iliac bone on 03/21/2022 revealed metastatic breast cancer. ER positive, KS positive and HER-2/halina negative.  3.  Letrozole started on 03/25/2022.  -Ribociclib started on 03/31/2022. Decreased to 200 mg a day in 09/2022.  -Zometa started on 03/24/2022.  -Radiation to thoracolumbar and sacral area between 03/22/2022 and 04/07/2022.  3000 cGy in 10 fractions.     SUBJECTIVE:  Ms. Mcghee is a 58-year-old female with metastatic breast cancer on letrozole and ribociclib.  She is tolerating it well. CT scan on 07/06/2023 revealed stable disease.     She is on Zometa for bone metastasis.  She is tolerating it well.  No dental or jaw related symptoms.    Patient has pain in left shoulder for last 2 months.  Pain is  more when she moves her arm.  There is some restriction of movement.     She has mild generalized weakness. She is able to do activities of daily living.      No headache.  No dizziness.  No chest pain.  No shortness of breath.  No abdominal pain.  No nausea or vomiting.  Appetite is good. No urinary or bowel complaints. No bleeding. No bone pain. All other review of system is negative.     PHYSICAL EXAMINATION:  She is alert and oriented x 3. Not in any distress.   Rest of systems not examined as this is a virtual visit.     LABS: CBC and CMP reviewed.     ASSESSMENT:  1.  A 58-year-old female with metastatic breast cancer on letrozole and ribociclib. Disease is clinically stable.  2.  Leukopenia from ribociclib. Stable.  3.  Normocytic anemia from cancer and ribociclib. Stable.  4.  Left shoulder pain.     PLAN:  1.  Patient is overall doing well from breast cancer.  No clinical suspicion of progression.    She will continue on letrozole and ribociclib.  She is tolerating it well.    For monitoring of breast cancer, will get CT chest, abdomen and pelvis in next few weeks.    2.  Patient has pain in left shoulder.  Could be from arthritis or rotator cuff problem.  Other possibility is metastatic disease causing the pain.  For further evaluation, we will get MRI of the shoulder.    3.  For bone metastasis, she will continue on Zometa.  She is tolerating it well.  No dental or jaw related symptoms.  She will also continue with calcium and vitamin D twice a day.    4.  Patient has leukopenia and anemia which is overall stable.  We will continue to monitor CBC.    5.  She had few questions which were all answered.  I will see her after CT scan and MRI.     Total video visit time of 30 minutes.  Time spent in today's visit, review of chart/investigations today, monitoring for toxicity of high risk drugs and documentation today.         Again, thank you for allowing me to participate in the care of your patient.         Sincerely,        Yury Lozada MD

## 2023-10-10 NOTE — PROGRESS NOTES
Virtual Visit Details    Type of service:  Video Visit     Originating Location (pt. Location): Home    Distant Location (provider location):  On-site  Platform used for Video Visit: [facility]    ONCOLOGY HISTORY:  Ms. Mcghee is a female with metastatic breast cancer. ER positive, NM positive and HER-2/halina negative.  -Foundation One reveals PIK3CA alteration.  -She had a stage III left breast cancer in 2007. ER positive and HER-2/halina negative.  She had bilateral mastectomy, chemotherapy, radiation, and anastrozole.     1.  On 03/18/2022, multiple imaging studies done because of bilateral lower extremity weakness:  -MRI of thoracic and lumbar spine revealed bone metastases with spinal cord compression.  -CT chest, abdomen, and pelvis on 03/19/2022 revealed bone metastasis, lung nodules and enlarged upper abdominal lymph node.  There is a hypodense nodule in the liver.  -Brain MRI on 03/19/2022 did not reveal any intracranial metastasis.  2.  CT-guided bone biopsy of left iliac bone on 03/21/2022 revealed metastatic breast cancer. ER positive, NM positive and HER-2/halina negative.  3.  Letrozole started on 03/25/2022.  -Ribociclib started on 03/31/2022. Decreased to 200 mg a day in 09/2022.  -Zometa started on 03/24/2022.  -Radiation to thoracolumbar and sacral area between 03/22/2022 and 04/07/2022.  3000 cGy in 10 fractions.     SUBJECTIVE:  Ms. Mcghee is a 58-year-old female with metastatic breast cancer on letrozole and ribociclib.  She is tolerating it well. CT scan on 07/06/2023 revealed stable disease.     She is on Zometa for bone metastasis.  She is tolerating it well.  No dental or jaw related symptoms.    Patient has pain in left shoulder for last 2 months.  Pain is more when she moves her arm.  There is some restriction of movement.     She has mild generalized weakness. She is able to do activities of daily living.      No headache.  No dizziness.  No chest pain.  No shortness of breath.  No abdominal pain.  No  nausea or vomiting.  Appetite is good. No urinary or bowel complaints. No bleeding. No bone pain. All other review of system is negative.     PHYSICAL EXAMINATION:  She is alert and oriented x 3. Not in any distress.   Rest of systems not examined as this is a virtual visit.     LABS: CBC and CMP reviewed.     ASSESSMENT:  1.  A 58-year-old female with metastatic breast cancer on letrozole and ribociclib. Disease is clinically stable.  2.  Leukopenia from ribociclib. Stable.  3.  Normocytic anemia from cancer and ribociclib. Stable.  4.  Left shoulder pain.     PLAN:  1.  Patient is overall doing well from breast cancer.  No clinical suspicion of progression.    She will continue on letrozole and ribociclib.  She is tolerating it well.    For monitoring of breast cancer, will get CT chest, abdomen and pelvis in next few weeks.    2.  Patient has pain in left shoulder.  Could be from arthritis or rotator cuff problem.  Other possibility is metastatic disease causing the pain.  For further evaluation, we will get MRI of the shoulder.    3.  For bone metastasis, she will continue on Zometa.  She is tolerating it well.  No dental or jaw related symptoms.  She will also continue with calcium and vitamin D twice a day.    4.  Patient has leukopenia and anemia which is overall stable.  We will continue to monitor CBC.    5.  She had few questions which were all answered.  I will see her after CT scan and MRI.     Total video visit time of 30 minutes.  Time spent in today's visit, review of chart/investigations today, monitoring for toxicity of high risk drugs and documentation today.

## 2023-10-10 NOTE — PATIENT INSTRUCTIONS
1.  Continue letrozole and ribociclib.  2.  Continue monthly Zometa.  3.  Continue calcium and vitamin D twice a day.  4.  MRI shoulder in next 2-3 weeks.  5.  CT scan in next 2-3 weeks.  6.  See me in 3 weeks.

## 2023-10-10 NOTE — NURSING NOTE
Is the patient currently in the state of [place]? YES    Visit mode:VIDEO    If the visit is dropped, the patient can be reconnected by: VIDEO VISIT: Send to e-mail at: [email or web address]    Will anyone else be joining the visit? NO  (If patient encounters technical issues they should call [phone]    How would you like to obtain your AVS? MyChart    Are changes needed to the allergy or medication list? No    Reason for visit: Video Visit (Follow Up )    Lou STRONG

## 2023-10-10 NOTE — LETTER
10/10/2023         RE: Mickie Mcghee  [id number]  [place] [address]        Dear Colleague,    Thank you for referring your patient, Mickie Mcghee, to the [facility]. Please see a copy of my visit note below.    Virtual Visit Details    Type of service:  Video Visit     Originating Location (pt. Location): Home    Distant Location (provider location):  On-site  Platform used for Video Visit: [facility]    ONCOLOGY HISTORY:  Ms. Mcghee is a female with metastatic breast cancer. ER positive, WA positive and HER-2/halina negative.  -Foundation One reveals PIK3CA alteration.  -She had a stage III left breast cancer in 2007. ER positive and HER-2/halina negative.  She had bilateral mastectomy, chemotherapy, radiation, and anastrozole.     1.  On 03/18/2022, multiple imaging studies done because of bilateral lower extremity weakness:  -MRI of thoracic and lumbar spine revealed bone metastases with spinal cord compression.  -CT chest, abdomen, and pelvis on 03/19/2022 revealed bone metastasis, lung nodules and enlarged upper abdominal lymph node.  There is a hypodense nodule in the liver.  -Brain MRI on 03/19/2022 did not reveal any intracranial metastasis.  2.  CT-guided bone biopsy of left iliac bone on 03/21/2022 revealed metastatic breast cancer. ER positive, WA positive and HER-2/halina negative.  3.  Letrozole started on 03/25/2022.  -Ribociclib started on 03/31/2022. Decreased to 200 mg a day in 09/2022.  -Zometa started on 03/24/2022.  -Radiation to thoracolumbar and sacral area between 03/22/2022 and 04/07/2022.  3000 cGy in 10 fractions.     SUBJECTIVE:  Ms. Mcghee is a 58-year-old female with metastatic breast cancer on letrozole and ribociclib.  She is tolerating it well. CT scan on 07/06/2023 revealed stable disease.     She is on Zometa for bone metastasis.  She is tolerating it well.  No dental or jaw related symptoms.    Patient has pain in left shoulder for last 2 months.  Pain is  more when she moves her arm.  There is some restriction of movement.     She has mild generalized weakness. She is able to do activities of daily living.      No headache.  No dizziness.  No chest pain.  No shortness of breath.  No abdominal pain.  No nausea or vomiting.  Appetite is good. No urinary or bowel complaints. No bleeding. No bone pain. All other review of system is negative.     PHYSICAL EXAMINATION:  She is alert and oriented x 3. Not in any distress.   Rest of systems not examined as this is a virtual visit.     LABS: CBC and CMP reviewed.     ASSESSMENT:  1.  A 58-year-old female with metastatic breast cancer on letrozole and ribociclib. Disease is clinically stable.  2.  Leukopenia from ribociclib. Stable.  3.  Normocytic anemia from cancer and ribociclib. Stable.  4.  Left shoulder pain.     PLAN:  1.  Patient is overall doing well from breast cancer.  No clinical suspicion of progression.    She will continue on letrozole and ribociclib.  She is tolerating it well.    For monitoring of breast cancer, will get CT chest, abdomen and pelvis in next few weeks.    2.  Patient has pain in left shoulder.  Could be from arthritis or rotator cuff problem.  Other possibility is metastatic disease causing the pain.  For further evaluation, we will get MRI of the shoulder.    3.  For bone metastasis, she will continue on Zometa.  She is tolerating it well.  No dental or jaw related symptoms.  She will also continue with calcium and vitamin D twice a day.    4.  Patient has leukopenia and anemia which is overall stable.  We will continue to monitor CBC.    5.  She had few questions which were all answered.  I will see her after CT scan and MRI.     Total video visit time of 30 minutes.  Time spent in today's visit, review of chart/investigations today, monitoring for toxicity of high risk drugs and documentation today.         Again, thank you for allowing me to participate in the care of your patient.         Sincerely,        Yury Lozada MD

## 2023-10-15 RX ORDER — HEPARIN SODIUM (PORCINE) LOCK FLUSH IV SOLN 100 UNIT/ML 100 UNIT/ML
5 SOLUTION INTRAVENOUS
Status: CANCELLED | OUTPATIENT
Start: 2023-12-26

## 2023-10-15 RX ORDER — HEPARIN SODIUM,PORCINE 10 UNIT/ML
5 VIAL (ML) INTRAVENOUS
Status: CANCELLED | OUTPATIENT
Start: 2023-12-26

## 2023-10-15 RX ORDER — ZOLEDRONIC ACID 0.04 MG/ML
4 INJECTION, SOLUTION INTRAVENOUS ONCE
Status: CANCELLED
Start: 2023-12-26 | End: 2023-11-28

## 2023-10-24 ENCOUNTER — ANCILLARY PROCEDURE (OUTPATIENT)
Dept: MRI IMAGING | Facility: CLINIC | Age: 58
End: 2023-10-24
Attending: INTERNAL MEDICINE
Payer: COMMERCIAL

## 2023-10-24 ENCOUNTER — ANCILLARY PROCEDURE (OUTPATIENT)
Dept: CT IMAGING | Facility: CLINIC | Age: 58
End: 2023-10-24
Attending: INTERNAL MEDICINE
Payer: COMMERCIAL

## 2023-10-24 DIAGNOSIS — C79.51 CARCINOMA OF BREAST METASTATIC TO BONE, UNSPECIFIED LATERALITY (H): ICD-10-CM

## 2023-10-24 DIAGNOSIS — C50.919 CARCINOMA OF BREAST METASTATIC TO BONE, UNSPECIFIED LATERALITY (H): ICD-10-CM

## 2023-10-24 DIAGNOSIS — G89.29 CHRONIC LEFT SHOULDER PAIN: ICD-10-CM

## 2023-10-24 DIAGNOSIS — M25.512 CHRONIC LEFT SHOULDER PAIN: ICD-10-CM

## 2023-10-24 PROCEDURE — A9585 GADOBUTROL INJECTION: HCPCS | Mod: JZ | Performed by: INTERNAL MEDICINE

## 2023-10-24 PROCEDURE — 250N000011 HC RX IP 250 OP 636: Performed by: INTERNAL MEDICINE

## 2023-10-24 PROCEDURE — 250N000009 HC RX 250: Performed by: INTERNAL MEDICINE

## 2023-10-24 PROCEDURE — 73223 MRI JOINT UPR EXTR W/O&W/DYE: CPT | Mod: LT

## 2023-10-24 PROCEDURE — 255N000002 HC RX 255 OP 636: Mod: JZ | Performed by: INTERNAL MEDICINE

## 2023-10-24 PROCEDURE — 74177 CT ABD & PELVIS W/CONTRAST: CPT

## 2023-10-24 RX ORDER — IOPAMIDOL 755 MG/ML
90 INJECTION, SOLUTION INTRAVASCULAR ONCE
Status: COMPLETED | OUTPATIENT
Start: 2023-10-24 | End: 2023-10-24

## 2023-10-24 RX ORDER — GADOBUTROL 604.72 MG/ML
7.5 INJECTION INTRAVENOUS ONCE
Status: COMPLETED | OUTPATIENT
Start: 2023-10-24 | End: 2023-10-24

## 2023-10-24 RX ADMIN — GADOBUTROL 7.5 ML: 604.72 INJECTION INTRAVENOUS at 11:46

## 2023-10-24 RX ADMIN — SODIUM CHLORIDE 40 ML: 9 INJECTION, SOLUTION INTRAVENOUS at 12:36

## 2023-10-24 RX ADMIN — IOPAMIDOL 90 ML: 755 INJECTION, SOLUTION INTRAVENOUS at 12:36

## 2023-10-25 DIAGNOSIS — C50.919 CARCINOMA OF BREAST METASTATIC TO BONE, UNSPECIFIED LATERALITY (H): Primary | ICD-10-CM

## 2023-10-25 DIAGNOSIS — C79.51 CARCINOMA OF BREAST METASTATIC TO BONE, UNSPECIFIED LATERALITY (H): Primary | ICD-10-CM

## 2023-10-25 RX ORDER — RIBOCICLIB 200 MG/1
200 TABLET, FILM COATED ORAL DAILY
Qty: 21 TABLET | Refills: 0 | Status: SHIPPED | OUTPATIENT
Start: 2023-11-01 | End: 2023-12-19

## 2023-10-25 RX ORDER — LETROZOLE 2.5 MG/1
2.5 TABLET, FILM COATED ORAL EVERY MORNING
Qty: 28 TABLET | Refills: 0 | Status: SHIPPED | OUTPATIENT
Start: 2023-11-01 | End: 2024-02-12

## 2023-10-26 NOTE — RESULT ENCOUNTER NOTE
Dear Ms. Litzy,    MRI of left shoulder does not reveal any cancer. There is some inflammation there. We will review it during appointment.    Please, call me with any questions.    Yury Lozada MD

## 2023-10-26 NOTE — RESULT ENCOUNTER NOTE
Dear Ms. Mcghee,    CT scan reveals cancer to be stable.    Please, call me with any questions.    Yury Lozada MD

## 2023-10-30 ENCOUNTER — ONCOLOGY VISIT (OUTPATIENT)
Dept: ONCOLOGY | Facility: CLINIC | Age: 58
End: 2023-10-30
Attending: INTERNAL MEDICINE
Payer: COMMERCIAL

## 2023-10-30 ENCOUNTER — LAB (OUTPATIENT)
Dept: INFUSION THERAPY | Facility: CLINIC | Age: 58
End: 2023-10-30
Attending: INTERNAL MEDICINE
Payer: COMMERCIAL

## 2023-10-30 VITALS
TEMPERATURE: 98.3 F | BODY MASS INDEX: 23.33 KG/M2 | WEIGHT: 140.2 LBS | SYSTOLIC BLOOD PRESSURE: 115 MMHG | HEART RATE: 68 BPM | RESPIRATION RATE: 16 BRPM | DIASTOLIC BLOOD PRESSURE: 76 MMHG | OXYGEN SATURATION: 97 %

## 2023-10-30 DIAGNOSIS — C50.919 CARCINOMA OF BREAST METASTATIC TO BONE, UNSPECIFIED LATERALITY (H): ICD-10-CM

## 2023-10-30 DIAGNOSIS — C79.51 SPINAL CORD COMPRESSION DUE TO MALIGNANT NEOPLASM METASTATIC TO SPINE (H): Primary | ICD-10-CM

## 2023-10-30 DIAGNOSIS — C79.51 CARCINOMA OF BREAST METASTATIC TO BONE, UNSPECIFIED LATERALITY (H): ICD-10-CM

## 2023-10-30 DIAGNOSIS — M25.512 CHRONIC LEFT SHOULDER PAIN: ICD-10-CM

## 2023-10-30 DIAGNOSIS — G89.29 CHRONIC LEFT SHOULDER PAIN: ICD-10-CM

## 2023-10-30 DIAGNOSIS — G95.29 SPINAL CORD COMPRESSION DUE TO MALIGNANT NEOPLASM METASTATIC TO SPINE (H): Primary | ICD-10-CM

## 2023-10-30 DIAGNOSIS — C50.919 PRIMARY MALIGNANT NEOPLASM OF BREAST WITH METASTASIS (H): Primary | ICD-10-CM

## 2023-10-30 LAB
ALBUMIN SERPL BCG-MCNC: 4.6 G/DL (ref 3.5–5.2)
ALP SERPL-CCNC: 49 U/L (ref 35–104)
ALT SERPL W P-5'-P-CCNC: 16 U/L (ref 0–50)
ANION GAP SERPL CALCULATED.3IONS-SCNC: 8 MMOL/L (ref 7–15)
AST SERPL W P-5'-P-CCNC: 27 U/L (ref 0–45)
BASOPHILS # BLD AUTO: 0 10E3/UL (ref 0–0.2)
BASOPHILS NFR BLD AUTO: 1 %
BILIRUB SERPL-MCNC: 0.2 MG/DL
BUN SERPL-MCNC: 16.8 MG/DL (ref 6–20)
CALCIUM SERPL-MCNC: 10.2 MG/DL (ref 8.6–10)
CALCIUM SERPL-MCNC: 10.2 MG/DL (ref 8.6–10)
CHLORIDE SERPL-SCNC: 101 MMOL/L (ref 98–107)
CREAT SERPL-MCNC: 0.89 MG/DL (ref 0.51–0.95)
DEPRECATED HCO3 PLAS-SCNC: 31 MMOL/L (ref 22–29)
EGFRCR SERPLBLD CKD-EPI 2021: 75 ML/MIN/1.73M2
EOSINOPHIL # BLD AUTO: 0.1 10E3/UL (ref 0–0.7)
EOSINOPHIL NFR BLD AUTO: 3 %
ERYTHROCYTE [DISTWIDTH] IN BLOOD BY AUTOMATED COUNT: 13.5 % (ref 10–15)
GLUCOSE SERPL-MCNC: 82 MG/DL (ref 70–99)
HCT VFR BLD AUTO: 34.8 % (ref 35–47)
HGB BLD-MCNC: 11.4 G/DL (ref 11.7–15.7)
IMM GRANULOCYTES # BLD: 0 10E3/UL
IMM GRANULOCYTES NFR BLD: 0 %
LYMPHOCYTES # BLD AUTO: 1.1 10E3/UL (ref 0.8–5.3)
LYMPHOCYTES NFR BLD AUTO: 36 %
MAGNESIUM SERPL-MCNC: 2.3 MG/DL (ref 1.7–2.3)
MCH RBC QN AUTO: 31.8 PG (ref 26.5–33)
MCHC RBC AUTO-ENTMCNC: 32.8 G/DL (ref 31.5–36.5)
MCV RBC AUTO: 97 FL (ref 78–100)
MONOCYTES # BLD AUTO: 0.4 10E3/UL (ref 0–1.3)
MONOCYTES NFR BLD AUTO: 11 %
NEUTROPHILS # BLD AUTO: 1.5 10E3/UL (ref 1.6–8.3)
NEUTROPHILS NFR BLD AUTO: 49 %
NRBC # BLD AUTO: 0 10E3/UL
NRBC BLD AUTO-RTO: 0 /100
PHOSPHATE SERPL-MCNC: 3.8 MG/DL (ref 2.5–4.5)
PLATELET # BLD AUTO: 223 10E3/UL (ref 150–450)
POTASSIUM SERPL-SCNC: 4 MMOL/L (ref 3.4–5.3)
PROT SERPL-MCNC: 7.3 G/DL (ref 6.4–8.3)
RBC # BLD AUTO: 3.58 10E6/UL (ref 3.8–5.2)
SODIUM SERPL-SCNC: 140 MMOL/L (ref 135–145)
WBC # BLD AUTO: 3.1 10E3/UL (ref 4–11)

## 2023-10-30 PROCEDURE — 99214 OFFICE O/P EST MOD 30 MIN: CPT | Performed by: INTERNAL MEDICINE

## 2023-10-30 PROCEDURE — 250N000011 HC RX IP 250 OP 636: Performed by: INTERNAL MEDICINE

## 2023-10-30 PROCEDURE — 80053 COMPREHEN METABOLIC PANEL: CPT | Performed by: INTERNAL MEDICINE

## 2023-10-30 PROCEDURE — G0008 ADMIN INFLUENZA VIRUS VAC: HCPCS | Performed by: INTERNAL MEDICINE

## 2023-10-30 PROCEDURE — 96365 THER/PROPH/DIAG IV INF INIT: CPT

## 2023-10-30 PROCEDURE — 36415 COLL VENOUS BLD VENIPUNCTURE: CPT

## 2023-10-30 PROCEDURE — 90480 ADMN SARSCOV2 VAC 1/ONLY CMP: CPT | Performed by: INTERNAL MEDICINE

## 2023-10-30 PROCEDURE — G0463 HOSPITAL OUTPT CLINIC VISIT: HCPCS | Mod: 25 | Performed by: INTERNAL MEDICINE

## 2023-10-30 PROCEDURE — 91320 SARSCV2 VAC 30MCG TRS-SUC IM: CPT | Performed by: INTERNAL MEDICINE

## 2023-10-30 PROCEDURE — 85014 HEMATOCRIT: CPT | Performed by: INTERNAL MEDICINE

## 2023-10-30 PROCEDURE — 83735 ASSAY OF MAGNESIUM: CPT | Performed by: INTERNAL MEDICINE

## 2023-10-30 PROCEDURE — 84100 ASSAY OF PHOSPHORUS: CPT | Performed by: INTERNAL MEDICINE

## 2023-10-30 PROCEDURE — 90682 RIV4 VACC RECOMBINANT DNA IM: CPT | Performed by: INTERNAL MEDICINE

## 2023-10-30 RX ORDER — ZOLEDRONIC ACID 0.04 MG/ML
4 INJECTION, SOLUTION INTRAVENOUS ONCE
Status: COMPLETED | OUTPATIENT
Start: 2023-10-30 | End: 2023-10-30

## 2023-10-30 RX ADMIN — COVID-19 VACCINE, MRNA 30 MCG: 0.05 INJECTION, SUSPENSION INTRAMUSCULAR at 09:09

## 2023-10-30 RX ADMIN — ZOLEDRONIC ACID 4 MG: 0.04 INJECTION, SOLUTION INTRAVENOUS at 09:17

## 2023-10-30 RX ADMIN — INFLUENZA A VIRUS A/WEST VIRGINIA/30/2022 (H1N1) RECOMBINANT HEMAGGLUTININ ANTIGEN, INFLUENZA A VIRUS A/DARWIN/6/2021 (H3N2) RECOMBINANT HEMAGGLUTININ ANTIGEN, INFLUENZA B VIRUS B/AUSTRIA/1359417/2021 RECOMBINANT HEMAGGLUTININ ANTIGEN, AND INFLUENZA B VIRUS B/PHUKET/3073/2013 RECOMBINANT HEMAGGLUTININ ANTIGEN 0.5 ML: 45; 45; 45; 45 INJECTION INTRAMUSCULAR at 09:06

## 2023-10-30 ASSESSMENT — PAIN SCALES - GENERAL: PAINLEVEL: NO PAIN (0)

## 2023-10-30 NOTE — LETTER
"    10/30/2023         RE: Mickie Mcghee  [address] [address]  [place] [address]        Dear Colleague,    Thank you for referring your patient, Mickie Mcghee, to the [facility]. Please see a copy of my visit note below.    Oncology Rooming Note    October 30, 2023 8:06 AM   Mickie Mcghee is a 58 year old female who presents for:    Chief Complaint   Patient presents with     Oncology Clinic Visit     Initial Vitals: /76   Pulse 68   Temp 98.3  F (36.8  C) (Oral)   Resp 16   Wt 63.6 kg (140 lb 3.2 oz)   SpO2 97%   BMI 23.33 kg/m   Estimated body mass index is 23.33 kg/m  as calculated from the following:    Height as of 10/10/23: 1.651 m (5' 5\").    Weight as of this encounter: 63.6 kg (140 lb 3.2 oz). Body surface area is 1.71 meters squared.  No Pain (0) Comment: Data Unavailable   No LMP recorded.  Allergies reviewed: Yes  Medications reviewed: Yes    Medications: Medication refills not needed today.  Pharmacy name entered into [employer]:    [id number] - [place], [place] - [address] AT [facility] - [place], [place] - [address]    Clinical concerns:   doctor was notified.      Dorothy Henao, [facility]              ONCOLOGY HISTORY:  Ms. Mcghee is a female with metastatic breast cancer. ER positive, OH positive and HER-2/halina negative.  -Foundation One reveals PIK3CA alteration.  -She had a stage III left breast cancer in 2007. ER positive and HER-2/halina negative.  She had bilateral mastectomy, chemotherapy, radiation, and anastrozole.     1.  On 03/18/2022, multiple imaging studies done because of bilateral lower extremity weakness:  -MRI of thoracic and lumbar spine revealed bone metastases with spinal cord compression.  -CT chest, abdomen, and pelvis on 03/19/2022 revealed bone metastasis, lung nodules and enlarged upper abdominal lymph node.  There is a hypodense nodule in the liver.  -Brain MRI on " 03/19/2022 did not reveal any intracranial metastasis.  2.  CT-guided bone biopsy of left iliac bone on 03/21/2022 revealed metastatic breast cancer. ER positive, TN positive and HER-2/halina negative.  3.  Letrozole started on 03/25/2022.  -Ribociclib started on 03/31/2022. Decreased to 200 mg a day in 09/2022.  -Zometa started on 03/24/2022.  -Radiation to thoracolumbar and sacral area between 03/22/2022 and 04/07/2022.  3000 cGy in 10 fractions.     SUBJECTIVE:  MRs. Mcghee is a 58-year-old female with metastatic breast cancer on letrozole and ribociclib.  She is tolerating it well.    She is on Zometa for bone metastasis.  She is tolerating it well.  No dental or jaw related symptoms.    CT chest, abdomen and pelvis on 10/20/2023:  1.  Stable skeletal metastases.  2.  Stable pulmonary nodules.  3.  No new findings or significant change from 07/06/2023.    Patient having pain in left shoulder.  MRI shoulder was done on 10/24/2023:  1.  There is left glenohumeral joint synovitis with a very small effusion. Findings are nonspecific but may be seen with inflammatory or crystalline arthropathies, although infection should be excluded clinically. No osseous erosions are identified. No   evidence of osteomyelitis.  2.  No evidence for metastatic disease in the left shoulder.  3.  Mild subacromial/subdeltoid bursitis.  4.  No rotator cuff tendon tear, tendinopathy, or muscle fatty atrophy.    She continues to have pain in the left shoulder.  Pain is more when she moves around.  It is causing some restriction of movement.     No headache.  No dizziness.  No chest pain.  No shortness of breath.  No abdominal pain.  No nausea or vomiting.  Appetite is good. No urinary or bowel complaints. No bleeding. No bone pain. All other review of system is negative.     PHYSICAL EXAMINATION:  She is alert and oriented x 3. Not in any distress.  Vitals: Reviewed.  Rest of systems not examined.     LABS: CBC and CMP reviewed.      ASSESSMENT:  1.  A 58-year-old female with metastatic breast cancer on letrozole and ibociclib. Disease is stable.  2.  Leukopenia from ribociclib. Stable.  3.  Normocytic anemia from cancer and ribociclib. Stable.  4.  Left shoulder pain from bursitis/synovitis.  5.  Mild hypercalcemia.     PLAN:  1.  Continue letrozole and ribociclib.  2.  Continue Zometa. Change to every 8 weeks.  3.  Continue calcium and vitamin D twice a day.  4.  Orthopedics appointmnet.  5.  See me in 2 months.  6.  Labs every 2 months.  7. Flu and COVID vaccine.    DISCUSSION:  1.  CT scan was reviewed with the patient.  Explained to her that CT scan reveals stable lung nodules and sclerotic bone lesions.  Cancer is stable.  She was happy to know that.     She will continue on letrozole and ribociclib.  She is tolerating it well.     2.  Patient has pain in left shoulder.  MRI was reviewed with her.  No metastatic disease.  No fracture.  No rotator cuff tear.  Pain is from bursitis and synovitis.  It is causing restriction of movement.  Advised her to see orthopedics.  She is going to make appointment with orthopedics in [facility].     3.  For bone metastasis, she will continue on Zometa.  As bone lesions are stable, we will change Zometa to every 8 weeks.  We will consider changing it to every 12 weeks in next 6 months.  She is tolerating it well.  No dental or jaw related symptoms.  She will continue with calcium and vitamin D twice a day.     4.  Labs were reviewed with her.  Patient has leukopenia and anemia which is stable.  We will continue to monitor CBC.    Calcium mildly elevated.  This is hypercalcemia of malignancy.  She will be getting Zometa which will help to improve calcium.  She will continue to drink plenty of fluid.     5.  She had a few questions which were all answered.  I will see her in 2 months time.       Total visit time of 30 minutes.  Time spent in today's visit, review of chart/investigations today,  monitoring for toxicity of high risk drugs and documentation today.             Again, thank you for allowing me to participate in the care of your patient.        Sincerely,        Yury Lozada MD

## 2023-10-30 NOTE — PROGRESS NOTES
Infusion Nursing Note:  Mickie Mcghee presents today for Zometa, Flu and Covid vaccines.    Patient seen by provider today: Yes: Dr. Lozada   present during visit today: Not Applicable.    Note: N/A.      Intravenous Access:  Peripheral IV placed.    Treatment Conditions:  Lab Results   Component Value Date    HGB 11.4 (L) 10/30/2023    WBC 3.1 (L) 10/30/2023    ANEU 1.3 (L) 08/04/2022    ANEUTAUTO 1.5 (L) 10/30/2023     10/30/2023        Lab Results   Component Value Date     10/30/2023    POTASSIUM 4.0 10/30/2023    MAG 2.3 10/30/2023    CR 0.89 10/30/2023    OSMEL 10.2 (H) 10/30/2023    OSMEL 10.2 (H) 10/30/2023    BILITOTAL 0.2 10/30/2023    ALBUMIN 4.6 10/30/2023    ALT 16 10/30/2023    AST 27 10/30/2023       Results reviewed, labs MET treatment parameters, ok to proceed with treatment.      Post Infusion Assessment:  Patient tolerated infusion without incident.  Patient tolerated injections without incident.  Patient observed for 15 minutes post Covid injection per protocol.  Blood return noted pre and post infusion.  Site patent and intact, free from redness, edema or discomfort.  No evidence of extravasations.  Access discontinued per protocol.       Discharge Plan:   Patient declined prescription refills.  Discharge instructions reviewed with: Patient.  Patient verbalized understanding of discharge instructions and all questions answered.  AVS to patient via [employer].  Patient will return 11/28/23 for next appointment.   Patient discharged in stable condition accompanied by: self.  Departure Mode: Ambulatory.      Shawna Henson RN

## 2023-10-30 NOTE — PROGRESS NOTES
"Oncology Rooming Note    October 30, 2023 8:06 AM   Mickie Mcghee is a 58 year old female who presents for:    Chief Complaint   Patient presents with    Oncology Clinic Visit     Initial Vitals: /76   Pulse 68   Temp 98.3  F (36.8  C) (Oral)   Resp 16   Wt 63.6 kg (140 lb 3.2 oz)   SpO2 97%   BMI 23.33 kg/m   Estimated body mass index is 23.33 kg/m  as calculated from the following:    Height as of 10/10/23: 1.651 m (5' 5\").    Weight as of this encounter: 63.6 kg (140 lb 3.2 oz). Body surface area is 1.71 meters squared.  No Pain (0) Comment: Data Unavailable   No LMP recorded.  Allergies reviewed: Yes  Medications reviewed: Yes    Medications: Medication refills not needed today.  Pharmacy name entered into [employer]:    [id number] - [place], [place] - [address] AT [facility] - [place], [place] - [address]    Clinical concerns:   doctor was notified.      Dorothy Henao CMA            "

## 2023-10-30 NOTE — PATIENT INSTRUCTIONS
1.  Continue letrozole and ribociclib.  2.  Continue Zometa. Change to every 8 weeks.  3.  Continue calcium and vitamin D twice a day.  4.  Orthopedics appointmnet.  5.  See me in 2 months.  6.  Labs every 2 months.  7. Flu and COVID vaccine.

## 2023-10-30 NOTE — PROGRESS NOTES
ONCOLOGY HISTORY:  Ms. Mcghee is a female with metastatic breast cancer. ER positive, FL positive and HER-2/halina negative.  -Foundation One reveals PIK3CA alteration.  -She had a stage III left breast cancer in 2007. ER positive and HER-2/halina negative.  She had bilateral mastectomy, chemotherapy, radiation, and anastrozole.     1.  On 03/18/2022, multiple imaging studies done because of bilateral lower extremity weakness:  -MRI of thoracic and lumbar spine revealed bone metastases with spinal cord compression.  -CT chest, abdomen, and pelvis on 03/19/2022 revealed bone metastasis, lung nodules and enlarged upper abdominal lymph node.  There is a hypodense nodule in the liver.  -Brain MRI on 03/19/2022 did not reveal any intracranial metastasis.  2.  CT-guided bone biopsy of left iliac bone on 03/21/2022 revealed metastatic breast cancer. ER positive, FL positive and HER-2/halina negative.  3.  Letrozole started on 03/25/2022.  -Ribociclib started on 03/31/2022. Decreased to 200 mg a day in 09/2022.  -Zometa started on 03/24/2022.  -Radiation to thoracolumbar and sacral area between 03/22/2022 and 04/07/2022.  3000 cGy in 10 fractions.     SUBJECTIVE:  MRs. Mcghee is a 58-year-old female with metastatic breast cancer on letrozole and ribociclib.  She is tolerating it well.    She is on Zometa for bone metastasis.  She is tolerating it well.  No dental or jaw related symptoms.    CT chest, abdomen and pelvis on 10/20/2023:  1.  Stable skeletal metastases.  2.  Stable pulmonary nodules.  3.  No new findings or significant change from 07/06/2023.    Patient having pain in left shoulder.  MRI shoulder was done on 10/24/2023:  1.  There is left glenohumeral joint synovitis with a very small effusion. Findings are nonspecific but may be seen with inflammatory or crystalline arthropathies, although infection should be excluded clinically. No osseous erosions are identified. No   evidence of osteomyelitis.  2.  No evidence for  metastatic disease in the left shoulder.  3.  Mild subacromial/subdeltoid bursitis.  4.  No rotator cuff tendon tear, tendinopathy, or muscle fatty atrophy.    She continues to have pain in the left shoulder.  Pain is more when she moves around.  It is causing some restriction of movement.     No headache.  No dizziness.  No chest pain.  No shortness of breath.  No abdominal pain.  No nausea or vomiting.  Appetite is good. No urinary or bowel complaints. No bleeding. No bone pain. All other review of system is negative.     PHYSICAL EXAMINATION:  She is alert and oriented x 3. Not in any distress.  Vitals: Reviewed.  Rest of systems not examined.     LABS: CBC and CMP reviewed.     ASSESSMENT:  1.  A 58-year-old female with metastatic breast cancer on letrozole and ibociclib. Disease is stable.  2.  Leukopenia from ribociclib. Stable.  3.  Normocytic anemia from cancer and ribociclib. Stable.  4.  Left shoulder pain from bursitis/synovitis.  5.  Mild hypercalcemia.     PLAN:  1.  Continue letrozole and ribociclib.  2.  Continue Zometa. Change to every 8 weeks.  3.  Continue calcium and vitamin D twice a day.  4.  Orthopedics appointmnet.  5.  See me in 2 months.  6.  Labs every 2 months.  7. Flu and COVID vaccine.    DISCUSSION:  1.  CT scan was reviewed with the patient.  Explained to her that CT scan reveals stable lung nodules and sclerotic bone lesions.  Cancer is stable.  She was happy to know that.     She will continue on letrozole and ribociclib.  She is tolerating it well.     2.  Patient has pain in left shoulder.  MRI was reviewed with her.  No metastatic disease.  No fracture.  No rotator cuff tear.  Pain is from bursitis and synovitis.  It is causing restriction of movement.  Advised her to see orthopedics.  She is going to make appointment with orthopedics in [facility].     3.  For bone metastasis, she will continue on Zometa.  As bone lesions are stable, we will change Zometa to every 8 weeks.   We will consider changing it to every 12 weeks in next 6 months.  She is tolerating it well.  No dental or jaw related symptoms.  She will continue with calcium and vitamin D twice a day.     4.  Labs were reviewed with her.  Patient has leukopenia and anemia which is stable.  We will continue to monitor CBC.    Calcium mildly elevated.  This is hypercalcemia of malignancy.  She will be getting Zometa which will help to improve calcium.  She will continue to drink plenty of fluid.     5.  She had a few questions which were all answered.  I will see her in 2 months time.       Total visit time of 30 minutes.  Time spent in today's visit, review of chart/investigations today, monitoring for toxicity of high risk drugs and documentation today.

## 2023-10-30 NOTE — PROGRESS NOTES
Medical Assistant Note:  Mickie Mcghee presents today for blood draw.    Patient seen by provider today: Yes: karin.   present during visit today: Not Applicable.    Concerns: No Concerns.    Procedure:  Lab draw site: rac, Needle type: bf, Gauge: 23.    Post Assessment:  Labs drawn without difficulty: Yes.    Discharge Plan:  Prescription refills given for none.    Face to Face Time: 5 min.    Dorothy Henao, CMA

## 2023-11-22 DIAGNOSIS — C50.919 CARCINOMA OF BREAST METASTATIC TO BONE, UNSPECIFIED LATERALITY (H): Primary | ICD-10-CM

## 2023-11-22 DIAGNOSIS — C79.51 CARCINOMA OF BREAST METASTATIC TO BONE, UNSPECIFIED LATERALITY (H): Primary | ICD-10-CM

## 2023-11-22 RX ORDER — LETROZOLE 2.5 MG/1
2.5 TABLET, FILM COATED ORAL EVERY MORNING
Qty: 28 TABLET | Refills: 0 | Status: SHIPPED | OUTPATIENT
Start: 2023-11-29 | End: 2024-02-12

## 2023-11-22 RX ORDER — RIBOCICLIB 200 MG/1
200 TABLET, FILM COATED ORAL DAILY
Qty: 21 TABLET | Refills: 0 | Status: SHIPPED | OUTPATIENT
Start: 2023-11-29 | End: 2023-12-19

## 2023-12-19 DIAGNOSIS — C50.919 CARCINOMA OF BREAST METASTATIC TO BONE, UNSPECIFIED LATERALITY (H): Primary | ICD-10-CM

## 2023-12-19 DIAGNOSIS — C79.51 CARCINOMA OF BREAST METASTATIC TO BONE, UNSPECIFIED LATERALITY (H): Primary | ICD-10-CM

## 2023-12-19 RX ORDER — RIBOCICLIB 200 MG/1
200 TABLET, FILM COATED ORAL DAILY
Qty: 21 TABLET | Refills: 0 | Status: SHIPPED | OUTPATIENT
Start: 2023-12-27 | End: 2024-02-12

## 2023-12-19 RX ORDER — LETROZOLE 2.5 MG/1
2.5 TABLET, FILM COATED ORAL EVERY MORNING
Qty: 28 TABLET | Refills: 0 | Status: SHIPPED | OUTPATIENT
Start: 2023-12-27 | End: 2024-02-12

## 2023-12-24 ENCOUNTER — HEALTH MAINTENANCE LETTER (OUTPATIENT)
Age: 58
End: 2023-12-24

## 2023-12-27 ENCOUNTER — DOCUMENTATION ONLY (OUTPATIENT)
Dept: PHARMACY | Facility: CLINIC | Age: 58
End: 2023-12-27

## 2023-12-27 ENCOUNTER — INFUSION THERAPY VISIT (OUTPATIENT)
Dept: INFUSION THERAPY | Facility: CLINIC | Age: 58
End: 2023-12-27
Attending: INTERNAL MEDICINE
Payer: COMMERCIAL

## 2023-12-27 VITALS
SYSTOLIC BLOOD PRESSURE: 125 MMHG | OXYGEN SATURATION: 98 % | DIASTOLIC BLOOD PRESSURE: 86 MMHG | HEART RATE: 67 BPM | TEMPERATURE: 97.9 F | RESPIRATION RATE: 16 BRPM

## 2023-12-27 DIAGNOSIS — C79.51 SPINAL CORD COMPRESSION DUE TO MALIGNANT NEOPLASM METASTATIC TO SPINE (H): Primary | ICD-10-CM

## 2023-12-27 DIAGNOSIS — C50.919 CARCINOMA OF BREAST METASTATIC TO BONE, UNSPECIFIED LATERALITY (H): ICD-10-CM

## 2023-12-27 DIAGNOSIS — C79.51 CARCINOMA OF BREAST METASTATIC TO BONE, UNSPECIFIED LATERALITY (H): ICD-10-CM

## 2023-12-27 DIAGNOSIS — G95.29 SPINAL CORD COMPRESSION DUE TO MALIGNANT NEOPLASM METASTATIC TO SPINE (H): Primary | ICD-10-CM

## 2023-12-27 LAB
ALBUMIN SERPL BCG-MCNC: 4.3 G/DL (ref 3.5–5.2)
ALP SERPL-CCNC: 56 U/L (ref 40–150)
ALT SERPL W P-5'-P-CCNC: 43 U/L (ref 0–50)
ANION GAP SERPL CALCULATED.3IONS-SCNC: 6 MMOL/L (ref 7–15)
AST SERPL W P-5'-P-CCNC: 45 U/L (ref 0–45)
BASOPHILS # BLD AUTO: 0 10E3/UL (ref 0–0.2)
BASOPHILS NFR BLD AUTO: 1 %
BILIRUB SERPL-MCNC: 0.2 MG/DL
BUN SERPL-MCNC: 14.6 MG/DL (ref 6–20)
CALCIUM SERPL-MCNC: 9.8 MG/DL (ref 8.6–10)
CALCIUM SERPL-MCNC: 9.8 MG/DL (ref 8.6–10)
CHLORIDE SERPL-SCNC: 103 MMOL/L (ref 98–107)
CREAT SERPL-MCNC: 0.81 MG/DL (ref 0.51–0.95)
DEPRECATED HCO3 PLAS-SCNC: 31 MMOL/L (ref 22–29)
EGFRCR SERPLBLD CKD-EPI 2021: 84 ML/MIN/1.73M2
EOSINOPHIL # BLD AUTO: 0.1 10E3/UL (ref 0–0.7)
EOSINOPHIL NFR BLD AUTO: 2 %
ERYTHROCYTE [DISTWIDTH] IN BLOOD BY AUTOMATED COUNT: 13.7 % (ref 10–15)
GLUCOSE SERPL-MCNC: 91 MG/DL (ref 70–99)
HCT VFR BLD AUTO: 34.3 % (ref 35–47)
HGB BLD-MCNC: 11.4 G/DL (ref 11.7–15.7)
IMM GRANULOCYTES # BLD: 0 10E3/UL
IMM GRANULOCYTES NFR BLD: 0 %
LYMPHOCYTES # BLD AUTO: 1 10E3/UL (ref 0.8–5.3)
LYMPHOCYTES NFR BLD AUTO: 29 %
MCH RBC QN AUTO: 31.9 PG (ref 26.5–33)
MCHC RBC AUTO-ENTMCNC: 33.2 G/DL (ref 31.5–36.5)
MCV RBC AUTO: 96 FL (ref 78–100)
MONOCYTES # BLD AUTO: 0.4 10E3/UL (ref 0–1.3)
MONOCYTES NFR BLD AUTO: 14 %
NEUTROPHILS # BLD AUTO: 1.8 10E3/UL (ref 1.6–8.3)
NEUTROPHILS NFR BLD AUTO: 54 %
NRBC # BLD AUTO: 0 10E3/UL
NRBC BLD AUTO-RTO: 0 /100
PLATELET # BLD AUTO: 212 10E3/UL (ref 150–450)
POTASSIUM SERPL-SCNC: 4.2 MMOL/L (ref 3.4–5.3)
PROT SERPL-MCNC: 7 G/DL (ref 6.4–8.3)
RBC # BLD AUTO: 3.57 10E6/UL (ref 3.8–5.2)
SODIUM SERPL-SCNC: 140 MMOL/L (ref 135–145)
WBC # BLD AUTO: 3.3 10E3/UL (ref 4–11)

## 2023-12-27 PROCEDURE — 85025 COMPLETE CBC W/AUTO DIFF WBC: CPT | Performed by: INTERNAL MEDICINE

## 2023-12-27 PROCEDURE — 36415 COLL VENOUS BLD VENIPUNCTURE: CPT

## 2023-12-27 PROCEDURE — 258N000003 HC RX IP 258 OP 636: Performed by: INTERNAL MEDICINE

## 2023-12-27 PROCEDURE — 250N000011 HC RX IP 250 OP 636: Mod: JZ | Performed by: INTERNAL MEDICINE

## 2023-12-27 PROCEDURE — 80053 COMPREHEN METABOLIC PANEL: CPT | Performed by: INTERNAL MEDICINE

## 2023-12-27 PROCEDURE — 96365 THER/PROPH/DIAG IV INF INIT: CPT

## 2023-12-27 RX ORDER — ZOLEDRONIC ACID 0.04 MG/ML
4 INJECTION, SOLUTION INTRAVENOUS ONCE
Status: COMPLETED | OUTPATIENT
Start: 2023-12-27 | End: 2023-12-27

## 2023-12-27 RX ADMIN — ZOLEDRONIC ACID 4 MG: 0.04 INJECTION, SOLUTION INTRAVENOUS at 11:19

## 2023-12-27 RX ADMIN — SODIUM CHLORIDE 250 ML: 9 INJECTION, SOLUTION INTRAVENOUS at 11:22

## 2023-12-27 ASSESSMENT — PAIN SCALES - GENERAL: PAINLEVEL: NO PAIN (0)

## 2023-12-27 NOTE — PROGRESS NOTES
Infusion Nursing Note:  Mickie Mcghee presents today for zometa.    Patient seen by provider today: No   present during visit today: Not Applicable.    Note: Pt will get zometa and oral chemo every 2 months per       Intravenous Access:  Peripheral IV placed.    Treatment Conditions:  Lab Results   Component Value Date     12/27/2023    POTASSIUM 4.2 12/27/2023    MAG 2.3 10/30/2023    CR 0.81 12/27/2023    OSMEL 9.8 12/27/2023    OSMEL 9.8 12/27/2023    BILITOTAL 0.2 12/27/2023    ALBUMIN 4.3 12/27/2023    ALT 43 12/27/2023    AST 45 12/27/2023       Results reviewed, labs MET treatment parameters, ok to proceed with treatment.      Post Infusion Assessment:  Patient tolerated infusion without incident.  Blood return noted pre and post infusion.  Site patent and intact, free from redness, edema or discomfort.  No evidence of extravasations.  Access discontinued per protocol.       Discharge Plan:   Discharge instructions reviewed with: Patient.  Patient and/or family verbalized understanding of discharge instructions and all questions answered.  AVS to patient via [employer].  Patient will return 2/20/24 for next appointment.   Patient discharged in stable condition accompanied by: self.  Departure Mode: Ambulatory with cane.      Jossy Leary RN

## 2023-12-27 NOTE — PROGRESS NOTES
Oral Chemotherapy Monitoring Program  Lab Follow Up    Reviewed lab results from 12/27.        7/11/2023    11:00 AM 9/27/2023    10:00 AM 10/3/2023    11:00 AM 10/25/2023    11:00 AM 11/22/2023    10:00 AM 12/19/2023     2:00 PM 12/27/2023    11:00 AM   ORAL CHEMOTHERAPY   Assessment Type Lab Monitoring Refill Lab Monitoring Refill;Chart Review Refill Refill Lab Monitoring   Diagnosis Code Breast Cancer Breast Cancer Breast Cancer Breast Cancer Breast Cancer Breast Cancer Breast Cancer   Providers Dr. Neville Lozada   Clinic Name/Location [facility]   Drug Name Kisqali (ribociclib) Kisqali (ribociclib) Kisqali (ribociclib) Kisqali (ribociclib) Kisqali (ribociclib) Kisqali (ribociclib) Kisqali (ribociclib)   Dose 200 mg 200 mg 200 mg 200 mg 200 mg 200 mg 200 mg   Current Schedule Daily Daily Daily Daily Daily Daily Daily   Cycle Details 3 weeks on, 1 week off 3 weeks on, 1 week off 3 weeks on, 1 week off 3 weeks on, 1 week off 3 weeks on, 1 week off 3 weeks on, 1 week off 3 weeks on, 1 week off   Start Date of Last Cycle   10/4/2023 10/4/2023 11/29/2023     Planned next cycle start date  10/4/2023 11/1/2023 11/1/2023 12/26/2023     Adverse Effects No AE identified during assessment      No AE identified during assessment   Any new drug interactions?    No      Is the dose as ordered appropriate for the patient?    Yes          Labs:  _  Result Component Current Result Ref Range   Sodium 140 (12/27/2023) 135 - 145 mmol/L     _  Result Component Current Result Ref Range   Potassium 4.2 (12/27/2023) 3.4 - 5.3 mmol/L     _  Result Component Current Result Ref Range   Calcium 9.8 (12/27/2023) 8.6 - 10.0 mg/dL     _  Result Component Current Result Ref Range   Albumin 4.3 (12/27/2023) 3.5 - 5.2 g/dL     _  Result Component Current Result Ref Range   Urea Nitrogen 14.6 (12/27/2023) 6.0 - 20.0 mg/dL     _  Result  Component Current Result Ref Range   Creatinine 0.81 (12/27/2023) 0.51 - 0.95 mg/dL     _  Result Component Current Result Ref Range   AST 45 (12/27/2023) 0 - 45 U/L     _  Result Component Current Result Ref Range   ALT 43 (12/27/2023) 0 - 50 U/L     _  Result Component Current Result Ref Range   Bilirubin Total 0.2 (12/27/2023) <=1.2 mg/dL     _  Result Component Current Result Ref Range   WBC Count 3.3 (L) (12/27/2023) 4.0 - 11.0 10e3/uL     _  Result Component Current Result Ref Range   Hemoglobin 11.4 (L) (12/27/2023) 11.7 - 15.7 g/dL     _  Result Component Current Result Ref Range   Platelet Count 212 (12/27/2023) 150 - 450 10e3/uL     _  Result Component Current Result Ref Range   Absolute Neutrophils 1.8 (12/27/2023) 1.6 - 8.3 10e3/uL        Assessment & Plan:  No concerning abnormalities. Ok to proceed on Kisqali.    Follow-Up:  01/23 - Appt w/ Neville + Labs    [facility]  Pharmacy Intern  [facility]  [phone]

## 2024-01-16 DIAGNOSIS — C50.919 CARCINOMA OF BREAST METASTATIC TO BONE, UNSPECIFIED LATERALITY (H): Primary | ICD-10-CM

## 2024-01-16 DIAGNOSIS — C79.51 CARCINOMA OF BREAST METASTATIC TO BONE, UNSPECIFIED LATERALITY (H): Primary | ICD-10-CM

## 2024-01-17 ENCOUNTER — TELEPHONE (OUTPATIENT)
Dept: ONCOLOGY | Facility: CLINIC | Age: 59
End: 2024-01-17
Payer: COMMERCIAL

## 2024-01-17 DIAGNOSIS — C50.919 CARCINOMA OF BREAST METASTATIC TO BONE, UNSPECIFIED LATERALITY (H): Primary | ICD-10-CM

## 2024-01-17 DIAGNOSIS — C79.51 CARCINOMA OF BREAST METASTATIC TO BONE, UNSPECIFIED LATERALITY (H): Primary | ICD-10-CM

## 2024-01-17 RX ORDER — LETROZOLE 2.5 MG/1
2.5 TABLET, FILM COATED ORAL EVERY MORNING
Qty: 28 TABLET | Refills: 0 | Status: SHIPPED | OUTPATIENT
Start: 2024-01-24 | End: 2024-02-12

## 2024-01-17 RX ORDER — RIBOCICLIB 200 MG/1
200 TABLET, FILM COATED ORAL DAILY
Qty: 21 TABLET | Refills: 0 | Status: SHIPPED | OUTPATIENT
Start: 2024-01-24 | End: 2024-02-12

## 2024-02-12 DIAGNOSIS — C50.919 CARCINOMA OF BREAST METASTATIC TO BONE, UNSPECIFIED LATERALITY (H): Primary | ICD-10-CM

## 2024-02-12 DIAGNOSIS — C79.51 CARCINOMA OF BREAST METASTATIC TO BONE, UNSPECIFIED LATERALITY (H): Primary | ICD-10-CM

## 2024-02-12 RX ORDER — RIBOCICLIB 200 MG/1
200 TABLET, FILM COATED ORAL DAILY
Qty: 21 TABLET | Refills: 0 | Status: SHIPPED | OUTPATIENT
Start: 2024-02-21 | End: 2024-03-25

## 2024-02-12 RX ORDER — LETROZOLE 2.5 MG/1
2.5 TABLET, FILM COATED ORAL EVERY MORNING
Qty: 28 TABLET | Refills: 0 | Status: SHIPPED | OUTPATIENT
Start: 2024-02-21 | End: 2024-04-02 | Stop reason: ALTCHOICE

## 2024-02-20 ENCOUNTER — ONCOLOGY VISIT (OUTPATIENT)
Dept: ONCOLOGY | Facility: CLINIC | Age: 59
End: 2024-02-20
Payer: COMMERCIAL

## 2024-02-20 ENCOUNTER — LAB (OUTPATIENT)
Dept: INFUSION THERAPY | Facility: CLINIC | Age: 59
End: 2024-02-20
Payer: COMMERCIAL

## 2024-02-20 ENCOUNTER — INFUSION THERAPY VISIT (OUTPATIENT)
Dept: INFUSION THERAPY | Facility: CLINIC | Age: 59
End: 2024-02-20
Payer: COMMERCIAL

## 2024-02-20 VITALS
SYSTOLIC BLOOD PRESSURE: 132 MMHG | RESPIRATION RATE: 16 BRPM | BODY MASS INDEX: 25.49 KG/M2 | DIASTOLIC BLOOD PRESSURE: 81 MMHG | WEIGHT: 153 LBS | HEIGHT: 65 IN | TEMPERATURE: 98.2 F | HEART RATE: 73 BPM | OXYGEN SATURATION: 96 %

## 2024-02-20 DIAGNOSIS — C50.919 CARCINOMA OF BREAST METASTATIC TO BONE, UNSPECIFIED LATERALITY (H): Primary | ICD-10-CM

## 2024-02-20 DIAGNOSIS — C79.51 SPINAL CORD COMPRESSION DUE TO MALIGNANT NEOPLASM METASTATIC TO SPINE (H): ICD-10-CM

## 2024-02-20 DIAGNOSIS — G95.29 SPINAL CORD COMPRESSION DUE TO MALIGNANT NEOPLASM METASTATIC TO SPINE (H): Primary | ICD-10-CM

## 2024-02-20 DIAGNOSIS — C50.919 CARCINOMA OF BREAST METASTATIC TO BONE, UNSPECIFIED LATERALITY (H): ICD-10-CM

## 2024-02-20 DIAGNOSIS — G95.29 SPINAL CORD COMPRESSION DUE TO MALIGNANT NEOPLASM METASTATIC TO SPINE (H): ICD-10-CM

## 2024-02-20 DIAGNOSIS — C79.51 SPINAL CORD COMPRESSION DUE TO MALIGNANT NEOPLASM METASTATIC TO SPINE (H): Primary | ICD-10-CM

## 2024-02-20 DIAGNOSIS — C79.51 CARCINOMA OF BREAST METASTATIC TO BONE, UNSPECIFIED LATERALITY (H): ICD-10-CM

## 2024-02-20 DIAGNOSIS — C79.51 CARCINOMA OF BREAST METASTATIC TO BONE, UNSPECIFIED LATERALITY (H): Primary | ICD-10-CM

## 2024-02-20 LAB
ALBUMIN SERPL BCG-MCNC: 4.5 G/DL (ref 3.5–5.2)
ALBUMIN SERPL BCG-MCNC: 4.5 G/DL (ref 3.5–5.2)
ALP SERPL-CCNC: 53 U/L (ref 40–150)
ALT SERPL W P-5'-P-CCNC: 24 U/L (ref 0–50)
ANION GAP SERPL CALCULATED.3IONS-SCNC: 11 MMOL/L (ref 7–15)
AST SERPL W P-5'-P-CCNC: 31 U/L (ref 0–45)
BASOPHILS # BLD AUTO: 0 10E3/UL (ref 0–0.2)
BASOPHILS NFR BLD AUTO: 1 %
BILIRUB SERPL-MCNC: 0.2 MG/DL
BUN SERPL-MCNC: 13.5 MG/DL (ref 6–20)
CALCIUM SERPL-MCNC: 10.1 MG/DL (ref 8.6–10)
CALCIUM SERPL-MCNC: 9.6 MG/DL (ref 8.6–10)
CHLORIDE SERPL-SCNC: 103 MMOL/L (ref 98–107)
CREAT SERPL-MCNC: 0.83 MG/DL (ref 0.51–0.95)
CREAT SERPL-MCNC: 0.9 MG/DL (ref 0.51–0.95)
DEPRECATED HCO3 PLAS-SCNC: 25 MMOL/L (ref 22–29)
EGFRCR SERPLBLD CKD-EPI 2021: 74 ML/MIN/1.73M2
EGFRCR SERPLBLD CKD-EPI 2021: 82 ML/MIN/1.73M2
EOSINOPHIL # BLD AUTO: 0.1 10E3/UL (ref 0–0.7)
EOSINOPHIL NFR BLD AUTO: 1 %
ERYTHROCYTE [DISTWIDTH] IN BLOOD BY AUTOMATED COUNT: 13.6 % (ref 10–15)
GLUCOSE SERPL-MCNC: 95 MG/DL (ref 70–99)
HCT VFR BLD AUTO: 35.2 % (ref 35–47)
HGB BLD-MCNC: 11.7 G/DL (ref 11.7–15.7)
IMM GRANULOCYTES # BLD: 0 10E3/UL
IMM GRANULOCYTES NFR BLD: 0 %
LYMPHOCYTES # BLD AUTO: 1 10E3/UL (ref 0.8–5.3)
LYMPHOCYTES NFR BLD AUTO: 27 %
MAGNESIUM SERPL-MCNC: 2.4 MG/DL (ref 1.7–2.3)
MCH RBC QN AUTO: 31.8 PG (ref 26.5–33)
MCHC RBC AUTO-ENTMCNC: 33.2 G/DL (ref 31.5–36.5)
MCV RBC AUTO: 96 FL (ref 78–100)
MONOCYTES # BLD AUTO: 0.5 10E3/UL (ref 0–1.3)
MONOCYTES NFR BLD AUTO: 14 %
NEUTROPHILS # BLD AUTO: 2 10E3/UL (ref 1.6–8.3)
NEUTROPHILS NFR BLD AUTO: 57 %
NRBC # BLD AUTO: 0 10E3/UL
NRBC BLD AUTO-RTO: 0 /100
PHOSPHATE SERPL-MCNC: 3.7 MG/DL (ref 2.5–4.5)
PLATELET # BLD AUTO: 252 10E3/UL (ref 150–450)
POTASSIUM SERPL-SCNC: 4.3 MMOL/L (ref 3.4–5.3)
PROT SERPL-MCNC: 7.5 G/DL (ref 6.4–8.3)
RBC # BLD AUTO: 3.68 10E6/UL (ref 3.8–5.2)
SODIUM SERPL-SCNC: 139 MMOL/L (ref 135–145)
WBC # BLD AUTO: 3.5 10E3/UL (ref 4–11)

## 2024-02-20 PROCEDURE — 96365 THER/PROPH/DIAG IV INF INIT: CPT

## 2024-02-20 PROCEDURE — 85025 COMPLETE CBC W/AUTO DIFF WBC: CPT | Performed by: INTERNAL MEDICINE

## 2024-02-20 PROCEDURE — 258N000003 HC RX IP 258 OP 636: Performed by: INTERNAL MEDICINE

## 2024-02-20 PROCEDURE — 80053 COMPREHEN METABOLIC PANEL: CPT | Performed by: INTERNAL MEDICINE

## 2024-02-20 PROCEDURE — 84100 ASSAY OF PHOSPHORUS: CPT | Performed by: INTERNAL MEDICINE

## 2024-02-20 PROCEDURE — 99214 OFFICE O/P EST MOD 30 MIN: CPT | Performed by: INTERNAL MEDICINE

## 2024-02-20 PROCEDURE — 82310 ASSAY OF CALCIUM: CPT | Performed by: INTERNAL MEDICINE

## 2024-02-20 PROCEDURE — 250N000011 HC RX IP 250 OP 636: Mod: JZ | Performed by: INTERNAL MEDICINE

## 2024-02-20 PROCEDURE — 36415 COLL VENOUS BLD VENIPUNCTURE: CPT | Performed by: INTERNAL MEDICINE

## 2024-02-20 PROCEDURE — G0463 HOSPITAL OUTPT CLINIC VISIT: HCPCS | Mod: 25 | Performed by: INTERNAL MEDICINE

## 2024-02-20 PROCEDURE — 83735 ASSAY OF MAGNESIUM: CPT | Performed by: INTERNAL MEDICINE

## 2024-02-20 PROCEDURE — 82565 ASSAY OF CREATININE: CPT | Performed by: INTERNAL MEDICINE

## 2024-02-20 PROCEDURE — 36415 COLL VENOUS BLD VENIPUNCTURE: CPT

## 2024-02-20 RX ORDER — HEPARIN SODIUM (PORCINE) LOCK FLUSH IV SOLN 100 UNIT/ML 100 UNIT/ML
5 SOLUTION INTRAVENOUS
Status: CANCELLED | OUTPATIENT
Start: 2024-02-20

## 2024-02-20 RX ORDER — HEPARIN SODIUM,PORCINE 10 UNIT/ML
5 VIAL (ML) INTRAVENOUS
Status: CANCELLED | OUTPATIENT
Start: 2024-02-20

## 2024-02-20 RX ORDER — ZOLEDRONIC ACID 0.04 MG/ML
4 INJECTION, SOLUTION INTRAVENOUS ONCE
Status: CANCELLED
Start: 2024-04-16 | End: 2024-04-16

## 2024-02-20 RX ORDER — ZOLEDRONIC ACID 0.04 MG/ML
4 INJECTION, SOLUTION INTRAVENOUS ONCE
Status: CANCELLED
Start: 2024-02-20 | End: 2024-02-20

## 2024-02-20 RX ORDER — ZOLEDRONIC ACID 0.04 MG/ML
4 INJECTION, SOLUTION INTRAVENOUS ONCE
Status: COMPLETED | OUTPATIENT
Start: 2024-02-20 | End: 2024-02-20

## 2024-02-20 RX ORDER — HEPARIN SODIUM,PORCINE 10 UNIT/ML
5 VIAL (ML) INTRAVENOUS
Status: CANCELLED | OUTPATIENT
Start: 2024-04-16

## 2024-02-20 RX ORDER — HEPARIN SODIUM (PORCINE) LOCK FLUSH IV SOLN 100 UNIT/ML 100 UNIT/ML
5 SOLUTION INTRAVENOUS
Status: CANCELLED | OUTPATIENT
Start: 2024-04-16

## 2024-02-20 RX ADMIN — SODIUM CHLORIDE 250 ML: 9 INJECTION, SOLUTION INTRAVENOUS at 10:23

## 2024-02-20 RX ADMIN — ZOLEDRONIC ACID 4 MG: 0.04 INJECTION, SOLUTION INTRAVENOUS at 10:23

## 2024-02-20 ASSESSMENT — PAIN SCALES - GENERAL: PAINLEVEL: NO PAIN (0)

## 2024-02-20 NOTE — PROGRESS NOTES
Infusion Nursing Note:  Mickie Mcghee presents today for Zometa Infusion.    Patient seen by provider today: Yes: Dr. Lozada   present during visit today: Not Applicable.    Note: N/A.      Intravenous Access:  Labs drawn without difficulty.  Peripheral IV placed.    Treatment Conditions:  Lab Results   Component Value Date     12/27/2023    POTASSIUM 4.2 12/27/2023    MAG 2.3 10/30/2023    CR 0.90 02/20/2024    OSMEL 9.6 02/20/2024    BILITOTAL 0.2 12/27/2023    ALBUMIN 4.5 02/20/2024    ALT 43 12/27/2023    AST 45 12/27/2023       Results reviewed, labs MET treatment parameters, ok to proceed with treatment.      Post Infusion Assessment:  Patient tolerated infusion without incident.  Blood return noted pre and post infusion.  Site patent and intact, free from redness, edema or discomfort.  No evidence of extravasations.  Access discontinued per protocol.       Discharge Plan:   Patient declined prescription refills.  Discharge instructions reviewed with: Patient.  Patient and/or family verbalized understanding of discharge instructions and all questions answered.  AVS to patient via [employer].  Patient will return when scheduled for next appointment.   Patient discharged in stable condition accompanied by: self.  Departure Mode: Ambulatory.      Colt Hardy RN

## 2024-02-20 NOTE — PATIENT INSTRUCTIONS
1.  Continue letrozole and ribociclib.  2.  Continue Zometa every 8 weeks.  3.  Continue calcium and vitamin D twice a day.  4.  PET is about 1 month.  5.  See me after PET  6.  Labs every 2 months.       
(3) no apparent problem

## 2024-02-20 NOTE — PROGRESS NOTES
"Oncology Rooming Note    February 20, 2024 9:45 AM   Mickie Mcghee is a 58 year old female who presents for:    Chief Complaint   Patient presents with    Oncology Clinic Visit     Initial Vitals: Resp 16   Ht 1.651 m (5' 5\")   Wt 69.4 kg (153 lb)   BMI 25.46 kg/m   Estimated body mass index is 25.46 kg/m  as calculated from the following:    Height as of this encounter: 1.651 m (5' 5\").    Weight as of this encounter: 69.4 kg (153 lb). Body surface area is 1.78 meters squared.  No Pain (0) Comment: Data Unavailable   No LMP recorded.  Allergies reviewed: Yes  Medications reviewed: Yes    Medications: Medication refills not needed today.  Pharmacy name entered into [email or web address]:    [id number] - [place], [place] - [address] AT [facility] - [place], [place] - [address]    Frailty Screening:   Is the patient here for a new oncology consult visit in cancer care? 2. No          Blanquita Meyer MA            "

## 2024-02-20 NOTE — PROGRESS NOTES
ONCOLOGY HISTORY:  Ms. Mcghee is a female with metastatic breast cancer. ER positive, TX positive and HER-2/halina negative.  -Foundation One reveals PIK3CA alteration.  -She had a stage III left breast cancer in 2007. ER positive and HER-2/halina negative.  She had bilateral mastectomy, chemotherapy, radiation, and anastrozole.     1.  On 03/18/2022, multiple imaging studies done because of bilateral lower extremity weakness:  -MRI of thoracic and lumbar spine revealed bone metastases with spinal cord compression.  -CT chest, abdomen, and pelvis on 03/19/2022 revealed bone metastasis, lung nodules and enlarged upper abdominal lymph node.  There is a hypodense nodule in the liver.  -Brain MRI on 03/19/2022 did not reveal any intracranial metastasis.  2.  CT-guided bone biopsy of left iliac bone on 03/21/2022 revealed metastatic breast cancer. ER positive, TX positive and HER-2/halina negative.  3.  Letrozole started on 03/25/2022.  -Ribociclib started on 03/31/2022. Decreased to 200 mg a day in 09/2022.  -Zometa started on 03/24/2022.  -Radiation to thoracolumbar and sacral area between 03/22/2022 and 04/07/2022.  3000 cGy in 10 fractions.     SUBJECTIVE:  MRs. Mcghee is a 58-year-old female with metastatic breast cancer on letrozole and ribociclib.  She is tolerating it well.     She is on Zometa for bone metastasis.  She is tolerating it well.  No dental or jaw related symptoms.     Patient has pain in left shoulder. Pain is improving. She has not yet seen the orthopedics.    Her gait is still slightly unstable. Her walking is pretty good.  She has not been falling down.  She generally uses cane outside the house.    She does not have any numbness or tingling or pain in the extremities.  Sometimes she has feeling of cramping in her legs.    No headache.  No dizziness.  No chest pain.  No shortness of breath.  No abdominal pain.  No nausea or vomiting.  Appetite is good. No urinary or bowel complaints. No bleeding. No bone pain.  All other review of system is negative.     PHYSICAL EXAMINATION:  She is alert and oriented x 3. Not in any distress.  Vitals: Reviewed.  ECOG PS of 1.  Rest of systems not examined.     LABS: CBC and CMP reviewed.     ASSESSMENT:  1.  A 58-year-old female with metastatic breast cancer on letrozole and ibociclib.  No clinical suspicion of progression.  2.  Leukopenia from ribociclib. Stable.  3.  Left shoulder pain from bursitis/synovitis.  Improving.     PLAN:  1.  Continue letrozole and ribociclib.  2.  Continue Zometa every 8 weeks.  3.  Continue calcium and vitamin D twice a day.  4.  PET is about 1 month.  5.  See me after PET  6.  Labs every 2 months.     DISCUSSION:  1.  Discussed with her regarding breast cancer.  Patient is clinically doing well.  No clinical suspicion of progression.    She will continue on letrozole and ribociclib.  She is tolerating it well.    2.  Discussed regarding scans.  Will get a PET scan in about a month.    3.  For bone metastasis, she is on Zometa every 2 months.  She is tolerating it well.  No dental or jaw related symptoms.  If next PET scan does not reveal any hypermetabolic bone metastasis, we can consider reducing the frequency of Zometa.  Further discussion after the next PET scan.    4.  Patient has left shoulder pain.  That is improving.  She did not see the orthopedics.  As it is improving, she can hold off on orthopedics appointment.  She can see them if pain gets worse.    5.  She has mild leukopenia.  No neutropenia.  Labs are good for continuing ribociclib.    6.  She had few questions which were all answered.  I will see her after the PET scan.  Advised her to call us with any questions or concerns.     Total visit time of 30 minutes.  Time spent in today's visit, review of chart/investigations today, monitoring for toxicity of high risk drugs and documentation today.

## 2024-03-03 ENCOUNTER — HEALTH MAINTENANCE LETTER (OUTPATIENT)
Age: 59
End: 2024-03-03

## 2024-03-08 DIAGNOSIS — C79.51 CARCINOMA OF BREAST METASTATIC TO BONE, UNSPECIFIED LATERALITY (H): Primary | ICD-10-CM

## 2024-03-08 DIAGNOSIS — C50.919 CARCINOMA OF BREAST METASTATIC TO BONE, UNSPECIFIED LATERALITY (H): Primary | ICD-10-CM

## 2024-03-11 DIAGNOSIS — C50.919 CARCINOMA OF BREAST METASTATIC TO BONE, UNSPECIFIED LATERALITY (H): Primary | ICD-10-CM

## 2024-03-11 DIAGNOSIS — C79.51 CARCINOMA OF BREAST METASTATIC TO BONE, UNSPECIFIED LATERALITY (H): Primary | ICD-10-CM

## 2024-03-11 RX ORDER — LETROZOLE 2.5 MG/1
2.5 TABLET, FILM COATED ORAL EVERY MORNING
Qty: 28 TABLET | Refills: 0 | Status: SHIPPED | OUTPATIENT
Start: 2024-03-20 | End: 2024-03-25

## 2024-03-11 RX ORDER — RIBOCICLIB 200 MG/1
200 TABLET, FILM COATED ORAL DAILY
Qty: 21 TABLET | Refills: 0 | Status: SHIPPED | OUTPATIENT
Start: 2024-03-20 | End: 2024-03-25

## 2024-03-18 ENCOUNTER — HOSPITAL ENCOUNTER (OUTPATIENT)
Dept: PET IMAGING | Facility: CLINIC | Age: 59
Discharge: HOME | End: 2024-03-18
Attending: INTERNAL MEDICINE | Admitting: INTERNAL MEDICINE
Payer: COMMERCIAL

## 2024-03-18 DIAGNOSIS — C50.919 CARCINOMA OF BREAST METASTATIC TO BONE, UNSPECIFIED LATERALITY (H): ICD-10-CM

## 2024-03-18 DIAGNOSIS — C79.51 CARCINOMA OF BREAST METASTATIC TO BONE, UNSPECIFIED LATERALITY (H): ICD-10-CM

## 2024-03-18 PROCEDURE — 78815 PET IMAGE W/CT SKULL-THIGH: CPT | Mod: PS

## 2024-03-18 PROCEDURE — A9552 F18 FDG: HCPCS | Performed by: INTERNAL MEDICINE

## 2024-03-18 PROCEDURE — 250N000011 HC RX IP 250 OP 636: Performed by: INTERNAL MEDICINE

## 2024-03-18 PROCEDURE — 71260 CT THORAX DX C+: CPT

## 2024-03-18 PROCEDURE — 343N000001 HC RX 343: Performed by: INTERNAL MEDICINE

## 2024-03-18 RX ORDER — IOPAMIDOL 755 MG/ML
10-135 INJECTION, SOLUTION INTRAVASCULAR ONCE
Status: COMPLETED | OUTPATIENT
Start: 2024-03-18 | End: 2024-03-18

## 2024-03-18 RX ADMIN — FLUDEOXYGLUCOSE F-18 13.24 MILLICURIE: 500 INJECTION, SOLUTION INTRAVENOUS at 12:39

## 2024-03-18 RX ADMIN — IOPAMIDOL 93 ML: 755 INJECTION, SOLUTION INTRAVENOUS at 12:40

## 2024-03-23 NOTE — RESULT ENCOUNTER NOTE
Dear Ms. Mcghee,    We will review the PET scan during appointment. PET reveals some worsening of cancer.    Please, call me with any questions.    Yury Lozada MD

## 2024-03-25 ENCOUNTER — VIRTUAL VISIT (OUTPATIENT)
Dept: ONCOLOGY | Facility: CLINIC | Age: 59
End: 2024-03-25
Attending: INTERNAL MEDICINE
Payer: COMMERCIAL

## 2024-03-25 VITALS — HEIGHT: 65 IN | BODY MASS INDEX: 24.99 KG/M2 | WEIGHT: 150 LBS

## 2024-03-25 DIAGNOSIS — C79.51 CARCINOMA OF BREAST METASTATIC TO BONE, UNSPECIFIED LATERALITY (H): Primary | ICD-10-CM

## 2024-03-25 DIAGNOSIS — C50.919 CARCINOMA OF BREAST METASTATIC TO BONE, UNSPECIFIED LATERALITY (H): Primary | ICD-10-CM

## 2024-03-25 PROCEDURE — 99215 OFFICE O/P EST HI 40 MIN: CPT | Mod: 95 | Performed by: INTERNAL MEDICINE

## 2024-03-25 RX ORDER — EPINEPHRINE 1 MG/ML
0.3 INJECTION, SOLUTION INTRAMUSCULAR; SUBCUTANEOUS EVERY 5 MIN PRN
Status: CANCELLED | OUTPATIENT
Start: 2024-04-01

## 2024-03-25 RX ORDER — ALBUTEROL SULFATE 90 UG/1
1-2 AEROSOL, METERED RESPIRATORY (INHALATION)
Status: CANCELLED
Start: 2024-04-15

## 2024-03-25 RX ORDER — MEPERIDINE HYDROCHLORIDE 25 MG/ML
25 INJECTION INTRAMUSCULAR; INTRAVENOUS; SUBCUTANEOUS EVERY 30 MIN PRN
Status: CANCELLED | OUTPATIENT
Start: 2024-04-01

## 2024-03-25 RX ORDER — EPINEPHRINE 1 MG/ML
0.3 INJECTION, SOLUTION INTRAMUSCULAR; SUBCUTANEOUS EVERY 5 MIN PRN
Status: CANCELLED | OUTPATIENT
Start: 2024-04-15

## 2024-03-25 RX ORDER — MEPERIDINE HYDROCHLORIDE 25 MG/ML
25 INJECTION INTRAMUSCULAR; INTRAVENOUS; SUBCUTANEOUS EVERY 30 MIN PRN
Status: CANCELLED | OUTPATIENT
Start: 2024-04-15

## 2024-03-25 RX ORDER — METHYLPREDNISOLONE SODIUM SUCCINATE 125 MG/2ML
125 INJECTION, POWDER, LYOPHILIZED, FOR SOLUTION INTRAMUSCULAR; INTRAVENOUS
Status: CANCELLED
Start: 2024-04-15

## 2024-03-25 RX ORDER — ALBUTEROL SULFATE 90 UG/1
1-2 AEROSOL, METERED RESPIRATORY (INHALATION)
Status: CANCELLED
Start: 2024-04-01

## 2024-03-25 RX ORDER — ALBUTEROL SULFATE 0.83 MG/ML
2.5 SOLUTION RESPIRATORY (INHALATION)
Status: CANCELLED | OUTPATIENT
Start: 2024-04-15

## 2024-03-25 RX ORDER — DIPHENHYDRAMINE HYDROCHLORIDE 50 MG/ML
50 INJECTION INTRAMUSCULAR; INTRAVENOUS
Status: CANCELLED
Start: 2024-04-01

## 2024-03-25 RX ORDER — LAMOTRIGINE 25 MG/1
500 TABLET ORAL ONCE
Status: CANCELLED | OUTPATIENT
Start: 2024-04-15 | End: 2024-04-15

## 2024-03-25 RX ORDER — LAMOTRIGINE 25 MG/1
500 TABLET ORAL ONCE
Status: CANCELLED | OUTPATIENT
Start: 2024-04-01 | End: 2024-04-01

## 2024-03-25 RX ORDER — DIPHENHYDRAMINE HYDROCHLORIDE 50 MG/ML
50 INJECTION INTRAMUSCULAR; INTRAVENOUS
Status: CANCELLED
Start: 2024-04-15

## 2024-03-25 RX ORDER — CALCIUM CARBONATE/VITAMIN D3 600 MG-10
1 TABLET ORAL 2 TIMES DAILY
COMMUNITY
Start: 2023-10-25 | End: 2024-07-17

## 2024-03-25 RX ORDER — ALBUTEROL SULFATE 0.83 MG/ML
2.5 SOLUTION RESPIRATORY (INHALATION)
Status: CANCELLED | OUTPATIENT
Start: 2024-04-01

## 2024-03-25 RX ORDER — METHYLPREDNISOLONE SODIUM SUCCINATE 125 MG/2ML
125 INJECTION, POWDER, LYOPHILIZED, FOR SOLUTION INTRAMUSCULAR; INTRAVENOUS
Status: CANCELLED
Start: 2024-04-01

## 2024-03-25 ASSESSMENT — PAIN SCALES - GENERAL: PAINLEVEL: NO PAIN (0)

## 2024-03-25 NOTE — PROGRESS NOTES
Virtual Visit Details    Originating Location (pt. Location): Home    Distant Location (provider location):  On-site  Platform used for Video Visit: [facility]    ONCOLOGY HISTORY:  Ms. Mcghee is a female with metastatic breast cancer. ER positive, MD positive and HER-2/halina negative (1+ IHC).  -Foundation One reveals PIK3CA alteration.  -She had a stage III left breast cancer in 2007. ER positive and HER-2/halina negative.  She had bilateral mastectomy, chemotherapy, radiation, and anastrozole.     1.  On 03/18/2022, multiple imaging studies done because of bilateral lower extremity weakness:  -MRI of thoracic and lumbar spine revealed bone metastases with spinal cord compression.  -CT chest, abdomen, and pelvis on 03/19/2022 revealed bone metastasis, lung nodules and enlarged upper abdominal lymph node.  There is a hypodense nodule in the liver.  -Brain MRI on 03/19/2022 did not reveal any intracranial metastasis.  2.  CT-guided bone biopsy of left iliac bone on 03/21/2022 revealed metastatic breast cancer. ER positive, MD positive and HER-2/halina negative.  3.  Letrozole started on 03/25/2022.  -Ribociclib started on 03/31/2022. Decreased to 200 mg a day in 09/2022.  -Zometa started on 03/24/2022.  -Radiation to thoracolumbar and sacral area between 03/22/2022 and 04/07/2022.  3000 cGy in 10 fractions.  4.  PET scan on 03/18/2024 reveals progression of disease in the bones.     SUBJECTIVE:  Mrs. Mcghee is a 59-year-old female with metastatic breast cancer on letrozole and ribociclib.  She is tolerating it well.     She is on Zometa for bone metastasis.  She is tolerating it well.  No dental or jaw related symptoms.    PET scan was done on 03/18/2024.  It reveals progression of disease in the bones.  No visceral metastasis.    Her overall condition is stable.  Her walking is fairly good.  Sometimes her gait is slightly unstable.  She has not been falling down.  She does not need to use any cane in the house.  Outside the house,  she uses a cane.     No headache.  No dizziness.  No chest pain.  No shortness of breath.  No abdominal pain.  No nausea or vomiting.  Appetite is good. No urinary or bowel complaints. No bleeding. No bone pain. All other review of system is negative.     PHYSICAL EXAMINATION:  She is alert and oriented x 3. Not in any distress.  Rest of systems not examined as this is a video visit.     ASSESSMENT:  1.  A 59-year-old female with metastatic breast cancer on letrozole and ribociclib.  Disease is progressing.  2.  Leukopenia from ribociclib. Stable.     PLAN:  1.  Change treatment to Faslodex and capivasertib.  2.  Continue Zometa every 8 weeks.  3.  Continue calcium and vitamin D twice a day.  4.  See me in about 1 month.  5.  Labs as per treatment protocol.     DISCUSSION:  1.  PET scan was reviewed with the patient.  Explained to her that it reveals progression of disease in the bones.  There is no visceral metastasis.    2.  Discussed regarding treatment.  She is currently on letrozole and ribociclib.  That will be discontinued.    Foundation One had revealed PIK3CA alteration.  Based on that, discussed regarding treatment with Faslodex and capivasertib.  Regimen discussed.  Side effects reviewed.  She is agreeable for it.  That will be started in next few days.    Patient multiple questions regarding treatment which were reviewed.    3.  She will continue on Zometa.  She is not having any dental or jaw related symptoms.    4.  I will see her in a month.  Advised her to call us with any questions or concerns.  Labs will be monitored as per treatment protocol.    Total video visit time of 40 minutes.  Time spent in today's visit, review of chart/investigations today, discussing regarding high risk drug and monitoring for toxicity of high risk drugs and documentation today.

## 2024-03-25 NOTE — NURSING NOTE
Is the patient currently in the state of [place]? YES    Visit mode:VIDEO    If the visit is dropped, the patient can be reconnected by: VIDEO VISIT: Send to e-mail at: [email or web address]    Will anyone else be joining the visit? NO  (If patient encounters technical issues they should call [phone])    How would you like to obtain your AVS? MyChart    Are changes needed to the allergy or medication list? No    Reason for visit: RECHECK    No other vitals to report per pt    Philomena CELESTEF

## 2024-03-25 NOTE — LETTER
3/25/2024         RE: Mickie Mcghee  [address]  [place] [address]        Dear Colleague,    Thank you for referring your patient, Mickie Mcghee, to the [facility]. Please see a copy of my visit note below.    Virtual Visit Details    Originating Location (pt. Location): Home    Distant Location (provider location):  On-site  Platform used for Video Visit: [facility]    ONCOLOGY HISTORY:  Ms. Mcghee is a female with metastatic breast cancer. ER positive, CA positive and HER-2/halina negative (1+ IHC).  -Foundation One reveals PIK3CA alteration.  -She had a stage III left breast cancer in 2007. ER positive and HER-2/halina negative.  She had bilateral mastectomy, chemotherapy, radiation, and anastrozole.     1.  On 03/18/2022, multiple imaging studies done because of bilateral lower extremity weakness:  -MRI of thoracic and lumbar spine revealed bone metastases with spinal cord compression.  -CT chest, abdomen, and pelvis on 03/19/2022 revealed bone metastasis, lung nodules and enlarged upper abdominal lymph node.  There is a hypodense nodule in the liver.  -Brain MRI on 03/19/2022 did not reveal any intracranial metastasis.  2.  CT-guided bone biopsy of left iliac bone on 03/21/2022 revealed metastatic breast cancer. ER positive, CA positive and HER-2/halina negative.  3.  Letrozole started on 03/25/2022.  -Ribociclib started on 03/31/2022. Decreased to 200 mg a day in 09/2022.  -Zometa started on 03/24/2022.  -Radiation to thoracolumbar and sacral area between 03/22/2022 and 04/07/2022.  3000 cGy in 10 fractions.  4.  PET scan on 03/18/2024 reveals progression of disease in the bones.     SUBJECTIVE:  Mrs. Mcghee is a 59-year-old female with metastatic breast cancer on letrozole and ribociclib.  She is tolerating it well.     She is on Zometa for bone metastasis.  She is tolerating it well.  No dental or jaw related symptoms.    PET scan was done on 03/18/2024.  It reveals progression  of disease in the bones.  No visceral metastasis.    Her overall condition is stable.  Her walking is fairly good.  Sometimes her gait is slightly unstable.  She has not been falling down.  She does not need to use any cane in the house.  Outside the house, she uses a cane.     No headache.  No dizziness.  No chest pain.  No shortness of breath.  No abdominal pain.  No nausea or vomiting.  Appetite is good. No urinary or bowel complaints. No bleeding. No bone pain. All other review of system is negative.     PHYSICAL EXAMINATION:  She is alert and oriented x 3. Not in any distress.  Rest of systems not examined as this is a video visit.     ASSESSMENT:  1.  A 59-year-old female with metastatic breast cancer on letrozole and ribociclib.  Disease is progressing.  2.  Leukopenia from ribociclib. Stable.     PLAN:  1.  Change treatment to Faslodex and capivasertib.  2.  Continue Zometa every 8 weeks.  3.  Continue calcium and vitamin D twice a day.  4.  See me in about 1 month.  5.  Labs as per treatment protocol.     DISCUSSION:  1.  PET scan was reviewed with the patient.  Explained to her that it reveals progression of disease in the bones.  There is no visceral metastasis.    2.  Discussed regarding treatment.  She is currently on letrozole and ribociclib.  That will be discontinued.    Foundation One had revealed PIK3CA alteration.  Based on that, discussed regarding treatment with Faslodex and capivasertib.  Regimen discussed.  Side effects reviewed.  She is agreeable for it.  That will be started in next few days.    Patient multiple questions regarding treatment which were reviewed.    3.  She will continue on Zometa.  She is not having any dental or jaw related symptoms.    4.  I will see her in a month.  Advised her to call us with any questions or concerns.  Labs will be monitored as per treatment protocol.    Total video visit time of 40 minutes.  Time spent in today's visit, review of chart/investigations  today, discussing regarding high risk drug and monitoring for toxicity of high risk drugs and documentation today.      Again, thank you for allowing me to participate in the care of your patient.        Sincerely,        Yury Lozada MD

## 2024-03-25 NOTE — PATIENT INSTRUCTIONS
1.  Change treatment to Faslodex and capivasertib.  2.  Continue Zometa every 8 weeks.  3.  Continue calcium and vitamin D twice a day.  4.  See me in about 1 month.  5.  Labs as per treatment protocol.

## 2024-03-25 NOTE — LETTER
3/25/2024         RE: Mickie Mcghee  [address]  [place] [address]        Dear Colleague,    Thank you for referring your patient, Mickie Mcghee, to the [facility]. Please see a copy of my visit note below.    Virtual Visit Details    Originating Location (pt. Location): Home    Distant Location (provider location):  On-site  Platform used for Video Visit: [facility]    ONCOLOGY HISTORY:  Ms. Mcghee is a female with metastatic breast cancer. ER positive, IA positive and HER-2/halina negative (1+ IHC).  -Foundation One reveals PIK3CA alteration.  -She had a stage III left breast cancer in 2007. ER positive and HER-2/halina negative.  She had bilateral mastectomy, chemotherapy, radiation, and anastrozole.     1.  On 03/18/2022, multiple imaging studies done because of bilateral lower extremity weakness:  -MRI of thoracic and lumbar spine revealed bone metastases with spinal cord compression.  -CT chest, abdomen, and pelvis on 03/19/2022 revealed bone metastasis, lung nodules and enlarged upper abdominal lymph node.  There is a hypodense nodule in the liver.  -Brain MRI on 03/19/2022 did not reveal any intracranial metastasis.  2.  CT-guided bone biopsy of left iliac bone on 03/21/2022 revealed metastatic breast cancer. ER positive, IA positive and HER-2/halina negative.  3.  Letrozole started on 03/25/2022.  -Ribociclib started on 03/31/2022. Decreased to 200 mg a day in 09/2022.  -Zometa started on 03/24/2022.  -Radiation to thoracolumbar and sacral area between 03/22/2022 and 04/07/2022.  3000 cGy in 10 fractions.  4.  PET scan on 03/18/2024 reveals progression of disease in the bones.     SUBJECTIVE:  Mrs. Mcghee is a 59-year-old female with metastatic breast cancer on letrozole and ribociclib.  She is tolerating it well.     She is on Zometa for bone metastasis.  She is tolerating it well.  No dental or jaw related symptoms.    PET scan was done on 03/18/2024.  It reveals progression  of disease in the bones.  No visceral metastasis.    Her overall condition is stable.  Her walking is fairly good.  Sometimes her gait is slightly unstable.  She has not been falling down.  She does not need to use any cane in the house.  Outside the house, she uses a cane.     No headache.  No dizziness.  No chest pain.  No shortness of breath.  No abdominal pain.  No nausea or vomiting.  Appetite is good. No urinary or bowel complaints. No bleeding. No bone pain. All other review of system is negative.     PHYSICAL EXAMINATION:  She is alert and oriented x 3. Not in any distress.  Rest of systems not examined as this is a video visit.     ASSESSMENT:  1.  A 59-year-old female with metastatic breast cancer on letrozole and ribociclib.  Disease is progressing.  2.  Leukopenia from ribociclib. Stable.     PLAN:  1.  Change treatment to Faslodex and capivasertib.  2.  Continue Zometa every 8 weeks.  3.  Continue calcium and vitamin D twice a day.  4.  See me in about 1 month.  5.  Labs as per treatment protocol.     DISCUSSION:  1.  PET scan was reviewed with the patient.  Explained to her that it reveals progression of disease in the bones.  There is no visceral metastasis.    2.  Discussed regarding treatment.  She is currently on letrozole and ribociclib.  That will be discontinued.    Foundation One had revealed PIK3CA alteration.  Based on that, discussed regarding treatment with Faslodex and capivasertib.  Regimen discussed.  Side effects reviewed.  She is agreeable for it.  That will be started in next few days.    Patient multiple questions regarding treatment which were reviewed.    3.  She will continue on Zometa.  She is not having any dental or jaw related symptoms.    4.  I will see her in a month.  Advised her to call us with any questions or concerns.  Labs will be monitored as per treatment protocol.    Total video visit time of 40 minutes.  Time spent in today's visit, review of chart/investigations  today, discussing regarding high risk drug and monitoring for toxicity of high risk drugs and documentation today.      Again, thank you for allowing me to participate in the care of your patient.        Sincerely,        Yury Lozada MD

## 2024-03-27 ENCOUNTER — TELEPHONE (OUTPATIENT)
Dept: PHARMACY | Facility: CLINIC | Age: 59
End: 2024-03-27
Payer: COMMERCIAL

## 2024-03-27 NOTE — TELEPHONE ENCOUNTER
PA Initiation    Medication: TRUQAP 200 MG PO TABS  Insurance Company: [employer] - Phone [phone] Fax [phone]  Pharmacy Filling the Rx: [facility] - [place], [place] - [facility] [address]  Filling Pharmacy Phone:    Filling Pharmacy Fax:    Start Date: 3/26/2024

## 2024-03-28 ENCOUNTER — DOCUMENTATION ONLY (OUTPATIENT)
Dept: PHARMACY | Facility: CLINIC | Age: 59
End: 2024-03-28
Payer: COMMERCIAL

## 2024-03-28 DIAGNOSIS — C79.51 CARCINOMA OF BREAST METASTATIC TO BONE, UNSPECIFIED LATERALITY (H): Primary | ICD-10-CM

## 2024-03-28 DIAGNOSIS — C50.919 CARCINOMA OF BREAST METASTATIC TO BONE, UNSPECIFIED LATERALITY (H): Primary | ICD-10-CM

## 2024-03-28 RX ORDER — LOPERAMIDE HCL 2 MG
CAPSULE ORAL
Qty: 30 CAPSULE | Refills: 0 | Status: SHIPPED | OUTPATIENT
Start: 2024-03-31 | End: 2024-09-17

## 2024-03-28 RX ORDER — PROCHLORPERAZINE MALEATE 10 MG
10 TABLET ORAL EVERY 6 HOURS PRN
Qty: 30 TABLET | Refills: 2 | Status: SHIPPED | OUTPATIENT
Start: 2024-03-31

## 2024-03-28 NOTE — TELEPHONE ENCOUNTER
Prior Authorization Approval    Medication: TRUQAP 200 MG PO TABS  Authorization Effective Date: 3/28/2024  Authorization Expiration Date: 3/28/2025  Approved Dose/Quantity: 64/28   Reference #:     Insurance Company: [employer] - Phone [phone] Fax [phone]  Expected CoPay: $ 0  CoPay Card Available:      Financial Assistance Needed:   Which Pharmacy is filling the prescription: [facility] - [place], [place] - [address]  Pharmacy Notified: yes  Patient Notified: yes

## 2024-03-28 NOTE — PROGRESS NOTES
Oral Chemotherapy Monitoring Program    Lab Monitoring Plan    CBC w/ diff + CMP every 2 weeks for first month, then monthly   Subjective/Objective:  Mickie Mcghee is a 59 year old female contacted by phone for an initial visit for oral chemotherapy education. Patient is continuing ribociclib until starting the new regimen of Truqap + Faslodex on 4/2/24.         10/25/2023    11:00 AM 11/22/2023    10:00 AM 12/19/2023     2:00 PM 12/27/2023    11:00 AM 1/17/2024    12:00 PM 2/12/2024     9:00 AM 3/28/2024     1:00 PM   ORAL CHEMOTHERAPY   Assessment Type Refill;Chart Review Refill Refill Lab Monitoring Refill Refill Initial Work up   Diagnosis Code Breast Cancer Breast Cancer Breast Cancer Breast Cancer Breast Cancer Breast Cancer Breast Cancer   Providers Dr. Neville Lozada   Clinic Name/Location [facility]   Drug Name Kisqali (ribociclib) Kisqali (ribociclib) Kisqali (ribociclib) Kisqali (ribociclib) Kisqali (ribociclib) Kisqali (ribociclib) Truqap (capivasertib)   Dose 200 mg 200 mg 200 mg 200 mg 200 mg 200 mg 400 mg   Current Schedule Daily Daily Daily Daily Daily Daily BID   Cycle Details 3 weeks on, 1 week off 3 weeks on, 1 week off 3 weeks on, 1 week off 3 weeks on, 1 week off 3 weeks on, 1 week off 3 weeks on, 1 week off Other   Start Date of Last Cycle 10/4/2023 11/29/2023     4/2/2024   Planned next cycle start date 11/1/2023 12/26/2023        Adverse Effects    No AE identified during assessment      Any new drug interactions? No      No   Is the dose as ordered appropriate for the patient? Yes      Yes       Last PHQ-2 Score on record:        No data to display                Vitals:  BP:   BP Readings from Last 1 Encounters:   02/20/24 132/81     Wt Readings from Last 1 Encounters:   03/25/24 68 kg (150 lb)     Estimated body surface area is 1.77 meters squared as calculated from the following:    " Height as of 3/25/24: 1.651 m (5' 5\").    Weight as of 3/25/24: 68 kg (150 lb).      Assessment:  Patient is appropriate to start therapy.    Plan:  Basic chemotherapy teaching was reviewed with the patient including indication, start date of therapy, dose, administration, adverse effects, missed doses, food and drug interactions, monitoring, side effect management, office contact information, and safe handling. Written materials were provided and all questions answered.    We are planning to get baseline labs on 4/2/24 and starting the Truqap that day along with the Faslodex.     Follow-Up:  4/2/24: Labs/Faslodex   4/9/24: Pharm follow up  4/16/24: Lozada and labs        Deshaun Tayo  Pharmacy Intern   [facility]   [phone]         "

## 2024-04-02 ENCOUNTER — LAB (OUTPATIENT)
Dept: INFUSION THERAPY | Facility: CLINIC | Age: 59
End: 2024-04-02
Attending: INTERNAL MEDICINE
Payer: COMMERCIAL

## 2024-04-02 ENCOUNTER — DOCUMENTATION ONLY (OUTPATIENT)
Dept: PHARMACY | Facility: CLINIC | Age: 59
End: 2024-04-02

## 2024-04-02 VITALS
TEMPERATURE: 98.4 F | SYSTOLIC BLOOD PRESSURE: 131 MMHG | HEART RATE: 76 BPM | OXYGEN SATURATION: 96 % | RESPIRATION RATE: 16 BRPM | DIASTOLIC BLOOD PRESSURE: 72 MMHG

## 2024-04-02 DIAGNOSIS — C79.51 CARCINOMA OF BREAST METASTATIC TO BONE, UNSPECIFIED LATERALITY (H): ICD-10-CM

## 2024-04-02 DIAGNOSIS — C79.51 CARCINOMA OF BREAST METASTATIC TO BONE, UNSPECIFIED LATERALITY (H): Primary | ICD-10-CM

## 2024-04-02 DIAGNOSIS — C50.919 CARCINOMA OF BREAST METASTATIC TO BONE, UNSPECIFIED LATERALITY (H): Primary | ICD-10-CM

## 2024-04-02 DIAGNOSIS — C50.919 CARCINOMA OF BREAST METASTATIC TO BONE, UNSPECIFIED LATERALITY (H): ICD-10-CM

## 2024-04-02 LAB
ALBUMIN SERPL BCG-MCNC: 4.3 G/DL (ref 3.5–5.2)
ALP SERPL-CCNC: 49 U/L (ref 40–150)
ALT SERPL W P-5'-P-CCNC: 31 U/L (ref 0–50)
ANION GAP SERPL CALCULATED.3IONS-SCNC: 11 MMOL/L (ref 7–15)
AST SERPL W P-5'-P-CCNC: 22 U/L (ref 0–45)
BASOPHILS # BLD AUTO: 0.1 10E3/UL (ref 0–0.2)
BASOPHILS NFR BLD AUTO: 1 %
BILIRUB SERPL-MCNC: 0.2 MG/DL
BUN SERPL-MCNC: 16.7 MG/DL (ref 6–20)
CALCIUM SERPL-MCNC: 9.5 MG/DL (ref 8.6–10)
CHLORIDE SERPL-SCNC: 106 MMOL/L (ref 98–107)
CREAT SERPL-MCNC: 0.93 MG/DL (ref 0.51–0.95)
DEPRECATED HCO3 PLAS-SCNC: 24 MMOL/L (ref 22–29)
EGFRCR SERPLBLD CKD-EPI 2021: 71 ML/MIN/1.73M2
EOSINOPHIL # BLD AUTO: 0.1 10E3/UL (ref 0–0.7)
EOSINOPHIL NFR BLD AUTO: 2 %
ERYTHROCYTE [DISTWIDTH] IN BLOOD BY AUTOMATED COUNT: 13.2 % (ref 10–15)
GLUCOSE SERPL-MCNC: 94 MG/DL (ref 70–99)
HCT VFR BLD AUTO: 33.7 % (ref 35–47)
HGB BLD-MCNC: 11.2 G/DL (ref 11.7–15.7)
IMM GRANULOCYTES # BLD: 0 10E3/UL
IMM GRANULOCYTES NFR BLD: 0 %
LYMPHOCYTES # BLD AUTO: 1.1 10E3/UL (ref 0.8–5.3)
LYMPHOCYTES NFR BLD AUTO: 30 %
MAGNESIUM SERPL-MCNC: 2.1 MG/DL (ref 1.7–2.3)
MCH RBC QN AUTO: 32 PG (ref 26.5–33)
MCHC RBC AUTO-ENTMCNC: 33.2 G/DL (ref 31.5–36.5)
MCV RBC AUTO: 96 FL (ref 78–100)
MONOCYTES # BLD AUTO: 0.4 10E3/UL (ref 0–1.3)
MONOCYTES NFR BLD AUTO: 10 %
NEUTROPHILS # BLD AUTO: 2 10E3/UL (ref 1.6–8.3)
NEUTROPHILS NFR BLD AUTO: 57 %
NRBC # BLD AUTO: 0 10E3/UL
NRBC BLD AUTO-RTO: 0 /100
PLATELET # BLD AUTO: 268 10E3/UL (ref 150–450)
POTASSIUM SERPL-SCNC: 4.1 MMOL/L (ref 3.4–5.3)
PROT SERPL-MCNC: 6.9 G/DL (ref 6.4–8.3)
RBC # BLD AUTO: 3.5 10E6/UL (ref 3.8–5.2)
SODIUM SERPL-SCNC: 141 MMOL/L (ref 135–145)
WBC # BLD AUTO: 3.6 10E3/UL (ref 4–11)

## 2024-04-02 PROCEDURE — 85004 AUTOMATED DIFF WBC COUNT: CPT | Performed by: INTERNAL MEDICINE

## 2024-04-02 PROCEDURE — 96402 CHEMO HORMON ANTINEOPL SQ/IM: CPT

## 2024-04-02 PROCEDURE — 250N000011 HC RX IP 250 OP 636: Mod: JZ | Performed by: INTERNAL MEDICINE

## 2024-04-02 PROCEDURE — 83735 ASSAY OF MAGNESIUM: CPT | Performed by: INTERNAL MEDICINE

## 2024-04-02 PROCEDURE — 36415 COLL VENOUS BLD VENIPUNCTURE: CPT

## 2024-04-02 PROCEDURE — 80053 COMPREHEN METABOLIC PANEL: CPT | Performed by: INTERNAL MEDICINE

## 2024-04-02 RX ORDER — LAMOTRIGINE 25 MG/1
500 TABLET ORAL ONCE
Status: COMPLETED | OUTPATIENT
Start: 2024-04-02 | End: 2024-04-02

## 2024-04-02 RX ADMIN — FULVESTRANT 500 MG: 50 INJECTION INTRAMUSCULAR at 15:08

## 2024-04-02 NOTE — PROGRESS NOTES
Infusion Nursing Note:  Mickie Mcghee presents today for first faslodex injections.    Patient seen by provider today: No   present during visit today: Not Applicable.    Note: Discussed faslodex as this is her first injections. Had received written info prior to today.      Intravenous Access:  No Intravenous access/labs at this visit.    Treatment Conditions:  Not Applicable.      Post Infusion Assessment:  Patient tolerated injection without incident.  Site patent and intact, free from redness, edema or discomfort.       Discharge Plan:   AVS to patient via MYCHART.  Patient will return as prev pancho for next appointment.   Patient discharged in stable condition accompanied by: self.  Departure Mode: Ambulatory with cane.      George Metz RN

## 2024-04-02 NOTE — PROGRESS NOTES
Oral Chemotherapy Monitoring Program  Lab Follow Up    Reviewed lab results from 4/2/24.        11/22/2023    10:00 AM 12/19/2023     2:00 PM 12/27/2023    11:00 AM 1/17/2024    12:00 PM 2/12/2024     9:00 AM 3/28/2024     1:00 PM 4/2/2024     3:00 PM   ORAL CHEMOTHERAPY   Assessment Type Refill Refill Lab Monitoring Refill Refill Initial Work up Lab Monitoring   Diagnosis Code Breast Cancer Breast Cancer Breast Cancer Breast Cancer Breast Cancer Breast Cancer Breast Cancer   Providers Dr. Neville Lozada   Clinic Name/Location [facility]   Drug Name Kisqali (ribociclib) Kisqali (ribociclib) Kisqali (ribociclib) Kisqali (ribociclib) Kisqali (ribociclib) Truqap (capivasertib) Truqap (capivasertib)   Dose 200 mg 200 mg 200 mg 200 mg 200 mg 400 mg 400 mg   Current Schedule Daily Daily Daily Daily Daily BID BID   Cycle Details 3 weeks on, 1 week off 3 weeks on, 1 week off 3 weeks on, 1 week off 3 weeks on, 1 week off 3 weeks on, 1 week off Other Other   Start Date of Last Cycle 11/29/2023 4/2/2024 4/2/2024   Planned next cycle start date 12/26/2023         Adverse Effects   No AE identified during assessment       Any new drug interactions?      No    Is the dose as ordered appropriate for the patient?      Yes Yes       Labs:  _  Result Component Current Result Ref Range   Sodium 141 (4/2/2024) 135 - 145 mmol/L     _  Result Component Current Result Ref Range   Potassium 4.1 (4/2/2024) 3.4 - 5.3 mmol/L     _  Result Component Current Result Ref Range   Calcium 9.5 (4/2/2024) 8.6 - 10.0 mg/dL     _  Result Component Current Result Ref Range   Magnesium 2.1 (4/2/2024) 1.7 - 2.3 mg/dL     No results found for Phos within last 30 days.     _  Result Component Current Result Ref Range   Albumin 4.3 (4/2/2024) 3.5 - 5.2 g/dL     _  Result Component Current Result Ref Range   Urea Nitrogen 16.7 (4/2/2024) 6.0 -  20.0 mg/dL     _  Result Component Current Result Ref Range   Creatinine 0.93 (4/2/2024) 0.51 - 0.95 mg/dL     _  Result Component Current Result Ref Range   AST 22 (4/2/2024) 0 - 45 U/L     _  Result Component Current Result Ref Range   ALT 31 (4/2/2024) 0 - 50 U/L     _  Result Component Current Result Ref Range   Bilirubin Total 0.2 (4/2/2024) <=1.2 mg/dL     _  Result Component Current Result Ref Range   WBC Count 3.6 (L) (4/2/2024) 4.0 - 11.0 10e3/uL     _  Result Component Current Result Ref Range   Hemoglobin 11.2 (L) (4/2/2024) 11.7 - 15.7 g/dL     _  Result Component Current Result Ref Range   Platelet Count 268 (4/2/2024) 150 - 450 10e3/uL     No results found for ANC within last 30 days.     _  Result Component Current Result Ref Range   Absolute Neutrophils 2.0 (4/2/2024) 1.6 - 8.3 10e3/uL        Assessment & Plan:  No concerning abnormalities, these are baseline labs. Good to start Truqap.     Questions answered to patient's satisfaction.    Follow-Up:  4/16 - Rena and Neville Cr  Pharmacy Intern   [facility]   [phone]

## 2024-04-09 ENCOUNTER — DOCUMENTATION ONLY (OUTPATIENT)
Dept: PHARMACY | Facility: CLINIC | Age: 59
End: 2024-04-09
Payer: COMMERCIAL

## 2024-04-09 NOTE — PROGRESS NOTES
Oral Chemotherapy Monitoring Program    Subjective/Objective:  Mickie Mcghee is a 59 year old female contacted by phone for a follow-up visit for oral chemotherapy. She stated that therapy with capivasertib has gone well so far. She has experienced some minor diarrhea (1 loose but not watery stool in the morning). She has not taken loperamide for the loose stools but does not feel that she needs to at this time. She agreed to be proactive with loperamide use for any worsening of diarrhea. She also noted some very minor headaches. She is not currently taking any over-the-counter medication for her headaches and does not feel that she needs to at this time.        12/19/2023     2:00 PM 12/27/2023    11:00 AM 1/17/2024    12:00 PM 2/12/2024     9:00 AM 3/28/2024     1:00 PM 4/2/2024     3:00 PM 4/9/2024    11:00 AM   ORAL CHEMOTHERAPY   Assessment Type Refill Lab Monitoring Refill Refill Initial Work up Lab Monitoring Initial Follow up   Diagnosis Code Breast Cancer Breast Cancer Breast Cancer Breast Cancer Breast Cancer Breast Cancer Breast Cancer   Providers Dr. Neville Lozada   Clinic Name/Location [facility]   Drug Name Kisqali (ribociclib) Kisqali (ribociclib) Kisqali (ribociclib) Kisqali (ribociclib) Truqap (capivasertib) Truqap (capivasertib) Truqap (capivasertib)   Dose 200 mg 200 mg 200 mg 200 mg 400 mg 400 mg 400 mg   Current Schedule Daily Daily Daily Daily BID BID BID   Cycle Details 3 weeks on, 1 week off 3 weeks on, 1 week off 3 weeks on, 1 week off 3 weeks on, 1 week off Other Other Other   Start Date of Last Cycle     4/2/2024 4/2/2024 4/2/2024   Planned next cycle start date       4/30/2024   Adverse Effects  No AE identified during assessment     Diarrhea;Headache   Diarrhea       Grade 1   Pharmacist Intervention(diarrhea)       Yes   Intervention(s)       Patient education   Headache        "Grade 1   Pharmacist Intervention(headache)       Yes   Intervention(s)       Patient education   Any new drug interactions?     No     Is the dose as ordered appropriate for the patient?     Yes Yes        Last PHQ-2 Score on record:        No data to display                Vitals:  BP:   BP Readings from Last 1 Encounters:   04/02/24 131/72     Wt Readings from Last 1 Encounters:   03/25/24 68 kg (150 lb)     Estimated body surface area is 1.77 meters squared as calculated from the following:    Height as of 3/25/24: 1.651 m (5' 5\").    Weight as of 3/25/24: 68 kg (150 lb).    Labs:  _  Result Component Current Result Ref Range   Sodium 141 (4/2/2024) 135 - 145 mmol/L     _  Result Component Current Result Ref Range   Potassium 4.1 (4/2/2024) 3.4 - 5.3 mmol/L     _  Result Component Current Result Ref Range   Calcium 9.5 (4/2/2024) 8.6 - 10.0 mg/dL     _  Result Component Current Result Ref Range   Magnesium 2.1 (4/2/2024) 1.7 - 2.3 mg/dL     No results found for Phos within last 30 days.     _  Result Component Current Result Ref Range   Albumin 4.3 (4/2/2024) 3.5 - 5.2 g/dL     _  Result Component Current Result Ref Range   Urea Nitrogen 16.7 (4/2/2024) 6.0 - 20.0 mg/dL     _  Result Component Current Result Ref Range   Creatinine 0.93 (4/2/2024) 0.51 - 0.95 mg/dL     _  Result Component Current Result Ref Range   AST 22 (4/2/2024) 0 - 45 U/L     _  Result Component Current Result Ref Range   ALT 31 (4/2/2024) 0 - 50 U/L     _  Result Component Current Result Ref Range   Bilirubin Total 0.2 (4/2/2024) <=1.2 mg/dL     _  Result Component Current Result Ref Range   WBC Count 3.6 (L) (4/2/2024) 4.0 - 11.0 10e3/uL     _  Result Component Current Result Ref Range   Hemoglobin 11.2 (L) (4/2/2024) 11.7 - 15.7 g/dL     _  Result Component Current Result Ref Range   Platelet Count 268 (4/2/2024) 150 - 450 10e3/uL     _  Result Component Current Result Ref Range   Absolute Neutrophils 2.0 (4/2/2024) 1.6 - 8.3 10e3/uL    "     Assessment/Plan:  The patient has tolerated therapy well so far with some minor side effects including headaches and diarrhea. She will continue to monitor her symptoms and will call with any worsening symptoms. She will start using loperamide if her diarrhea worsens. Ok to proceed with capivasertib.    Follow-Up:  4/16 - Appointment with Dr. Lozada and labs Rachel Moniz, PharmD  Hematology/Oncology Pharmacist  [facility]  [phone]\

## 2024-04-16 ENCOUNTER — LAB (OUTPATIENT)
Dept: INFUSION THERAPY | Facility: CLINIC | Age: 59
End: 2024-04-16
Attending: INTERNAL MEDICINE
Payer: COMMERCIAL

## 2024-04-16 ENCOUNTER — ONCOLOGY VISIT (OUTPATIENT)
Dept: ONCOLOGY | Facility: CLINIC | Age: 59
End: 2024-04-16
Attending: INTERNAL MEDICINE
Payer: COMMERCIAL

## 2024-04-16 VITALS
DIASTOLIC BLOOD PRESSURE: 89 MMHG | OXYGEN SATURATION: 96 % | HEIGHT: 65 IN | WEIGHT: 155 LBS | RESPIRATION RATE: 16 BRPM | HEART RATE: 77 BPM | TEMPERATURE: 98.2 F | SYSTOLIC BLOOD PRESSURE: 134 MMHG | BODY MASS INDEX: 25.83 KG/M2

## 2024-04-16 DIAGNOSIS — C79.51 CARCINOMA OF BREAST METASTATIC TO BONE, UNSPECIFIED LATERALITY (H): ICD-10-CM

## 2024-04-16 DIAGNOSIS — G95.29 SPINAL CORD COMPRESSION DUE TO MALIGNANT NEOPLASM METASTATIC TO SPINE (H): Primary | ICD-10-CM

## 2024-04-16 DIAGNOSIS — C50.919 CARCINOMA OF BREAST METASTATIC TO BONE, UNSPECIFIED LATERALITY (H): ICD-10-CM

## 2024-04-16 DIAGNOSIS — C79.51 CARCINOMA OF BREAST METASTATIC TO BONE, UNSPECIFIED LATERALITY (H): Primary | ICD-10-CM

## 2024-04-16 DIAGNOSIS — C79.51 SPINAL CORD COMPRESSION DUE TO MALIGNANT NEOPLASM METASTATIC TO SPINE (H): Primary | ICD-10-CM

## 2024-04-16 DIAGNOSIS — C79.51 SPINAL CORD COMPRESSION DUE TO MALIGNANT NEOPLASM METASTATIC TO SPINE (H): ICD-10-CM

## 2024-04-16 DIAGNOSIS — G95.29 SPINAL CORD COMPRESSION DUE TO MALIGNANT NEOPLASM METASTATIC TO SPINE (H): ICD-10-CM

## 2024-04-16 DIAGNOSIS — C50.919 CARCINOMA OF BREAST METASTATIC TO BONE, UNSPECIFIED LATERALITY (H): Primary | ICD-10-CM

## 2024-04-16 LAB
ALBUMIN SERPL BCG-MCNC: 4.2 G/DL (ref 3.5–5.2)
ALP SERPL-CCNC: 55 U/L (ref 40–150)
ALT SERPL W P-5'-P-CCNC: 31 U/L (ref 0–50)
ANION GAP SERPL CALCULATED.3IONS-SCNC: 12 MMOL/L (ref 7–15)
AST SERPL W P-5'-P-CCNC: 31 U/L (ref 0–45)
BILIRUB SERPL-MCNC: 0.2 MG/DL
BUN SERPL-MCNC: 15.2 MG/DL (ref 6–20)
CALCIUM SERPL-MCNC: 9.5 MG/DL (ref 8.6–10)
CALCIUM SERPL-MCNC: 9.5 MG/DL (ref 8.6–10)
CHLORIDE SERPL-SCNC: 104 MMOL/L (ref 98–107)
CREAT SERPL-MCNC: 0.88 MG/DL (ref 0.51–0.95)
DEPRECATED HCO3 PLAS-SCNC: 23 MMOL/L (ref 22–29)
EGFRCR SERPLBLD CKD-EPI 2021: 76 ML/MIN/1.73M2
GLUCOSE SERPL-MCNC: 99 MG/DL (ref 70–99)
MAGNESIUM SERPL-MCNC: 2.4 MG/DL (ref 1.7–2.3)
POTASSIUM SERPL-SCNC: 4.3 MMOL/L (ref 3.4–5.3)
PROT SERPL-MCNC: 6.9 G/DL (ref 6.4–8.3)
SODIUM SERPL-SCNC: 139 MMOL/L (ref 135–145)

## 2024-04-16 PROCEDURE — G0463 HOSPITAL OUTPT CLINIC VISIT: HCPCS | Mod: 25 | Performed by: INTERNAL MEDICINE

## 2024-04-16 PROCEDURE — 99214 OFFICE O/P EST MOD 30 MIN: CPT | Performed by: INTERNAL MEDICINE

## 2024-04-16 PROCEDURE — 83735 ASSAY OF MAGNESIUM: CPT | Performed by: INTERNAL MEDICINE

## 2024-04-16 PROCEDURE — 80053 COMPREHEN METABOLIC PANEL: CPT | Performed by: INTERNAL MEDICINE

## 2024-04-16 PROCEDURE — 96402 CHEMO HORMON ANTINEOPL SQ/IM: CPT

## 2024-04-16 PROCEDURE — 96365 THER/PROPH/DIAG IV INF INIT: CPT

## 2024-04-16 PROCEDURE — 258N000003 HC RX IP 258 OP 636: Performed by: INTERNAL MEDICINE

## 2024-04-16 PROCEDURE — 36415 COLL VENOUS BLD VENIPUNCTURE: CPT | Performed by: INTERNAL MEDICINE

## 2024-04-16 PROCEDURE — 250N000011 HC RX IP 250 OP 636: Mod: JZ | Performed by: INTERNAL MEDICINE

## 2024-04-16 RX ORDER — HEPARIN SODIUM,PORCINE 10 UNIT/ML
5 VIAL (ML) INTRAVENOUS
Status: CANCELLED | OUTPATIENT
Start: 2024-06-11

## 2024-04-16 RX ORDER — LAMOTRIGINE 25 MG/1
500 TABLET ORAL ONCE
Status: CANCELLED | OUTPATIENT
Start: 2024-04-30 | End: 2024-04-29

## 2024-04-16 RX ORDER — DIPHENHYDRAMINE HYDROCHLORIDE 50 MG/ML
50 INJECTION INTRAMUSCULAR; INTRAVENOUS
Status: CANCELLED
Start: 2024-04-29

## 2024-04-16 RX ORDER — ALBUTEROL SULFATE 90 UG/1
1-2 AEROSOL, METERED RESPIRATORY (INHALATION)
Status: CANCELLED
Start: 2024-04-29

## 2024-04-16 RX ORDER — LAMOTRIGINE 25 MG/1
500 TABLET ORAL ONCE
Status: COMPLETED | OUTPATIENT
Start: 2024-04-16 | End: 2024-04-16

## 2024-04-16 RX ORDER — ZOLEDRONIC ACID 0.04 MG/ML
4 INJECTION, SOLUTION INTRAVENOUS ONCE
Status: CANCELLED
Start: 2024-06-11 | End: 2024-06-11

## 2024-04-16 RX ORDER — MEPERIDINE HYDROCHLORIDE 25 MG/ML
25 INJECTION INTRAMUSCULAR; INTRAVENOUS; SUBCUTANEOUS EVERY 30 MIN PRN
Status: CANCELLED | OUTPATIENT
Start: 2024-04-29

## 2024-04-16 RX ORDER — ZOLEDRONIC ACID 0.04 MG/ML
4 INJECTION, SOLUTION INTRAVENOUS ONCE
Status: COMPLETED | OUTPATIENT
Start: 2024-04-16 | End: 2024-04-16

## 2024-04-16 RX ORDER — HEPARIN SODIUM (PORCINE) LOCK FLUSH IV SOLN 100 UNIT/ML 100 UNIT/ML
5 SOLUTION INTRAVENOUS
Status: CANCELLED | OUTPATIENT
Start: 2024-06-11

## 2024-04-16 RX ORDER — EPINEPHRINE 1 MG/ML
0.3 INJECTION, SOLUTION INTRAMUSCULAR; SUBCUTANEOUS EVERY 5 MIN PRN
Status: CANCELLED | OUTPATIENT
Start: 2024-04-29

## 2024-04-16 RX ORDER — METHYLPREDNISOLONE SODIUM SUCCINATE 125 MG/2ML
125 INJECTION, POWDER, LYOPHILIZED, FOR SOLUTION INTRAMUSCULAR; INTRAVENOUS
Status: CANCELLED
Start: 2024-04-29

## 2024-04-16 RX ORDER — ALBUTEROL SULFATE 0.83 MG/ML
2.5 SOLUTION RESPIRATORY (INHALATION)
Status: CANCELLED | OUTPATIENT
Start: 2024-04-29

## 2024-04-16 RX ADMIN — ZOLEDRONIC ACID 4 MG: 0.04 INJECTION, SOLUTION INTRAVENOUS at 10:34

## 2024-04-16 RX ADMIN — SODIUM CHLORIDE 250 ML: 9 INJECTION, SOLUTION INTRAVENOUS at 10:34

## 2024-04-16 RX ADMIN — FULVESTRANT 500 MG: 50 INJECTION INTRAMUSCULAR at 10:38

## 2024-04-16 ASSESSMENT — PAIN SCALES - GENERAL: PAINLEVEL: NO PAIN (0)

## 2024-04-16 NOTE — PATIENT INSTRUCTIONS
1.  Continue Faslodex and capivasertib.  2.  Continue Zometa.  3.  Continue calcium and vitamin D twice a day.  4.  See me in 6-8 weeks.  5.  Labs as per treatment protocol.

## 2024-04-16 NOTE — LETTER
"    4/16/2024         RE: Mickie Mcghee  [address]  [place] [address]        Dear Colleague,    Thank you for referring your patient, Mickie Mcghee, to the [facility]. Please see a copy of my visit note below.    Oncology Rooming Note    April 16, 2024 9:57 AM   Mickie Mcghee is a 59 year old female who presents for:    Chief Complaint   Patient presents with     Oncology Clinic Visit     Initial Vitals: /89   Pulse 77   Temp 98.2  F (36.8  C)   Resp 16   Ht 1.651 m (5' 5\")   Wt 70.3 kg (155 lb)   SpO2 96%   BMI 25.79 kg/m   Estimated body mass index is 25.79 kg/m  as calculated from the following:    Height as of this encounter: 1.651 m (5' 5\").    Weight as of this encounter: 70.3 kg (155 lb). Body surface area is 1.8 meters squared.  No Pain (0) Comment: Data Unavailable   No LMP recorded.  Allergies reviewed: Yes  Medications reviewed: Yes    Medications: Medication refills not needed today.  Pharmacy name entered into [employer]:    [facility] - [place], [place] - [address]  [id number] - [place], [place] - [facility] access [facility]    Frailty Screening:   Is the patient here for a new oncology consult visit in cancer care? 2. No          Blanquita Meyer MA              ONCOLOGY HISTORY:  Ms. Mcghee is a female with metastatic breast cancer. ER positive, OH positive and HER-2/halina negative (1+ IHC).  -Foundation One reveals PIK3CA alteration.  -She had a stage III left breast cancer in 2007. ER positive and HER-2/halina negative.  She had bilateral mastectomy, chemotherapy, radiation, and anastrozole.     1.  On 03/18/2022, multiple imaging studies done because of bilateral lower extremity weakness:  -MRI of thoracic and lumbar spine revealed bone metastases with spinal cord compression.  -CT chest, abdomen, and pelvis on 03/19/2022 revealed bone metastasis, lung nodules and enlarged upper abdominal lymph node.  There is a hypodense " nodule in the liver.  -Brain MRI on 03/19/2022 did not reveal any intracranial metastasis.  2.  CT-guided bone biopsy of left iliac bone on 03/21/2022 revealed metastatic breast cancer. ER positive, GA positive and HER-2/halina negative.  3.  Letrozole started on 03/25/2022.  -Ribociclib started on 03/31/2022. Decreased to 200 mg a day in 09/2022.  -Zometa started on 03/24/2022.  -Radiation to thoracolumbar and sacral area between 03/22/2022 and 04/07/2022.  3000 cGy in 10 fractions.  4.  PET scan on 03/18/2024 reveals progression of disease in the bones.  5. Faslodex and capivasertib started on 04/02/2024.    SUBJECTIVE:  Mrs. Mcghee is a 59-year-old female with metastatic breast cancer on Faslodex and capivasertib. Overall she is tolerating it well.     She is on Zometa for bone metastasis.  She is tolerating it well.  No dental or jaw related symptoms.     Her overall condition is stable. Mild increase in fatigue. Sometimes her gait is slightly unstable.  She has not been falling down.       No headache. Occasional dizziness.  No chest pain.  No shortness of breath.  No abdominal pain.  No nausea or vomiting.  Appetite is good. No urinary complaints. Gets 2 loose bowel movement a day. No bleeding. No bone pain. All other review of system is negative.     PHYSICAL EXAMINATION:  She is alert and oriented x 3. Not in any distress.  Vital: Reviewed.  Rest of systems not examined.     ASSESSMENT:  1.  A 59-year-old female with metastatic breast cancer on Faslodex and capivasertib.   2.  Mild leukopenia from Faslodex and capivasertib.   3.  Mild anemia from Faslodex and capivasertib.      PLAN:  1.  Continue Faslodex and capivasertib.  2.  Continue Zometa.  3.  Continue calcium and vitamin D twice a day.  4.  See me in 6-8 weeks.  5.  Labs as per treatment protocol.     DISCUSSION:  1.  Patient is doing well.  For breast cancer, she will continue on Faslodex and capivasertib. She is tolerating it well.    We will consider  follow-up scan in about 3 to 4 months time.    2.  She will continue on Zometa.  She is tolerating it well.  No dental or jaw related symptoms.  She will also continue on calcium and vitamin D twice a day.    3.  She had a few questions which were all answered.  I will see her in 6 to 8 weeks time.     Total visit time of 30 minutes.  Time spent in today's visit, review of chart/investigations today, monitoring for toxicity of high risk drugs and documentation today.      Again, thank you for allowing me to participate in the care of your patient.        Sincerely,        Yury Lozada MD

## 2024-04-16 NOTE — PROGRESS NOTES
"Oncology Rooming Note    April 16, 2024 9:57 AM   Mickie Mcghee is a 59 year old female who presents for:    Chief Complaint   Patient presents with    Oncology Clinic Visit     Initial Vitals: /89   Pulse 77   Temp 98.2  F (36.8  C)   Resp 16   Ht 1.651 m (5' 5\")   Wt 70.3 kg (155 lb)   SpO2 96%   BMI 25.79 kg/m   Estimated body mass index is 25.79 kg/m  as calculated from the following:    Height as of this encounter: 1.651 m (5' 5\").    Weight as of this encounter: 70.3 kg (155 lb). Body surface area is 1.8 meters squared.  No Pain (0) Comment: Data Unavailable   No LMP recorded.  Allergies reviewed: Yes  Medications reviewed: Yes    Medications: Medication refills not needed today.  Pharmacy name entered into [email or web address]:    [facility] - [place], [place] - [address]  [id number] - [place], [place] - [address] [place] [address]    Frailty Screening:   Is the patient here for a new oncology consult visit in cancer care? 2. No          Blanquita Meyer MA            "

## 2024-04-16 NOTE — PROGRESS NOTES
ONCOLOGY HISTORY:  Ms. Mcghee is a female with metastatic breast cancer. ER positive, TX positive and HER-2/halina negative (1+ IHC).  -Foundation One reveals PIK3CA alteration.  -She had a stage III left breast cancer in 2007. ER positive and HER-2/halina negative.  She had bilateral mastectomy, chemotherapy, radiation, and anastrozole.     1.  On 03/18/2022, multiple imaging studies done because of bilateral lower extremity weakness:  -MRI of thoracic and lumbar spine revealed bone metastases with spinal cord compression.  -CT chest, abdomen, and pelvis on 03/19/2022 revealed bone metastasis, lung nodules and enlarged upper abdominal lymph node.  There is a hypodense nodule in the liver.  -Brain MRI on 03/19/2022 did not reveal any intracranial metastasis.  2.  CT-guided bone biopsy of left iliac bone on 03/21/2022 revealed metastatic breast cancer. ER positive, TX positive and HER-2/halina negative.  3.  Letrozole started on 03/25/2022.  -Ribociclib started on 03/31/2022. Decreased to 200 mg a day in 09/2022.  -Zometa started on 03/24/2022.  -Radiation to thoracolumbar and sacral area between 03/22/2022 and 04/07/2022.  3000 cGy in 10 fractions.  4.  PET scan on 03/18/2024 reveals progression of disease in the bones.  5. Faslodex and capivasertib started on 04/02/2024.    SUBJECTIVE:  Mrs. Mcghee is a 59-year-old female with metastatic breast cancer on Faslodex and capivasertib. Overall she is tolerating it well.     She is on Zometa for bone metastasis.  She is tolerating it well.  No dental or jaw related symptoms.     Her overall condition is stable. Mild increase in fatigue. Sometimes her gait is slightly unstable.  She has not been falling down.       No headache. Occasional dizziness.  No chest pain.  No shortness of breath.  No abdominal pain.  No nausea or vomiting.  Appetite is good. No urinary complaints. Gets 2 loose bowel movement a day. No bleeding. No bone pain. All other review of system is negative.     PHYSICAL  EXAMINATION:  She is alert and oriented x 3. Not in any distress.  Vital: Reviewed.  Rest of systems not examined.     ASSESSMENT:  1.  A 59-year-old female with metastatic breast cancer on Faslodex and capivasertib.   2.  Mild leukopenia from Faslodex and capivasertib.   3.  Mild anemia from Faslodex and capivasertib.      PLAN:  1.  Continue Faslodex and capivasertib.  2.  Continue Zometa.  3.  Continue calcium and vitamin D twice a day.  4.  See me in 6-8 weeks.  5.  Labs as per treatment protocol.     DISCUSSION:  1.  Patient is doing well.  For breast cancer, she will continue on Faslodex and capivasertib. She is tolerating it well.    We will consider follow-up scan in about 3 to 4 months time.    2.  She will continue on Zometa.  She is tolerating it well.  No dental or jaw related symptoms.  She will also continue on calcium and vitamin D twice a day.    3.  She had a few questions which were all answered.  I will see her in 6 to 8 weeks time.     Total visit time of 30 minutes.  Time spent in today's visit, review of chart/investigations today, monitoring for toxicity of high risk drugs and documentation today.

## 2024-04-24 DIAGNOSIS — C50.919 CARCINOMA OF BREAST METASTATIC TO BONE, UNSPECIFIED LATERALITY (H): Primary | ICD-10-CM

## 2024-04-24 DIAGNOSIS — C79.51 CARCINOMA OF BREAST METASTATIC TO BONE, UNSPECIFIED LATERALITY (H): Primary | ICD-10-CM

## 2024-04-30 ENCOUNTER — TELEPHONE (OUTPATIENT)
Dept: PHARMACY | Facility: CLINIC | Age: 59
End: 2024-04-30

## 2024-04-30 ENCOUNTER — INFUSION THERAPY VISIT (OUTPATIENT)
Dept: INFUSION THERAPY | Facility: CLINIC | Age: 59
End: 2024-04-30
Attending: INTERNAL MEDICINE
Payer: COMMERCIAL

## 2024-04-30 VITALS
RESPIRATION RATE: 16 BRPM | TEMPERATURE: 98.5 F | HEART RATE: 101 BPM | DIASTOLIC BLOOD PRESSURE: 64 MMHG | SYSTOLIC BLOOD PRESSURE: 112 MMHG | OXYGEN SATURATION: 96 %

## 2024-04-30 DIAGNOSIS — C79.51 CARCINOMA OF BREAST METASTATIC TO BONE, UNSPECIFIED LATERALITY (H): Primary | ICD-10-CM

## 2024-04-30 DIAGNOSIS — C50.919 CARCINOMA OF BREAST METASTATIC TO BONE, UNSPECIFIED LATERALITY (H): Primary | ICD-10-CM

## 2024-04-30 LAB
ALBUMIN SERPL BCG-MCNC: 4.3 G/DL (ref 3.5–5.2)
ALP SERPL-CCNC: 65 U/L (ref 40–150)
ALT SERPL W P-5'-P-CCNC: 32 U/L (ref 0–50)
ANION GAP SERPL CALCULATED.3IONS-SCNC: 13 MMOL/L (ref 7–15)
AST SERPL W P-5'-P-CCNC: 25 U/L (ref 0–45)
BASOPHILS # BLD AUTO: 0.1 10E3/UL (ref 0–0.2)
BASOPHILS NFR BLD AUTO: 1 %
BILIRUB SERPL-MCNC: <0.2 MG/DL
BUN SERPL-MCNC: 17.6 MG/DL (ref 6–20)
CALCIUM SERPL-MCNC: 9.2 MG/DL (ref 8.6–10)
CHLORIDE SERPL-SCNC: 107 MMOL/L (ref 98–107)
CREAT SERPL-MCNC: 0.78 MG/DL (ref 0.51–0.95)
DEPRECATED HCO3 PLAS-SCNC: 23 MMOL/L (ref 22–29)
EGFRCR SERPLBLD CKD-EPI 2021: 88 ML/MIN/1.73M2
EOSINOPHIL # BLD AUTO: 1.7 10E3/UL (ref 0–0.7)
EOSINOPHIL NFR BLD AUTO: 21 %
ERYTHROCYTE [DISTWIDTH] IN BLOOD BY AUTOMATED COUNT: 13.1 % (ref 10–15)
GLUCOSE SERPL-MCNC: 157 MG/DL (ref 70–99)
HCT VFR BLD AUTO: 33.1 % (ref 35–47)
HGB BLD-MCNC: 11.2 G/DL (ref 11.7–15.7)
IMM GRANULOCYTES # BLD: 0 10E3/UL
IMM GRANULOCYTES NFR BLD: 1 %
LYMPHOCYTES # BLD AUTO: 1.2 10E3/UL (ref 0.8–5.3)
LYMPHOCYTES NFR BLD AUTO: 15 %
MAGNESIUM SERPL-MCNC: 2.2 MG/DL (ref 1.7–2.3)
MCH RBC QN AUTO: 31.7 PG (ref 26.5–33)
MCHC RBC AUTO-ENTMCNC: 33.8 G/DL (ref 31.5–36.5)
MCV RBC AUTO: 94 FL (ref 78–100)
MONOCYTES # BLD AUTO: 0.5 10E3/UL (ref 0–1.3)
MONOCYTES NFR BLD AUTO: 6 %
NEUTROPHILS # BLD AUTO: 4.6 10E3/UL (ref 1.6–8.3)
NEUTROPHILS NFR BLD AUTO: 56 %
NRBC # BLD AUTO: 0 10E3/UL
NRBC BLD AUTO-RTO: 0 /100
PLATELET # BLD AUTO: 209 10E3/UL (ref 150–450)
POTASSIUM SERPL-SCNC: 4 MMOL/L (ref 3.4–5.3)
PROT SERPL-MCNC: 6.7 G/DL (ref 6.4–8.3)
RBC # BLD AUTO: 3.53 10E6/UL (ref 3.8–5.2)
SODIUM SERPL-SCNC: 143 MMOL/L (ref 135–145)
WBC # BLD AUTO: 8 10E3/UL (ref 4–11)

## 2024-04-30 PROCEDURE — 250N000011 HC RX IP 250 OP 636: Mod: JZ | Performed by: INTERNAL MEDICINE

## 2024-04-30 PROCEDURE — 96402 CHEMO HORMON ANTINEOPL SQ/IM: CPT

## 2024-04-30 PROCEDURE — 80053 COMPREHEN METABOLIC PANEL: CPT | Performed by: INTERNAL MEDICINE

## 2024-04-30 PROCEDURE — 83735 ASSAY OF MAGNESIUM: CPT | Performed by: INTERNAL MEDICINE

## 2024-04-30 PROCEDURE — 85025 COMPLETE CBC W/AUTO DIFF WBC: CPT | Performed by: INTERNAL MEDICINE

## 2024-04-30 PROCEDURE — 36415 COLL VENOUS BLD VENIPUNCTURE: CPT

## 2024-04-30 RX ORDER — LAMOTRIGINE 25 MG/1
500 TABLET ORAL ONCE
Status: COMPLETED | OUTPATIENT
Start: 2024-04-30 | End: 2024-04-30

## 2024-04-30 RX ADMIN — FULVESTRANT 500 MG: 50 INJECTION INTRAMUSCULAR at 14:59

## 2024-04-30 NOTE — ORAL ONC MGMT
Oral Chemotherapy Monitoring Program  Lab Follow Up    Reviewed lab results from 4/30/24.        1/17/2024    12:00 PM 2/12/2024     9:00 AM 3/28/2024     1:00 PM 4/2/2024     3:00 PM 4/9/2024    11:00 AM 4/24/2024    12:00 PM 4/30/2024     4:00 PM   ORAL CHEMOTHERAPY   Assessment Type Refill Refill Initial Work up Lab Monitoring Initial Follow up Refill;Chart Review Lab Monitoring   Diagnosis Code Breast Cancer Breast Cancer Breast Cancer Breast Cancer Breast Cancer Breast Cancer Breast Cancer   Providers Dr. Neville Lozada   Clinic Name/Location [facility]   Drug Name Kisqali (ribociclib) Kisqali (ribociclib) Truqap (capivasertib) Truqap (capivasertib) Truqap (capivasertib) Truqap (capivasertib) Truqap (capivasertib)   Dose 200 mg 200 mg 400 mg 400 mg 400 mg 400 mg 400 mg   Current Schedule Daily Daily BID BID BID BID BID   Cycle Details 3 weeks on, 1 week off 3 weeks on, 1 week off Other Other Other Other Other   Start Date of Last Cycle   4/2/2024 4/2/2024 4/2/2024     Planned next cycle start date     4/30/2024     Adverse Effects     Diarrhea;Headache     Diarrhea     Grade 1     Pharmacist Intervention(diarrhea)     Yes     Intervention(s)     Patient education     Headache     Grade 1     Pharmacist Intervention(headache)     Yes     Intervention(s)     Patient education     Any new drug interactions?   No   No    Is the dose as ordered appropriate for the patient?   Yes Yes  Yes        Labs:  _  Result Component Current Result Ref Range   Sodium 143 (4/30/2024) 135 - 145 mmol/L     _  Result Component Current Result Ref Range   Potassium 4.0 (4/30/2024) 3.4 - 5.3 mmol/L     _  Result Component Current Result Ref Range   Calcium 9.2 (4/30/2024) 8.6 - 10.0 mg/dL          _  Result Component Current Result Ref Range   Magnesium 2.2 (4/30/2024) 1.7 - 2.3 mg/dL     No results found for Phos within last  30 days.     _  Result Component Current Result Ref Range   Albumin 4.3 (4/30/2024) 3.5 - 5.2 g/dL     _  Result Component Current Result Ref Range   Urea Nitrogen 17.6 (4/30/2024) 6.0 - 20.0 mg/dL     _  Result Component Current Result Ref Range   Creatinine 0.78 (4/30/2024) 0.51 - 0.95 mg/dL     _  Result Component Current Result Ref Range   AST 25 (4/30/2024) 0 - 45 U/L     _  Result Component Current Result Ref Range   ALT 32 (4/30/2024) 0 - 50 U/L     _  Result Component Current Result Ref Range   Bilirubin Total <0.2 (4/30/2024) <=1.2 mg/dL     _  Result Component Current Result Ref Range   WBC Count 8.0 (4/30/2024) 4.0 - 11.0 10e3/uL     _  Result Component Current Result Ref Range   Hemoglobin 11.2 (L) (4/30/2024) 11.7 - 15.7 g/dL     _  Result Component Current Result Ref Range   Platelet Count 209 (4/30/2024) 150 - 450 10e3/uL     No results found for ANC within last 30 days.     _  Result Component Current Result Ref Range   Absolute Neutrophils 4.6 (4/30/2024) 1.6 - 8.3 10e3/uL        Assessment & Plan:  Results are concerning for elevated blood glucose. Called and spoke with Mickie. She states that she was not fasting for this lab draw. She states that she would like to keep her next lab draw in the afternoon but skip lunch. She will eat breakfast at 6am per her wishes. If still elevated with the next monthly draw then consider repeat fasting bg on 6/11 appt with Dr. Lozada. Other labs are WNL. OK to proceed at this time.     Questions answered to patient's satisfaction.    Follow-Up:  4 weeks with labs.     Ashley Mcadams PharmD  April 30, 2024

## 2024-04-30 NOTE — PROGRESS NOTES
Infusion Nursing Note:  Mickie Mcghee presents today for labs and Faslodex.    Patient seen by provider today: No   present during visit today: Not Applicable.    Note: N/A.      Intravenous Access:  Lab draw site RAC, Needle type Butterfly, Gauge 23.    Treatment Conditions:  Not Applicable.      Post Infusion Assessment:  Patient tolerated injection without incident.       Discharge Plan:   Patient declined prescription refills.  Discharge instructions reviewed with: Patient.  Patient verbalized understanding of discharge instructions and all questions answered.  AVS to patient via logtrust.  Patient will return 5/28/24 for next appointment.   Patient discharged in stable condition accompanied by: self.  Departure Mode: Ambulatory.      Shawna Henson RN

## 2024-05-21 DIAGNOSIS — C79.51 CARCINOMA OF BREAST METASTATIC TO BONE, UNSPECIFIED LATERALITY (H): ICD-10-CM

## 2024-05-21 DIAGNOSIS — C50.919 CARCINOMA OF BREAST METASTATIC TO BONE, UNSPECIFIED LATERALITY (H): ICD-10-CM

## 2024-05-21 DIAGNOSIS — G95.29 SPINAL CORD COMPRESSION DUE TO MALIGNANT NEOPLASM METASTATIC TO SPINE (H): Primary | ICD-10-CM

## 2024-05-21 DIAGNOSIS — C79.51 SPINAL CORD COMPRESSION DUE TO MALIGNANT NEOPLASM METASTATIC TO SPINE (H): Primary | ICD-10-CM

## 2024-05-21 RX ORDER — HEPARIN SODIUM (PORCINE) LOCK FLUSH IV SOLN 100 UNIT/ML 100 UNIT/ML
5 SOLUTION INTRAVENOUS
Status: CANCELLED | OUTPATIENT
Start: 2024-08-06

## 2024-05-21 RX ORDER — EPINEPHRINE 1 MG/ML
0.3 INJECTION, SOLUTION, CONCENTRATE INTRAVENOUS EVERY 5 MIN PRN
Status: CANCELLED | OUTPATIENT
Start: 2024-07-22

## 2024-05-21 RX ORDER — EPINEPHRINE 1 MG/ML
0.3 INJECTION, SOLUTION INTRAMUSCULAR; SUBCUTANEOUS EVERY 5 MIN PRN
Status: CANCELLED | OUTPATIENT
Start: 2024-05-27

## 2024-05-21 RX ORDER — ALBUTEROL SULFATE 90 UG/1
1-2 AEROSOL, METERED RESPIRATORY (INHALATION)
Status: CANCELLED
Start: 2024-05-27

## 2024-05-21 RX ORDER — MEPERIDINE HYDROCHLORIDE 25 MG/ML
25 INJECTION INTRAMUSCULAR; INTRAVENOUS; SUBCUTANEOUS EVERY 30 MIN PRN
Status: CANCELLED | OUTPATIENT
Start: 2024-07-22

## 2024-05-21 RX ORDER — DIPHENHYDRAMINE HYDROCHLORIDE 50 MG/ML
50 INJECTION INTRAMUSCULAR; INTRAVENOUS
Status: CANCELLED
Start: 2024-05-27

## 2024-05-21 RX ORDER — ALBUTEROL SULFATE 0.83 MG/ML
2.5 SOLUTION RESPIRATORY (INHALATION)
Status: CANCELLED | OUTPATIENT
Start: 2024-07-22

## 2024-05-21 RX ORDER — LAMOTRIGINE 25 MG/1
500 TABLET ORAL ONCE
Status: CANCELLED | OUTPATIENT
Start: 2024-05-27 | End: 2024-05-27

## 2024-05-21 RX ORDER — MEPERIDINE HYDROCHLORIDE 25 MG/ML
25 INJECTION INTRAMUSCULAR; INTRAVENOUS; SUBCUTANEOUS EVERY 30 MIN PRN
Status: CANCELLED | OUTPATIENT
Start: 2024-06-24

## 2024-05-21 RX ORDER — MEPERIDINE HYDROCHLORIDE 25 MG/ML
25 INJECTION INTRAMUSCULAR; INTRAVENOUS; SUBCUTANEOUS EVERY 30 MIN PRN
Status: CANCELLED | OUTPATIENT
Start: 2024-05-27

## 2024-05-21 RX ORDER — ALBUTEROL SULFATE 90 UG/1
1-2 AEROSOL, METERED RESPIRATORY (INHALATION)
Status: CANCELLED
Start: 2024-07-22

## 2024-05-21 RX ORDER — HEPARIN SODIUM,PORCINE 10 UNIT/ML
5 VIAL (ML) INTRAVENOUS
Status: CANCELLED | OUTPATIENT
Start: 2024-08-06

## 2024-05-21 RX ORDER — ALBUTEROL SULFATE 0.83 MG/ML
2.5 SOLUTION RESPIRATORY (INHALATION)
Status: CANCELLED | OUTPATIENT
Start: 2024-05-27

## 2024-05-21 RX ORDER — LAMOTRIGINE 25 MG/1
500 TABLET ORAL ONCE
Status: CANCELLED | OUTPATIENT
Start: 2024-06-24 | End: 2024-06-24

## 2024-05-21 RX ORDER — ALBUTEROL SULFATE 0.83 MG/ML
2.5 SOLUTION RESPIRATORY (INHALATION)
Status: CANCELLED | OUTPATIENT
Start: 2024-06-24

## 2024-05-21 RX ORDER — DIPHENHYDRAMINE HYDROCHLORIDE 50 MG/ML
50 INJECTION INTRAMUSCULAR; INTRAVENOUS
Status: CANCELLED
Start: 2024-06-24

## 2024-05-21 RX ORDER — DIPHENHYDRAMINE HYDROCHLORIDE 50 MG/ML
50 INJECTION INTRAMUSCULAR; INTRAVENOUS
Status: CANCELLED
Start: 2024-07-22

## 2024-05-21 RX ORDER — EPINEPHRINE 1 MG/ML
0.3 INJECTION, SOLUTION, CONCENTRATE INTRAVENOUS EVERY 5 MIN PRN
Status: CANCELLED | OUTPATIENT
Start: 2024-06-24

## 2024-05-21 RX ORDER — ALBUTEROL SULFATE 90 UG/1
1-2 AEROSOL, METERED RESPIRATORY (INHALATION)
Status: CANCELLED
Start: 2024-06-24

## 2024-05-21 RX ORDER — LAMOTRIGINE 25 MG/1
500 TABLET ORAL ONCE
Status: CANCELLED | OUTPATIENT
Start: 2024-07-23 | End: 2024-07-22

## 2024-05-21 RX ORDER — METHYLPREDNISOLONE SODIUM SUCCINATE 125 MG/2ML
125 INJECTION, POWDER, LYOPHILIZED, FOR SOLUTION INTRAMUSCULAR; INTRAVENOUS
Status: CANCELLED
Start: 2024-05-27

## 2024-05-28 ENCOUNTER — INFUSION THERAPY VISIT (OUTPATIENT)
Dept: INFUSION THERAPY | Facility: CLINIC | Age: 59
End: 2024-05-28
Attending: INTERNAL MEDICINE
Payer: COMMERCIAL

## 2024-05-28 ENCOUNTER — DOCUMENTATION ONLY (OUTPATIENT)
Dept: ONCOLOGY | Facility: CLINIC | Age: 59
End: 2024-05-28

## 2024-05-28 VITALS
HEART RATE: 80 BPM | TEMPERATURE: 98.4 F | DIASTOLIC BLOOD PRESSURE: 71 MMHG | OXYGEN SATURATION: 95 % | SYSTOLIC BLOOD PRESSURE: 114 MMHG | RESPIRATION RATE: 16 BRPM

## 2024-05-28 DIAGNOSIS — C50.919 CARCINOMA OF BREAST METASTATIC TO BONE, UNSPECIFIED LATERALITY (H): Primary | ICD-10-CM

## 2024-05-28 DIAGNOSIS — C79.51 CARCINOMA OF BREAST METASTATIC TO BONE, UNSPECIFIED LATERALITY (H): Primary | ICD-10-CM

## 2024-05-28 LAB
ALBUMIN SERPL BCG-MCNC: 4.4 G/DL (ref 3.5–5.2)
ALP SERPL-CCNC: 68 U/L (ref 40–150)
ALT SERPL W P-5'-P-CCNC: 44 U/L (ref 0–50)
ANION GAP SERPL CALCULATED.3IONS-SCNC: 12 MMOL/L (ref 7–15)
AST SERPL W P-5'-P-CCNC: 28 U/L (ref 0–45)
BASOPHILS # BLD AUTO: 0 10E3/UL (ref 0–0.2)
BASOPHILS NFR BLD AUTO: 0 %
BILIRUB SERPL-MCNC: 0.2 MG/DL
BUN SERPL-MCNC: 16.5 MG/DL (ref 6–20)
CALCIUM SERPL-MCNC: 9.6 MG/DL (ref 8.6–10)
CHLORIDE SERPL-SCNC: 103 MMOL/L (ref 98–107)
CREAT SERPL-MCNC: 0.82 MG/DL (ref 0.51–0.95)
DEPRECATED HCO3 PLAS-SCNC: 25 MMOL/L (ref 22–29)
EGFRCR SERPLBLD CKD-EPI 2021: 82 ML/MIN/1.73M2
EOSINOPHIL # BLD AUTO: 0.9 10E3/UL (ref 0–0.7)
EOSINOPHIL NFR BLD AUTO: 12 %
ERYTHROCYTE [DISTWIDTH] IN BLOOD BY AUTOMATED COUNT: 13.2 % (ref 10–15)
GLUCOSE SERPL-MCNC: 81 MG/DL (ref 70–99)
HCT VFR BLD AUTO: 35.6 % (ref 35–47)
HGB BLD-MCNC: 11.7 G/DL (ref 11.7–15.7)
IMM GRANULOCYTES # BLD: 0.1 10E3/UL
IMM GRANULOCYTES NFR BLD: 1 %
LYMPHOCYTES # BLD AUTO: 1.5 10E3/UL (ref 0.8–5.3)
LYMPHOCYTES NFR BLD AUTO: 20 %
MAGNESIUM SERPL-MCNC: 2.1 MG/DL (ref 1.7–2.3)
MCH RBC QN AUTO: 31.2 PG (ref 26.5–33)
MCHC RBC AUTO-ENTMCNC: 32.9 G/DL (ref 31.5–36.5)
MCV RBC AUTO: 95 FL (ref 78–100)
MONOCYTES # BLD AUTO: 0.5 10E3/UL (ref 0–1.3)
MONOCYTES NFR BLD AUTO: 7 %
NEUTROPHILS # BLD AUTO: 4.3 10E3/UL (ref 1.6–8.3)
NEUTROPHILS NFR BLD AUTO: 60 %
NRBC # BLD AUTO: 0 10E3/UL
NRBC BLD AUTO-RTO: 0 /100
PLATELET # BLD AUTO: 220 10E3/UL (ref 150–450)
POTASSIUM SERPL-SCNC: 4.1 MMOL/L (ref 3.4–5.3)
PROT SERPL-MCNC: 7 G/DL (ref 6.4–8.3)
RBC # BLD AUTO: 3.75 10E6/UL (ref 3.8–5.2)
SODIUM SERPL-SCNC: 140 MMOL/L (ref 135–145)
WBC # BLD AUTO: 7.2 10E3/UL (ref 4–11)

## 2024-05-28 PROCEDURE — 85025 COMPLETE CBC W/AUTO DIFF WBC: CPT | Performed by: INTERNAL MEDICINE

## 2024-05-28 PROCEDURE — 250N000011 HC RX IP 250 OP 636: Mod: JZ | Performed by: NURSE PRACTITIONER

## 2024-05-28 PROCEDURE — 83735 ASSAY OF MAGNESIUM: CPT | Performed by: INTERNAL MEDICINE

## 2024-05-28 PROCEDURE — 36415 COLL VENOUS BLD VENIPUNCTURE: CPT

## 2024-05-28 PROCEDURE — 96402 CHEMO HORMON ANTINEOPL SQ/IM: CPT

## 2024-05-28 PROCEDURE — 82040 ASSAY OF SERUM ALBUMIN: CPT | Performed by: INTERNAL MEDICINE

## 2024-05-28 RX ORDER — LAMOTRIGINE 25 MG/1
500 TABLET ORAL ONCE
Status: COMPLETED | OUTPATIENT
Start: 2024-05-28 | End: 2024-05-28

## 2024-05-28 RX ORDER — HEPARIN SODIUM,PORCINE 10 UNIT/ML
5 VIAL (ML) INTRAVENOUS
Status: CANCELLED | OUTPATIENT
Start: 2024-06-11

## 2024-05-28 RX ORDER — HEPARIN SODIUM (PORCINE) LOCK FLUSH IV SOLN 100 UNIT/ML 100 UNIT/ML
5 SOLUTION INTRAVENOUS
Status: CANCELLED | OUTPATIENT
Start: 2024-06-11

## 2024-05-28 RX ORDER — ZOLEDRONIC ACID 0.04 MG/ML
4 INJECTION, SOLUTION INTRAVENOUS ONCE
Status: CANCELLED
Start: 2024-06-11 | End: 2024-06-11

## 2024-05-28 RX ADMIN — FULVESTRANT 500 MG: 50 INJECTION INTRAMUSCULAR at 15:11

## 2024-05-28 ASSESSMENT — PAIN SCALES - GENERAL: PAINLEVEL: NO PAIN (0)

## 2024-05-28 NOTE — PROGRESS NOTES
Oral Chemotherapy Monitoring Program  Lab Follow Up    Reviewed lab results from 5/28/24.        2/12/2024     9:00 AM 3/28/2024     1:00 PM 4/2/2024     3:00 PM 4/9/2024    11:00 AM 4/24/2024    12:00 PM 4/30/2024     4:00 PM 5/28/2024     5:00 PM   ORAL CHEMOTHERAPY   Assessment Type Refill Initial Work up Lab Monitoring Initial Follow up Refill;Chart Review Lab Monitoring Lab Monitoring   Diagnosis Code Breast Cancer Breast Cancer Breast Cancer Breast Cancer Breast Cancer Breast Cancer Breast Cancer   Providers Dr. Neville Lozada   Clinic Name/Location [facility]   Drug Name Kisqali (ribociclib) Truqap (capivasertib) Truqap (capivasertib) Truqap (capivasertib) Truqap (capivasertib) Truqap (capivasertib) Truqap (capivasertib)   Dose 200 mg 400 mg 400 mg 400 mg 400 mg 400 mg 400 mg   Current Schedule Daily BID BID BID BID BID BID   Cycle Details 3 weeks on, 1 week off Other Other Other Other Other Other   Start Date of Last Cycle  4/2/2024 4/2/2024 4/2/2024 4/30/2024   Planned next cycle start date    4/30/2024 5/28/2024   Adverse Effects    Diarrhea;Headache   No AE identified during assessment   Diarrhea    Grade 1      Pharmacist Intervention(diarrhea)    Yes      Intervention(s)    Patient education      Headache    Grade 1      Pharmacist Intervention(headache)    Yes      Intervention(s)    Patient education      Any new drug interactions?  No   No     Is the dose as ordered appropriate for the patient?  Yes Yes  Yes         Labs:  _  Result Component Current Result Ref Range   Sodium 140 (5/28/2024) 135 - 145 mmol/L     _  Result Component Current Result Ref Range   Potassium 4.1 (5/28/2024) 3.4 - 5.3 mmol/L     _  Result Component Current Result Ref Range   Calcium 9.6 (5/28/2024) 8.6 - 10.0 mg/dL     _  Result Component Current Result Ref Range   Magnesium 2.1 (5/28/2024) 1.7 - 2.3 mg/dL     No  results found for Phos within last 30 days.     _  Result Component Current Result Ref Range   Albumin 4.4 (5/28/2024) 3.5 - 5.2 g/dL     _  Result Component Current Result Ref Range   Urea Nitrogen 16.5 (5/28/2024) 6.0 - 20.0 mg/dL     _  Result Component Current Result Ref Range   Creatinine 0.82 (5/28/2024) 0.51 - 0.95 mg/dL     _  Result Component Current Result Ref Range   AST 28 (5/28/2024) 0 - 45 U/L     _  Result Component Current Result Ref Range   ALT 44 (5/28/2024) 0 - 50 U/L     _  Result Component Current Result Ref Range   Bilirubin Total 0.2 (5/28/2024) <=1.2 mg/dL     _  Result Component Current Result Ref Range   WBC Count 7.2 (5/28/2024) 4.0 - 11.0 10e3/uL     _  Result Component Current Result Ref Range   Hemoglobin 11.7 (5/28/2024) 11.7 - 15.7 g/dL     _  Result Component Current Result Ref Range   Platelet Count 220 (5/28/2024) 150 - 450 10e3/uL     No results found for ANC within last 30 days.     _  Result Component Current Result Ref Range   Absolute Neutrophils 4.3 (5/28/2024) 1.6 - 8.3 10e3/uL        Assessment & Plan:  No concerning abnormalities. Blood glucose is normal and true fasting per patient.     Follow-Up:  6/11: labs and appt with Dr. Neville Valdovinos, PharmD, MS  Hematology/Oncology Clinical Pharmacist  [facility]  [phone]

## 2024-05-28 NOTE — PROGRESS NOTES
Infusion Nursing Note:  Mickie Mcghee presents today for labs/faslodex.    Patient seen by provider today: No   present during visit today: Not Applicable.    Note: N/A.      Intravenous Access:  Lab draw site RAC, Needle type butterfly, Gauge 23.  Labs drawn without difficulty.    Treatment Conditions:  Not Applicable.      Post Infusion Assessment:  Patient tolerated injection without incident.  Site patent and intact, free from redness, edema or discomfort.       Discharge Plan:   Discharge instructions reviewed with: Patient.  Patient and/or family verbalized understanding of discharge instructions and all questions answered.  AVS to patient via [employer].  Patient will return 6/11/24 for next appointment.   Patient discharged in stable condition accompanied by: self.  Departure Mode: Ambulatory with cane.      Jossy Leary RN

## 2024-05-29 NOTE — RESULT ENCOUNTER NOTE
Dear Ms. Mcghee,    Blood tests are good.    Please, call me with any questions.    Yury Lozada MD

## 2024-06-11 ENCOUNTER — LAB (OUTPATIENT)
Dept: INFUSION THERAPY | Facility: CLINIC | Age: 59
End: 2024-06-11
Attending: INTERNAL MEDICINE
Payer: COMMERCIAL

## 2024-06-11 ENCOUNTER — ONCOLOGY VISIT (OUTPATIENT)
Dept: ONCOLOGY | Facility: CLINIC | Age: 59
End: 2024-06-11
Attending: INTERNAL MEDICINE
Payer: COMMERCIAL

## 2024-06-11 VITALS
TEMPERATURE: 98 F | DIASTOLIC BLOOD PRESSURE: 85 MMHG | BODY MASS INDEX: 26.49 KG/M2 | OXYGEN SATURATION: 97 % | RESPIRATION RATE: 16 BRPM | HEIGHT: 65 IN | HEART RATE: 69 BPM | WEIGHT: 159 LBS | SYSTOLIC BLOOD PRESSURE: 127 MMHG

## 2024-06-11 DIAGNOSIS — G95.29 SPINAL CORD COMPRESSION DUE TO MALIGNANT NEOPLASM METASTATIC TO SPINE (H): Primary | ICD-10-CM

## 2024-06-11 DIAGNOSIS — C50.919 CARCINOMA OF BREAST METASTATIC TO BONE, UNSPECIFIED LATERALITY (H): ICD-10-CM

## 2024-06-11 DIAGNOSIS — C79.51 SPINAL CORD COMPRESSION DUE TO MALIGNANT NEOPLASM METASTATIC TO SPINE (H): Primary | ICD-10-CM

## 2024-06-11 DIAGNOSIS — C79.51 CARCINOMA OF BREAST METASTATIC TO BONE, UNSPECIFIED LATERALITY (H): ICD-10-CM

## 2024-06-11 DIAGNOSIS — C79.51 CARCINOMA OF BREAST METASTATIC TO BONE, UNSPECIFIED LATERALITY (H): Primary | ICD-10-CM

## 2024-06-11 DIAGNOSIS — C50.919 CARCINOMA OF BREAST METASTATIC TO BONE, UNSPECIFIED LATERALITY (H): Primary | ICD-10-CM

## 2024-06-11 LAB
ALBUMIN SERPL BCG-MCNC: 4 G/DL (ref 3.5–5.2)
ALP SERPL-CCNC: 63 U/L (ref 40–150)
ALT SERPL W P-5'-P-CCNC: 35 U/L (ref 0–50)
ANION GAP SERPL CALCULATED.3IONS-SCNC: 13 MMOL/L (ref 7–15)
AST SERPL W P-5'-P-CCNC: 27 U/L (ref 0–45)
BASOPHILS # BLD AUTO: 0 10E3/UL (ref 0–0.2)
BASOPHILS NFR BLD AUTO: 1 %
BILIRUB SERPL-MCNC: 0.2 MG/DL
BUN SERPL-MCNC: 13.6 MG/DL (ref 6–20)
CALCIUM SERPL-MCNC: 9.6 MG/DL (ref 8.6–10)
CALCIUM SERPL-MCNC: 9.6 MG/DL (ref 8.6–10)
CHLORIDE SERPL-SCNC: 106 MMOL/L (ref 98–107)
CREAT SERPL-MCNC: 0.78 MG/DL (ref 0.51–0.95)
DEPRECATED HCO3 PLAS-SCNC: 23 MMOL/L (ref 22–29)
EGFRCR SERPLBLD CKD-EPI 2021: 88 ML/MIN/1.73M2
EOSINOPHIL # BLD AUTO: 0.8 10E3/UL (ref 0–0.7)
EOSINOPHIL NFR BLD AUTO: 15 %
ERYTHROCYTE [DISTWIDTH] IN BLOOD BY AUTOMATED COUNT: 13.2 % (ref 10–15)
GLUCOSE SERPL-MCNC: 163 MG/DL (ref 70–99)
HCT VFR BLD AUTO: 32.5 % (ref 35–47)
HGB BLD-MCNC: 10.6 G/DL (ref 11.7–15.7)
IMM GRANULOCYTES # BLD: 0 10E3/UL
IMM GRANULOCYTES NFR BLD: 1 %
LYMPHOCYTES # BLD AUTO: 1.3 10E3/UL (ref 0.8–5.3)
LYMPHOCYTES NFR BLD AUTO: 23 %
MAGNESIUM SERPL-MCNC: 2.4 MG/DL (ref 1.7–2.3)
MCH RBC QN AUTO: 30.7 PG (ref 26.5–33)
MCHC RBC AUTO-ENTMCNC: 32.6 G/DL (ref 31.5–36.5)
MCV RBC AUTO: 94 FL (ref 78–100)
MONOCYTES # BLD AUTO: 0.4 10E3/UL (ref 0–1.3)
MONOCYTES NFR BLD AUTO: 6 %
NEUTROPHILS # BLD AUTO: 2.9 10E3/UL (ref 1.6–8.3)
NEUTROPHILS NFR BLD AUTO: 54 %
NRBC # BLD AUTO: 0 10E3/UL
NRBC BLD AUTO-RTO: 0 /100
PLATELET # BLD AUTO: 195 10E3/UL (ref 150–450)
POTASSIUM SERPL-SCNC: 4.2 MMOL/L (ref 3.4–5.3)
PROT SERPL-MCNC: 6.4 G/DL (ref 6.4–8.3)
RBC # BLD AUTO: 3.45 10E6/UL (ref 3.8–5.2)
SODIUM SERPL-SCNC: 142 MMOL/L (ref 135–145)
WBC # BLD AUTO: 5.5 10E3/UL (ref 4–11)

## 2024-06-11 PROCEDURE — 258N000003 HC RX IP 258 OP 636: Mod: JZ | Performed by: INTERNAL MEDICINE

## 2024-06-11 PROCEDURE — G0463 HOSPITAL OUTPT CLINIC VISIT: HCPCS | Mod: 25 | Performed by: INTERNAL MEDICINE

## 2024-06-11 PROCEDURE — 83735 ASSAY OF MAGNESIUM: CPT | Performed by: INTERNAL MEDICINE

## 2024-06-11 PROCEDURE — 80053 COMPREHEN METABOLIC PANEL: CPT | Performed by: INTERNAL MEDICINE

## 2024-06-11 PROCEDURE — 99214 OFFICE O/P EST MOD 30 MIN: CPT | Performed by: INTERNAL MEDICINE

## 2024-06-11 PROCEDURE — 96365 THER/PROPH/DIAG IV INF INIT: CPT

## 2024-06-11 PROCEDURE — 85048 AUTOMATED LEUKOCYTE COUNT: CPT | Performed by: INTERNAL MEDICINE

## 2024-06-11 PROCEDURE — 36415 COLL VENOUS BLD VENIPUNCTURE: CPT

## 2024-06-11 PROCEDURE — 250N000011 HC RX IP 250 OP 636: Mod: JZ | Performed by: INTERNAL MEDICINE

## 2024-06-11 RX ORDER — ZOLEDRONIC ACID 0.04 MG/ML
4 INJECTION, SOLUTION INTRAVENOUS ONCE
Status: COMPLETED | OUTPATIENT
Start: 2024-06-11 | End: 2024-06-11

## 2024-06-11 RX ADMIN — ZOLEDRONIC ACID 4 MG: 0.04 INJECTION, SOLUTION INTRAVENOUS at 10:32

## 2024-06-11 RX ADMIN — SODIUM CHLORIDE 250 ML: 9 INJECTION, SOLUTION INTRAVENOUS at 10:32

## 2024-06-11 ASSESSMENT — PAIN SCALES - GENERAL: PAINLEVEL: NO PAIN (0)

## 2024-06-11 NOTE — PROGRESS NOTES
"Oncology Rooming Note    June 11, 2024 9:23 AM   Mickie Mcghee is a 59 year old female who presents for:    Chief Complaint   Patient presents with    Oncology Clinic Visit     Initial Vitals: /85   Pulse 69   Temp 98  F (36.7  C)   Resp 16   Ht 1.651 m (5' 5\")   Wt 72.1 kg (159 lb)   SpO2 97%   BMI 26.46 kg/m   Estimated body mass index is 26.46 kg/m  as calculated from the following:    Height as of this encounter: 1.651 m (5' 5\").    Weight as of this encounter: 72.1 kg (159 lb). Body surface area is 1.82 meters squared.  No Pain (0) Comment: Data Unavailable   No LMP recorded.  Allergies reviewed: Yes  Medications reviewed: Yes    Medications: Medication refills not needed today.  Pharmacy name entered into [employer]:    [facility] - [place], [place] - [address]  [id number] - [place], [place] - [address] [place] [address]    Frailty Screening:   Is the patient here for a new oncology consult visit in cancer care? 2. No      Blanquita Meyer MA            "

## 2024-06-11 NOTE — PATIENT INSTRUCTIONS
1.  Continue Faslodex and capivasertib.  2.  Continue Zometa every 8 weeks.  3.  Continue calcium and vitamin D twice a day.  4.  PET scan in 1 month.  5.  See me after PET. (When she comes for faslodex)  6.  Labs as per treatment protocol.

## 2024-06-11 NOTE — PROGRESS NOTES
Infusion Nursing Note:  Mickie Mcghee presents today for Zometa.    Patient seen by provider today: Yes: Dr. Lozada   present during visit today: Not Applicable.    Note: N/A.      Intravenous Access:  Peripheral IV placed.    Treatment Conditions:  Lab Results   Component Value Date     06/11/2024    POTASSIUM 4.2 06/11/2024    MAG 2.4 (H) 06/11/2024    CR 0.78 06/11/2024    OSMEL 9.6 06/11/2024    OSMEL 9.6 06/11/2024    BILITOTAL 0.2 06/11/2024    ALBUMIN 4.0 06/11/2024    ALT 35 06/11/2024    AST 27 06/11/2024       Results reviewed, labs MET treatment parameters, ok to proceed with treatment.      Post Infusion Assessment:  Patient tolerated infusion without incident.  Site patent and intact, free from redness, edema or discomfort.  No evidence of extravasations.  Access discontinued per protocol.       Discharge Plan:   Discharge instructions reviewed with: Patient.  Patient and/or family verbalized understanding of discharge instructions and all questions answered.  AVS to patient via [employer].  Patient aware that she needs to schedule next appointment for 6/24.  Patient discharged in stable condition accompanied by: self.  Departure Mode: Ambulatory.      Lorena Uribe RN

## 2024-06-11 NOTE — PROGRESS NOTES
ONCOLOGY HISTORY:  Ms. Mcghee is a female with metastatic breast cancer. ER positive, AZ positive and HER-2/halina negative (1+ IHC).  -Foundation One reveals PIK3CA alteration.  -She had a stage III left breast cancer in 2007. ER positive and HER-2/halina negative.  She had bilateral mastectomy, chemotherapy, radiation, and anastrozole.     1.  On 03/18/2022, multiple imaging studies done because of bilateral lower extremity weakness:  -MRI of thoracic and lumbar spine revealed bone metastases with spinal cord compression.  -CT chest, abdomen, and pelvis on 03/19/2022 revealed bone metastasis, lung nodules and enlarged upper abdominal lymph node.  There is a hypodense nodule in the liver.  -Brain MRI on 03/19/2022 did not reveal any intracranial metastasis.  2.  CT-guided bone biopsy of left iliac bone on 03/21/2022 revealed metastatic breast cancer. ER positive, AZ positive and HER-2/halina negative.  3.  Letrozole started on 03/25/2022.  -Ribociclib started on 03/31/2022. Decreased to 200 mg a day in 09/2022.  -Zometa started on 03/24/2022.  -Radiation to thoracolumbar and sacral area between 03/22/2022 and 04/07/2022.  3000 cGy in 10 fractions.  4.  PET scan on 03/18/2024 reveals progression of disease in the bones.  5. Faslodex and capivasertib started on 04/02/2024.     SUBJECTIVE:    Mrs. Mcghee is a 59-year-old female with metastatic breast cancer on Faslodex and capivasertib. She gets few side effects.  She gets diarrhea.  She gets 3 loose bowel movements a day.  She also has increased fatigue.  Patient says that overall she is tolerating it fairly well.     She gets Zometa for bone metastasis.  She is tolerating it well.  No dental or jaw related symptoms.     Her overall condition is stable.  She has generalized weakness.  She is able to do her activities of daily living slowly. Sometimes her gait is unstable.  She has not been falling down.  She uses a cane at times.     No headache. No dizziness.  No chest pain.  No  shortness of breath.  No abdominal pain.  No nausea or vomiting.  Appetite is good. No urinary complaints. No bleeding. No bone pain. All other review of system is negative.     PHYSICAL EXAMINATION:  She is alert and oriented x 3. Not in any distress.  Vital: Reviewed.  Rest of systems not examined.     ASSESSMENT:  1.  A 59-year-old female with metastatic breast cancer on Faslodex and capivasertib.   2.  Mild diarrhea from capivasertib.   3.  Mild anemia from Faslodex and capivasertib.  4.  Generalized weakness from metastatic breast cancer, Faslodex and capivasertib.     PLAN:  1.  Continue Faslodex and capivasertib.  2.  Continue Zometa every 8 weeks.  3.  Continue calcium and vitamin D twice a day.  4.  PET scan in 1 month.  5.  See me after PET. (When she comes for faslodex)  6.  Labs as per treatment protocol.     DISCUSSION:  1.  Patient's overall condition is stable. For breast cancer, she is on Faslodex and capivasertib.  She has few side effects from it which are tolerable.  She will continue on it.    She has mild diarrhea.  She will take Imodium as needed.  There has been increase in her weakness.  Advised her to be careful to avoid falls.      2.  For monitoring, will get PET scan in a month.  She is agreeable for it.    3.  She will continue on Zometa every 2 months. She will also continue on calcium and vitamin D twice a day.     4.  She had a few questions which were all answered.  I will see her after PET scan.     Total visit time of 30 minutes.  Time spent in today's visit, review of chart/investigations today, monitoring for toxicity of high risk drugs and documentation today.

## 2024-06-11 NOTE — LETTER
"6/11/2024      Mickie Mcghee  [address]  [place] [address]      Dear Colleague,    Thank you for referring your patient, Mickie Mcghee, to the [facility]. Please see a copy of my visit note below.    Oncology Rooming Note    June 11, 2024 9:23 AM   Mickie Mcghee is a 59 year old female who presents for:    Chief Complaint   Patient presents with     Oncology Clinic Visit     Initial Vitals: /85   Pulse 69   Temp 98  F (36.7  C)   Resp 16   Ht 1.651 m (5' 5\")   Wt 72.1 kg (159 lb)   SpO2 97%   BMI 26.46 kg/m   Estimated body mass index is 26.46 kg/m  as calculated from the following:    Height as of this encounter: 1.651 m (5' 5\").    Weight as of this encounter: 72.1 kg (159 lb). Body surface area is 1.82 meters squared.  No Pain (0) Comment: Data Unavailable   No LMP recorded.  Allergies reviewed: Yes  Medications reviewed: Yes    Medications: Medication refills not needed today.  Pharmacy name entered into [employer]:    [facility] - [place], [place] - [address] [id number] - [place], [place] - [facility]    Frailty Screening:   Is the patient here for a new oncology consult visit in cancer care? 2. No      Blanquita Meyer MA              ONCOLOGY HISTORY:  Ms. Mcghee is a female with metastatic breast cancer. ER positive, AK positive and HER-2/halina negative (1+ IHC).  -Foundation One reveals PIK3CA alteration.  -She had a stage III left breast cancer in 2007. ER positive and HER-2/halina negative.  She had bilateral mastectomy, chemotherapy, radiation, and anastrozole.     1.  On 03/18/2022, multiple imaging studies done because of bilateral lower extremity weakness:  -MRI of thoracic and lumbar spine revealed bone metastases with spinal cord compression.  -CT chest, abdomen, and pelvis on 03/19/2022 revealed bone metastasis, lung nodules and enlarged upper abdominal lymph node.  There is a hypodense nodule in the " liver.  -Brain MRI on 03/19/2022 did not reveal any intracranial metastasis.  2.  CT-guided bone biopsy of left iliac bone on 03/21/2022 revealed metastatic breast cancer. ER positive, NH positive and HER-2/halina negative.  3.  Letrozole started on 03/25/2022.  -Ribociclib started on 03/31/2022. Decreased to 200 mg a day in 09/2022.  -Zometa started on 03/24/2022.  -Radiation to thoracolumbar and sacral area between 03/22/2022 and 04/07/2022.  3000 cGy in 10 fractions.  4.  PET scan on 03/18/2024 reveals progression of disease in the bones.  5. Faslodex and capivasertib started on 04/02/2024.     SUBJECTIVE:    Mrs. Mcghee is a 59-year-old female with metastatic breast cancer on Faslodex and capivasertib. She gets few side effects.  She gets diarrhea.  She gets 3 loose bowel movements a day.  She also has increased fatigue.  Patient says that overall she is tolerating it fairly well.     She gets Zometa for bone metastasis.  She is tolerating it well.  No dental or jaw related symptoms.     Her overall condition is stable.  She has generalized weakness.  She is able to do her activities of daily living slowly. Sometimes her gait is unstable.  She has not been falling down.  She uses a cane at times.     No headache. No dizziness.  No chest pain.  No shortness of breath.  No abdominal pain.  No nausea or vomiting.  Appetite is good. No urinary complaints. No bleeding. No bone pain. All other review of system is negative.     PHYSICAL EXAMINATION:  She is alert and oriented x 3. Not in any distress.  Vital: Reviewed.  Rest of systems not examined.     ASSESSMENT:  1.  A 59-year-old female with metastatic breast cancer on Faslodex and capivasertib.   2.  Mild diarrhea from capivasertib.   3.  Mild anemia from Faslodex and capivasertib.  4.  Generalized weakness from metastatic breast cancer, Faslodex and capivasertib.     PLAN:  1.  Continue Faslodex and capivasertib.  2.  Continue Zometa every 8 weeks.  3.  Continue  calcium and vitamin D twice a day.  4.  PET scan in 1 month.  5.  See me after PET. (When she comes for faslodex)  6.  Labs as per treatment protocol.     DISCUSSION:  1.  Patient's overall condition is stable. For breast cancer, she is on Faslodex and capivasertib.  She has few side effects from it which are tolerable.  She will continue on it.    She has mild diarrhea.  She will take Imodium as needed.  There has been increase in her weakness.  Advised her to be careful to avoid falls.      2.  For monitoring, will get PET scan in a month.  She is agreeable for it.    3.  She will continue on Zometa every 2 months. She will also continue on calcium and vitamin D twice a day.     4.  She had a few questions which were all answered.  I will see her after PET scan.     Total visit time of 30 minutes.  Time spent in today's visit, review of chart/investigations today, monitoring for toxicity of high risk drugs and documentation today.      Again, thank you for allowing me to participate in the care of your patient.        Sincerely,        Yury Lozada MD

## 2024-06-17 DIAGNOSIS — C79.51 CARCINOMA OF BREAST METASTATIC TO BONE, UNSPECIFIED LATERALITY (H): Primary | ICD-10-CM

## 2024-06-17 DIAGNOSIS — C50.919 CARCINOMA OF BREAST METASTATIC TO BONE, UNSPECIFIED LATERALITY (H): Primary | ICD-10-CM

## 2024-06-24 NOTE — PROGRESS NOTES
Infusion Nursing Note:  Mickie Mcghee presents today for zometa and faslodex.    Patient seen by provider today: Yes: Dr. oLzada   present during visit today: Not Applicable.    Note: N/A.      Intravenous Access:  Peripheral IV placed.    Treatment Conditions:  Lab Results   Component Value Date    HGB 11.2 (L) 04/02/2024    WBC 3.6 (L) 04/02/2024    ANEU 1.3 (L) 08/04/2022    ANEUTAUTO 2.0 04/02/2024     04/02/2024        Lab Results   Component Value Date     04/16/2024    POTASSIUM 4.3 04/16/2024    MAG 2.4 (H) 04/16/2024    CR 0.88 04/16/2024    OSMEL 9.5 04/16/2024    OSMEL 9.5 04/16/2024    BILITOTAL 0.2 04/16/2024    ALBUMIN 4.2 04/16/2024    ALT 31 04/16/2024    AST 31 04/16/2024       Results reviewed, labs MET treatment parameters, ok to proceed with treatment.      Post Infusion Assessment:  Patient tolerated infusion without incident.  Patient tolerated injection without incident.  Site patent and intact, free from redness, edema or discomfort.  No evidence of extravasations.  Access discontinued per protocol.       Discharge Plan:   AVS to patient via [employer].  Patient will return as pancho - appts need to be made for next appointment.   Patient discharged in stable condition accompanied by: self.  Departure Mode: Ambulatory.      George Metz RN     162.56

## 2024-06-25 ENCOUNTER — LAB (OUTPATIENT)
Dept: INFUSION THERAPY | Facility: CLINIC | Age: 59
End: 2024-06-25
Attending: INTERNAL MEDICINE
Payer: COMMERCIAL

## 2024-06-25 VITALS
SYSTOLIC BLOOD PRESSURE: 103 MMHG | RESPIRATION RATE: 16 BRPM | HEART RATE: 78 BPM | TEMPERATURE: 98.8 F | OXYGEN SATURATION: 97 % | DIASTOLIC BLOOD PRESSURE: 69 MMHG

## 2024-06-25 DIAGNOSIS — C50.919 CARCINOMA OF BREAST METASTATIC TO BONE, UNSPECIFIED LATERALITY (H): Primary | ICD-10-CM

## 2024-06-25 DIAGNOSIS — C79.51 CARCINOMA OF BREAST METASTATIC TO BONE, UNSPECIFIED LATERALITY (H): Primary | ICD-10-CM

## 2024-06-25 PROCEDURE — 250N000011 HC RX IP 250 OP 636: Mod: JZ | Performed by: INTERNAL MEDICINE

## 2024-06-25 PROCEDURE — 96402 CHEMO HORMON ANTINEOPL SQ/IM: CPT

## 2024-06-25 RX ORDER — LAMOTRIGINE 25 MG/1
500 TABLET ORAL ONCE
Status: COMPLETED | OUTPATIENT
Start: 2024-06-25 | End: 2024-06-25

## 2024-06-25 RX ADMIN — FULVESTRANT 500 MG: 50 INJECTION INTRAMUSCULAR at 14:20

## 2024-06-25 NOTE — PROGRESS NOTES
Nursing Note:  Mickie Mcghee presents today for Faslodex.    Patient seen by provider today: No   present during visit today: Not Applicable.    Note: Patient tolerated Faslodex injections without incident.     Intravenous Access:  No Intravenous access/labs at this visit.    Discharge Plan:   Patient was discharged to home.     Shawna Henson RN

## 2024-07-16 DIAGNOSIS — C50.919 CARCINOMA OF BREAST METASTATIC TO BONE, UNSPECIFIED LATERALITY (H): Primary | ICD-10-CM

## 2024-07-16 DIAGNOSIS — C79.51 CARCINOMA OF BREAST METASTATIC TO BONE, UNSPECIFIED LATERALITY (H): Primary | ICD-10-CM

## 2024-07-17 DIAGNOSIS — C50.919 CARCINOMA OF BREAST METASTATIC TO BONE, UNSPECIFIED LATERALITY (H): Primary | ICD-10-CM

## 2024-07-17 DIAGNOSIS — C79.51 CARCINOMA OF BREAST METASTATIC TO BONE, UNSPECIFIED LATERALITY (H): Primary | ICD-10-CM

## 2024-07-17 RX ORDER — CALCIUM CARBONATE/VITAMIN D3 600 MG-10
1 TABLET ORAL 2 TIMES DAILY
Qty: 180 TABLET | Refills: 0 | Status: SHIPPED | OUTPATIENT
Start: 2024-07-17 | End: 2024-08-12

## 2024-07-21 ENCOUNTER — HEALTH MAINTENANCE LETTER (OUTPATIENT)
Age: 59
End: 2024-07-21

## 2024-07-22 ENCOUNTER — HOSPITAL ENCOUNTER (OUTPATIENT)
Dept: PET IMAGING | Facility: CLINIC | Age: 59
Discharge: HOME | End: 2024-07-22
Attending: INTERNAL MEDICINE
Payer: COMMERCIAL

## 2024-07-22 ENCOUNTER — LAB (OUTPATIENT)
Dept: LAB | Facility: CLINIC | Age: 59
End: 2024-07-22
Attending: INTERNAL MEDICINE
Payer: COMMERCIAL

## 2024-07-22 DIAGNOSIS — C79.51 CARCINOMA OF BREAST METASTATIC TO BONE, UNSPECIFIED LATERALITY (H): ICD-10-CM

## 2024-07-22 DIAGNOSIS — C50.919 CARCINOMA OF BREAST METASTATIC TO BONE, UNSPECIFIED LATERALITY (H): ICD-10-CM

## 2024-07-22 LAB
ALBUMIN SERPL BCG-MCNC: 4.3 G/DL (ref 3.5–5.2)
ALP SERPL-CCNC: 67 U/L (ref 40–150)
ALT SERPL W P-5'-P-CCNC: 42 U/L (ref 0–50)
ANION GAP SERPL CALCULATED.3IONS-SCNC: 9 MMOL/L (ref 7–15)
AST SERPL W P-5'-P-CCNC: 27 U/L (ref 0–45)
BASOPHILS # BLD AUTO: 0 10E3/UL (ref 0–0.2)
BASOPHILS NFR BLD AUTO: 0 %
BILIRUB SERPL-MCNC: 0.3 MG/DL
BUN SERPL-MCNC: 16.2 MG/DL (ref 6–20)
CALCIUM SERPL-MCNC: 9.5 MG/DL (ref 8.8–10.4)
CHLORIDE SERPL-SCNC: 102 MMOL/L (ref 98–107)
CREAT SERPL-MCNC: 0.92 MG/DL (ref 0.51–0.95)
EGFRCR SERPLBLD CKD-EPI 2021: 72 ML/MIN/1.73M2
EOSINOPHIL # BLD AUTO: 0.4 10E3/UL (ref 0–0.7)
EOSINOPHIL NFR BLD AUTO: 7 %
ERYTHROCYTE [DISTWIDTH] IN BLOOD BY AUTOMATED COUNT: 13.4 % (ref 10–15)
GLUCOSE SERPL-MCNC: 99 MG/DL (ref 70–99)
HCO3 SERPL-SCNC: 27 MMOL/L (ref 22–29)
HCT VFR BLD AUTO: 36.9 % (ref 35–47)
HGB BLD-MCNC: 12 G/DL (ref 11.7–15.7)
IMM GRANULOCYTES # BLD: 0 10E3/UL
IMM GRANULOCYTES NFR BLD: 0 %
LYMPHOCYTES # BLD AUTO: 0.9 10E3/UL (ref 0.8–5.3)
LYMPHOCYTES NFR BLD AUTO: 18 %
MAGNESIUM SERPL-MCNC: 2.3 MG/DL (ref 1.7–2.3)
MCH RBC QN AUTO: 31 PG (ref 26.5–33)
MCHC RBC AUTO-ENTMCNC: 32.5 G/DL (ref 31.5–36.5)
MCV RBC AUTO: 95 FL (ref 78–100)
MONOCYTES # BLD AUTO: 0.4 10E3/UL (ref 0–1.3)
MONOCYTES NFR BLD AUTO: 8 %
NEUTROPHILS # BLD AUTO: 3.2 10E3/UL (ref 1.6–8.3)
NEUTROPHILS NFR BLD AUTO: 66 %
NRBC # BLD AUTO: 0 10E3/UL
NRBC BLD AUTO-RTO: 0 /100
PLATELET # BLD AUTO: 212 10E3/UL (ref 150–450)
POTASSIUM SERPL-SCNC: 4.3 MMOL/L (ref 3.4–5.3)
PROT SERPL-MCNC: 6.8 G/DL (ref 6.4–8.3)
RBC # BLD AUTO: 3.87 10E6/UL (ref 3.8–5.2)
SODIUM SERPL-SCNC: 138 MMOL/L (ref 135–145)
WBC # BLD AUTO: 4.9 10E3/UL (ref 4–11)

## 2024-07-22 PROCEDURE — 85025 COMPLETE CBC W/AUTO DIFF WBC: CPT

## 2024-07-22 PROCEDURE — A9552 F18 FDG: HCPCS | Performed by: INTERNAL MEDICINE

## 2024-07-22 PROCEDURE — 84155 ASSAY OF PROTEIN SERUM: CPT

## 2024-07-22 PROCEDURE — 36415 COLL VENOUS BLD VENIPUNCTURE: CPT

## 2024-07-22 PROCEDURE — 78815 PET IMAGE W/CT SKULL-THIGH: CPT | Mod: PS

## 2024-07-22 PROCEDURE — 250N000011 HC RX IP 250 OP 636: Performed by: INTERNAL MEDICINE

## 2024-07-22 PROCEDURE — 83735 ASSAY OF MAGNESIUM: CPT

## 2024-07-22 PROCEDURE — 343N000001 HC RX 343: Performed by: INTERNAL MEDICINE

## 2024-07-22 PROCEDURE — 84075 ASSAY ALKALINE PHOSPHATASE: CPT

## 2024-07-22 PROCEDURE — 71260 CT THORAX DX C+: CPT

## 2024-07-22 RX ORDER — FLUDEOXYGLUCOSE F 18 200 MCI/ML
10-18 INJECTION, SOLUTION INTRAVENOUS ONCE
Status: COMPLETED | OUTPATIENT
Start: 2024-07-22 | End: 2024-07-22

## 2024-07-22 RX ORDER — IOPAMIDOL 755 MG/ML
10-135 INJECTION, SOLUTION INTRAVASCULAR ONCE
Status: COMPLETED | OUTPATIENT
Start: 2024-07-22 | End: 2024-07-22

## 2024-07-22 RX ADMIN — IOPAMIDOL 97 ML: 755 INJECTION, SOLUTION INTRAVENOUS at 08:39

## 2024-07-22 RX ADMIN — FLUDEOXYGLUCOSE F 18 12.6 MILLICURIE: 200 INJECTION, SOLUTION INTRAVENOUS at 08:39

## 2024-07-23 ENCOUNTER — INFUSION THERAPY VISIT (OUTPATIENT)
Dept: INFUSION THERAPY | Facility: CLINIC | Age: 59
End: 2024-07-23
Attending: INTERNAL MEDICINE
Payer: COMMERCIAL

## 2024-07-23 VITALS
SYSTOLIC BLOOD PRESSURE: 112 MMHG | HEART RATE: 73 BPM | TEMPERATURE: 98.4 F | DIASTOLIC BLOOD PRESSURE: 74 MMHG | OXYGEN SATURATION: 95 % | RESPIRATION RATE: 18 BRPM

## 2024-07-23 DIAGNOSIS — C50.919 CARCINOMA OF BREAST METASTATIC TO BONE, UNSPECIFIED LATERALITY (H): Primary | ICD-10-CM

## 2024-07-23 DIAGNOSIS — C79.51 CARCINOMA OF BREAST METASTATIC TO BONE, UNSPECIFIED LATERALITY (H): Primary | ICD-10-CM

## 2024-07-23 PROCEDURE — 96402 CHEMO HORMON ANTINEOPL SQ/IM: CPT

## 2024-07-23 PROCEDURE — 250N000011 HC RX IP 250 OP 636: Performed by: INTERNAL MEDICINE

## 2024-07-23 RX ORDER — LAMOTRIGINE 25 MG/1
500 TABLET ORAL ONCE
Status: COMPLETED | OUTPATIENT
Start: 2024-07-23 | End: 2024-07-23

## 2024-07-23 RX ADMIN — FULVESTRANT 500 MG: 50 INJECTION INTRAMUSCULAR at 10:10

## 2024-07-23 ASSESSMENT — PAIN SCALES - GENERAL: PAINLEVEL: NO PAIN (0)

## 2024-07-23 NOTE — TELEPHONE ENCOUNTER
Patient called again requesting it now to go to [facility] pharmacy and that she will pick it up tomorrow with radiation appointment.    Writer has changed the pharmacy on the order and routed to Dr. Lozada to review and sign.     Jolynn Rajput RN     Phone call to patient at [phone] due to EPDS score of 14 after delivery.     Patient confirms experiencing \"a bit of anxiety, but I always kind of struggle with that.\"  She continues to take her Zoloft and feels that it is managing her anxiety well at this time.      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  Patient continues to practice self-care and will remain on the Zoloft when returning to work.     Patient without any additional needs at this time.  SW encouraged patient to reach out if any needs/questions arise.       Carola Melissa, HU-SUSHANT, [facility]  [facility]   [phone]

## 2024-07-23 NOTE — PROGRESS NOTES
Infusion Nursing Note:  Mickie Mcghee presents today for faslodex injections.    Patient seen by provider today: No   present during visit today: Not Applicable.    Note: N/A.      Intravenous Access:  No Intravenous access/labs at this visit.    Treatment Conditions:  Not Applicable.      Post Infusion Assessment:  Patient tolerated injections without incident.       Discharge Plan:   Patient and/or family verbalized understanding of discharge instructions and all questions answered.  Patient discharged in stable condition accompanied by: self.  Departure Mode: Ambulatory with cane.      Kimmy Burnett RN

## 2024-07-24 NOTE — RESULT ENCOUNTER NOTE
Dear Ms. Mcghee,    Blood test is good.    Please, call me with any questions.    Yury Lozada MD

## 2024-07-25 ENCOUNTER — VIRTUAL VISIT (OUTPATIENT)
Dept: ONCOLOGY | Facility: CLINIC | Age: 59
End: 2024-07-25
Attending: INTERNAL MEDICINE
Payer: COMMERCIAL

## 2024-07-25 VITALS — HEIGHT: 65 IN | WEIGHT: 150 LBS | BODY MASS INDEX: 24.99 KG/M2

## 2024-07-25 DIAGNOSIS — C79.51 CARCINOMA OF BREAST METASTATIC TO BONE, UNSPECIFIED LATERALITY (H): Primary | ICD-10-CM

## 2024-07-25 DIAGNOSIS — C50.919 CARCINOMA OF BREAST METASTATIC TO BONE, UNSPECIFIED LATERALITY (H): Primary | ICD-10-CM

## 2024-07-25 PROCEDURE — 99214 OFFICE O/P EST MOD 30 MIN: CPT | Mod: 95 | Performed by: INTERNAL MEDICINE

## 2024-07-25 ASSESSMENT — PAIN SCALES - GENERAL: PAINLEVEL: NO PAIN (0)

## 2024-07-25 NOTE — NURSING NOTE
Current patient location: [address] [address]    Is the patient currently in the state of [place]? YES    Visit mode:VIDEO    If the visit is dropped, the patient can be reconnected by: VIDEO VISIT: Text to cell phone:   Telephone Information:   Mobile [phone]       Will anyone else be joining the visit? NO  (If patient encounters technical issues they should call [phone])    How would you like to obtain your AVS? MyChart    Are changes needed to the allergy or medication list? No    Are refills needed on medications prescribed by this physician? NO    Reason for visit: TAMARA STRONG

## 2024-07-25 NOTE — PATIENT INSTRUCTIONS
1.  Continue Faslodex and capivasertib.  2.  Continue Zometa every 8 weeks.  3.  Continue calcium and vitamin D twice a day.  4.  See me in 2 months. (When she comes for faslodex)  5.  Labs as per treatment protocol.

## 2024-07-25 NOTE — PROGRESS NOTES
Virtual Visit Details    Type of service:  Video Visit     Originating Location (pt. Location): Home    Distant Location (provider location):  On-site  Platform used for Video Visit: [facility]    ONCOLOGY HISTORY:  Ms. Mcghee is a female with metastatic breast cancer. ER positive, [place] positive and HER-2/halina negative (1+ IHC).  -Foundation One reveals PIK3CA alteration.  -She had a stage III left breast cancer in 2007. ER positive and HER-2/halina negative.  She had bilateral mastectomy, chemotherapy, radiation, and anastrozole.     1.  On 03/18/2022, multiple imaging studies done because of bilateral lower extremity weakness:  -MRI of thoracic and lumbar spine revealed bone metastases with spinal cord compression.  -CT chest, abdomen, and pelvis on 03/19/2022 revealed bone metastasis, lung nodules and enlarged upper abdominal lymph node.  There is a hypodense nodule in the liver.  -Brain MRI on 03/19/2022 did not reveal any intracranial metastasis.  2.  CT-guided bone biopsy of left iliac bone on 03/21/2022 revealed metastatic breast cancer. ER positive, WI positive and HER-2/halina negative.  3.  Letrozole started on 03/25/2022.  -Ribociclib started on 03/31/2022. Decreased to 200 mg a day in 09/2022.  -Zometa started on 03/24/2022.  -Radiation to thoracolumbar and sacral area between 03/22/2022 and 04/07/2022.  3000 cGy in 10 fractions.  4.  PET scan on 03/18/2024 reveals progression of disease in the bones.  5. Faslodex and capivasertib started on 04/02/2024.     SUBJECTIVE:    Mrs. Mcghee is a 59-year-old female with metastatic breast cancer on Faslodex and capivasertib. She has few side effects.  She gets diarrhea.  She gets 2-3 loose stools a day.  It is tolerable.  She is not taking any Imodium.  She also has increased fatigue.  That is again tolerable.      She gets Zometa for bone metastasis.  She is tolerating it well.  No dental or jaw related symptoms.    Patient is responding to treatment.  PET scan on 07/22/2024:  Interval response to therapy with residual disease scattered throughout the osseous structures.      Her overall condition is stable.  She has generalized weakness. No headache. No dizziness.  No chest pain.  No shortness of breath. No abdominal pain.  No nausea or vomiting.  Appetite is good. No urinary complaints. No bleeding. No bone pain. All other review of system is negative.     PHYSICAL EXAMINATION:  She is alert and oriented x 3. Not in any distress.  Rest of systems not examined as this is a video visit.    LABS: CBC and CMP done on 07/22/2024 is normal.     ASSESSMENT:  1.  A 59-year-old female with metastatic breast cancer on Faslodex and capivasertib.  Disease is responding.  2.  Mild diarrhea from capivasertib.  Tolerable.  3.  Generalized weakness from metastatic breast cancer, Faslodex and capivasertib.     PLAN:  1.  Continue Faslodex and capivasertib.  2.  Continue Zometa every 8 weeks.  3.  Continue calcium and vitamin D twice a day.  4.  See me in 2 months. (When she comes for faslodex)  5.  Labs as per treatment protocol.     DISCUSSION:  1.  I had a video visit with the patient.  PET scan reviewed with her.  Explained to her it reveals improvement in bone metastasis.  No new disease.  She was happy to know that.    For breast cancer, she she will continue on Faslodex and capivasertib.  Side effects are tolerable.     For diarrhea, advised her to take Imodium as needed.  Also advised her to keep herself well-hydrated.     2.  She will continue on Zometa every 2 months. She will continue on calcium and vitamin D twice a day.     3.  She had a few questions which were all answered.  I will see her in 2 months.       Total visit time of 30 minutes.  Time spent in today's visit, review of chart/investigations today, monitoring for toxicity of high risk drugs and documentation today.

## 2024-07-25 NOTE — LETTER
7/25/2024      Mickie Mcghee  [address] [facility] [address] [place] [id number]      Dear Colleague,    Thank you for referring your patient, Mickie Mcghee, to the [facility] CANCER [facility]. Please see a copy of my visit note below.    Virtual Visit Details    Type of service:  Video Visit     Originating Location (pt. Location): Home    Distant Location (provider location):  On-site  Platform used for Video Visit: [facility]    ONCOLOGY HISTORY:  Ms. Mcghee is a female with metastatic breast cancer. ER positive, W[place] positive and HER-2/halina negative (1+ IHC).  -Foundation One reveals PIK3CA alteration.  -She had a stage III left breast cancer in 2007. ER positive and HER-2/halina negative.  She had bilateral mastectomy, chemotherapy, radiation, and anastrozole.     1.  On 03/18/2022, multiple imaging studies done because of bilateral lower extremity weakness:  -MRI of thoracic and lumbar spine revealed bone metastases with spinal cord compression.  -CT chest, abdomen, and pelvis on 03/19/2022 revealed bone metastasis, lung nodules and enlarged upper abdominal lymph node.  There is a hypodense nodule in the liver.  -Brain MRI on 03/19/2022 did not reveal any intracranial metastasis.  2.  CT-guided bone biopsy of left iliac bone on 03/21/2022 revealed metastatic breast cancer. ER positive, WY positive and HER-2/halina negative.  3.  Letrozole started on 03/25/2022.  -Ribociclib started on 03/31/2022. Decreased to 200 mg a day in 09/2022.  -Zometa started on 03/24/2022.  -Radiation to thoracolumbar and sacral area between 03/22/2022 and 04/07/2022.  3000 cGy in 10 fractions.  4.  PET scan on 03/18/2024 reveals progression of disease in the bones.  5. Faslodex and capivasertib started on 04/02/2024.     SUBJECTIVE:    Mrs. Mcghee is a 59-year-old female with metastatic breast cancer on Faslodex and capivasertib. She has few side effects.  She gets diarrhea.  She gets 2-3 loose stools a day.  It is tolerable.  She is not taking  any Imodium.  She also has increased fatigue.  That is again tolerable.      She gets Zometa for bone metastasis.  She is tolerating it well.  No dental or jaw related symptoms.    Patient is responding to treatment.  PET scan on 07/22/2024: Interval response to therapy with residual disease scattered throughout the osseous structures.      Her overall condition is stable.  She has generalized weakness. No headache. No dizziness.  No chest pain.  No shortness of breath. No abdominal pain.  No nausea or vomiting.  Appetite is good. No urinary complaints. No bleeding. No bone pain. All other review of system is negative.     PHYSICAL EXAMINATION:  She is alert and oriented x 3. Not in any distress.  Rest of systems not examined as this is a video visit.    LABS: CBC and CMP done on 07/22/2024 is normal.     ASSESSMENT:  1.  A 59-year-old female with metastatic breast cancer on Faslodex and capivasertib.  Disease is responding.  2.  Mild diarrhea from capivasertib.  Tolerable.  3.  Generalized weakness from metastatic breast cancer, Faslodex and capivasertib.     PLAN:  1.  Continue Faslodex and capivasertib.  2.  Continue Zometa every 8 weeks.  3.  Continue calcium and vitamin D twice a day.  4.  See me in 2 months. (When she comes for faslodex)  5.  Labs as per treatment protocol.     DISCUSSION:  1.  I had a video visit with the patient.  PET scan reviewed with her.  Explained to her it reveals improvement in bone metastasis.  No new disease.  She was happy to know that.    For breast cancer, she she will continue on Faslodex and capivasertib.  Side effects are tolerable.     For diarrhea, advised her to take Imodium as needed.  Also advised her to keep herself well-hydrated.     2.  She will continue on Zometa every 2 months. She will continue on calcium and vitamin D twice a day.     3.  She had a few questions which were all answered.  I will see her in 2 months.       Total visit time of 30 minutes.  Time spent  in today's visit, review of chart/investigations today, monitoring for toxicity of high risk drugs and documentation today.         Again, thank you for allowing me to participate in the care of your patient.        Sincerely,        Yury Lozada MD

## 2024-07-25 NOTE — LETTER
7/25/2024      Mickie Mcghee  [address] [facility] [address] [place] [id number]      Dear Colleague,    Thank you for referring your patient, Mickie Mcghee, to the [facility]. Please see a copy of my visit note below.    Virtual Visit Details    Type of service:  Video Visit     Originating Location (pt. Location): Home    Distant Location (provider location):  On-site  Platform used for Video Visit: [facility]    ONCOLOGY HISTORY:  Ms. Mcghee is a female with metastatic breast cancer. ER positive, [place] positive and HER-2/halina negative (1+ IHC).  -Foundation One reveals PIK3CA alteration.  -She had a stage III left breast cancer in 2007. ER positive and HER-2/halina negative.  She had bilateral mastectomy, chemotherapy, radiation, and anastrozole.     1.  On 03/18/2022, multiple imaging studies done because of bilateral lower extremity weakness:  -MRI of thoracic and lumbar spine revealed bone metastases with spinal cord compression.  -CT chest, abdomen, and pelvis on 03/19/2022 revealed bone metastasis, lung nodules and enlarged upper abdominal lymph node.  There is a hypodense nodule in the liver.  -Brain MRI on 03/19/2022 did not reveal any intracranial metastasis.  2.  CT-guided bone biopsy of left iliac bone on 03/21/2022 revealed metastatic breast cancer. ER positive, NH positive and HER-2/halina negative.  3.  Letrozole started on 03/25/2022.  -Ribociclib started on 03/31/2022. Decreased to 200 mg a day in 09/2022.  -Zometa started on 03/24/2022.  -Radiation to thoracolumbar and sacral area between 03/22/2022 and 04/07/2022.  3000 cGy in 10 fractions.  4.  PET scan on 03/18/2024 reveals progression of disease in the bones.  5. Faslodex and capivasertib started on 04/02/2024.     SUBJECTIVE:    Mrs. Mcghee is a 59-year-old female with metastatic breast cancer on Faslodex and capivasertib. She has few side effects.  She gets diarrhea.  She gets 2-3 loose stools a day.  It is tolerable.  She is not taking  any Imodium.  She also has increased fatigue.  That is again tolerable.      She gets Zometa for bone metastasis.  She is tolerating it well.  No dental or jaw related symptoms.    Patient is responding to treatment.  PET scan on 07/22/2024: Interval response to therapy with residual disease scattered throughout the osseous structures.      Her overall condition is stable.  She has generalized weakness. No headache. No dizziness.  No chest pain.  No shortness of breath. No abdominal pain.  No nausea or vomiting.  Appetite is good. No urinary complaints. No bleeding. No bone pain. All other review of system is negative.     PHYSICAL EXAMINATION:  She is alert and oriented x 3. Not in any distress.  Rest of systems not examined as this is a video visit.    LABS: CBC and CMP done on 07/22/2024 is normal.     ASSESSMENT:  1.  A 59-year-old female with metastatic breast cancer on Faslodex and capivasertib.  Disease is responding.  2.  Mild diarrhea from capivasertib.  Tolerable.  3.  Generalized weakness from metastatic breast cancer, Faslodex and capivasertib.     PLAN:  1.  Continue Faslodex and capivasertib.  2.  Continue Zometa every 8 weeks.  3.  Continue calcium and vitamin D twice a day.  4.  See me in 2 months. (When she comes for faslodex)  5.  Labs as per treatment protocol.     DISCUSSION:  1.  I had a video visit with the patient.  PET scan reviewed with her.  Explained to her it reveals improvement in bone metastasis.  No new disease.  She was happy to know that.    For breast cancer, she she will continue on Faslodex and capivasertib.  Side effects are tolerable.     For diarrhea, advised her to take Imodium as needed.  Also advised her to keep herself well-hydrated.     2.  She will continue on Zometa every 2 months. She will continue on calcium and vitamin D twice a day.     3.  She had a few questions which were all answered.  I will see her in 2 months.       Total visit time of 30 minutes.  Time spent  in today's visit, review of chart/investigations today, monitoring for toxicity of high risk drugs and documentation today.         Again, thank you for allowing me to participate in the care of your patient.        Sincerely,        Yury Lozada MD

## 2024-08-12 ENCOUNTER — TELEPHONE (OUTPATIENT)
Dept: ONCOLOGY | Facility: CLINIC | Age: 59
End: 2024-08-12
Payer: COMMERCIAL

## 2024-08-12 DIAGNOSIS — C79.51 CARCINOMA OF BREAST METASTATIC TO BONE, UNSPECIFIED LATERALITY (H): Primary | ICD-10-CM

## 2024-08-12 DIAGNOSIS — C50.919 CARCINOMA OF BREAST METASTATIC TO BONE, UNSPECIFIED LATERALITY (H): ICD-10-CM

## 2024-08-12 DIAGNOSIS — C50.919 CARCINOMA OF BREAST METASTATIC TO BONE, UNSPECIFIED LATERALITY (H): Primary | ICD-10-CM

## 2024-08-12 DIAGNOSIS — C79.51 CARCINOMA OF BREAST METASTATIC TO BONE, UNSPECIFIED LATERALITY (H): ICD-10-CM

## 2024-08-12 RX ORDER — LAMOTRIGINE 25 MG/1
500 TABLET ORAL ONCE
Status: CANCELLED | OUTPATIENT
Start: 2024-09-17 | End: 2024-09-16

## 2024-08-12 RX ORDER — DIPHENHYDRAMINE HYDROCHLORIDE 50 MG/ML
50 INJECTION INTRAMUSCULAR; INTRAVENOUS
Status: CANCELLED
Start: 2024-08-19

## 2024-08-12 RX ORDER — MEPERIDINE HYDROCHLORIDE 25 MG/ML
25 INJECTION INTRAMUSCULAR; INTRAVENOUS; SUBCUTANEOUS EVERY 30 MIN PRN
Status: CANCELLED | OUTPATIENT
Start: 2024-09-17

## 2024-08-12 RX ORDER — DIPHENHYDRAMINE HYDROCHLORIDE 50 MG/ML
50 INJECTION INTRAMUSCULAR; INTRAVENOUS
Status: CANCELLED
Start: 2024-09-17

## 2024-08-12 RX ORDER — ALBUTEROL SULFATE 90 UG/1
1-2 AEROSOL, METERED RESPIRATORY (INHALATION)
Status: CANCELLED
Start: 2024-09-17

## 2024-08-12 RX ORDER — EPINEPHRINE 1 MG/ML
0.3 INJECTION, SOLUTION, CONCENTRATE INTRAVENOUS EVERY 5 MIN PRN
Status: CANCELLED | OUTPATIENT
Start: 2024-08-19

## 2024-08-12 RX ORDER — ALBUTEROL SULFATE 0.83 MG/ML
2.5 SOLUTION RESPIRATORY (INHALATION)
Status: CANCELLED | OUTPATIENT
Start: 2024-08-19

## 2024-08-12 RX ORDER — MEPERIDINE HYDROCHLORIDE 25 MG/ML
25 INJECTION INTRAMUSCULAR; INTRAVENOUS; SUBCUTANEOUS EVERY 30 MIN PRN
Status: CANCELLED | OUTPATIENT
Start: 2024-08-19

## 2024-08-12 RX ORDER — ALBUTEROL SULFATE 90 UG/1
1-2 AEROSOL, METERED RESPIRATORY (INHALATION)
Status: CANCELLED
Start: 2024-08-19

## 2024-08-12 RX ORDER — ALBUTEROL SULFATE 0.83 MG/ML
2.5 SOLUTION RESPIRATORY (INHALATION)
Status: CANCELLED | OUTPATIENT
Start: 2024-09-17

## 2024-08-12 RX ORDER — CALCIUM CARBONATE/VITAMIN D3 600 MG-10
1 TABLET ORAL 2 TIMES DAILY
Qty: 180 TABLET | Refills: 0 | Status: SHIPPED | OUTPATIENT
Start: 2024-08-12

## 2024-08-12 RX ORDER — LAMOTRIGINE 25 MG/1
500 TABLET ORAL ONCE
Status: CANCELLED | OUTPATIENT
Start: 2024-08-20 | End: 2024-08-19

## 2024-08-12 RX ORDER — EPINEPHRINE 1 MG/ML
0.3 INJECTION, SOLUTION, CONCENTRATE INTRAVENOUS EVERY 5 MIN PRN
Status: CANCELLED | OUTPATIENT
Start: 2024-09-17

## 2024-08-20 ENCOUNTER — DOCUMENTATION ONLY (OUTPATIENT)
Dept: PHARMACY | Facility: CLINIC | Age: 59
End: 2024-08-20

## 2024-08-20 ENCOUNTER — INFUSION THERAPY VISIT (OUTPATIENT)
Dept: INFUSION THERAPY | Facility: CLINIC | Age: 59
End: 2024-08-20
Attending: INTERNAL MEDICINE
Payer: COMMERCIAL

## 2024-08-20 VITALS
DIASTOLIC BLOOD PRESSURE: 66 MMHG | OXYGEN SATURATION: 97 % | BODY MASS INDEX: 26.02 KG/M2 | WEIGHT: 156.2 LBS | HEART RATE: 69 BPM | RESPIRATION RATE: 16 BRPM | SYSTOLIC BLOOD PRESSURE: 105 MMHG | TEMPERATURE: 98.2 F | HEIGHT: 65 IN

## 2024-08-20 DIAGNOSIS — G95.29 SPINAL CORD COMPRESSION DUE TO MALIGNANT NEOPLASM METASTATIC TO SPINE (H): ICD-10-CM

## 2024-08-20 DIAGNOSIS — C79.51 CARCINOMA OF BREAST METASTATIC TO BONE, UNSPECIFIED LATERALITY (H): Primary | ICD-10-CM

## 2024-08-20 DIAGNOSIS — C50.919 CARCINOMA OF BREAST METASTATIC TO BONE, UNSPECIFIED LATERALITY (H): Primary | ICD-10-CM

## 2024-08-20 DIAGNOSIS — C79.51 SPINAL CORD COMPRESSION DUE TO MALIGNANT NEOPLASM METASTATIC TO SPINE (H): ICD-10-CM

## 2024-08-20 LAB
ALBUMIN SERPL BCG-MCNC: 4.2 G/DL (ref 3.5–5.2)
ALP SERPL-CCNC: 67 U/L (ref 40–150)
ALT SERPL W P-5'-P-CCNC: 48 U/L (ref 0–50)
ANION GAP SERPL CALCULATED.3IONS-SCNC: 8 MMOL/L (ref 7–15)
AST SERPL W P-5'-P-CCNC: 32 U/L (ref 0–45)
BASOPHILS # BLD AUTO: 0 10E3/UL (ref 0–0.2)
BASOPHILS NFR BLD AUTO: 0 %
BILIRUB SERPL-MCNC: 0.2 MG/DL
BUN SERPL-MCNC: 13 MG/DL (ref 6–20)
CALCIUM SERPL-MCNC: 9.8 MG/DL (ref 8.8–10.4)
CHLORIDE SERPL-SCNC: 104 MMOL/L (ref 98–107)
CREAT SERPL-MCNC: 0.8 MG/DL (ref 0.51–0.95)
EGFRCR SERPLBLD CKD-EPI 2021: 85 ML/MIN/1.73M2
EOSINOPHIL # BLD AUTO: 0.3 10E3/UL (ref 0–0.7)
EOSINOPHIL NFR BLD AUTO: 5 %
ERYTHROCYTE [DISTWIDTH] IN BLOOD BY AUTOMATED COUNT: 13.2 % (ref 10–15)
GLUCOSE SERPL-MCNC: 78 MG/DL (ref 70–99)
HCO3 SERPL-SCNC: 27 MMOL/L (ref 22–29)
HCT VFR BLD AUTO: 33.2 % (ref 35–47)
HGB BLD-MCNC: 11.2 G/DL (ref 11.7–15.7)
IMM GRANULOCYTES # BLD: 0 10E3/UL
IMM GRANULOCYTES NFR BLD: 1 %
LYMPHOCYTES # BLD AUTO: 1.3 10E3/UL (ref 0.8–5.3)
LYMPHOCYTES NFR BLD AUTO: 24 %
MAGNESIUM SERPL-MCNC: 2.3 MG/DL (ref 1.7–2.3)
MCH RBC QN AUTO: 31.3 PG (ref 26.5–33)
MCHC RBC AUTO-ENTMCNC: 33.7 G/DL (ref 31.5–36.5)
MCV RBC AUTO: 93 FL (ref 78–100)
MONOCYTES # BLD AUTO: 0.6 10E3/UL (ref 0–1.3)
MONOCYTES NFR BLD AUTO: 11 %
NEUTROPHILS # BLD AUTO: 3.3 10E3/UL (ref 1.6–8.3)
NEUTROPHILS NFR BLD AUTO: 59 %
NRBC # BLD AUTO: 0 10E3/UL
NRBC BLD AUTO-RTO: 0 /100
PLATELET # BLD AUTO: 208 10E3/UL (ref 150–450)
POTASSIUM SERPL-SCNC: 3.9 MMOL/L (ref 3.4–5.3)
PROT SERPL-MCNC: 6.8 G/DL (ref 6.4–8.3)
RBC # BLD AUTO: 3.58 10E6/UL (ref 3.8–5.2)
SODIUM SERPL-SCNC: 139 MMOL/L (ref 135–145)
WBC # BLD AUTO: 5.6 10E3/UL (ref 4–11)

## 2024-08-20 PROCEDURE — 85004 AUTOMATED DIFF WBC COUNT: CPT | Performed by: INTERNAL MEDICINE

## 2024-08-20 PROCEDURE — 250N000011 HC RX IP 250 OP 636: Performed by: INTERNAL MEDICINE

## 2024-08-20 PROCEDURE — 83735 ASSAY OF MAGNESIUM: CPT | Performed by: INTERNAL MEDICINE

## 2024-08-20 PROCEDURE — 96365 THER/PROPH/DIAG IV INF INIT: CPT

## 2024-08-20 PROCEDURE — 96402 CHEMO HORMON ANTINEOPL SQ/IM: CPT

## 2024-08-20 PROCEDURE — 36415 COLL VENOUS BLD VENIPUNCTURE: CPT

## 2024-08-20 PROCEDURE — 258N000003 HC RX IP 258 OP 636: Performed by: INTERNAL MEDICINE

## 2024-08-20 PROCEDURE — 80053 COMPREHEN METABOLIC PANEL: CPT | Performed by: INTERNAL MEDICINE

## 2024-08-20 RX ORDER — LAMOTRIGINE 25 MG/1
500 TABLET ORAL ONCE
Status: COMPLETED | OUTPATIENT
Start: 2024-08-20 | End: 2024-08-20

## 2024-08-20 RX ORDER — ZOLEDRONIC ACID 0.04 MG/ML
4 INJECTION, SOLUTION INTRAVENOUS ONCE
Status: COMPLETED | OUTPATIENT
Start: 2024-08-20 | End: 2024-08-20

## 2024-08-20 RX ADMIN — FULVESTRANT 500 MG: 50 INJECTION INTRAMUSCULAR at 11:20

## 2024-08-20 RX ADMIN — ZOLEDRONIC ACID 4 MG: 0.04 INJECTION, SOLUTION INTRAVENOUS at 11:49

## 2024-08-20 RX ADMIN — SODIUM CHLORIDE 250 ML: 9 INJECTION, SOLUTION INTRAVENOUS at 11:46

## 2024-08-20 NOTE — PROGRESS NOTES
Oral Chemotherapy Monitoring Program  Lab Follow Up    Reviewed lab results from 8/20/24.        4/24/2024    12:00 PM 4/30/2024     4:00 PM 5/28/2024     5:00 PM 6/17/2024    10:00 AM 7/16/2024    10:00 AM 8/12/2024    11:00 AM 8/20/2024    11:00 AM   ORAL CHEMOTHERAPY   Assessment Type Refill;Chart Review Lab Monitoring Lab Monitoring Refill Refill Refill Lab Monitoring   Diagnosis Code Breast Cancer Breast Cancer Breast Cancer Breast Cancer Breast Cancer Breast Cancer Breast Cancer   Providers Dr. Neville Lozada   Clinic Name/Location [facility]   Drug Name Truqap (capivasertib) Truqap (capivasertib) Truqap (capivasertib) Truqap (capivasertib) Truqap (capivasertib) Truqap (capivasertib) Truqap (capivasertib)   Dose 400 mg 400 mg 400 mg 400 mg 400 mg 400 mg 400 mg   Current Schedule BID BID BID BID BID BID BID   Cycle Details Other Other Other Other Other Other Other   Start Date of Last Cycle   4/30/2024       Planned next cycle start date   5/28/2024       Adverse Effects   No AE identified during assessment    Anemia   Anemia       Grade 1   Pharmacist Intervention(anemia)       No   Any new drug interactions? No    No     Is the dose as ordered appropriate for the patient? Yes    Yes         Labs:  _  Result Component Current Result Ref Range   Sodium 139 (8/20/2024) 135 - 145 mmol/L     _  Result Component Current Result Ref Range   Potassium 3.9 (8/20/2024) 3.4 - 5.3 mmol/L     _  Result Component Current Result Ref Range   Calcium 9.8 (8/20/2024) 8.8 - 10.4 mg/dL     _  Result Component Current Result Ref Range   Magnesium 2.3 (8/20/2024) 1.7 - 2.3 mg/dL     No results found for Phos within last 30 days.     _  Result Component Current Result Ref Range   Albumin 4.2 (8/20/2024) 3.5 - 5.2 g/dL     _  Result Component Current Result Ref Range   Urea Nitrogen 13.0 (8/20/2024) 6.0 - 20.0 mg/dL      _  Result Component Current Result Ref Range   Creatinine 0.80 (8/20/2024) 0.51 - 0.95 mg/dL     _  Result Component Current Result Ref Range   AST 32 (8/20/2024) 0 - 45 U/L     _  Result Component Current Result Ref Range   ALT 48 (8/20/2024) 0 - 50 U/L     _  Result Component Current Result Ref Range   Bilirubin Total 0.2 (8/20/2024) <=1.2 mg/dL     _  Result Component Current Result Ref Range   WBC Count 5.6 (8/20/2024) 4.0 - 11.0 10e3/uL     _  Result Component Current Result Ref Range   Hemoglobin 11.2 (L) (8/20/2024) 11.7 - 15.7 g/dL     _  Result Component Current Result Ref Range   Platelet Count 208 (8/20/2024) 150 - 450 10e3/uL     No results found for ANC within last 30 days.     _  Result Component Current Result Ref Range   Absolute Neutrophils 3.3 (8/20/2024) 1.6 - 8.3 10e3/uL        Assessment  Patient has grade 1 anemia. No other concerns.     Plan   Continue Truqap   Monthly labs   9/17 labs and karin Cr  Pharmacy Intern   [facility]   [phone]

## 2024-08-20 NOTE — PROGRESS NOTES
Infusion Nursing Note:  Mickie Mcghee presents today for Labs+ Faslodex injection+Zometa.    Patient seen by provider today: No   present during visit today: Not Applicable.    Note: Pt confirms she takes her Vit D+Ca as prescribed. Pt reports no new health changes or concerns today.      Intravenous Access:  Labs drawn without difficulty.  Peripheral IV placed.    Treatment Conditions:  Lab Results   Component Value Date    HGB 11.2 (L) 08/20/2024    WBC 5.6 08/20/2024    ANEU 1.3 (L) 08/04/2022    ANEUTAUTO 3.3 08/20/2024     08/20/2024        Lab Results   Component Value Date     08/20/2024    POTASSIUM 3.9 08/20/2024    MAG 2.3 08/20/2024    CR 0.80 08/20/2024    OSMEL 9.8 08/20/2024    BILITOTAL 0.2 08/20/2024    ALBUMIN 4.2 08/20/2024    ALT 48 08/20/2024    AST 32 08/20/2024       Results reviewed, labs MET treatment parameters, ok to proceed with treatment.      Post Infusion Assessment:  Patient tolerated infusion without incident.  Patient tolerated injection without incident.  Blood return noted pre and post infusion.  Site patent and intact, free from redness, edema or discomfort.  No evidence of extravasations.  Access discontinued per protocol.       Discharge Plan:   Discharge instructions reviewed with: Patient.  Patient and/or family verbalized understanding of discharge instructions and all questions answered.  AVS to patient via [employer].  Patient will return 9/17/24 for next appointment.   Patient discharged in stable condition accompanied by: self.  Departure Mode: Ambulatory.      Heather Crabtree RN

## 2024-09-09 DIAGNOSIS — C50.919 CARCINOMA OF BREAST METASTATIC TO BONE, UNSPECIFIED LATERALITY (H): Primary | ICD-10-CM

## 2024-09-09 DIAGNOSIS — C79.51 CARCINOMA OF BREAST METASTATIC TO BONE, UNSPECIFIED LATERALITY (H): Primary | ICD-10-CM

## 2024-09-17 ENCOUNTER — LAB (OUTPATIENT)
Dept: INFUSION THERAPY | Facility: CLINIC | Age: 59
End: 2024-09-17
Attending: INTERNAL MEDICINE
Payer: COMMERCIAL

## 2024-09-17 ENCOUNTER — ONCOLOGY VISIT (OUTPATIENT)
Dept: ONCOLOGY | Facility: CLINIC | Age: 59
End: 2024-09-17
Attending: INTERNAL MEDICINE
Payer: COMMERCIAL

## 2024-09-17 VITALS
HEART RATE: 79 BPM | OXYGEN SATURATION: 95 % | WEIGHT: 158.4 LBS | DIASTOLIC BLOOD PRESSURE: 83 MMHG | BODY MASS INDEX: 26.36 KG/M2 | SYSTOLIC BLOOD PRESSURE: 128 MMHG | RESPIRATION RATE: 16 BRPM | TEMPERATURE: 98.2 F

## 2024-09-17 DIAGNOSIS — C79.51 CARCINOMA OF BREAST METASTATIC TO BONE, UNSPECIFIED LATERALITY (H): Primary | ICD-10-CM

## 2024-09-17 DIAGNOSIS — C50.919 CARCINOMA OF BREAST METASTATIC TO BONE, UNSPECIFIED LATERALITY (H): Primary | ICD-10-CM

## 2024-09-17 DIAGNOSIS — C50.919 BREAST CANCER METASTASIZED TO BONE (H): Primary | ICD-10-CM

## 2024-09-17 DIAGNOSIS — C79.51 BREAST CANCER METASTASIZED TO BONE (H): Primary | ICD-10-CM

## 2024-09-17 DIAGNOSIS — C50.919 PRIMARY MALIGNANT NEOPLASM OF BREAST WITH METASTASIS (H): ICD-10-CM

## 2024-09-17 LAB
ALBUMIN SERPL BCG-MCNC: 4.4 G/DL (ref 3.5–5.2)
ALP SERPL-CCNC: 64 U/L (ref 40–150)
ALT SERPL W P-5'-P-CCNC: 44 U/L (ref 0–50)
ANION GAP SERPL CALCULATED.3IONS-SCNC: 10 MMOL/L (ref 7–15)
AST SERPL W P-5'-P-CCNC: 29 U/L (ref 0–45)
BASOPHILS # BLD AUTO: 0 10E3/UL (ref 0–0.2)
BASOPHILS NFR BLD AUTO: 1 %
BILIRUB SERPL-MCNC: 0.2 MG/DL
BUN SERPL-MCNC: 14.7 MG/DL (ref 6–20)
CALCIUM SERPL-MCNC: 9.9 MG/DL (ref 8.8–10.4)
CHLORIDE SERPL-SCNC: 104 MMOL/L (ref 98–107)
CREAT SERPL-MCNC: 0.84 MG/DL (ref 0.51–0.95)
EGFRCR SERPLBLD CKD-EPI 2021: 80 ML/MIN/1.73M2
EOSINOPHIL # BLD AUTO: 0.4 10E3/UL (ref 0–0.7)
EOSINOPHIL NFR BLD AUTO: 7 %
ERYTHROCYTE [DISTWIDTH] IN BLOOD BY AUTOMATED COUNT: 13.2 % (ref 10–15)
GLUCOSE SERPL-MCNC: 131 MG/DL (ref 70–99)
HCO3 SERPL-SCNC: 26 MMOL/L (ref 22–29)
HCT VFR BLD AUTO: 35 % (ref 35–47)
HGB BLD-MCNC: 11.5 G/DL (ref 11.7–15.7)
IMM GRANULOCYTES # BLD: 0 10E3/UL
IMM GRANULOCYTES NFR BLD: 1 %
LYMPHOCYTES # BLD AUTO: 1.3 10E3/UL (ref 0.8–5.3)
LYMPHOCYTES NFR BLD AUTO: 22 %
MAGNESIUM SERPL-MCNC: 2.4 MG/DL (ref 1.7–2.3)
MCH RBC QN AUTO: 30.2 PG (ref 26.5–33)
MCHC RBC AUTO-ENTMCNC: 32.9 G/DL (ref 31.5–36.5)
MCV RBC AUTO: 92 FL (ref 78–100)
MONOCYTES # BLD AUTO: 0.5 10E3/UL (ref 0–1.3)
MONOCYTES NFR BLD AUTO: 9 %
NEUTROPHILS # BLD AUTO: 3.6 10E3/UL (ref 1.6–8.3)
NEUTROPHILS NFR BLD AUTO: 61 %
NRBC # BLD AUTO: 0 10E3/UL
NRBC BLD AUTO-RTO: 0 /100
PLATELET # BLD AUTO: 222 10E3/UL (ref 150–450)
POTASSIUM SERPL-SCNC: 4.2 MMOL/L (ref 3.4–5.3)
PROT SERPL-MCNC: 6.8 G/DL (ref 6.4–8.3)
RBC # BLD AUTO: 3.81 10E6/UL (ref 3.8–5.2)
SODIUM SERPL-SCNC: 140 MMOL/L (ref 135–145)
WBC # BLD AUTO: 5.9 10E3/UL (ref 4–11)

## 2024-09-17 PROCEDURE — 83735 ASSAY OF MAGNESIUM: CPT | Performed by: INTERNAL MEDICINE

## 2024-09-17 PROCEDURE — 99215 OFFICE O/P EST HI 40 MIN: CPT | Performed by: INTERNAL MEDICINE

## 2024-09-17 PROCEDURE — 85025 COMPLETE CBC W/AUTO DIFF WBC: CPT | Performed by: INTERNAL MEDICINE

## 2024-09-17 PROCEDURE — 96402 CHEMO HORMON ANTINEOPL SQ/IM: CPT

## 2024-09-17 PROCEDURE — 84155 ASSAY OF PROTEIN SERUM: CPT | Performed by: INTERNAL MEDICINE

## 2024-09-17 PROCEDURE — G0463 HOSPITAL OUTPT CLINIC VISIT: HCPCS | Performed by: INTERNAL MEDICINE

## 2024-09-17 PROCEDURE — G2211 COMPLEX E/M VISIT ADD ON: HCPCS | Performed by: INTERNAL MEDICINE

## 2024-09-17 PROCEDURE — 36415 COLL VENOUS BLD VENIPUNCTURE: CPT

## 2024-09-17 PROCEDURE — 250N000011 HC RX IP 250 OP 636: Performed by: INTERNAL MEDICINE

## 2024-09-17 RX ORDER — HEPARIN SODIUM,PORCINE 10 UNIT/ML
5 VIAL (ML) INTRAVENOUS
OUTPATIENT
Start: 2024-12-10

## 2024-09-17 RX ORDER — HEPARIN SODIUM (PORCINE) LOCK FLUSH IV SOLN 100 UNIT/ML 100 UNIT/ML
5 SOLUTION INTRAVENOUS
OUTPATIENT
Start: 2024-12-10

## 2024-09-17 RX ORDER — ALBUTEROL SULFATE 90 UG/1
1-2 AEROSOL, METERED RESPIRATORY (INHALATION)
Start: 2024-10-15

## 2024-09-17 RX ORDER — HEPARIN SODIUM,PORCINE 10 UNIT/ML
5 VIAL (ML) INTRAVENOUS
OUTPATIENT
Start: 2024-10-15

## 2024-09-17 RX ORDER — ZOLEDRONIC ACID 0.04 MG/ML
4 INJECTION, SOLUTION INTRAVENOUS ONCE
Start: 2024-10-15 | End: 2024-10-15

## 2024-09-17 RX ORDER — EPINEPHRINE 1 MG/ML
0.3 INJECTION, SOLUTION INTRAMUSCULAR; SUBCUTANEOUS EVERY 5 MIN PRN
OUTPATIENT
Start: 2024-10-15

## 2024-09-17 RX ORDER — LAMOTRIGINE 25 MG/1
500 TABLET ORAL ONCE
OUTPATIENT
Start: 2024-10-15 | End: 2024-10-15

## 2024-09-17 RX ORDER — LOPERAMIDE HCL 2 MG
CAPSULE ORAL
Qty: 30 CAPSULE | Refills: 3 | Status: SHIPPED | OUTPATIENT
Start: 2024-09-17

## 2024-09-17 RX ORDER — MEPERIDINE HYDROCHLORIDE 25 MG/ML
25 INJECTION INTRAMUSCULAR; INTRAVENOUS; SUBCUTANEOUS EVERY 30 MIN PRN
OUTPATIENT
Start: 2024-10-15

## 2024-09-17 RX ORDER — LAMOTRIGINE 25 MG/1
500 TABLET ORAL ONCE
Status: COMPLETED | OUTPATIENT
Start: 2024-09-17 | End: 2024-09-17

## 2024-09-17 RX ORDER — ZOLEDRONIC ACID 0.04 MG/ML
4 INJECTION, SOLUTION INTRAVENOUS ONCE
Start: 2024-12-10 | End: 2024-12-10

## 2024-09-17 RX ORDER — METHYLPREDNISOLONE SODIUM SUCCINATE 125 MG/2ML
125 INJECTION, POWDER, LYOPHILIZED, FOR SOLUTION INTRAMUSCULAR; INTRAVENOUS
Start: 2024-10-15

## 2024-09-17 RX ORDER — HEPARIN SODIUM (PORCINE) LOCK FLUSH IV SOLN 100 UNIT/ML 100 UNIT/ML
5 SOLUTION INTRAVENOUS
OUTPATIENT
Start: 2024-10-15

## 2024-09-17 RX ORDER — DIPHENHYDRAMINE HYDROCHLORIDE 50 MG/ML
50 INJECTION INTRAMUSCULAR; INTRAVENOUS
Start: 2024-10-15

## 2024-09-17 RX ORDER — ALBUTEROL SULFATE 0.83 MG/ML
2.5 SOLUTION RESPIRATORY (INHALATION)
OUTPATIENT
Start: 2024-10-15

## 2024-09-17 RX ADMIN — FULVESTRANT 500 MG: 50 INJECTION INTRAMUSCULAR at 11:03

## 2024-09-17 ASSESSMENT — PAIN SCALES - GENERAL: PAINLEVEL: MILD PAIN (2)

## 2024-09-17 NOTE — LETTER
9/17/2024      Mickie Mcghee  [address] [facility] [address] [place] [address]      Dear Colleague,    Thank you for referring your patient, Mickie Mcghee, to the [facility] CANCER [facility]. Please see a copy of my visit note below.    ONCOLOGY HISTORY:  Ms. Mcghee is a female with metastatic breast cancer. ER positive, [place] positive and HER-2/halina negative (1+ IHC).  -Foundation One reveals PIK3CA alteration.  -She had a stage III left breast cancer in 2007. ER positive and HER-2/halina negative.  She had bilateral mastectomy, chemotherapy, radiation, and anastrozole.     1.  On 03/18/2022, multiple imaging studies done because of bilateral lower extremity weakness:  -MRI of thoracic and lumbar spine revealed bone metastases with spinal cord compression.  -CT chest, abdomen, and pelvis on 03/19/2022 revealed bone metastasis, lung nodules and enlarged upper abdominal lymph node.  There is a hypodense nodule in the liver.  -Brain MRI on 03/19/2022 did not reveal any intracranial metastasis.  2.  CT-guided bone biopsy of left iliac bone on 03/21/2022 revealed metastatic breast cancer. ER positive, UT positive and HER-2/halina negative.  3.  Letrozole started on 03/25/2022.  -Ribociclib started on 03/31/2022. Decreased to 200 mg a day in 09/2022.  -Zometa started on 03/24/2022.  -Radiation to thoracolumbar and sacral area between 03/22/2022 and 04/07/2022.  3000 cGy in 10 fractions.  4.  PET scan on 03/18/2024 reveals progression of disease in the bones.  5. Faslodex and capivasertib started on 04/02/2024.     SUBJECTIVE:    Mrs. Mcghee is a 59-year-old female with metastatic breast cancer on Faslodex and capivasertib. She has few side effects. She gets leg and foot cramps. She gets 2-3 diarrheal stool in morning.  It is tolerable. She is not taking any Imodium.      She has mild generalized weakness. She can do activities of daily living without any problem. She uses cane/walker. No worsening of weakness.      She gets  Zometa for bone metastasis.  She is tolerating it well.  No dental or jaw related symptoms.     No headache. No dizziness.  No chest pain.  No shortness of breath. No abdominal pain.  No nausea or vomiting.  Appetite is good. No urinary complaints. No bleeding. No bone pain. All other review of system is negative.     PHYSICAL EXAMINATION:  She is alert and oriented x 3. Not in any distress.  Vitals: Reviewed. ECOG PS of 1.  Rest of systems not examined.     LABS: CBC and CMP reviewed.     ASSESSMENT:  1.  A 59-year-old female with metastatic breast cancer on Faslodex and capivasertib.  Disease is stable.  2.  Grade 1 diarrhea from capivasertib. Tolerable.  3.  Generalized weakness from metastatic breast cancer, Faslodex and capivasertib.     PLAN:  1.  Continue Faslodex and capivasertib.  2.  Continue Zometa every 8 weeks.  3.  Continue calcium and vitamin D twice a day.  4.  PET scan in 2 months.  5.  See me in 2 months. (When she comes for faslodex)  6.  Labs as per treatment protocol.     DISCUSSION:  1.  Patient is doing well from breast cancer.  No suspicion of progression.    For metastatic breast cancer, she will continue on Faslodex and capivasertib.  Overall she is tolerating it well.  She has mild diarrhea which is tolerable.  Her weakness is about the same.    2.  Will get a PET scan in 2 months.  She is agreeable for it.    3.  She is on Zometa for bone metastasis which will be continued.  She is tolerating it well.  No dental or jaw related symptoms.  She will continue on calcium and vitamin D twice a day.  She will also continue to follow-up with dentist on regular basis.    If next PET scan reveals improvement in bone metastasis, we can consider changing Zometa to every 3 months.    4.  Labs were reviewed with her.  It reveals minor abnormalities.  Labs are good for treatment.  Will continue to monitor it.    5.  She had a few questions which were all answered.  I will see her in 2 months time with  PET scan.     Total visit time of 40 minutes.  Time spent in today's visit, review of chart/investigations today, monitoring for toxicity of high risk drugs and documentation.          Again, thank you for allowing me to participate in the care of your patient.        Sincerely,        Yury Lozada MD

## 2024-09-17 NOTE — PATIENT INSTRUCTIONS
1.  Continue Faslodex and capivasertib.  2.  Continue Zometa every 8 weeks.  3.  Continue calcium and vitamin D twice a day.  4.  PET scan in 2 months.  5.  See me in 2 months. (When she comes for faslodex)  6.  Labs as per treatment protocol.

## 2024-09-17 NOTE — PROGRESS NOTES
ONCOLOGY HISTORY:  Ms. Mcghee is a female with metastatic breast cancer. ER positive, [place] positive and HER-2/halina negative (1+ IHC).  -Foundation One reveals PIK3CA alteration.  -She had a stage III left breast cancer in 2007. ER positive and HER-2/halina negative.  She had bilateral mastectomy, chemotherapy, radiation, and anastrozole.     1.  On 03/18/2022, multiple imaging studies done because of bilateral lower extremity weakness:  -MRI of thoracic and lumbar spine revealed bone metastases with spinal cord compression.  -CT chest, abdomen, and pelvis on 03/19/2022 revealed bone metastasis, lung nodules and enlarged upper abdominal lymph node.  There is a hypodense nodule in the liver.  -Brain MRI on 03/19/2022 did not reveal any intracranial metastasis.  2.  CT-guided bone biopsy of left iliac bone on 03/21/2022 revealed metastatic breast cancer. ER positive, PA positive and HER-2/halina negative.  3.  Letrozole started on 03/25/2022.  -Ribociclib started on 03/31/2022. Decreased to 200 mg a day in 09/2022.  -Zometa started on 03/24/2022.  -Radiation to thoracolumbar and sacral area between 03/22/2022 and 04/07/2022.  3000 cGy in 10 fractions.  4.  PET scan on 03/18/2024 reveals progression of disease in the bones.  5. Faslodex and capivasertib started on 04/02/2024.     SUBJECTIVE:    Mrs. Mcghee is a 59-year-old female with metastatic breast cancer on Faslodex and capivasertib. She has few side effects. She gets leg and foot cramps. She gets 2-3 diarrheal stool in morning.  It is tolerable. She is not taking any Imodium.      She has mild generalized weakness. She can do activities of daily living without any problem. She uses cane/walker. No worsening of weakness.      She gets Zometa for bone metastasis.  She is tolerating it well.  No dental or jaw related symptoms.     No headache. No dizziness.  No chest pain.  No shortness of breath. No abdominal pain.  No nausea or vomiting.  Appetite is good. No urinary complaints.  No bleeding. No bone pain. All other review of system is negative.     PHYSICAL EXAMINATION:  She is alert and oriented x 3. Not in any distress.  Vitals: Reviewed. ECOG PS of 1.  Rest of systems not examined.     LABS: CBC and CMP reviewed.     ASSESSMENT:  1.  A 59-year-old female with metastatic breast cancer on Faslodex and capivasertib.  Disease is stable.  2.  Grade 1 diarrhea from capivasertib. Tolerable.  3.  Generalized weakness from metastatic breast cancer, Faslodex and capivasertib.     PLAN:  1.  Continue Faslodex and capivasertib.  2.  Continue Zometa every 8 weeks.  3.  Continue calcium and vitamin D twice a day.  4.  PET scan in 2 months.  5.  See me in 2 months. (When she comes for faslodex)  6.  Labs as per treatment protocol.     DISCUSSION:  1.  Patient is doing well from breast cancer.  No suspicion of progression.    For metastatic breast cancer, she will continue on Faslodex and capivasertib.  Overall she is tolerating it well.  She has mild diarrhea which is tolerable.  Her weakness is about the same.    2.  Will get a PET scan in 2 months.  She is agreeable for it.    3.  She is on Zometa for bone metastasis which will be continued.  She is tolerating it well.  No dental or jaw related symptoms.  She will continue on calcium and vitamin D twice a day.  She will also continue to follow-up with dentist on regular basis.    If next PET scan reveals improvement in bone metastasis, we can consider changing Zometa to every 3 months.    4.  Labs were reviewed with her.  It reveals minor abnormalities.  Labs are good for treatment.  Will continue to monitor it.    5.  She had a few questions which were all answered.  I will see her in 2 months time with PET scan.     Total visit time of 40 minutes.  Time spent in today's visit, review of chart/investigations today, monitoring for toxicity of high risk drugs and documentation.

## 2024-10-07 DIAGNOSIS — C50.919 CARCINOMA OF BREAST METASTATIC TO BONE, UNSPECIFIED LATERALITY (H): Primary | ICD-10-CM

## 2024-10-07 DIAGNOSIS — C79.51 CARCINOMA OF BREAST METASTATIC TO BONE, UNSPECIFIED LATERALITY (H): Primary | ICD-10-CM

## 2024-10-15 ENCOUNTER — DOCUMENTATION ONLY (OUTPATIENT)
Dept: ONCOLOGY | Facility: CLINIC | Age: 59
End: 2024-10-15

## 2024-10-15 ENCOUNTER — INFUSION THERAPY VISIT (OUTPATIENT)
Dept: INFUSION THERAPY | Facility: CLINIC | Age: 59
End: 2024-10-15
Attending: INTERNAL MEDICINE
Payer: COMMERCIAL

## 2024-10-15 VITALS
DIASTOLIC BLOOD PRESSURE: 65 MMHG | WEIGHT: 158.8 LBS | OXYGEN SATURATION: 97 % | BODY MASS INDEX: 26.43 KG/M2 | HEART RATE: 76 BPM | RESPIRATION RATE: 16 BRPM | TEMPERATURE: 97.8 F | SYSTOLIC BLOOD PRESSURE: 97 MMHG

## 2024-10-15 DIAGNOSIS — G95.29 SPINAL CORD COMPRESSION DUE TO MALIGNANT NEOPLASM METASTATIC TO SPINE (H): ICD-10-CM

## 2024-10-15 DIAGNOSIS — C50.919 CARCINOMA OF BREAST METASTATIC TO BONE, UNSPECIFIED LATERALITY (H): Primary | ICD-10-CM

## 2024-10-15 DIAGNOSIS — C79.51 CARCINOMA OF BREAST METASTATIC TO BONE, UNSPECIFIED LATERALITY (H): Primary | ICD-10-CM

## 2024-10-15 DIAGNOSIS — C79.51 SPINAL CORD COMPRESSION DUE TO MALIGNANT NEOPLASM METASTATIC TO SPINE (H): ICD-10-CM

## 2024-10-15 LAB
ALBUMIN SERPL BCG-MCNC: 4.4 G/DL (ref 3.5–5.2)
ALP SERPL-CCNC: 65 U/L (ref 40–150)
ALT SERPL W P-5'-P-CCNC: 59 U/L (ref 0–50)
ANION GAP SERPL CALCULATED.3IONS-SCNC: 12 MMOL/L (ref 7–15)
AST SERPL W P-5'-P-CCNC: 38 U/L (ref 0–45)
BASOPHILS # BLD AUTO: 0 10E3/UL (ref 0–0.2)
BASOPHILS NFR BLD AUTO: 1 %
BILIRUB SERPL-MCNC: 0.2 MG/DL
BUN SERPL-MCNC: 17 MG/DL (ref 6–20)
CALCIUM SERPL-MCNC: 10.1 MG/DL (ref 8.8–10.4)
CHLORIDE SERPL-SCNC: 102 MMOL/L (ref 98–107)
CREAT SERPL-MCNC: 0.77 MG/DL (ref 0.51–0.95)
EGFRCR SERPLBLD CKD-EPI 2021: 89 ML/MIN/1.73M2
EOSINOPHIL # BLD AUTO: 0.4 10E3/UL (ref 0–0.7)
EOSINOPHIL NFR BLD AUTO: 8 %
ERYTHROCYTE [DISTWIDTH] IN BLOOD BY AUTOMATED COUNT: 13.5 % (ref 10–15)
GLUCOSE SERPL-MCNC: 138 MG/DL (ref 70–99)
HCO3 SERPL-SCNC: 25 MMOL/L (ref 22–29)
HCT VFR BLD AUTO: 33.3 % (ref 35–47)
HGB BLD-MCNC: 11 G/DL (ref 11.7–15.7)
IMM GRANULOCYTES # BLD: 0 10E3/UL
IMM GRANULOCYTES NFR BLD: 1 %
LYMPHOCYTES # BLD AUTO: 1.2 10E3/UL (ref 0.8–5.3)
LYMPHOCYTES NFR BLD AUTO: 22 %
MAGNESIUM SERPL-MCNC: 2.3 MG/DL (ref 1.7–2.3)
MCH RBC QN AUTO: 29.9 PG (ref 26.5–33)
MCHC RBC AUTO-ENTMCNC: 33 G/DL (ref 31.5–36.5)
MCV RBC AUTO: 91 FL (ref 78–100)
MONOCYTES # BLD AUTO: 0.5 10E3/UL (ref 0–1.3)
MONOCYTES NFR BLD AUTO: 8 %
NEUTROPHILS # BLD AUTO: 3.2 10E3/UL (ref 1.6–8.3)
NEUTROPHILS NFR BLD AUTO: 60 %
NRBC # BLD AUTO: 0 10E3/UL
NRBC BLD AUTO-RTO: 0 /100
PLATELET # BLD AUTO: 219 10E3/UL (ref 150–450)
POTASSIUM SERPL-SCNC: 4.3 MMOL/L (ref 3.4–5.3)
PROT SERPL-MCNC: 6.8 G/DL (ref 6.4–8.3)
RBC # BLD AUTO: 3.68 10E6/UL (ref 3.8–5.2)
SODIUM SERPL-SCNC: 139 MMOL/L (ref 135–145)
WBC # BLD AUTO: 5.4 10E3/UL (ref 4–11)

## 2024-10-15 PROCEDURE — 85025 COMPLETE CBC W/AUTO DIFF WBC: CPT | Performed by: INTERNAL MEDICINE

## 2024-10-15 PROCEDURE — 96402 CHEMO HORMON ANTINEOPL SQ/IM: CPT

## 2024-10-15 PROCEDURE — 36415 COLL VENOUS BLD VENIPUNCTURE: CPT

## 2024-10-15 PROCEDURE — 84155 ASSAY OF PROTEIN SERUM: CPT | Performed by: INTERNAL MEDICINE

## 2024-10-15 PROCEDURE — 250N000011 HC RX IP 250 OP 636: Performed by: INTERNAL MEDICINE

## 2024-10-15 PROCEDURE — 96365 THER/PROPH/DIAG IV INF INIT: CPT

## 2024-10-15 PROCEDURE — 83735 ASSAY OF MAGNESIUM: CPT | Performed by: INTERNAL MEDICINE

## 2024-10-15 RX ORDER — ZOLEDRONIC ACID 0.04 MG/ML
4 INJECTION, SOLUTION INTRAVENOUS ONCE
Status: COMPLETED | OUTPATIENT
Start: 2024-10-15 | End: 2024-10-15

## 2024-10-15 RX ORDER — LAMOTRIGINE 25 MG/1
500 TABLET ORAL ONCE
Status: COMPLETED | OUTPATIENT
Start: 2024-10-15 | End: 2024-10-15

## 2024-10-15 RX ADMIN — FULVESTRANT 500 MG: 50 INJECTION INTRAMUSCULAR at 11:25

## 2024-10-15 RX ADMIN — ZOLEDRONIC ACID 4 MG: 0.04 INJECTION, SOLUTION INTRAVENOUS at 11:30

## 2024-10-15 ASSESSMENT — PAIN SCALES - GENERAL: PAINLEVEL: NO PAIN (0)

## 2024-10-15 NOTE — PROGRESS NOTES
Infusion Nursing Note:  Mickie Mcghee presents today for C26/D1: Zometa/ C8D1: Faslodex, peripheral labs.    Patient seen by provider today: No   present during visit today: Not Applicable.    Note: Patient reports to feeling well.  Her main complaint is daily diarrhea caused by her oral Truqap. Patient reports that her diarrhea is controlled and she feels like she tolerates it. Denies any new concerns today.      Intravenous Access:  Peripheral IV placed.    Treatment Conditions:  Lab Results   Component Value Date    HGB 11.0 (L) 10/15/2024    WBC 5.4 10/15/2024    ANEU 1.3 (L) 08/04/2022    ANEUTAUTO 3.2 10/15/2024     10/15/2024        Lab Results   Component Value Date     10/15/2024    POTASSIUM 4.3 10/15/2024    MAG 2.3 10/15/2024    CR 0.77 10/15/2024    OSMEL 10.1 10/15/2024    BILITOTAL 0.2 10/15/2024    ALBUMIN 4.4 10/15/2024    ALT 59 (H) 10/15/2024    AST 38 10/15/2024       Results reviewed, labs MET treatment parameters, ok to proceed with treatment.      Post Infusion Assessment:  Patient tolerated infusion without incident.  Patient tolerated two Faslodex injections to bilateral gluteus George without issue.  Blood return noted pre and post infusion.  Site patent and intact, free from redness, edema or discomfort.  No evidence of extravasations.  Access discontinued per protocol.       Discharge Plan:   Patient declined prescription refills.  Discharge instructions reviewed with: Patient.  Patient and/or family verbalized understanding of discharge instructions and all questions answered.  AVS to patient via [employer].  Patient will return 11/12/24 for Faslodex injections for next appointment.   Patient discharged in stable condition accompanied by: self.  Departure Mode: Ambulatory with cane.      Annabelle Hoffmann RN

## 2024-10-15 NOTE — PROGRESS NOTES
Oral Chemotherapy Monitoring Program  Lab Follow Up    Reviewed lab results from 10/15/24.        5/28/2024     5:00 PM 6/17/2024    10:00 AM 7/16/2024    10:00 AM 8/12/2024    11:00 AM 8/20/2024    11:00 AM 10/7/2024    11:00 AM 10/15/2024    12:00 PM   ORAL CHEMOTHERAPY   Assessment Type Lab Monitoring Refill Refill Refill Lab Monitoring Refill Lab Monitoring   Diagnosis Code Breast Cancer Breast Cancer Breast Cancer Breast Cancer Breast Cancer Breast Cancer Breast Cancer   Providers Dr. Neville Lozada   Clinic Name/Location [facility]   Drug Name Truqap (capivasertib) Truqap (capivasertib) Truqap (capivasertib) Truqap (capivasertib) Truqap (capivasertib) Truqap (capivasertib) Truqap (capivasertib)   Dose 400 mg 400 mg 400 mg 400 mg 400 mg 400 mg 400 mg   Current Schedule BID BID BID BID BID BID BID   Cycle Details Other Other Other Other Other Days 1-4 each week Days 1-4 each week   Start Date of Last Cycle 4/30/2024         Planned next cycle start date 5/28/2024         Adverse Effects No AE identified during assessment    Anemia  Hyperglycemia;Increased AST/ALT/T BILI   Hyperglycemia       Grade 1   Pharmacist Intervention(hyperglycemia)       No   Anemia     Grade 1     Pharmacist Intervention(anemia)     No     Any new drug interactions?   No       Is the dose as ordered appropriate for the patient?   Yes           Labs:  _  Result Component Current Result Ref Range   Sodium 139 (10/15/2024) 135 - 145 mmol/L     _  Result Component Current Result Ref Range   Potassium 4.3 (10/15/2024) 3.4 - 5.3 mmol/L     _  Result Component Current Result Ref Range   Calcium 10.1 (10/15/2024) 8.8 - 10.4 mg/dL     _  Result Component Current Result Ref Range   Magnesium 2.3 (10/15/2024) 1.7 - 2.3 mg/dL     No results found for Phos within last 30 days.     _  Result Component Current Result Ref Range   Albumin 4.4  (10/15/2024) 3.5 - 5.2 g/dL     _  Result Component Current Result Ref Range   Urea Nitrogen 17.0 (10/15/2024) 6.0 - 20.0 mg/dL     _  Result Component Current Result Ref Range   Creatinine 0.77 (10/15/2024) 0.51 - 0.95 mg/dL     _  Result Component Current Result Ref Range   AST 38 (10/15/2024) 0 - 45 U/L     _  Result Component Current Result Ref Range   ALT 59 (H) (10/15/2024) 0 - 50 U/L     _  Result Component Current Result Ref Range   Bilirubin Total 0.2 (10/15/2024) <=1.2 mg/dL     _  Result Component Current Result Ref Range   WBC Count 5.4 (10/15/2024) 4.0 - 11.0 10e3/uL     _  Result Component Current Result Ref Range   Hemoglobin 11.0 (L) (10/15/2024) 11.7 - 15.7 g/dL     _  Result Component Current Result Ref Range   Platelet Count 219 (10/15/2024) 150 - 450 10e3/uL     _  Result Component Current Result Ref Range   Absolute Neutrophils 3.2 (10/15/2024) 1.6 - 8.3 10e3/uL        Assessment & Plan:  No concerning abnormalities. Glucose continues to be elevated but stable. Not reported if labs were drawn fasted. Consider initiation of antihyperglycemic therapy if fasting levels are >/= 160 mg/dL. Continue to monitor. Ok to proceed with capivasertib.    Questions answered to patient's satisfaction.    Follow-Up:  11/11 - Monthly labs    Rachel Moniz, PharmD  Hematology/Oncology Pharmacist  [facility]  [phone]

## 2024-11-02 DIAGNOSIS — C79.51 CARCINOMA OF BREAST METASTATIC TO BONE, UNSPECIFIED LATERALITY (H): Primary | ICD-10-CM

## 2024-11-02 DIAGNOSIS — C50.919 CARCINOMA OF BREAST METASTATIC TO BONE, UNSPECIFIED LATERALITY (H): Primary | ICD-10-CM

## 2024-11-02 RX ORDER — MEPERIDINE HYDROCHLORIDE 25 MG/ML
25 INJECTION INTRAMUSCULAR; INTRAVENOUS; SUBCUTANEOUS
Status: CANCELLED | OUTPATIENT
Start: 2024-11-12

## 2024-11-02 RX ORDER — LAMOTRIGINE 25 MG/1
500 TABLET ORAL ONCE
OUTPATIENT
Start: 2025-01-07 | End: 2025-01-07

## 2024-11-02 RX ORDER — DIPHENHYDRAMINE HYDROCHLORIDE 50 MG/ML
50 INJECTION INTRAMUSCULAR; INTRAVENOUS
Start: 2025-01-07

## 2024-11-02 RX ORDER — ALBUTEROL SULFATE 90 UG/1
1-2 INHALANT RESPIRATORY (INHALATION)
Status: CANCELLED
Start: 2024-11-12

## 2024-11-02 RX ORDER — DIPHENHYDRAMINE HYDROCHLORIDE 50 MG/ML
50 INJECTION INTRAMUSCULAR; INTRAVENOUS
Start: 2024-12-10

## 2024-11-02 RX ORDER — LAMOTRIGINE 25 MG/1
500 TABLET ORAL ONCE
Status: CANCELLED | OUTPATIENT
Start: 2024-11-12 | End: 2024-11-12

## 2024-11-02 RX ORDER — ALBUTEROL SULFATE 90 UG/1
1-2 INHALANT RESPIRATORY (INHALATION)
Start: 2024-12-10

## 2024-11-02 RX ORDER — ALBUTEROL SULFATE 90 UG/1
1-2 INHALANT RESPIRATORY (INHALATION)
Start: 2025-01-07

## 2024-11-02 RX ORDER — DIPHENHYDRAMINE HYDROCHLORIDE 50 MG/ML
25 INJECTION INTRAMUSCULAR; INTRAVENOUS
Start: 2024-12-10

## 2024-11-02 RX ORDER — DIPHENHYDRAMINE HYDROCHLORIDE 50 MG/ML
25 INJECTION INTRAMUSCULAR; INTRAVENOUS
Status: CANCELLED
Start: 2024-11-12

## 2024-11-02 RX ORDER — ALBUTEROL SULFATE 0.83 MG/ML
2.5 SOLUTION RESPIRATORY (INHALATION)
OUTPATIENT
Start: 2024-12-10

## 2024-11-02 RX ORDER — ALBUTEROL SULFATE 0.83 MG/ML
2.5 SOLUTION RESPIRATORY (INHALATION)
Status: CANCELLED | OUTPATIENT
Start: 2024-11-12

## 2024-11-02 RX ORDER — DIPHENHYDRAMINE HYDROCHLORIDE 50 MG/ML
25 INJECTION INTRAMUSCULAR; INTRAVENOUS
Start: 2025-01-07

## 2024-11-02 RX ORDER — ALBUTEROL SULFATE 0.83 MG/ML
2.5 SOLUTION RESPIRATORY (INHALATION)
OUTPATIENT
Start: 2025-01-07

## 2024-11-02 RX ORDER — MEPERIDINE HYDROCHLORIDE 25 MG/ML
25 INJECTION INTRAMUSCULAR; INTRAVENOUS; SUBCUTANEOUS
OUTPATIENT
Start: 2024-12-10

## 2024-11-02 RX ORDER — EPINEPHRINE 1 MG/ML
0.3 INJECTION, SOLUTION, CONCENTRATE INTRAVENOUS EVERY 5 MIN PRN
Status: CANCELLED | OUTPATIENT
Start: 2024-11-12

## 2024-11-02 RX ORDER — LAMOTRIGINE 25 MG/1
500 TABLET ORAL ONCE
OUTPATIENT
Start: 2024-12-10 | End: 2024-12-10

## 2024-11-02 RX ORDER — DIPHENHYDRAMINE HYDROCHLORIDE 50 MG/ML
50 INJECTION INTRAMUSCULAR; INTRAVENOUS
Status: CANCELLED
Start: 2024-11-12

## 2024-11-02 RX ORDER — EPINEPHRINE 1 MG/ML
0.3 INJECTION, SOLUTION, CONCENTRATE INTRAVENOUS EVERY 5 MIN PRN
OUTPATIENT
Start: 2025-01-07

## 2024-11-02 RX ORDER — EPINEPHRINE 1 MG/ML
0.3 INJECTION, SOLUTION, CONCENTRATE INTRAVENOUS EVERY 5 MIN PRN
OUTPATIENT
Start: 2024-12-10

## 2024-11-02 RX ORDER — MEPERIDINE HYDROCHLORIDE 25 MG/ML
25 INJECTION INTRAMUSCULAR; INTRAVENOUS; SUBCUTANEOUS
OUTPATIENT
Start: 2025-01-07

## 2024-11-06 DIAGNOSIS — C79.51 CARCINOMA OF BREAST METASTATIC TO BONE, UNSPECIFIED LATERALITY (H): ICD-10-CM

## 2024-11-06 DIAGNOSIS — C50.919 CARCINOMA OF BREAST METASTATIC TO BONE, UNSPECIFIED LATERALITY (H): ICD-10-CM

## 2024-11-06 RX ORDER — CALCIUM CARBONATE/VITAMIN D3 600 MG-10
1 TABLET ORAL 2 TIMES DAILY
Qty: 180 TABLET | Refills: 0 | Status: SHIPPED | OUTPATIENT
Start: 2024-11-06

## 2024-11-12 ENCOUNTER — INFUSION THERAPY VISIT (OUTPATIENT)
Dept: INFUSION THERAPY | Facility: CLINIC | Age: 59
End: 2024-11-12
Attending: INTERNAL MEDICINE
Payer: COMMERCIAL

## 2024-11-12 ENCOUNTER — DOCUMENTATION ONLY (OUTPATIENT)
Dept: PHARMACY | Facility: CLINIC | Age: 59
End: 2024-11-12

## 2024-11-12 VITALS
OXYGEN SATURATION: 95 % | RESPIRATION RATE: 18 BRPM | DIASTOLIC BLOOD PRESSURE: 75 MMHG | HEART RATE: 77 BPM | TEMPERATURE: 98.1 F | SYSTOLIC BLOOD PRESSURE: 121 MMHG

## 2024-11-12 DIAGNOSIS — C50.919 CARCINOMA OF BREAST METASTATIC TO BONE, UNSPECIFIED LATERALITY (H): Primary | ICD-10-CM

## 2024-11-12 DIAGNOSIS — C79.51 CARCINOMA OF BREAST METASTATIC TO BONE, UNSPECIFIED LATERALITY (H): Primary | ICD-10-CM

## 2024-11-12 LAB
ALBUMIN SERPL BCG-MCNC: 4.3 G/DL (ref 3.5–5.2)
ALP SERPL-CCNC: 65 U/L (ref 40–150)
ALT SERPL W P-5'-P-CCNC: 83 U/L (ref 0–50)
ANION GAP SERPL CALCULATED.3IONS-SCNC: 11 MMOL/L (ref 7–15)
AST SERPL W P-5'-P-CCNC: 54 U/L (ref 0–45)
BASOPHILS # BLD AUTO: 0 10E3/UL (ref 0–0.2)
BASOPHILS NFR BLD AUTO: 1 %
BILIRUB SERPL-MCNC: 0.2 MG/DL
BUN SERPL-MCNC: 16.9 MG/DL (ref 6–20)
CALCIUM SERPL-MCNC: 10.1 MG/DL (ref 8.8–10.4)
CHLORIDE SERPL-SCNC: 104 MMOL/L (ref 98–107)
CREAT SERPL-MCNC: 0.79 MG/DL (ref 0.51–0.95)
EGFRCR SERPLBLD CKD-EPI 2021: 86 ML/MIN/1.73M2
EOSINOPHIL # BLD AUTO: 0.4 10E3/UL (ref 0–0.7)
EOSINOPHIL NFR BLD AUTO: 7 %
ERYTHROCYTE [DISTWIDTH] IN BLOOD BY AUTOMATED COUNT: 13.7 % (ref 10–15)
GLUCOSE SERPL-MCNC: 111 MG/DL (ref 70–99)
HCO3 SERPL-SCNC: 25 MMOL/L (ref 22–29)
HCT VFR BLD AUTO: 34.9 % (ref 35–47)
HGB BLD-MCNC: 11.6 G/DL (ref 11.7–15.7)
IMM GRANULOCYTES # BLD: 0 10E3/UL
IMM GRANULOCYTES NFR BLD: 1 %
LYMPHOCYTES # BLD AUTO: 1.1 10E3/UL (ref 0.8–5.3)
LYMPHOCYTES NFR BLD AUTO: 22 %
MAGNESIUM SERPL-MCNC: 2.2 MG/DL (ref 1.7–2.3)
MCH RBC QN AUTO: 30 PG (ref 26.5–33)
MCHC RBC AUTO-ENTMCNC: 33.2 G/DL (ref 31.5–36.5)
MCV RBC AUTO: 90 FL (ref 78–100)
MONOCYTES # BLD AUTO: 0.4 10E3/UL (ref 0–1.3)
MONOCYTES NFR BLD AUTO: 8 %
NEUTROPHILS # BLD AUTO: 3.2 10E3/UL (ref 1.6–8.3)
NEUTROPHILS NFR BLD AUTO: 61 %
NRBC # BLD AUTO: 0 10E3/UL
NRBC BLD AUTO-RTO: 0 /100
PLATELET # BLD AUTO: 221 10E3/UL (ref 150–450)
POTASSIUM SERPL-SCNC: 4.4 MMOL/L (ref 3.4–5.3)
PROT SERPL-MCNC: 7 G/DL (ref 6.4–8.3)
RBC # BLD AUTO: 3.87 10E6/UL (ref 3.8–5.2)
SODIUM SERPL-SCNC: 140 MMOL/L (ref 135–145)
WBC # BLD AUTO: 5.2 10E3/UL (ref 4–11)

## 2024-11-12 PROCEDURE — 80053 COMPREHEN METABOLIC PANEL: CPT | Performed by: INTERNAL MEDICINE

## 2024-11-12 PROCEDURE — 36415 COLL VENOUS BLD VENIPUNCTURE: CPT

## 2024-11-12 PROCEDURE — 85025 COMPLETE CBC W/AUTO DIFF WBC: CPT | Performed by: INTERNAL MEDICINE

## 2024-11-12 PROCEDURE — 250N000011 HC RX IP 250 OP 636: Mod: JZ | Performed by: INTERNAL MEDICINE

## 2024-11-12 PROCEDURE — 83735 ASSAY OF MAGNESIUM: CPT | Performed by: INTERNAL MEDICINE

## 2024-11-12 PROCEDURE — 96402 CHEMO HORMON ANTINEOPL SQ/IM: CPT

## 2024-11-12 RX ORDER — LAMOTRIGINE 25 MG/1
500 TABLET ORAL ONCE
Status: COMPLETED | OUTPATIENT
Start: 2024-11-12 | End: 2024-11-12

## 2024-11-12 RX ADMIN — FULVESTRANT 500 MG: 50 INJECTION INTRAMUSCULAR at 10:52

## 2024-11-12 NOTE — PROGRESS NOTES
Infusion Nursing Note:  Mickie Mcghee presents today for labs/faslodex.    Patient seen by provider today: No   present during visit today: Not Applicable.    Note: N/A.      Intravenous Access:  Lab draw site right AC, Needle type butterfly, Gauge 21.  Labs drawn without difficulty.    Treatment Conditions:  Not Applicable.      Post Infusion Assessment:  Patient tolerated injection without incident.       Discharge Plan:   Discharge instructions reviewed with: Patient.  Patient and/or family verbalized understanding of discharge instructions and all questions answered.  Patient discharged in stable condition accompanied by: self.  Departure Mode: Ambulatory.      Destiny Loving RN

## 2024-11-12 NOTE — PROGRESS NOTES
Oral Chemotherapy Monitoring Program  Lab Follow Up    Reviewed lab results from 11/12/24.        6/17/2024    10:00 AM 7/16/2024    10:00 AM 8/12/2024    11:00 AM 8/20/2024    11:00 AM 10/7/2024    11:00 AM 10/15/2024    12:00 PM 11/12/2024     1:00 PM   ORAL CHEMOTHERAPY   Assessment Type Refill Refill Refill Lab Monitoring Refill Lab Monitoring Lab Monitoring   Diagnosis Code Breast Cancer Breast Cancer Breast Cancer Breast Cancer Breast Cancer Breast Cancer Breast Cancer   Providers Dr. Neville Lozada   Clinic Name/Location [facility]   Drug Name Truqap (capivasertib) Truqap (capivasertib) Truqap (capivasertib) Truqap (capivasertib) Truqap (capivasertib) Truqap (capivasertib) Truqap (capivasertib)   Dose 400 mg 400 mg 400 mg 400 mg 400 mg 400 mg 400 mg   Current Schedule BID BID BID BID BID BID BID   Cycle Details Other Other Other Other Days 1-4 each week Days 1-4 each week Days 1-4 each week   Adverse Effects    Anemia  Hyperglycemia;Increased AST/ALT/T BILI Hyperglycemia;Increased AST/ALT/T BILI   Hyperglycemia      Grade 1 Grade 1   Pharmacist Intervention(hyperglycemia)      No No   Anemia    Grade 1      Pharmacist Intervention(anemia)    No      Increased AST/ALT/T BILI       Grade 1   Pharmacist Intervention(increased ast/alt/t bili)       No   Any new drug interactions?  No        Is the dose as ordered appropriate for the patient?  Yes            Labs:  _  Result Component Current Result Ref Range   Sodium 140 (11/12/2024) 135 - 145 mmol/L     _  Result Component Current Result Ref Range   Potassium 4.4 (11/12/2024) 3.4 - 5.3 mmol/L     _  Result Component Current Result Ref Range   Calcium 10.1 (11/12/2024) 8.8 - 10.4 mg/dL     _  Result Component Current Result Ref Range   Magnesium 2.2 (11/12/2024) 1.7 - 2.3 mg/dL     No results found for Phos within last 30 days.     _  Result Component  Current Result Ref Range   Albumin 4.3 (11/12/2024) 3.5 - 5.2 g/dL     _  Result Component Current Result Ref Range   Urea Nitrogen 16.9 (11/12/2024) 6.0 - 20.0 mg/dL     _  Result Component Current Result Ref Range   Creatinine 0.79 (11/12/2024) 0.51 - 0.95 mg/dL     _  Result Component Current Result Ref Range   AST 54 (H) (11/12/2024) 0 - 45 U/L     _  Result Component Current Result Ref Range   ALT 83 (H) (11/12/2024) 0 - 50 U/L     _  Result Component Current Result Ref Range   Bilirubin Total 0.2 (11/12/2024) <=1.2 mg/dL     _  Result Component Current Result Ref Range   WBC Count 5.2 (11/12/2024) 4.0 - 11.0 10e3/uL     _  Result Component Current Result Ref Range   Hemoglobin 11.6 (L) (11/12/2024) 11.7 - 15.7 g/dL     _  Result Component Current Result Ref Range   Platelet Count 221 (11/12/2024) 150 - 450 10e3/uL     _  Result Component Current Result Ref Range   Absolute Neutrophils 3.2 (11/12/2024) 1.6 - 8.3 10e3/uL        Assessment & Plan:  Results are concerning for elevated LFTs. They are still grade 1 and no dose adjustments indicated at this time. Continue to monitor. Ok to proceed with capivasertib.     Questions answered to patient's satisfaction.    Follow-Up:  12/10 - Appointment with Dr. Lozada + Labs Rachel Moniz, PharmD  Hematology/Oncology Pharmacist  [facility]  [phone]

## 2024-11-13 NOTE — RESULT ENCOUNTER NOTE
Dear Ms. Litzy,    Blood tests are overall good.  AST and ALT mildly elevated.  Will monitor it.    Please, call me with any questions.    Yury Lozada MD

## 2024-12-02 ENCOUNTER — HOSPITAL ENCOUNTER (OUTPATIENT)
Dept: PET IMAGING | Facility: CLINIC | Age: 59
Discharge: HOME | End: 2024-12-02
Attending: INTERNAL MEDICINE | Admitting: INTERNAL MEDICINE
Payer: COMMERCIAL

## 2024-12-02 DIAGNOSIS — C50.919 CARCINOMA OF BREAST METASTATIC TO BONE, UNSPECIFIED LATERALITY (H): ICD-10-CM

## 2024-12-02 DIAGNOSIS — C50.919 PRIMARY MALIGNANT NEOPLASM OF BREAST WITH METASTASIS (H): ICD-10-CM

## 2024-12-02 DIAGNOSIS — C79.51 CARCINOMA OF BREAST METASTATIC TO BONE, UNSPECIFIED LATERALITY (H): ICD-10-CM

## 2024-12-02 PROCEDURE — 74177 CT ABD & PELVIS W/CONTRAST: CPT

## 2024-12-02 PROCEDURE — 250N000011 HC RX IP 250 OP 636: Performed by: INTERNAL MEDICINE

## 2024-12-02 PROCEDURE — 78815 PET IMAGE W/CT SKULL-THIGH: CPT | Mod: PS

## 2024-12-02 PROCEDURE — 343N000001 HC RX 343 MED OP 636: Performed by: INTERNAL MEDICINE

## 2024-12-02 PROCEDURE — A9552 F18 FDG: HCPCS | Performed by: INTERNAL MEDICINE

## 2024-12-02 PROCEDURE — 71260 CT THORAX DX C+: CPT

## 2024-12-02 RX ORDER — IOPAMIDOL 755 MG/ML
10-135 INJECTION, SOLUTION INTRAVASCULAR ONCE
Status: COMPLETED | OUTPATIENT
Start: 2024-12-02 | End: 2024-12-02

## 2024-12-02 RX ORDER — FLUDEOXYGLUCOSE F 18 200 MCI/ML
10-18 INJECTION, SOLUTION INTRAVENOUS ONCE
Status: COMPLETED | OUTPATIENT
Start: 2024-12-02 | End: 2024-12-02

## 2024-12-02 RX ADMIN — IOPAMIDOL 97 ML: 755 INJECTION, SOLUTION INTRAVENOUS at 09:57

## 2024-12-02 RX ADMIN — FLUDEOXYGLUCOSE F 18 11.9 MILLICURIE: 200 INJECTION, SOLUTION INTRAVENOUS at 09:58

## 2024-12-03 DIAGNOSIS — C50.919 CARCINOMA OF BREAST METASTATIC TO BONE, UNSPECIFIED LATERALITY (H): Primary | ICD-10-CM

## 2024-12-03 DIAGNOSIS — C79.51 CARCINOMA OF BREAST METASTATIC TO BONE, UNSPECIFIED LATERALITY (H): Primary | ICD-10-CM

## 2024-12-04 NOTE — RESULT ENCOUNTER NOTE
Dear Ms. Litzy,    PET scan reveals cancer to be improving. This is good.    Please, call me with any questions.    Yury Lozada MD

## 2024-12-10 ENCOUNTER — ONCOLOGY VISIT (OUTPATIENT)
Dept: ONCOLOGY | Facility: CLINIC | Age: 59
End: 2024-12-10
Attending: INTERNAL MEDICINE
Payer: COMMERCIAL

## 2024-12-10 ENCOUNTER — INFUSION THERAPY VISIT (OUTPATIENT)
Dept: INFUSION THERAPY | Facility: CLINIC | Age: 59
End: 2024-12-10
Attending: INTERNAL MEDICINE
Payer: COMMERCIAL

## 2024-12-10 VITALS
DIASTOLIC BLOOD PRESSURE: 85 MMHG | WEIGHT: 159 LBS | SYSTOLIC BLOOD PRESSURE: 134 MMHG | BODY MASS INDEX: 26.49 KG/M2 | HEART RATE: 69 BPM | TEMPERATURE: 98.4 F | HEIGHT: 65 IN | OXYGEN SATURATION: 95 % | RESPIRATION RATE: 16 BRPM

## 2024-12-10 DIAGNOSIS — C50.919 CARCINOMA OF BREAST METASTATIC TO BONE, UNSPECIFIED LATERALITY (H): Primary | ICD-10-CM

## 2024-12-10 DIAGNOSIS — C79.51 CARCINOMA OF BREAST METASTATIC TO BONE, UNSPECIFIED LATERALITY (H): ICD-10-CM

## 2024-12-10 DIAGNOSIS — C79.51 SPINAL CORD COMPRESSION DUE TO MALIGNANT NEOPLASM METASTATIC TO SPINE (H): Primary | ICD-10-CM

## 2024-12-10 DIAGNOSIS — C50.919 CARCINOMA OF BREAST METASTATIC TO BONE, UNSPECIFIED LATERALITY (H): ICD-10-CM

## 2024-12-10 DIAGNOSIS — G95.29 SPINAL CORD COMPRESSION DUE TO MALIGNANT NEOPLASM METASTATIC TO SPINE (H): ICD-10-CM

## 2024-12-10 DIAGNOSIS — C79.51 SPINAL CORD COMPRESSION DUE TO MALIGNANT NEOPLASM METASTATIC TO SPINE (H): ICD-10-CM

## 2024-12-10 DIAGNOSIS — R73.9 HYPERGLYCEMIA: Primary | ICD-10-CM

## 2024-12-10 DIAGNOSIS — G95.29 SPINAL CORD COMPRESSION DUE TO MALIGNANT NEOPLASM METASTATIC TO SPINE (H): Primary | ICD-10-CM

## 2024-12-10 DIAGNOSIS — C79.51 CARCINOMA OF BREAST METASTATIC TO BONE, UNSPECIFIED LATERALITY (H): Primary | ICD-10-CM

## 2024-12-10 LAB
ALBUMIN SERPL BCG-MCNC: 4 G/DL (ref 3.5–5.2)
ALP SERPL-CCNC: 58 U/L (ref 40–150)
ALT SERPL W P-5'-P-CCNC: 51 U/L (ref 0–50)
ANION GAP SERPL CALCULATED.3IONS-SCNC: 8 MMOL/L (ref 7–15)
AST SERPL W P-5'-P-CCNC: 33 U/L (ref 0–45)
BASOPHILS # BLD AUTO: 0 10E3/UL (ref 0–0.2)
BASOPHILS NFR BLD AUTO: 0 %
BILIRUB SERPL-MCNC: 0.2 MG/DL
BUN SERPL-MCNC: 15.7 MG/DL (ref 6–20)
CALCIUM SERPL-MCNC: 9.5 MG/DL (ref 8.8–10.4)
CALCIUM SERPL-MCNC: 9.5 MG/DL (ref 8.8–10.4)
CHLORIDE SERPL-SCNC: 104 MMOL/L (ref 98–107)
CREAT SERPL-MCNC: 0.89 MG/DL (ref 0.51–0.95)
EGFRCR SERPLBLD CKD-EPI 2021: 75 ML/MIN/1.73M2
EOSINOPHIL # BLD AUTO: 0.3 10E3/UL (ref 0–0.7)
EOSINOPHIL NFR BLD AUTO: 6 %
ERYTHROCYTE [DISTWIDTH] IN BLOOD BY AUTOMATED COUNT: 13.8 % (ref 10–15)
EST. AVERAGE GLUCOSE BLD GHB EST-MCNC: 171 MG/DL
GLUCOSE SERPL-MCNC: 182 MG/DL (ref 70–99)
HBA1C MFR BLD: 7.6 %
HCO3 SERPL-SCNC: 26 MMOL/L (ref 22–29)
HCT VFR BLD AUTO: 32.5 % (ref 35–47)
HGB BLD-MCNC: 10.7 G/DL (ref 11.7–15.7)
IMM GRANULOCYTES # BLD: 0 10E3/UL
IMM GRANULOCYTES NFR BLD: 0 %
LYMPHOCYTES # BLD AUTO: 1.3 10E3/UL (ref 0.8–5.3)
LYMPHOCYTES NFR BLD AUTO: 26 %
MAGNESIUM SERPL-MCNC: 2.2 MG/DL (ref 1.7–2.3)
MCH RBC QN AUTO: 29.7 PG (ref 26.5–33)
MCHC RBC AUTO-ENTMCNC: 32.9 G/DL (ref 31.5–36.5)
MCV RBC AUTO: 90 FL (ref 78–100)
MONOCYTES # BLD AUTO: 0.4 10E3/UL (ref 0–1.3)
MONOCYTES NFR BLD AUTO: 8 %
NEUTROPHILS # BLD AUTO: 3 10E3/UL (ref 1.6–8.3)
NEUTROPHILS NFR BLD AUTO: 59 %
NRBC # BLD AUTO: 0 10E3/UL
NRBC BLD AUTO-RTO: 0 /100
PLATELET # BLD AUTO: 198 10E3/UL (ref 150–450)
POTASSIUM SERPL-SCNC: 4.3 MMOL/L (ref 3.4–5.3)
PROT SERPL-MCNC: 6.2 G/DL (ref 6.4–8.3)
RBC # BLD AUTO: 3.6 10E6/UL (ref 3.8–5.2)
SODIUM SERPL-SCNC: 138 MMOL/L (ref 135–145)
WBC # BLD AUTO: 5.1 10E3/UL (ref 4–11)

## 2024-12-10 PROCEDURE — G0463 HOSPITAL OUTPT CLINIC VISIT: HCPCS | Performed by: INTERNAL MEDICINE

## 2024-12-10 PROCEDURE — 82310 ASSAY OF CALCIUM: CPT | Performed by: INTERNAL MEDICINE

## 2024-12-10 PROCEDURE — 99215 OFFICE O/P EST HI 40 MIN: CPT | Performed by: INTERNAL MEDICINE

## 2024-12-10 PROCEDURE — 83036 HEMOGLOBIN GLYCOSYLATED A1C: CPT | Performed by: INTERNAL MEDICINE

## 2024-12-10 PROCEDURE — 85004 AUTOMATED DIFF WBC COUNT: CPT | Performed by: INTERNAL MEDICINE

## 2024-12-10 PROCEDURE — G2211 COMPLEX E/M VISIT ADD ON: HCPCS | Performed by: INTERNAL MEDICINE

## 2024-12-10 PROCEDURE — 250N000011 HC RX IP 250 OP 636: Performed by: INTERNAL MEDICINE

## 2024-12-10 PROCEDURE — 84132 ASSAY OF SERUM POTASSIUM: CPT | Performed by: INTERNAL MEDICINE

## 2024-12-10 PROCEDURE — 82040 ASSAY OF SERUM ALBUMIN: CPT | Performed by: INTERNAL MEDICINE

## 2024-12-10 PROCEDURE — 36415 COLL VENOUS BLD VENIPUNCTURE: CPT | Performed by: INTERNAL MEDICINE

## 2024-12-10 PROCEDURE — 83735 ASSAY OF MAGNESIUM: CPT | Performed by: INTERNAL MEDICINE

## 2024-12-10 PROCEDURE — 84450 TRANSFERASE (AST) (SGOT): CPT | Performed by: INTERNAL MEDICINE

## 2024-12-10 PROCEDURE — 85041 AUTOMATED RBC COUNT: CPT | Performed by: INTERNAL MEDICINE

## 2024-12-10 RX ORDER — HEPARIN SODIUM (PORCINE) LOCK FLUSH IV SOLN 100 UNIT/ML 100 UNIT/ML
5 SOLUTION INTRAVENOUS
OUTPATIENT
Start: 2025-02-04

## 2024-12-10 RX ORDER — ZOLEDRONIC ACID 0.04 MG/ML
4 INJECTION, SOLUTION INTRAVENOUS ONCE
Start: 2025-02-04 | End: 2025-02-04

## 2024-12-10 RX ORDER — LAMOTRIGINE 25 MG/1
500 TABLET ORAL ONCE
Status: COMPLETED | OUTPATIENT
Start: 2024-12-10 | End: 2024-12-10

## 2024-12-10 RX ORDER — EPINEPHRINE 1 MG/ML
0.3 INJECTION, SOLUTION INTRAMUSCULAR; SUBCUTANEOUS EVERY 5 MIN PRN
OUTPATIENT
Start: 2025-02-04

## 2024-12-10 RX ORDER — LAMOTRIGINE 25 MG/1
500 TABLET ORAL ONCE
OUTPATIENT
Start: 2025-02-04 | End: 2025-02-04

## 2024-12-10 RX ORDER — DIPHENHYDRAMINE HYDROCHLORIDE 50 MG/ML
25 INJECTION INTRAMUSCULAR; INTRAVENOUS
Start: 2025-02-04

## 2024-12-10 RX ORDER — ALBUTEROL SULFATE 0.83 MG/ML
2.5 SOLUTION RESPIRATORY (INHALATION)
OUTPATIENT
Start: 2025-02-04

## 2024-12-10 RX ORDER — MEPERIDINE HYDROCHLORIDE 25 MG/ML
25 INJECTION INTRAMUSCULAR; INTRAVENOUS; SUBCUTANEOUS
OUTPATIENT
Start: 2025-02-04

## 2024-12-10 RX ORDER — ZOLEDRONIC ACID 0.04 MG/ML
4 INJECTION, SOLUTION INTRAVENOUS ONCE
Status: COMPLETED | OUTPATIENT
Start: 2024-12-10 | End: 2024-12-10

## 2024-12-10 RX ORDER — ALBUTEROL SULFATE 90 UG/1
1-2 INHALANT RESPIRATORY (INHALATION)
Start: 2025-02-04

## 2024-12-10 RX ORDER — DIPHENHYDRAMINE HYDROCHLORIDE 50 MG/ML
50 INJECTION INTRAMUSCULAR; INTRAVENOUS
Start: 2025-02-04

## 2024-12-10 RX ORDER — METHYLPREDNISOLONE SODIUM SUCCINATE 40 MG/ML
40 INJECTION INTRAMUSCULAR; INTRAVENOUS
Start: 2025-02-04

## 2024-12-10 RX ORDER — HEPARIN SODIUM,PORCINE 10 UNIT/ML
5 VIAL (ML) INTRAVENOUS
OUTPATIENT
Start: 2025-02-04

## 2024-12-10 RX ADMIN — FULVESTRANT 500 MG: 50 INJECTION INTRAMUSCULAR at 09:50

## 2024-12-10 RX ADMIN — ZOLEDRONIC ACID 4 MG: 0.04 INJECTION, SOLUTION INTRAVENOUS at 09:36

## 2024-12-10 ASSESSMENT — PAIN SCALES - GENERAL: PAINLEVEL_OUTOF10: NO PAIN (0)

## 2024-12-10 NOTE — PROGRESS NOTES
Infusion Nursing Note:  Mickie Mcghee presents today for Zometa/Faslodex.    Patient seen by provider today: Yes: Dr. Lozada   present during visit today: Not Applicable.    Note: N/A.      Intravenous Access:  Peripheral IV placed.    Treatment Conditions:  Lab Results   Component Value Date    HGB 10.7 (L) 12/10/2024    WBC 5.1 12/10/2024    ANEU 1.3 (L) 08/04/2022    ANEUTAUTO 3.0 12/10/2024     12/10/2024        Lab Results   Component Value Date     12/10/2024    POTASSIUM 4.3 12/10/2024    MAG 2.2 12/10/2024    CR 0.89 12/10/2024    OSMEL 9.5 12/10/2024    OSMEL 9.5 12/10/2024    BILITOTAL 0.2 12/10/2024    ALBUMIN 4.0 12/10/2024    ALT 51 (H) 12/10/2024    AST 33 12/10/2024       Results reviewed, labs MET treatment parameters, ok to proceed with treatment.      Post Infusion Assessment:  Patient tolerated infusion without incident.  Patient tolerated injection without incident.  Blood return noted pre and post infusion.  Site patent and intact, free from redness, edema or discomfort.  No evidence of extravasations.  Access discontinued per protocol.       Discharge Plan:   Patient declined prescription refills.  Discharge instructions reviewed with: Patient.  Patient and/or family verbalized understanding of discharge instructions and all questions answered.  AVS to patient via [employer].  Patient will return 1/7 for next appointment.   Patient discharged in stable condition accompanied by: self.  Departure Mode: Ambulatory.      Colt Hardy RN

## 2024-12-10 NOTE — PROGRESS NOTES
"Oncology Rooming Note    December 10, 2024 9:09 AM   Mickie Mcghee is a 59 year old female who presents for:    Chief Complaint   Patient presents with    Oncology Clinic Visit     Initial Vitals: There were no vitals taken for this visit. Estimated body mass index is 26.43 kg/m  as calculated from the following:    Height as of 8/20/24: 1.651 m (5' 5\").    Weight as of 10/15/24: 72 kg (158 lb 12.8 oz). There is no height or weight on file to calculate BSA.  Data Unavailable Comment: Data Unavailable   No LMP recorded.  Allergies reviewed: Yes  Medications reviewed: Yes    Medications: Medication refills not needed today.  Pharmacy name entered into [employer]:    [facility] - [place], [place] - [address]  [id number] - [place], [place] - [address] [place] [address]    Frailty Screening:   Is the patient here for a new oncology consult visit in cancer care? 2. No        Blanquita Meyer MA            "

## 2024-12-10 NOTE — PATIENT INSTRUCTIONS
1.  Continue Faslodex and capivasertib.  2.  Continue Zometa. After next dose, will change it to every 12 weeks.   3.  Continue calcium and vitamin D twice a day.  4.  See me in 2-3 months. (When she comes for faslodex)  5.  Labs as per treatment protocol.  6.  See PCP for blood sugar.

## 2024-12-10 NOTE — PROGRESS NOTES
ONCOLOGY HISTORY:  Ms. Mcghee is a female with metastatic breast cancer. ER positive, DC positive and HER-2/halina negative (1+ IHC).  -Foundation One reveals PIK3CA alteration.  -She had a stage III left breast cancer in 2007. ER positive and HER-2/halina negative.  She had bilateral mastectomy, chemotherapy, radiation, and anastrozole.     1.  On 03/18/2022, multiple imaging studies done because of bilateral lower extremity weakness:  -MRI of thoracic and lumbar spine revealed bone metastases with spinal cord compression.  -CT chest, abdomen, and pelvis on 03/19/2022 revealed bone metastasis, lung nodules and enlarged upper abdominal lymph node.  There is a hypodense nodule in the liver.  -Brain MRI on 03/19/2022 did not reveal any intracranial metastasis.  2.  CT-guided bone biopsy of left iliac bone on 03/21/2022 revealed metastatic breast cancer. ER positive, DC positive and HER-2/halina negative.  3.  Letrozole started on 03/25/2022.  -Ribociclib started on 03/31/2022. Decreased to 200 mg a day in 09/2022.  -Zometa started on 03/24/2022.  -Radiation to thoracolumbar and sacral area between 03/22/2022 and 04/07/2022.  3000 cGy in 10 fractions.  4.  PET scan on 03/18/2024 reveals progression of disease in the bones.  5. Faslodex and capivasertib started on 04/02/2024.     SUBJECTIVE:    Mrs. Mcghee is a 59-year-old female with metastatic breast cancer on Faslodex and capivasertib.  Overall she is tolerating it well.  She gets few side effects.  She gets intermittent cramps in her leg and foot.  She also gets 2-3 loose stools in a day.  They are not bothersome.  There has been no worsening of side effects.    Patient gets some pain at Faslodex injection site.    She gets Zometa for bone metastasis.  She is tolerating it well.  No dental or jaw related symptoms.    PET scan was done on 12/02/2024: While there is broadly stable FDG avid osseous lesions involving the left lateral mass of C1, T1 vertebral body, midline S2 sacral  segment, and lesion in the posterior aspect of right lesser trochanter, there is decreasing metabolic activity of a lesion in the L4 spinous process suggesting continued response to therapy.    Overall her condition is stable.  She has mild generalized weakness.  She can do all her activities.  She uses cane/walker.  There has been no worsening of her weakness.    No headache. No dizziness.  No chest pain.  No shortness of breath. No abdominal pain.  No nausea or vomiting.  Appetite is good. No urinary complaints. No bleeding. No bone pain.  No fever or night sweats.  All other review of system is negative.     PHYSICAL EXAMINATION:  She is alert and oriented x 3. Not in any distress.  Vitals: Reviewed. ECOG PS of 1.  Rest of systems not examined.     LABS: CBC and CMP reviewed.     ASSESSMENT:  1.  A 59-year-old female with metastatic breast cancer on Faslodex and capivasertib.  Disease is responding to treatment.   2.  Grade 1 diarrhea from capivasertib.  No worsening of diarrhea.   3.  Generalized weakness from metastatic breast cancer, Faslodex and capivasertib.  4.  Mild normocytic anemia.     PLAN:  1.  Continue Faslodex and capivasertib.  2.  Continue Zometa. After next dose, will change it to every 12 weeks.  3.  Continue calcium and vitamin D twice a day.  4.  See me in 2-3 months. (When she comes for faslodex)  5.  Labs as per treatment protocol.  6.  See PCP for blood sugar.     DISCUSSION:  1.  Patient's overall condition is stable.  PET scan was reviewed with her.  Explained to her that it reveals stable to improved disease.  No new lesions.  She was happy to know that.    2.  For breast cancer, she will continue on Faslodex and capivasertib.  Overall she is tolerating it well.  She has mild fatigue.  She has grade 1 diarrhea.  They are all tolerable.  She gets some pain at injection site from Faslodex.  She will take over-the-counter pain medications as needed.    Will consider follow-up PET scan in 3  to 6 months time.    3.  Labs were reviewed with her.  There are few minor abnormalities.  She has mild anemia.  Protein is mildly low.  ALT minimally elevated.  Will monitor CBC and CMP.    Her blood sugar is elevated.  Hemoglobin A1c was checked which is also elevated at 7.6.  Patient advised to follow-up with her PCP regarding diabetes.    4.  She will continue on Zometa for bone metastasis.  We have been giving it every 2 months. Will change it to every 3 months after next dose. She is tolerating it well.  No dental or jaw related symptoms.  She will also continue on calcium and vitamin D twice a day.  Advised her to follow-up with her dentist.    5.  She had few questions which were all answered.  I will see her in 2 to 3 months time.     Total visit time of 40 minutes.  Time spent in today's visit, review of chart/investigations today, monitoring for toxicity of high risk drugs and documentation.

## 2024-12-11 NOTE — RESULT ENCOUNTER NOTE
Dear Ms. Mcghee,    Hemoglobin A1c is elevated. Please, see your primary doctor for diabetes.    Please, call me with any questions.    Yury Lozada MD

## 2024-12-12 ENCOUNTER — TELEPHONE (OUTPATIENT)
Dept: ONCOLOGY | Facility: CLINIC | Age: 59
End: 2024-12-12
Payer: COMMERCIAL

## 2024-12-12 NOTE — TELEPHONE ENCOUNTER
----- Message from Yury Lozada sent at 12/11/2024  1:35 PM CST -----  Let her know that radiation center will call her for appt.    beryl  ----- Message -----  From: Valdo Pineda MD  Sent: 12/10/2024  12:18 PM CST  To: Jesu Soto MD; MD Barber Kingy BERYL,    Looks like a short course of palliative treatment would make sense - as you might imagine, that's an area where a pathologic fracture and resultant instability would be problematic.  We're happy to see her back again, if she'd like.    Thanks,  Maciej  ----- Message -----  From: Yury Lozada MD  Sent: 12/10/2024   9:22 AM CST  To: Jesu Soto MD; Valdo Pineda MD    Can you please review the PET ? Will she benefit from radiation to C1.    beryl

## 2024-12-18 DIAGNOSIS — C79.51 CARCINOMA OF BREAST METASTATIC TO BONE, UNSPECIFIED LATERALITY (H): Primary | ICD-10-CM

## 2024-12-18 DIAGNOSIS — C50.919 CARCINOMA OF BREAST METASTATIC TO BONE, UNSPECIFIED LATERALITY (H): Primary | ICD-10-CM

## 2024-12-19 ENCOUNTER — TRANSFERRED RECORDS (OUTPATIENT)
Dept: HEALTH INFORMATION MANAGEMENT | Facility: CLINIC | Age: 59
End: 2024-12-19
Payer: COMMERCIAL

## 2025-01-07 ENCOUNTER — LAB (OUTPATIENT)
Dept: INFUSION THERAPY | Facility: CLINIC | Age: 60
End: 2025-01-07
Attending: INTERNAL MEDICINE
Payer: COMMERCIAL

## 2025-01-07 ENCOUNTER — DOCUMENTATION ONLY (OUTPATIENT)
Dept: PHARMACY | Facility: CLINIC | Age: 60
End: 2025-01-07

## 2025-01-07 VITALS
RESPIRATION RATE: 18 BRPM | HEART RATE: 85 BPM | OXYGEN SATURATION: 96 % | SYSTOLIC BLOOD PRESSURE: 109 MMHG | TEMPERATURE: 98.5 F | DIASTOLIC BLOOD PRESSURE: 69 MMHG

## 2025-01-07 DIAGNOSIS — C50.919 CARCINOMA OF BREAST METASTATIC TO BONE, UNSPECIFIED LATERALITY (H): Primary | ICD-10-CM

## 2025-01-07 DIAGNOSIS — C50.919 CARCINOMA OF BREAST METASTATIC TO BONE, UNSPECIFIED LATERALITY (H): ICD-10-CM

## 2025-01-07 DIAGNOSIS — C79.51 CARCINOMA OF BREAST METASTATIC TO BONE, UNSPECIFIED LATERALITY (H): Primary | ICD-10-CM

## 2025-01-07 DIAGNOSIS — C79.51 CARCINOMA OF BREAST METASTATIC TO BONE, UNSPECIFIED LATERALITY (H): ICD-10-CM

## 2025-01-07 LAB
ALBUMIN SERPL BCG-MCNC: 4.4 G/DL (ref 3.5–5.2)
ALP SERPL-CCNC: 66 U/L (ref 40–150)
ALT SERPL W P-5'-P-CCNC: 63 U/L (ref 0–50)
ANION GAP SERPL CALCULATED.3IONS-SCNC: 9 MMOL/L (ref 7–15)
AST SERPL W P-5'-P-CCNC: 41 U/L (ref 0–45)
BASOPHILS # BLD AUTO: 0 10E3/UL (ref 0–0.2)
BASOPHILS NFR BLD AUTO: 1 %
BILIRUB SERPL-MCNC: 0.2 MG/DL
BUN SERPL-MCNC: 13.9 MG/DL (ref 6–20)
CALCIUM SERPL-MCNC: 9.4 MG/DL (ref 8.8–10.4)
CHLORIDE SERPL-SCNC: 105 MMOL/L (ref 98–107)
CREAT SERPL-MCNC: 0.88 MG/DL (ref 0.51–0.95)
EGFRCR SERPLBLD CKD-EPI 2021: 76 ML/MIN/1.73M2
EOSINOPHIL # BLD AUTO: 0.4 10E3/UL (ref 0–0.7)
EOSINOPHIL NFR BLD AUTO: 7 %
ERYTHROCYTE [DISTWIDTH] IN BLOOD BY AUTOMATED COUNT: 13.7 % (ref 10–15)
GLUCOSE SERPL-MCNC: 90 MG/DL (ref 70–99)
HCO3 SERPL-SCNC: 28 MMOL/L (ref 22–29)
HCT VFR BLD AUTO: 35.9 % (ref 35–47)
HGB BLD-MCNC: 11.6 G/DL (ref 11.7–15.7)
IMM GRANULOCYTES # BLD: 0 10E3/UL
IMM GRANULOCYTES NFR BLD: 1 %
LYMPHOCYTES # BLD AUTO: 1 10E3/UL (ref 0.8–5.3)
LYMPHOCYTES NFR BLD AUTO: 19 %
MAGNESIUM SERPL-MCNC: 2.5 MG/DL (ref 1.7–2.3)
MCH RBC QN AUTO: 29.8 PG (ref 26.5–33)
MCHC RBC AUTO-ENTMCNC: 32.3 G/DL (ref 31.5–36.5)
MCV RBC AUTO: 92 FL (ref 78–100)
MONOCYTES # BLD AUTO: 0.6 10E3/UL (ref 0–1.3)
MONOCYTES NFR BLD AUTO: 11 %
NEUTROPHILS # BLD AUTO: 3 10E3/UL (ref 1.6–8.3)
NEUTROPHILS NFR BLD AUTO: 61 %
NRBC # BLD AUTO: 0 10E3/UL
NRBC BLD AUTO-RTO: 0 /100
PLATELET # BLD AUTO: 232 10E3/UL (ref 150–450)
POTASSIUM SERPL-SCNC: 4.6 MMOL/L (ref 3.4–5.3)
PROT SERPL-MCNC: 7 G/DL (ref 6.4–8.3)
RBC # BLD AUTO: 3.89 10E6/UL (ref 3.8–5.2)
SODIUM SERPL-SCNC: 142 MMOL/L (ref 135–145)
WBC # BLD AUTO: 5 10E3/UL (ref 4–11)

## 2025-01-07 PROCEDURE — 250N000011 HC RX IP 250 OP 636: Mod: JZ | Performed by: INTERNAL MEDICINE

## 2025-01-07 PROCEDURE — 85025 COMPLETE CBC W/AUTO DIFF WBC: CPT | Performed by: INTERNAL MEDICINE

## 2025-01-07 PROCEDURE — 96402 CHEMO HORMON ANTINEOPL SQ/IM: CPT

## 2025-01-07 PROCEDURE — 82310 ASSAY OF CALCIUM: CPT | Performed by: INTERNAL MEDICINE

## 2025-01-07 PROCEDURE — 84450 TRANSFERASE (AST) (SGOT): CPT | Performed by: INTERNAL MEDICINE

## 2025-01-07 PROCEDURE — 83735 ASSAY OF MAGNESIUM: CPT | Performed by: INTERNAL MEDICINE

## 2025-01-07 PROCEDURE — 36415 COLL VENOUS BLD VENIPUNCTURE: CPT

## 2025-01-07 RX ORDER — LAMOTRIGINE 25 MG/1
500 TABLET ORAL ONCE
Status: COMPLETED | OUTPATIENT
Start: 2025-01-07 | End: 2025-01-07

## 2025-01-07 RX ADMIN — FULVESTRANT 500 MG: 50 INJECTION INTRAMUSCULAR at 12:44

## 2025-01-07 NOTE — PROGRESS NOTES
Oral Chemotherapy Monitoring Program  Lab Follow Up    Reviewed lab results from 1/7/25.        8/12/2024    11:00 AM 8/20/2024    11:00 AM 10/7/2024    11:00 AM 10/15/2024    12:00 PM 11/12/2024     1:00 PM 12/18/2024     8:00 AM 1/7/2025     1:00 PM   ORAL CHEMOTHERAPY   Assessment Type Refill Lab Monitoring Refill Lab Monitoring Lab Monitoring Refill Lab Monitoring   Diagnosis Code Breast Cancer Breast Cancer Breast Cancer Breast Cancer Breast Cancer Breast Cancer Breast Cancer   Providers Dr. Neville Lozada   Clinic Name/Location [facility]   Drug Name Truqap (capivasertib) Truqap (capivasertib) Truqap (capivasertib) Truqap (capivasertib) Truqap (capivasertib) Truqap (capivasertib) Truqap (capivasertib)   Dose 400 mg 400 mg 400 mg 400 mg 400 mg 400 mg 400 mg   Current Schedule BID BID BID BID BID BID BID   Cycle Details Other Other Days 1-4 each week Days 1-4 each week Days 1-4 each week Days 1-4 each week Days 1-4 each week   Adverse Effects  Anemia  Hyperglycemia;Increased AST/ALT/T BILI Hyperglycemia;Increased AST/ALT/T BILI  Increased AST/ALT/T BILI;Anemia   Hyperglycemia    Grade 1 Grade 1     Pharmacist Intervention(hyperglycemia)    No No     Anemia  Grade 1     Grade 1   Pharmacist Intervention(anemia)  No     No   Increased AST/ALT/T BILI     Grade 1  Grade 1   Pharmacist Intervention(increased ast/alt/t bili)     No  No       Labs:  _  Result Component Current Result Ref Range   Sodium 142 (1/7/2025) 135 - 145 mmol/L     _  Result Component Current Result Ref Range   Potassium 4.6 (1/7/2025) 3.4 - 5.3 mmol/L     _  Result Component Current Result Ref Range   Calcium 9.4 (1/7/2025) 8.8 - 10.4 mg/dL          _  Result Component Current Result Ref Range   Magnesium 2.5 (H) (1/7/2025) 1.7 - 2.3 mg/dL     No results found for Phos within last 30 days.     _  Result Component Current Result Ref Range    Albumin 4.4 (1/7/2025) 3.5 - 5.2 g/dL     _  Result Component Current Result Ref Range   Urea Nitrogen 13.9 (1/7/2025) 6.0 - 20.0 mg/dL     _  Result Component Current Result Ref Range   Creatinine 0.88 (1/7/2025) 0.51 - 0.95 mg/dL     _  Result Component Current Result Ref Range   AST 41 (1/7/2025) 0 - 45 U/L     _  Result Component Current Result Ref Range   ALT 63 (H) (1/7/2025) 0 - 50 U/L     _  Result Component Current Result Ref Range   Bilirubin Total 0.2 (1/7/2025) <=1.2 mg/dL     _  Result Component Current Result Ref Range   WBC Count 5.0 (1/7/2025) 4.0 - 11.0 10e3/uL     _  Result Component Current Result Ref Range   Hemoglobin 11.6 (L) (1/7/2025) 11.7 - 15.7 g/dL     _  Result Component Current Result Ref Range   Platelet Count 232 (1/7/2025) 150 - 450 10e3/uL     No results found for ANC within last 30 days.     _  Result Component Current Result Ref Range   Absolute Neutrophils 3.0 (1/7/2025) 1.6 - 8.3 10e3/uL        Assessment  Patient has increased ALT and grade 1 anemia. No other concerning abnormalities.     Plan   Continue Truqap   Labs and Sha 2/3    Deshaun Cr  Pharmacy Intern   [facility]   [phone]

## 2025-01-07 NOTE — PROGRESS NOTES
Nursing Note:  Mickie Mcghee presents today for labs.    Patient seen by provider today: No   present during visit today: Not Applicable.    Note: N/A.    Intravenous Access:  Lab draw site right ac, Needle type butterfly, Gauge 23.  Labs drawn without difficulty.    Discharge Plan:   Patient was sent to infusion for faslodex appointment.    Destiny Loving RN

## 2025-01-07 NOTE — PROGRESS NOTES
Infusion Nursing Note:  Mickie Mcghee presents today for Faslodex.    Patient seen by provider today: No   present during visit today: Not Applicable.    Note: N/A.      Intravenous Access:  No Intravenous access/labs at this visit.    Treatment Conditions:  Not Applicable.      Post Infusion Assessment:  Patient tolerated injection without incident.  Site patent and intact, free from redness, edema or discomfort.  No evidence of extravasations.       Discharge Plan:   Patient declined prescription refills.  Discharge instructions reviewed with: Patient.  Patient and/or family verbalized understanding of discharge instructions and all questions answered.  AVS to patient via [employer].  Patient will return 2/3 for next appointment.   Patient discharged in stable condition accompanied by: self.  Departure Mode: Ambulatory.      Colt Hardy RN

## 2025-01-07 NOTE — PROGRESS NOTES
Oral Chemotherapy Monitoring Program  Lab Follow Up    Reviewed lab results from 1/7/24.        8/12/2024    11:00 AM 8/20/2024    11:00 AM 10/7/2024    11:00 AM 10/15/2024    12:00 PM 11/12/2024     1:00 PM 12/18/2024     8:00 AM 1/7/2025     1:00 PM   ORAL CHEMOTHERAPY   Assessment Type Refill Lab Monitoring Refill Lab Monitoring Lab Monitoring Refill Lab Monitoring   Diagnosis Code Breast Cancer Breast Cancer Breast Cancer Breast Cancer Breast Cancer Breast Cancer Breast Cancer   Providers Dr. Neville Lozada   Clinic Name/Location [facility]   Drug Name Truqap (capivasertib) Truqap (capivasertib) Truqap (capivasertib) Truqap (capivasertib) Truqap (capivasertib) Truqap (capivasertib) Truqap (capivasertib)   Dose 400 mg 400 mg 400 mg 400 mg 400 mg 400 mg 400 mg   Current Schedule BID BID BID BID BID BID BID   Cycle Details Other Other Days 1-4 each week Days 1-4 each week Days 1-4 each week Days 1-4 each week Days 1-4 each week   Adverse Effects  Anemia  Hyperglycemia;Increased AST/ALT/T BILI Hyperglycemia;Increased AST/ALT/T BILI     Hyperglycemia    Grade 1 Grade 1     Pharmacist Intervention(hyperglycemia)    No No     Anemia  Grade 1        Pharmacist Intervention(anemia)  No        Increased AST/ALT/T BILI     Grade 1     Pharmacist Intervention(increased ast/alt/t bili)     No         Labs:  _  Result Component Current Result Ref Range   Sodium 142 (1/7/2025) 135 - 145 mmol/L     _  Result Component Current Result Ref Range   Potassium 4.6 (1/7/2025) 3.4 - 5.3 mmol/L     _  Result Component Current Result Ref Range   Calcium 9.4 (1/7/2025) 8.8 - 10.4 mg/dL          _  Result Component Current Result Ref Range   Magnesium 2.5 (H) (1/7/2025) 1.7 - 2.3 mg/dL     No results found for Phos within last 30 days.     _  Result Component Current Result Ref Range   Albumin 4.4 (1/7/2025) 3.5 - 5.2 g/dL      _  Result Component Current Result Ref Range   Urea Nitrogen 13.9 (1/7/2025) 6.0 - 20.0 mg/dL     _  Result Component Current Result Ref Range   Creatinine 0.88 (1/7/2025) 0.51 - 0.95 mg/dL     _  Result Component Current Result Ref Range   AST 41 (1/7/2025) 0 - 45 U/L     _  Result Component Current Result Ref Range   ALT 63 (H) (1/7/2025) 0 - 50 U/L     _  Result Component Current Result Ref Range   Bilirubin Total 0.2 (1/7/2025) <=1.2 mg/dL     _  Result Component Current Result Ref Range   WBC Count 5.0 (1/7/2025) 4.0 - 11.0 10e3/uL     _  Result Component Current Result Ref Range   Hemoglobin 11.6 (L) (1/7/2025) 11.7 - 15.7 g/dL     _  Result Component Current Result Ref Range   Platelet Count 232 (1/7/2025) 150 - 450 10e3/uL     No results found for ANC within last 30 days.     _  Result Component Current Result Ref Range   Absolute Neutrophils 3.0 (1/7/2025) 1.6 - 8.3 10e3/uL        Assessment  Patient has increased ALT and grade 1 anemia. No other concerning abnormalities.     Plan   Continue Truqap   Labs and Sha 2/3    Deshaun Cr  Pharmacy Intern   [facility]   [phone]

## 2025-01-09 PROBLEM — Z12.4 CERVICAL CANCER SCREENING: Status: ACTIVE | Noted: 2025-01-09

## 2025-01-16 ENCOUNTER — TELEPHONE (OUTPATIENT)
Dept: GASTROENTEROLOGY | Facility: CLINIC | Age: 60
End: 2025-01-16
Payer: COMMERCIAL

## 2025-01-16 NOTE — TELEPHONE ENCOUNTER
"  Endoscopy Scheduling Screen    Have you had any respiratory illness or flu-like symptoms in the last 10 days?  No    What is your communication preference for Instructions and/or Bowel Prep?   MyChart    What insurance is in the chart?  Other:  [facility]    Ordering/Referring Provider: NAV OWEN    (If ordering provider performs procedure, schedule with ordering provider unless otherwise instructed. )    BMI: Estimated body mass index is 27.55 kg/m  as calculated from the following:    Height as of 1/3/25: 1.613 m (5' 3.5\").    Weight as of 1/3/25: 71.7 kg (158 lb).     Sedation Ordered  moderate sedation.   If patient BMI > 50 do not schedule in [facility].    If patient BMI > 45 do not schedule at [facility].    Are you taking methadone or Suboxone?  NO, No RN review required.    Have you been diagnosed and are being treated for severe PTSD or severe anxiety?  NO, No RN review required.    Are you taking any prescription medications for pain 3 or more times per week?   NO, No RN review required.    Do you have a history of malignant hyperthermia?  No    (Females) Are you currently pregnant?        Have you been diagnosed or told you have pulmonary hypertension?   No    Do you have an LVAD?  No    Have you been told you have moderate to severe sleep apnea?  No.    Have you been told you have COPD, asthma, or any other lung disease?  No    Do you have any heart conditions?  No     Have you ever had or are you waiting for an organ transplant?  No. Continue scheduling, no site restrictions.    Have you had a stroke or transient ischemic attack (TIA aka \"mini stroke\" in the last 6 months?   No    Have you been diagnosed with or been told you have cirrhosis of the liver?   No.    Are you currently on dialysis?   No    Do you need assistance transferring?   No    BMI: Estimated body mass index is 27.55 kg/m  as calculated from the following:    Height as of 1/3/25: 1.613 m (5' 3.5\").    Weight as of 1/3/25: 71.7 kg (158 lb). "     Is patients BMI > 40 and scheduling location [facility]?  No    Do you take an injectable or oral medication for weight loss or diabetes (excluding insulin)?  No    Do you take the medication Naltrexone?  No    Do you take blood thinners?  No       Prep   Are you currently on dialysis or do you have chronic kidney disease?  No    Do you have a diagnosis of diabetes?  Yes (Golytely Prep)    Do you have a diagnosis of cystic fibrosis (CF)?  No    On a regular basis do you go 3 -5 days between bowel movements?  No    BMI > 40?  No    Preferred Pharmacy:    [facility]/pharmacy #[id number] - [place], [place] - [address]  [address]  [facility] [address]  Phone: [phone] Fax: [phone]      Final Scheduling Details     Procedure scheduled  Colonoscopy    Surgeon:  MURIEL     Date of procedure:  2/11     Pre-OP / PAC:   No - Not required for this site.    Location  [facility] - Patient preference.    Sedation   Moderate Sedation - Per order.      Patient Reminders:   You will receive a call from a Nurse to review instructions and health history.  This assessment must be completed prior to your procedure.  Failure to complete the Nurse assessment may result in the procedure being cancelled.      On the day of your procedure, please designate an adult(s) who can drive you home stay with you for the next 24 hours. The medicines used in the exam will make you sleepy. You will not be able to drive.      You cannot take public transportation, ride share services, or non-medical taxi service without a responsible caregiver.  Medical transport services are allowed with the requirement that a responsible caregiver will receive you at your destination.  We require that drivers and caregivers are confirmed prior to your procedure.

## 2025-01-17 ENCOUNTER — TRANSFERRED RECORDS (OUTPATIENT)
Dept: HEALTH INFORMATION MANAGEMENT | Facility: CLINIC | Age: 60
End: 2025-01-17
Payer: COMMERCIAL

## 2025-01-21 DIAGNOSIS — C79.51 CARCINOMA OF BREAST METASTATIC TO BONE, UNSPECIFIED LATERALITY (H): Primary | ICD-10-CM

## 2025-01-21 DIAGNOSIS — C50.919 CARCINOMA OF BREAST METASTATIC TO BONE, UNSPECIFIED LATERALITY (H): Primary | ICD-10-CM

## 2025-01-29 ENCOUNTER — OFFICE VISIT (OUTPATIENT)
Dept: OPTOMETRY | Facility: CLINIC | Age: 60
End: 2025-01-29
Attending: NURSE PRACTITIONER
Payer: COMMERCIAL

## 2025-01-29 ENCOUNTER — TELEPHONE (OUTPATIENT)
Dept: GASTROENTEROLOGY | Facility: CLINIC | Age: 60
End: 2025-01-29

## 2025-01-29 DIAGNOSIS — H52.13 HIGH MYOPIA, BOTH EYES: ICD-10-CM

## 2025-01-29 DIAGNOSIS — Z01.01 ENCOUNTER FOR EXAMINATION OF EYES AND VISION WITH ABNORMAL FINDINGS: Primary | ICD-10-CM

## 2025-01-29 DIAGNOSIS — Z12.11 ENCOUNTER FOR SCREENING COLONOSCOPY: Primary | ICD-10-CM

## 2025-01-29 DIAGNOSIS — H52.223 REGULAR ASTIGMATISM OF BOTH EYES: ICD-10-CM

## 2025-01-29 DIAGNOSIS — H43.813 PVD (POSTERIOR VITREOUS DETACHMENT), BILATERAL: ICD-10-CM

## 2025-01-29 DIAGNOSIS — H52.4 PRESBYOPIA: ICD-10-CM

## 2025-01-29 PROCEDURE — 92004 COMPRE OPH EXAM NEW PT 1/>: CPT | Performed by: OPTOMETRIST

## 2025-01-29 PROCEDURE — 92015 DETERMINE REFRACTIVE STATE: CPT | Performed by: OPTOMETRIST

## 2025-01-29 RX ORDER — BISACODYL 5 MG/1
TABLET, DELAYED RELEASE ORAL
Qty: 4 TABLET | Refills: 0 | Status: SHIPPED | OUTPATIENT
Start: 2025-01-29

## 2025-01-29 ASSESSMENT — CONF VISUAL FIELD
OS_SUPERIOR_TEMPORAL_RESTRICTION: 0
OS_NORMAL: 1
OS_INFERIOR_TEMPORAL_RESTRICTION: 0
OD_SUPERIOR_TEMPORAL_RESTRICTION: 0
METHOD: COUNTING FINGERS
OD_INFERIOR_NASAL_RESTRICTION: 0
OD_NORMAL: 1
OS_SUPERIOR_NASAL_RESTRICTION: 0
OS_INFERIOR_NASAL_RESTRICTION: 0
OD_INFERIOR_TEMPORAL_RESTRICTION: 0
OD_SUPERIOR_NASAL_RESTRICTION: 0

## 2025-01-29 ASSESSMENT — VISUAL ACUITY
OS_SC: CF @ 3'
OD_SC: 20/20-1
METHOD: SNELLEN - LINEAR
OD_SC: CF @ 3'
OS_CC+: +2
OD_CC: 20/25
OS_SC: 20/20
OD_CC+: -2
CORRECTION_TYPE: GLASSES
OS_CC: 20/25

## 2025-01-29 ASSESSMENT — REFRACTION_MANIFEST
OS_CYLINDER: +1.50
OS_ADD: +2.25
OS_SPHERE: -6.75
OD_AXIS: 177
OS_AXIS: 011
OD_CYLINDER: +1.25
OD_SPHERE: -8.25
OD_ADD: +2.25

## 2025-01-29 ASSESSMENT — REFRACTION_WEARINGRX
SPECS_TYPE: SVL
OD_AXIS: 114
OS_AXIS: 013
OS_CYLINDER: +1.50
OD_SPHERE: -8.25
OS_SPHERE: -7.00
OD_CYLINDER: +1.25

## 2025-01-29 ASSESSMENT — TONOMETRY
OD_IOP_MMHG: 21
IOP_METHOD: APPLANATION
OS_IOP_MMHG: 20

## 2025-01-29 ASSESSMENT — CUP TO DISC RATIO
OS_RATIO: 0.25
OD_RATIO: 0.25

## 2025-01-29 ASSESSMENT — SLIT LAMP EXAM - LIDS
COMMENTS: NORMAL
COMMENTS: NORMAL

## 2025-01-29 ASSESSMENT — EXTERNAL EXAM - RIGHT EYE: OD_EXAM: NORMAL

## 2025-01-29 ASSESSMENT — EXTERNAL EXAM - LEFT EYE: OS_EXAM: NORMAL

## 2025-01-29 NOTE — TELEPHONE ENCOUNTER
Attempted to contact patient in order to complete pre assessment questions.     Patient answered but said it wasn't a good time because she was driving.  Advised patient to return call to [phone] option 2.    Callback communication sent via Localmind.      Procedure details:    Patient scheduled for Colonoscopy on 2/11/25.     Arrival time: 0945. Procedure time 1030    Facility location: [facility]; [address] [place] [address] [facility] [address]. Check in location: 1st [facility].     Sedation type: Conscious sedation     Pre op exam needed? No.    Indication for procedure: Screening      Chart review:     Electronic implanted devices? No    Recent diagnosis of diverticulitis within the last 6 weeks? No      Medication review:    Diabetic? Yes. Oral diabetic medications: Metformin (glucophage): HOLD day of procedure.    Anticoagulants? No    Weight loss medication/injectable? No GLP-1 medication per patient's medication list. Nursing to verify with pre-assessment call.    Other medication HOLDING recommendations:  N/A      Prep for procedure:     Bowel prep recommendation: Standard Golytely.   Bowel prep sent to [facility]/PHARMACY #[id number] - [address]  Due to: diabetes    Procedure information and instructions sent via Localmind.       Mckenna Villegas RN  Endoscopy Procedure Pre-Assessment RN  [phone], option 2

## 2025-01-29 NOTE — PATIENT INSTRUCTIONS
"Separate distance and reading glasses provided today.     You have a PVD- posterior vitreous detachment which is due to the gel of the eye shrinking and clumping together.  This can sometimes cause holes or tears in the retina.  The signs of a retinal detachment are flashes of light or a \"curtain veil\" coming over your vision. If you notice any of these changes return to clinic for re-evaluation.     Return in 1 year for a comprehensive eye exam, or sooner if needed.      The effects of the dilating drops last for 4- 6 hours.  You will be more sensitive to light and vision will be blurry up close.  Mydriatic sunglasses were given if needed.     Toi Levy, OD  [address] [place]  [address] [phone]   "

## 2025-01-29 NOTE — PROGRESS NOTES
"Chief Complaint   Patient presents with    Diabetic Eye Exam        Chief Complaint(s) and History of Present Illness(es)       Diabetic Eye Exam              Diabetes Type: Type 2 and taking oral medications (DM developed because of Truqap medication )    Duration: months    Blood Sugars: Doesn't check at home                   Lab Results   Component Value Date    A1C 7.6 12/10/2024       -Father has eye disease - unsure if glaucoma or AMD    Last Eye Exam: 2024 [employer]   Dilated Previously: Yes, side effects of dilation explained today    What are you currently using to see?  Glasses - SVL distance - takes off for reading    -Has tried BF in past but could not adapt    Distance Vision Acuity: Satisfied with vision    Near Vision Acuity: Satisfied with vision while reading and using computer unaided     Eye Comfort: good  Do you use eye drops? : No  Occupation or Hobbies: Home - 5+ hr/day on screen     Alexandra Elizondo  Optometry Assistant     Medical, surgical and family histories reviewed and updated 1/29/2025.       OBJECTIVE: See Ophthalmology exam    ASSESSMENT:    ICD-10-CM    1. Encounter for examination of eyes and vision with abnormal findings  Z01.01       2. PVD (posterior vitreous detachment), bilateral  H43.813       3. High myopia, both eyes  H52.13       4. Regular astigmatism of both eyes  H52.223       5. Presbyopia  H52.4           PLAN:    Mickie delacruz  eye exam results will be sent to [facility] Torres  Patient Instructions   Separate distance and reading glasses provided today.     You have a PVD- posterior vitreous detachment which is due to the gel of the eye shrinking and clumping together.  This can sometimes cause holes or tears in the retina.  The signs of a retinal detachment are flashes of light or a \"curtain veil\" coming over your vision. If you notice any of these changes return to clinic for re-evaluation.     Return in 1 year for a comprehensive eye exam, or sooner if " needed.      The effects of the dilating drops last for 4- 6 hours.  You will be more sensitive to light and vision will be blurry up close.  Mydriatic sunglasses were given if needed.     Toi Levy, [address]  [place]  [address]    [phone]

## 2025-01-29 NOTE — LETTER
1/29/2025      Mickie Mcghee  [address]  [place] [address]      Dear Colleague,    Thank you for referring your patient, Mickie Mcghee, to the [facility]. Please see a copy of my visit note below.    Chief Complaint   Patient presents with     Diabetic Eye Exam        Chief Complaint(s) and History of Present Illness(es)       Diabetic Eye Exam              Diabetes Type: Type 2 and taking oral medications (DM developed because of Truqap medication )    Duration: months    Blood Sugars: Doesn't check at home                   Lab Results   Component Value Date    A1C 7.6 12/10/2024       -Father has eye disease - unsure if glaucoma or AMD    Last Eye Exam: 2024 [employer]   Dilated Previously: Yes, side effects of dilation explained today    What are you currently using to see?  Glasses - SVL distance - takes off for reading    -Has tried BF in past but could not adapt    Distance Vision Acuity: Satisfied with vision    Near Vision Acuity: Satisfied with vision while reading and using computer unaided     Eye Comfort: good  Do you use eye drops? : No  Occupation or Hobbies: Home - 5+ hr/day on screen     Alexandra Elizondo  Optometry Assistant     Medical, surgical and family histories reviewed and updated 1/29/2025.       OBJECTIVE: See Ophthalmology exam    ASSESSMENT:    ICD-10-CM    1. Encounter for examination of eyes and vision with abnormal findings  Z01.01       2. PVD (posterior vitreous detachment), bilateral  H43.813       3. High myopia, both eyes  H52.13       4. Regular astigmatism of both eyes  H52.223       5. Presbyopia  H52.4           PLAN:    Mickie Mcghee aware  eye exam results will be sent to Constance Torres  Patient Instructions   Separate distance and reading glasses provided today.     You have a PVD- posterior vitreous detachment which is due to the gel of the eye shrinking and clumping together.  This can sometimes cause holes or tears in the retina.   "The signs of a retinal detachment are flashes of light or a \"curtain veil\" coming over your vision. If you notice any of these changes return to clinic for re-evaluation.     Return in 1 year for a comprehensive eye exam, or sooner if needed.      The effects of the dilating drops last for 4- 6 hours.  You will be more sensitive to light and vision will be blurry up close.  Mydriatic sunglasses were given if needed.     Toi Levy, YANIQUE  [address] [place]  [address]    [phone]             Again, thank you for allowing me to participate in the care of your patient.        Sincerely,        Toi Levy OD    Electronically signed"

## 2025-01-29 NOTE — TELEPHONE ENCOUNTER
Pre assessment completed for upcoming procedure.   (Please see previous telephone encounter notes for complete details)    Patient returned call.       Procedure details:    Arrival time and facility location reviewed.    Pre op exam needed? No.    Designated  policy reviewed. Instructed to have someone stay 6  hours post procedure.       Medication review:    Medications reviewed. Please see supporting documentation below. Holding recommendations discussed (if applicable).   Oral diabetic medication(s): Metformin (glucophage): HOLD day of procedure.      Prep for procedure:     Procedure prep instructions reviewed.        Any additional information needed:  N/A      Patient verbalized understanding and had no questions or concerns at this time.      Daxa Knott LPN  Endoscopy Procedure Pre Assessment   [phone] option 2

## 2025-02-03 ENCOUNTER — LAB (OUTPATIENT)
Dept: INFUSION THERAPY | Facility: CLINIC | Age: 60
End: 2025-02-03
Attending: INTERNAL MEDICINE
Payer: COMMERCIAL

## 2025-02-03 ENCOUNTER — ONCOLOGY VISIT (OUTPATIENT)
Dept: ONCOLOGY | Facility: CLINIC | Age: 60
End: 2025-02-03
Attending: INTERNAL MEDICINE
Payer: COMMERCIAL

## 2025-02-03 VITALS
HEART RATE: 96 BPM | DIASTOLIC BLOOD PRESSURE: 75 MMHG | TEMPERATURE: 99.1 F | HEIGHT: 64 IN | WEIGHT: 156 LBS | BODY MASS INDEX: 26.63 KG/M2 | SYSTOLIC BLOOD PRESSURE: 107 MMHG | OXYGEN SATURATION: 96 % | RESPIRATION RATE: 16 BRPM

## 2025-02-03 DIAGNOSIS — C79.51 CARCINOMA OF BREAST METASTATIC TO BONE, UNSPECIFIED LATERALITY (H): ICD-10-CM

## 2025-02-03 DIAGNOSIS — C50.919 CARCINOMA OF BREAST METASTATIC TO BONE, UNSPECIFIED LATERALITY (H): ICD-10-CM

## 2025-02-03 DIAGNOSIS — C79.51 CARCINOMA OF BREAST METASTATIC TO BONE, UNSPECIFIED LATERALITY (H): Primary | ICD-10-CM

## 2025-02-03 DIAGNOSIS — C50.919 CARCINOMA OF BREAST METASTATIC TO BONE, UNSPECIFIED LATERALITY (H): Primary | ICD-10-CM

## 2025-02-03 LAB
ALBUMIN SERPL BCG-MCNC: 4.4 G/DL (ref 3.5–5.2)
ALP SERPL-CCNC: 65 U/L (ref 40–150)
ALT SERPL W P-5'-P-CCNC: 35 U/L (ref 0–50)
ANION GAP SERPL CALCULATED.3IONS-SCNC: 10 MMOL/L (ref 7–15)
AST SERPL W P-5'-P-CCNC: 30 U/L (ref 0–45)
BASOPHILS # BLD AUTO: 0 10E3/UL (ref 0–0.2)
BASOPHILS NFR BLD AUTO: 0 %
BILIRUB SERPL-MCNC: 0.2 MG/DL
BUN SERPL-MCNC: 15.8 MG/DL (ref 6–20)
CALCIUM SERPL-MCNC: 9.7 MG/DL (ref 8.8–10.4)
CHLORIDE SERPL-SCNC: 102 MMOL/L (ref 98–107)
CREAT SERPL-MCNC: 0.96 MG/DL (ref 0.51–0.95)
EGFRCR SERPLBLD CKD-EPI 2021: 68 ML/MIN/1.73M2
EOSINOPHIL # BLD AUTO: 0.3 10E3/UL (ref 0–0.7)
EOSINOPHIL NFR BLD AUTO: 4 %
ERYTHROCYTE [DISTWIDTH] IN BLOOD BY AUTOMATED COUNT: 13.5 % (ref 10–15)
GLUCOSE SERPL-MCNC: 147 MG/DL (ref 70–99)
HCO3 SERPL-SCNC: 26 MMOL/L (ref 22–29)
HCT VFR BLD AUTO: 35.2 % (ref 35–47)
HGB BLD-MCNC: 11.8 G/DL (ref 11.7–15.7)
IMM GRANULOCYTES # BLD: 0 10E3/UL
IMM GRANULOCYTES NFR BLD: 0 %
LYMPHOCYTES # BLD AUTO: 0.5 10E3/UL (ref 0.8–5.3)
LYMPHOCYTES NFR BLD AUTO: 8 %
MAGNESIUM SERPL-MCNC: 2.1 MG/DL (ref 1.7–2.3)
MCH RBC QN AUTO: 30.3 PG (ref 26.5–33)
MCHC RBC AUTO-ENTMCNC: 33.5 G/DL (ref 31.5–36.5)
MCV RBC AUTO: 91 FL (ref 78–100)
MONOCYTES # BLD AUTO: 0.4 10E3/UL (ref 0–1.3)
MONOCYTES NFR BLD AUTO: 5 %
NEUTROPHILS # BLD AUTO: 5.4 10E3/UL (ref 1.6–8.3)
NEUTROPHILS NFR BLD AUTO: 82 %
NRBC # BLD AUTO: 0 10E3/UL
NRBC BLD AUTO-RTO: 0 /100
PLATELET # BLD AUTO: 238 10E3/UL (ref 150–450)
POTASSIUM SERPL-SCNC: 4.2 MMOL/L (ref 3.4–5.3)
PROT SERPL-MCNC: 7.1 G/DL (ref 6.4–8.3)
RBC # BLD AUTO: 3.89 10E6/UL (ref 3.8–5.2)
SODIUM SERPL-SCNC: 138 MMOL/L (ref 135–145)
WBC # BLD AUTO: 6.6 10E3/UL (ref 4–11)

## 2025-02-03 PROCEDURE — 83735 ASSAY OF MAGNESIUM: CPT | Performed by: INTERNAL MEDICINE

## 2025-02-03 PROCEDURE — 85041 AUTOMATED RBC COUNT: CPT | Performed by: INTERNAL MEDICINE

## 2025-02-03 PROCEDURE — 36415 COLL VENOUS BLD VENIPUNCTURE: CPT

## 2025-02-03 PROCEDURE — 82435 ASSAY OF BLOOD CHLORIDE: CPT | Performed by: INTERNAL MEDICINE

## 2025-02-03 PROCEDURE — 99214 OFFICE O/P EST MOD 30 MIN: CPT | Performed by: NURSE PRACTITIONER

## 2025-02-03 PROCEDURE — 82040 ASSAY OF SERUM ALBUMIN: CPT | Performed by: INTERNAL MEDICINE

## 2025-02-03 PROCEDURE — G0463 HOSPITAL OUTPT CLINIC VISIT: HCPCS | Performed by: NURSE PRACTITIONER

## 2025-02-03 PROCEDURE — 85004 AUTOMATED DIFF WBC COUNT: CPT | Performed by: INTERNAL MEDICINE

## 2025-02-03 ASSESSMENT — PAIN SCALES - GENERAL: PAINLEVEL_OUTOF10: NO PAIN (0)

## 2025-02-03 NOTE — PROGRESS NOTES
Oncology/Hematology Visit Note  Feb 3, 2025    Reason for Visit: follow up of metastatic breast cancer  ER positive NE positive HER2 negative  Diagnosed in 2007 status post bilateral mastectomy chemotherapy radiation anastrozole  -03/21/20228085-VQ-ilryex bone biopsy of left iliac bone reveals metastatic breast cancer ER positive NE positive HER2 negative  Treated with letrozole and Ribociclib    03/18/2024-PET scan revealed progression of the disease  04/02/2024-started Faslodex and capivasertib   12/02/2024-PET scan revealed stable to improved disease    Interval History:  Patient denies fever chills sweats cough shortness of breath chest pain nausea vomiting diarrhea abdominal pain or bleeding reports blood sugars controlled at home with metformin        Review of Systems:  14 point ROS of systems including Constitutional, Eyes, Respiratory, Cardiovascular, Gastroenterology, Genitourinary, Integumentary, Muscularskeletal, Psychiatric were all negative except for pertinent positives noted in my HPI.    Physical Examination:  General: The patient is a pleasant female in no acute distress.  HEENT: EOMI, PERRL. Sclerae are anicteric. Oral mucosa is pink and moist with no lesions or thrush.   Lymph: Neck is supple with no lymphadenopathy in the cervical or supraclavicular areas.   Heart: Regular rate and rhythm.   Lungs: Clear to auscultation bilaterally.   GI: Bowel sounds present, soft, nontender with no palpable hepatosplenomegaly or masses.   Extremities: No lower extremity edema noted bilaterally.   Skin: No rashes, petechiae, or bruising noted on exposed skin.    Laboratory Data:  CBC CMP results reviewed    Assessment and Plan:      1 metastatic breast cancer  Patient of Dr. Lozada  ER positive NE positive HER2 negative  Patient is currently on  Faslodex and capivasertib started in April 2024.  12/2024-PET scan revealed stable to improved disease  Continue with Faslodex and capivasertib -potential side effects  reviewed  Continue to monitor blood sugar closely  Continue checking labs monthly with follow-up appointment      2 Bone metastasis  03/22-04/07/2022-status post radiation to the L-spine and LS spine 10 fractions  Continue with Zometa every 3 months  Call our clinic in the event of jaw pain or any dental issues  Continue with calcium and vitamin D    3 Blood sugar  Close monitoring of blood sugar while on capivasertib  Per PCP  Continue with metformin      Please call our clinic with any changes in health condition or questions      SAMI Jacob [facility] [place]     Chart documentation with Dragon Voice recognition Software. Although reviewed after completion, some words and grammatical errors may remain.

## 2025-02-03 NOTE — PROGRESS NOTES
Nursing Note:  Mickie Mcghee presents today for labs.    Patient seen by provider today: Yes: ALEXSANDRA Dalton   present during visit today: Not Applicable.    Note: N/A.    Intravenous Access:  Lab draw site left AC, Needle type BF, Gauge 23.  Labs drawn without difficulty.    Discharge Plan:   Patient was sent to [facility] for next appointment.    Diane Melissa RN

## 2025-02-03 NOTE — PROGRESS NOTES
"Oncology Rooming Note    February 3, 2025 2:53 PM   Mickie Mcghee is a 59 year old female who presents for:    Chief Complaint   Patient presents with    Oncology Clinic Visit     Initial Vitals: LMP  (LMP Unknown)  Estimated body mass index is 27.55 kg/m  as calculated from the following:    Height as of 1/3/25: 1.613 m (5' 3.5\").    Weight as of 1/3/25: 71.7 kg (158 lb). There is no height or weight on file to calculate BSA.  Data Unavailable Comment: Data Unavailable   No LMP recorded (lmp unknown). Patient is postmenopausal.  Allergies reviewed: Yes  Medications reviewed: Yes    Medications: Medication refills not needed today.  Pharmacy name entered into [facility]:    [facility] - [place], [place] - [address]  [facility]ACY #[id number] - [place], [place] - [address] [place] [address]    Frailty Screening:   Is the patient here for a new oncology consult visit in cancer care? 2. No        Blanquita Meyer MA            "

## 2025-02-03 NOTE — LETTER
"2/3/2025      Mickie Mcghee  [address] [place] [address]      Dear Colleague,    Thank you for referring your patient, Mickie Mcghee, to the [facility]. Please see a copy of my visit note below.    Oncology Rooming Note    February 3, 2025 2:53 PM   Mickie Mcghee is a 59 year old female who presents for:    Chief Complaint   Patient presents with     Oncology Clinic Visit     Initial Vitals: LMP  (LMP Unknown)  Estimated body mass index is 27.55 kg/m  as calculated from the following:    Height as of 1/3/25: 1.613 m (5' 3.5\").    Weight as of 1/3/25: 71.7 kg (158 lb). There is no height or weight on file to calculate BSA.  Data Unavailable Comment: Data Unavailable   No LMP recorded (lmp unknown). Patient is postmenopausal.  Allergies reviewed: Yes  Medications reviewed: Yes    Medications: Medication refills not needed today.  Pharmacy name entered into [facility]:    [facility] - [place], [place] - [address] [place] [address]  [id number] - [address]    Frailty Screening:   Is the patient here for a new oncology consult visit in cancer care? 2. No        Blanquita Meyer MA                Oncology/Hematology Visit Note  Feb 3, 2025    Reason for Visit: follow up of metastatic breast cancer  ER positive UT positive HER2 negative  Diagnosed in 2007 status post bilateral mastectomy chemotherapy radiation anastrozole  -03/21/20225154-LM-xlaeva bone biopsy of left iliac bone reveals metastatic breast cancer ER positive UT positive HER2 negative  Treated with letrozole and Ribociclib    03/18/2024-PET scan revealed progression of the disease  04/02/2024-started Faslodex and capivasertib   12/02/2024-PET scan revealed stable to improved disease    Interval History:  Patient denies fever chills sweats cough shortness of breath chest pain nausea vomiting diarrhea abdominal pain or bleeding reports blood sugars controlled at home with " metformin        Review of Systems:  14 point ROS of systems including Constitutional, Eyes, Respiratory, Cardiovascular, Gastroenterology, Genitourinary, Integumentary, Muscularskeletal, Psychiatric were all negative except for pertinent positives noted in my HPI.    Physical Examination:  General: The patient is a pleasant female in no acute distress.  HEENT: EOMI, PERRL. Sclerae are anicteric. Oral mucosa is pink and moist with no lesions or thrush.   Lymph: Neck is supple with no lymphadenopathy in the cervical or supraclavicular areas.   Heart: Regular rate and rhythm.   Lungs: Clear to auscultation bilaterally.   GI: Bowel sounds present, soft, nontender with no palpable hepatosplenomegaly or masses.   Extremities: No lower extremity edema noted bilaterally.   Skin: No rashes, petechiae, or bruising noted on exposed skin.    Laboratory Data:  CBC CMP results reviewed    Assessment and Plan:      1 metastatic breast cancer  Patient of Dr. Lozada  ER positive GA positive HER2 negative  Patient is currently on  Faslodex and capivasertib started in April 2024.  12/2024-PET scan revealed stable to improved disease  Continue with Faslodex and capivasertib -potential side effects reviewed  Continue to monitor blood sugar closely  Continue checking labs monthly with follow-up appointment      2 Bone metastasis  03/22-04/07/2022-status post radiation to the L-spine and LS spine 10 fractions  Continue with Zometa every 3 months  Call our clinic in the event of jaw pain or any dental issues  Continue with calcium and vitamin D    3 Blood sugar  Close monitoring of blood sugar while on capivasertib  Per PCP  Continue with metformin      Please call our clinic with any changes in health condition or questions      SAMI Jacob [facility] [place]     Chart documentation with Dragon Voice recognition Software. Although reviewed after completion, some words and grammatical errors may  remain.      Again, thank you for allowing me to participate in the care of your patient.        Sincerely,        SAMI Jacob CNP    Electronically signed

## 2025-02-04 ENCOUNTER — INFUSION THERAPY VISIT (OUTPATIENT)
Dept: INFUSION THERAPY | Facility: CLINIC | Age: 60
End: 2025-02-04
Attending: INTERNAL MEDICINE
Payer: COMMERCIAL

## 2025-02-04 VITALS
SYSTOLIC BLOOD PRESSURE: 121 MMHG | TEMPERATURE: 98.2 F | DIASTOLIC BLOOD PRESSURE: 84 MMHG | OXYGEN SATURATION: 97 % | RESPIRATION RATE: 16 BRPM | HEART RATE: 74 BPM

## 2025-02-04 DIAGNOSIS — C79.51 SPINAL CORD COMPRESSION DUE TO MALIGNANT NEOPLASM METASTATIC TO SPINE (H): ICD-10-CM

## 2025-02-04 DIAGNOSIS — C79.51 CARCINOMA OF BREAST METASTATIC TO BONE, UNSPECIFIED LATERALITY (H): Primary | ICD-10-CM

## 2025-02-04 DIAGNOSIS — G95.29 SPINAL CORD COMPRESSION DUE TO MALIGNANT NEOPLASM METASTATIC TO SPINE (H): ICD-10-CM

## 2025-02-04 DIAGNOSIS — C50.919 CARCINOMA OF BREAST METASTATIC TO BONE, UNSPECIFIED LATERALITY (H): Primary | ICD-10-CM

## 2025-02-04 PROCEDURE — 96374 THER/PROPH/DIAG INJ IV PUSH: CPT

## 2025-02-04 PROCEDURE — 96402 CHEMO HORMON ANTINEOPL SQ/IM: CPT

## 2025-02-04 PROCEDURE — 250N000011 HC RX IP 250 OP 636: Performed by: INTERNAL MEDICINE

## 2025-02-04 RX ORDER — ZOLEDRONIC ACID 0.04 MG/ML
4 INJECTION, SOLUTION INTRAVENOUS ONCE
Status: COMPLETED | OUTPATIENT
Start: 2025-02-04 | End: 2025-02-04

## 2025-02-04 RX ORDER — LAMOTRIGINE 25 MG/1
500 TABLET ORAL ONCE
Status: COMPLETED | OUTPATIENT
Start: 2025-02-04 | End: 2025-02-04

## 2025-02-04 RX ADMIN — ZOLEDRONIC ACID 4 MG: 0.04 INJECTION, SOLUTION INTRAVENOUS at 09:31

## 2025-02-04 RX ADMIN — FULVESTRANT 500 MG: 50 INJECTION INTRAMUSCULAR at 09:43

## 2025-02-04 ASSESSMENT — PAIN SCALES - GENERAL: PAINLEVEL_OUTOF10: NO PAIN (0)

## 2025-02-04 NOTE — PROGRESS NOTES
Infusion Nursing Note:  Mickie Mcghee presents today for faslodex, zometa.    Patient seen by provider today: No   present during visit today: Not Applicable.    Note: Patient denies any new concerns since clinic visit yesterday with Sha Martínez NP. Writer confirmed with Sha Martínez NP that zometa with switch to every 3 months after today's dose, next due May 2025. Patient confirmed she is taking calcium and vitamin D supplements as recommended.      Intravenous Access:  Peripheral IV placed.    Treatment Conditions:  Lab Results   Component Value Date     02/03/2025    POTASSIUM 4.2 02/03/2025    MAG 2.1 02/03/2025    CR 0.96 (H) 02/03/2025    OSMEL 9.7 02/03/2025    BILITOTAL 0.2 02/03/2025    ALBUMIN 4.4 02/03/2025    ALT 35 02/03/2025    AST 30 02/03/2025     Corrected calcium: 9.38  Results reviewed, labs MET treatment parameters, ok to proceed with treatment.      Post Infusion Assessment:  Patient tolerated infusion without incident.  Patient tolerated injections without incident.  Blood return noted pre and post infusion.  Site patent and intact, free from redness, edema or discomfort.  No evidence of extravasations.  Access discontinued per protocol.       Discharge Plan:   Discharge instructions reviewed with: Patient.  Patient and/or family verbalized understanding of discharge instructions and all questions answered.  AVS to patient via [employer].  Patient will return 3/4/25 for next appointment.   Patient discharged in stable condition accompanied by: self.  Departure Mode: Ambulatory.  Face to Face time: 10 minutes.      Vani Singletary RN

## 2025-02-11 ENCOUNTER — HOSPITAL ENCOUNTER (OUTPATIENT)
Facility: CLINIC | Age: 60
Discharge: HOME | End: 2025-02-11
Attending: COLON & RECTAL SURGERY | Admitting: COLON & RECTAL SURGERY
Payer: COMMERCIAL

## 2025-02-11 PROCEDURE — 999N000002 HC CANCELLED SURGERY UP TO 16-30 MINS: Performed by: COLON & RECTAL SURGERY

## 2025-02-11 RX ORDER — ONDANSETRON 2 MG/ML
4 INJECTION INTRAMUSCULAR; INTRAVENOUS
Status: DISCONTINUED | OUTPATIENT
Start: 2025-02-11 | End: 2025-02-11 | Stop reason: HOSPADM

## 2025-02-11 RX ORDER — LIDOCAINE 40 MG/G
CREAM TOPICAL
Status: DISCONTINUED | OUTPATIENT
Start: 2025-02-11 | End: 2025-02-11 | Stop reason: HOSPADM

## 2025-02-11 ASSESSMENT — ACTIVITIES OF DAILY LIVING (ADL): ADLS_ACUITY_SCORE: 57

## 2025-02-11 NOTE — OR NURSING
Pt is currently on chemo , Dr Lewis spoke with patient and discussed case and Patient with reschedule after speaking with he Oncologist.

## 2025-02-12 DIAGNOSIS — G95.29 SPINAL CORD COMPRESSION DUE TO MALIGNANT NEOPLASM METASTATIC TO SPINE (H): ICD-10-CM

## 2025-02-12 DIAGNOSIS — C50.919 CARCINOMA OF BREAST METASTATIC TO BONE, UNSPECIFIED LATERALITY (H): Primary | ICD-10-CM

## 2025-02-12 DIAGNOSIS — C79.51 CARCINOMA OF BREAST METASTATIC TO BONE, UNSPECIFIED LATERALITY (H): Primary | ICD-10-CM

## 2025-02-12 DIAGNOSIS — C79.51 SPINAL CORD COMPRESSION DUE TO MALIGNANT NEOPLASM METASTATIC TO SPINE (H): ICD-10-CM

## 2025-02-12 RX ORDER — MEPERIDINE HYDROCHLORIDE 25 MG/ML
25 INJECTION INTRAMUSCULAR; INTRAVENOUS; SUBCUTANEOUS
OUTPATIENT
Start: 2025-04-01

## 2025-02-12 RX ORDER — DIPHENHYDRAMINE HYDROCHLORIDE 50 MG/ML
50 INJECTION INTRAMUSCULAR; INTRAVENOUS
Start: 2025-03-04

## 2025-02-12 RX ORDER — DIPHENHYDRAMINE HYDROCHLORIDE 50 MG/ML
25 INJECTION INTRAMUSCULAR; INTRAVENOUS
Start: 2025-04-01

## 2025-02-12 RX ORDER — HEPARIN SODIUM (PORCINE) LOCK FLUSH IV SOLN 100 UNIT/ML 100 UNIT/ML
5 SOLUTION INTRAVENOUS
OUTPATIENT
Start: 2025-04-29

## 2025-02-12 RX ORDER — METHYLPREDNISOLONE SODIUM SUCCINATE 40 MG/ML
40 INJECTION INTRAMUSCULAR; INTRAVENOUS
Start: 2025-03-04

## 2025-02-12 RX ORDER — LAMOTRIGINE 25 MG/1
500 TABLET ORAL ONCE
OUTPATIENT
Start: 2025-03-04 | End: 2025-03-04

## 2025-02-12 RX ORDER — DIPHENHYDRAMINE HYDROCHLORIDE 50 MG/ML
25 INJECTION INTRAMUSCULAR; INTRAVENOUS
Start: 2025-03-04

## 2025-02-12 RX ORDER — LAMOTRIGINE 25 MG/1
500 TABLET ORAL ONCE
OUTPATIENT
Start: 2025-04-01 | End: 2025-04-01

## 2025-02-12 RX ORDER — HEPARIN SODIUM,PORCINE 10 UNIT/ML
5 VIAL (ML) INTRAVENOUS
OUTPATIENT
Start: 2025-04-29

## 2025-02-12 RX ORDER — DIPHENHYDRAMINE HYDROCHLORIDE 50 MG/ML
50 INJECTION INTRAMUSCULAR; INTRAVENOUS
Start: 2025-04-01

## 2025-02-12 RX ORDER — MEPERIDINE HYDROCHLORIDE 25 MG/ML
25 INJECTION INTRAMUSCULAR; INTRAVENOUS; SUBCUTANEOUS
OUTPATIENT
Start: 2025-03-04

## 2025-02-12 RX ORDER — ALBUTEROL SULFATE 90 UG/1
1-2 INHALANT RESPIRATORY (INHALATION)
Start: 2025-03-04

## 2025-02-12 RX ORDER — ALBUTEROL SULFATE 0.83 MG/ML
2.5 SOLUTION RESPIRATORY (INHALATION)
OUTPATIENT
Start: 2025-03-04

## 2025-02-12 RX ORDER — EPINEPHRINE 1 MG/ML
0.3 INJECTION, SOLUTION INTRAMUSCULAR; SUBCUTANEOUS EVERY 5 MIN PRN
OUTPATIENT
Start: 2025-03-04

## 2025-02-12 RX ORDER — EPINEPHRINE 1 MG/ML
0.3 INJECTION, SOLUTION, CONCENTRATE INTRAVENOUS EVERY 5 MIN PRN
OUTPATIENT
Start: 2025-04-01

## 2025-02-12 RX ORDER — ALBUTEROL SULFATE 0.83 MG/ML
2.5 SOLUTION RESPIRATORY (INHALATION)
OUTPATIENT
Start: 2025-04-01

## 2025-02-12 RX ORDER — ALBUTEROL SULFATE 90 UG/1
1-2 INHALANT RESPIRATORY (INHALATION)
Start: 2025-04-01

## 2025-02-18 DIAGNOSIS — C79.51 CARCINOMA OF BREAST METASTATIC TO BONE, UNSPECIFIED LATERALITY (H): ICD-10-CM

## 2025-02-18 DIAGNOSIS — C50.919 CARCINOMA OF BREAST METASTATIC TO BONE, UNSPECIFIED LATERALITY (H): ICD-10-CM

## 2025-02-19 NOTE — TELEPHONE ENCOUNTER
Patient has never completed visit at   Reviewed chart for who patient sees for primary needs. Routing to correct team-thanks!

## 2025-02-20 RX ORDER — CALCIUM CARBONATE/VITAMIN D3 600 MG-10
1 TABLET ORAL 2 TIMES DAILY
Qty: 180 TABLET | Refills: 0 | Status: SHIPPED | OUTPATIENT
Start: 2025-02-20

## 2025-03-04 ENCOUNTER — ONCOLOGY VISIT (OUTPATIENT)
Dept: ONCOLOGY | Facility: CLINIC | Age: 60
End: 2025-03-04
Attending: INTERNAL MEDICINE
Payer: COMMERCIAL

## 2025-03-04 ENCOUNTER — LAB (OUTPATIENT)
Dept: INFUSION THERAPY | Facility: CLINIC | Age: 60
End: 2025-03-04
Attending: INTERNAL MEDICINE
Payer: COMMERCIAL

## 2025-03-04 VITALS
DIASTOLIC BLOOD PRESSURE: 83 MMHG | BODY MASS INDEX: 26.85 KG/M2 | HEART RATE: 67 BPM | WEIGHT: 154 LBS | TEMPERATURE: 98.5 F | SYSTOLIC BLOOD PRESSURE: 126 MMHG | OXYGEN SATURATION: 96 % | RESPIRATION RATE: 16 BRPM

## 2025-03-04 DIAGNOSIS — C79.51 CARCINOMA OF BREAST METASTATIC TO BONE, UNSPECIFIED LATERALITY (H): Primary | ICD-10-CM

## 2025-03-04 DIAGNOSIS — C50.919 CARCINOMA OF BREAST METASTATIC TO BONE, UNSPECIFIED LATERALITY (H): Primary | ICD-10-CM

## 2025-03-04 DIAGNOSIS — C79.51 CARCINOMA OF BREAST METASTATIC TO BONE, UNSPECIFIED LATERALITY (H): ICD-10-CM

## 2025-03-04 DIAGNOSIS — C50.919 CARCINOMA OF BREAST METASTATIC TO BONE, UNSPECIFIED LATERALITY (H): ICD-10-CM

## 2025-03-04 LAB
ALBUMIN SERPL BCG-MCNC: 4.1 G/DL (ref 3.5–5.2)
ALP SERPL-CCNC: 63 U/L (ref 40–150)
ALT SERPL W P-5'-P-CCNC: 32 U/L (ref 0–50)
ANION GAP SERPL CALCULATED.3IONS-SCNC: 11 MMOL/L (ref 7–15)
AST SERPL W P-5'-P-CCNC: 23 U/L (ref 0–45)
BASOPHILS # BLD AUTO: 0 10E3/UL (ref 0–0.2)
BASOPHILS NFR BLD AUTO: 0 %
BILIRUB SERPL-MCNC: 0.2 MG/DL
BUN SERPL-MCNC: 15.9 MG/DL (ref 8–23)
CALCIUM SERPL-MCNC: 9.6 MG/DL (ref 8.8–10.4)
CHLORIDE SERPL-SCNC: 104 MMOL/L (ref 98–107)
CREAT SERPL-MCNC: 0.85 MG/DL (ref 0.51–0.95)
EGFRCR SERPLBLD CKD-EPI 2021: 78 ML/MIN/1.73M2
EOSINOPHIL # BLD AUTO: 0.2 10E3/UL (ref 0–0.7)
EOSINOPHIL NFR BLD AUTO: 5 %
ERYTHROCYTE [DISTWIDTH] IN BLOOD BY AUTOMATED COUNT: 13.7 % (ref 10–15)
GLUCOSE SERPL-MCNC: 91 MG/DL (ref 70–99)
HCO3 SERPL-SCNC: 25 MMOL/L (ref 22–29)
HCT VFR BLD AUTO: 31.7 % (ref 35–47)
HGB BLD-MCNC: 10.9 G/DL (ref 11.7–15.7)
IMM GRANULOCYTES # BLD: 0 10E3/UL
IMM GRANULOCYTES NFR BLD: 0 %
LYMPHOCYTES # BLD AUTO: 1.1 10E3/UL (ref 0.8–5.3)
LYMPHOCYTES NFR BLD AUTO: 20 %
MAGNESIUM SERPL-MCNC: 2.3 MG/DL (ref 1.7–2.3)
MCH RBC QN AUTO: 30.8 PG (ref 26.5–33)
MCHC RBC AUTO-ENTMCNC: 34.4 G/DL (ref 31.5–36.5)
MCV RBC AUTO: 90 FL (ref 78–100)
MONOCYTES # BLD AUTO: 0.5 10E3/UL (ref 0–1.3)
MONOCYTES NFR BLD AUTO: 9 %
NEUTROPHILS # BLD AUTO: 3.5 10E3/UL (ref 1.6–8.3)
NEUTROPHILS NFR BLD AUTO: 65 %
NRBC # BLD AUTO: 0 10E3/UL
NRBC BLD AUTO-RTO: 0 /100
PLATELET # BLD AUTO: 210 10E3/UL (ref 150–450)
POTASSIUM SERPL-SCNC: 4.3 MMOL/L (ref 3.4–5.3)
PROT SERPL-MCNC: 6.6 G/DL (ref 6.4–8.3)
RBC # BLD AUTO: 3.54 10E6/UL (ref 3.8–5.2)
SODIUM SERPL-SCNC: 140 MMOL/L (ref 135–145)
WBC # BLD AUTO: 5.4 10E3/UL (ref 4–11)

## 2025-03-04 PROCEDURE — 83735 ASSAY OF MAGNESIUM: CPT | Performed by: NURSE PRACTITIONER

## 2025-03-04 PROCEDURE — 99214 OFFICE O/P EST MOD 30 MIN: CPT | Performed by: NURSE PRACTITIONER

## 2025-03-04 PROCEDURE — 250N000011 HC RX IP 250 OP 636: Mod: JZ | Performed by: NURSE PRACTITIONER

## 2025-03-04 PROCEDURE — 84520 ASSAY OF UREA NITROGEN: CPT | Performed by: NURSE PRACTITIONER

## 2025-03-04 PROCEDURE — 85014 HEMATOCRIT: CPT | Performed by: NURSE PRACTITIONER

## 2025-03-04 PROCEDURE — 82310 ASSAY OF CALCIUM: CPT | Performed by: NURSE PRACTITIONER

## 2025-03-04 PROCEDURE — G0463 HOSPITAL OUTPT CLINIC VISIT: HCPCS | Performed by: NURSE PRACTITIONER

## 2025-03-04 PROCEDURE — 36415 COLL VENOUS BLD VENIPUNCTURE: CPT

## 2025-03-04 PROCEDURE — 84155 ASSAY OF PROTEIN SERUM: CPT | Performed by: NURSE PRACTITIONER

## 2025-03-04 PROCEDURE — 96402 CHEMO HORMON ANTINEOPL SQ/IM: CPT

## 2025-03-04 PROCEDURE — 85004 AUTOMATED DIFF WBC COUNT: CPT | Performed by: NURSE PRACTITIONER

## 2025-03-04 RX ORDER — LAMOTRIGINE 25 MG/1
500 TABLET ORAL ONCE
Status: COMPLETED | OUTPATIENT
Start: 2025-03-04 | End: 2025-03-04

## 2025-03-04 RX ADMIN — FULVESTRANT 500 MG: 50 INJECTION, SOLUTION INTRAMUSCULAR at 10:48

## 2025-03-04 ASSESSMENT — PAIN SCALES - GENERAL: PAINLEVEL_OUTOF10: NO PAIN (0)

## 2025-03-04 NOTE — LETTER
3/4/2025      Mickie Mcghee  [address] [facility] [address] [place] [address]      Dear Colleague,    Thank you for referring your patient, Mickie Mcghee, to the [facility]. Please see a copy of my visit note below.      Oncology/Hematology Visit Note  Mar 4, 2025    Reason for Visit: follow up of metastatic breast cancer  ER positive TX positive HER2 negative  Diagnosed in 2007 status post bilateral mastectomy chemotherapy radiation anastrozole  -03/21/20225105-[id number] bone biopsy of left iliac bone reveals metastatic breast cancer ER positive TX positive HER2 negative  Treated with letrozole and Ribociclib    03/18/2024-PET scan revealed progression of the disease  04/02/2024-started Faslodex and capivasertib   12/02/2024-PET scan revealed stable to improved disease    Interval History:  Reports feeling well.  Denies fever chills sweats cough shortness of breath chest pain nausea vomiting diarrhea abdominal pain or bleeding        Review of Systems:  14 point ROS of systems including Constitutional, Eyes, Respiratory, Cardiovascular, Gastroenterology, Genitourinary, Integumentary, Muscularskeletal, Psychiatric were all negative except for pertinent positives noted in my HPI.    Physical Examination:  General: The patient is a pleasant female in no acute distress.  HEENT: EOMI, PERRL. Sclerae are anicteric. Oral mucosa is pink and moist with no lesions or thrush.   Lymph: Neck is supple with no lymphadenopathy in the cervical or supraclavicular areas.   Heart: Regular rate and rhythm.   Lungs: Clear to auscultation bilaterally.   GI: Bowel sounds present, soft, nontender with no palpable hepatosplenomegaly or masses.   Extremities: No lower extremity edema noted bilaterally.   Skin: No rashes, petechiae, or bruising noted on exposed skin.    Laboratory Data:  CBC CMP results reviewed    Assessment and Plan:      1 metastatic breast cancer  Patient of Dr. Lozada  ER positive TX positive HER2  negative  Patient is currently on  Faslodex and capivasertib started in April 2024.  12/2024-PET scan revealed stable to improved disease  Continue with Faslodex and capivasertib -potential side effects reviewed  Continue to monitor blood sugar closely  Continue checking labs monthly with follow-up appointment  Schedule with Dr Lozada in 4 weeks with labs and Faslodex    2 Bone metastasis  03/22-04/07/2022-status post radiation to the L-spine and LS spine 10 fractions  Continue with Zometa every 3 months- next due in May  Call our clinic in the event of jaw pain or any dental issues  Continue with calcium and vitamin D    3 Blood sugar  Close monitoring of blood sugar while on capivasertib  Monitor clolsy with PCP  Continue with metformin    4 chronic mild anemia   Pt denies bleeding or dark stools   Stable   For now monitor closely        Please call our clinic with any changes in health condition or questions      SAMI Jacob CNP  [facility]     Chart documentation with Dragon Voice recognition Software. Although reviewed after completion, some words and grammatical errors may remain.    Again, thank you for allowing me to participate in the care of your patient.        Sincerely,        SAMI Jacob CNP    Electronically signed

## 2025-03-04 NOTE — NURSING NOTE
"Oncology Rooming Note    March 4, 2025 10:15 AM   Mickie Mcghee is a 60 year old female who presents for:    Chief Complaint   Patient presents with    Oncology Clinic Visit     Initial Vitals: /83   Pulse 67   Temp 98.5  F (36.9  C) (Oral)   Resp 16   Wt 69.9 kg (154 lb)   LMP  (LMP Unknown)   SpO2 96%   BMI 26.85 kg/m   Estimated body mass index is 26.85 kg/m  as calculated from the following:    Height as of 2/3/25: 1.613 m (5' 3.5\").    Weight as of this encounter: 69.9 kg (154 lb). Body surface area is 1.77 meters squared.  No Pain (0) Comment: Data Unavailable   No LMP recorded (lmp unknown). Patient is postmenopausal.  Allergies reviewed: Yes  Medications reviewed: Yes    Medications: Medication refills not needed today.  Pharmacy name entered into [employer]:    [facility] - [place], [place] - [address] [id number] - [place], [place] - [address] [place] [address]    Frailty Screening:   Is the patient here for a new oncology consult visit in cancer care? 2. No    PHQ9:  Did this patient require a PHQ9?: No      Clinical concerns: follow up        Brittney Rahman            "

## 2025-03-04 NOTE — PROGRESS NOTES
Oncology/Hematology Visit Note  Mar 4, 2025    Reason for Visit: follow up of metastatic breast cancer  ER positive OR positive HER2 negative  Diagnosed in 2007 status post bilateral mastectomy chemotherapy radiation anastrozole  -03/21/20221882-[id number] bone biopsy of left iliac bone reveals metastatic breast cancer ER positive OR positive HER2 negative  Treated with letrozole and Ribociclib    03/18/2024-PET scan revealed progression of the disease  04/02/2024-started Faslodex and capivasertib   12/02/2024-PET scan revealed stable to improved disease    Interval History:  Reports feeling well.  Denies fever chills sweats cough shortness of breath chest pain nausea vomiting diarrhea abdominal pain or bleeding        Review of Systems:  14 point ROS of systems including Constitutional, Eyes, Respiratory, Cardiovascular, Gastroenterology, Genitourinary, Integumentary, Muscularskeletal, Psychiatric were all negative except for pertinent positives noted in my HPI.    Physical Examination:  General: The patient is a pleasant female in no acute distress.  HEENT: EOMI, PERRL. Sclerae are anicteric. Oral mucosa is pink and moist with no lesions or thrush.   Lymph: Neck is supple with no lymphadenopathy in the cervical or supraclavicular areas.   Heart: Regular rate and rhythm.   Lungs: Clear to auscultation bilaterally.   GI: Bowel sounds present, soft, nontender with no palpable hepatosplenomegaly or masses.   Extremities: No lower extremity edema noted bilaterally.   Skin: No rashes, petechiae, or bruising noted on exposed skin.    Laboratory Data:  CBC CMP results reviewed    Assessment and Plan:      1 metastatic breast cancer  Patient of Dr. Lozada  ER positive OR positive HER2 negative  Patient is currently on  Faslodex and capivasertib started in April 2024.  12/2024-PET scan revealed stable to improved disease  Continue with Faslodex and capivasertib -potential side effects reviewed  Continue to monitor blood sugar  closely  Continue checking labs monthly with follow-up appointment  Schedule with Dr Lozada in 4 weeks with labs and Faslodex    2 Bone metastasis  03/22-04/07/2022-status post radiation to the L-spine and LS spine 10 fractions  Continue with Zometa every 3 months- next due in May  Call our clinic in the event of jaw pain or any dental issues  Continue with calcium and vitamin D    3 Blood sugar  Close monitoring of blood sugar while on capivasertib  Monitor clolsy with PCP  Continue with metformin    4 chronic mild anemia   Pt denies bleeding or dark stools   Stable   For now monitor closely        Please call our clinic with any changes in health condition or questions      SAMI Jacob [facility]     Chart documentation with Dragon Voice recognition Software. Although reviewed after completion, some words and grammatical errors may remain.

## 2025-03-04 NOTE — PROGRESS NOTES
Medical Assistant Note:  Mickie Mcghee presents today for blood draw.    Patient seen by provider today: Yes: junito.   present during visit today: Not Applicable.    Concerns: No Concerns.    Procedure:  Lab draw site: right hand, Needle type: bf, Gauge: 23.    Post Assessment:  Labs drawn without difficulty: Yes.    Discharge Plan:  Departure Mode: Ambulatory.    Face to Face Time: 5 min.    Dorothy Henao, CMA

## 2025-03-12 ENCOUNTER — OFFICE VISIT (OUTPATIENT)
Dept: INTERNAL MEDICINE | Facility: CLINIC | Age: 60
End: 2025-03-12
Payer: COMMERCIAL

## 2025-03-12 VITALS
RESPIRATION RATE: 16 BRPM | TEMPERATURE: 97.8 F | HEIGHT: 64 IN | SYSTOLIC BLOOD PRESSURE: 102 MMHG | BODY MASS INDEX: 25.78 KG/M2 | OXYGEN SATURATION: 97 % | WEIGHT: 151 LBS | HEART RATE: 74 BPM | DIASTOLIC BLOOD PRESSURE: 76 MMHG

## 2025-03-12 DIAGNOSIS — E11.9 TYPE 2 DIABETES MELLITUS WITHOUT COMPLICATION, WITHOUT LONG-TERM CURRENT USE OF INSULIN (H): Primary | ICD-10-CM

## 2025-03-12 DIAGNOSIS — C79.51 CARCINOMA OF BREAST METASTATIC TO BONE, UNSPECIFIED LATERALITY (H): Primary | ICD-10-CM

## 2025-03-12 DIAGNOSIS — Z23 IMMUNIZATION DUE: ICD-10-CM

## 2025-03-12 DIAGNOSIS — C50.919 CARCINOMA OF BREAST METASTATIC TO BONE, UNSPECIFIED LATERALITY (H): Primary | ICD-10-CM

## 2025-03-12 DIAGNOSIS — Z12.12 SCREENING FOR COLORECTAL CANCER: ICD-10-CM

## 2025-03-12 DIAGNOSIS — E78.2 MIXED HYPERLIPIDEMIA: ICD-10-CM

## 2025-03-12 DIAGNOSIS — Z12.11 SCREENING FOR COLORECTAL CANCER: ICD-10-CM

## 2025-03-12 LAB
EST. AVERAGE GLUCOSE BLD GHB EST-MCNC: 169 MG/DL
HBA1C MFR BLD: 7.5 % (ref 0–5.6)

## 2025-03-12 PROCEDURE — 3078F DIAST BP <80 MM HG: CPT | Performed by: NURSE PRACTITIONER

## 2025-03-12 PROCEDURE — 36415 COLL VENOUS BLD VENIPUNCTURE: CPT | Performed by: NURSE PRACTITIONER

## 2025-03-12 PROCEDURE — 90471 IMMUNIZATION ADMIN: CPT | Performed by: NURSE PRACTITIONER

## 2025-03-12 PROCEDURE — 83036 HEMOGLOBIN GLYCOSYLATED A1C: CPT | Performed by: NURSE PRACTITIONER

## 2025-03-12 PROCEDURE — 3074F SYST BP LT 130 MM HG: CPT | Performed by: NURSE PRACTITIONER

## 2025-03-12 PROCEDURE — 99214 OFFICE O/P EST MOD 30 MIN: CPT | Mod: 25 | Performed by: NURSE PRACTITIONER

## 2025-03-12 PROCEDURE — 90677 PCV20 VACCINE IM: CPT | Performed by: NURSE PRACTITIONER

## 2025-03-12 PROCEDURE — G2211 COMPLEX E/M VISIT ADD ON: HCPCS | Performed by: NURSE PRACTITIONER

## 2025-03-12 RX ORDER — METFORMIN HYDROCHLORIDE 500 MG/1
TABLET, EXTENDED RELEASE ORAL
Qty: 180 TABLET | Refills: 1 | Status: SHIPPED | OUTPATIENT
Start: 2025-03-12

## 2025-03-12 RX ORDER — METFORMIN HYDROCHLORIDE 500 MG/1
TABLET, EXTENDED RELEASE ORAL
Qty: 90 TABLET | Refills: 1 | Status: SHIPPED | OUTPATIENT
Start: 2025-03-12 | End: 2025-03-12

## 2025-03-12 RX ORDER — ROSUVASTATIN CALCIUM 20 MG/1
20 TABLET, COATED ORAL DAILY
Qty: 90 TABLET | Refills: 3 | Status: SHIPPED | OUTPATIENT
Start: 2025-03-12

## 2025-03-12 NOTE — PROGRESS NOTES
Assessment & Plan     Type 2 diabetes mellitus without complication, without long-term current use of insulin (H)  Mickie is following up today on her diabetes.  She had a hemoglobin A1c of 7.6 back on 12/10/2024.  She then followed up with me in the clinic 1 month later to discuss this and we started her on metformin 500 mg daily.  Patient states she feels like she has been doing well with this medication.  Does have some diarrhea but states she is also has diarrhea secondary to her chemotherapy treatments.  Has not noticed it worsening since starting the metformin however.  No blood in the stools.  At this time we will recheck her A1c today.  A1c today is with only little improvement down to 7.5.  At this time I think it would be reasonable to increase her metformin to 1000 mg daily to see if we can get slightly better improvement of her A1c.  We discussed if we can get her A1c lower than 7 then we will not have to make significant adjustments to her medications after that.  I would like to see her back in 3 to 6 months for recheck.  - HEMOGLOBIN A1C; Future  - PRIMARY CARE FOLLOW-UP SCHEDULING; Future  - HEMOGLOBIN A1C    Mixed hyperlipidemia  Patient was noted to have elevated cholesterol labs as well.  This in addition with her diagnosis of type 2 diabetes we discussed increased risk of cardiovascular disease and recommendation to start a statin medication.  Reviewed use of the statin including possible side effects.  At this time we will start her on rosuvastatin 20 mg daily.  Will monitor for any side effects and adjust the medication as needed.  - rosuvastatin (CRESTOR) 20 MG tablet; Take 1 tablet (20 mg) by mouth daily.  - PRIMARY CARE FOLLOW-UP SCHEDULING; Future    Screening for colorectal cancer  Patient originally had a colonoscopy ordered but states that this got canceled due to concerns with possible perforation as she is on ongoing chemotherapy treatment.  Discussed at this time we can do  "screening with a FIT testing.  - Fecal colorectal cancer screen (FIT); Future  - Fecal colorectal cancer screen (FIT)    Immunization due  Due to patient being higher risk pneumonia vaccine recommended.  Pneumonia vaccine given today.  - Pneumococcal 20 Valent Conjugate (PCV20)      The longitudinal plan of care for the diagnosis(es)/condition(s) as documented were addressed during this visit. Due to the added complexity in care, I will continue to support Abiel in the subsequent management and with ongoing continuity of care.          Subjective   Abiel is a 60 year old, presenting for the following health issues:  Follow Up (Diabetes)      3/12/2025     9:40 AM   Additional Questions   Roomed by Susan WING     History of Present Illness       Diabetes:   She presents for follow up of diabetes.  She is checking home blood glucose a few times a month.   She checks blood glucose after meals.  Blood glucose is sometimes over 200 and never under 70.  When her blood glucose is low, the patient is asymptomatic for confusion, blurred vision, lethargy and reports not feeling dizzy, shaky, or weak.   She has no concerns regarding her diabetes at this time.  She is having numbness in feet.            Reason for visit:  Follow up    She eats 2-3 servings of fruits and vegetables daily.She consumes 0 sweetened beverage(s) daily.She exercises with enough effort to increase her heart rate 9 or less minutes per day.  She exercises with enough effort to increase her heart rate 3 or less days per week.   She is taking medications regularly.        ROS  Comprehensive 12-point review of systems was completed and negative except as noted in HPI.        Objective    /76 (BP Location: Right arm, Patient Position: Sitting)   Pulse 74   Temp 97.8  F (36.6  C)   Resp 16   Ht 1.613 m (5' 3.5\")   Wt 68.5 kg (151 lb)   LMP  (LMP Unknown)   SpO2 97%   BMI 26.33 kg/m    Body mass index is 26.33 kg/m .  Physical Exam "   Constitutional: In no acute distress.  Clean appearance.  Cardiovascular: Regular rate.  Respiratory: Normal respiratory effort.  Skin: Skin is pink, warm and dry.     Musculoskeletal: Gait normal.    Psychiatric: Appropriate affect and demeanor.  Memory intact.  Good insight and judgment.  Neurologic: No tremor or involuntary movement noted.              Signed Electronically by: Constance Torres NP

## 2025-03-12 NOTE — PATIENT INSTRUCTIONS
We will recheck your hemoglobin A1c today.  I will let you know this result once available.  At this time we will continue as things are getting better with the metformin 500 mg daily.  If lab is stable or improving we will plan on rechecking in 6 months.    We will start you on a cholesterol medication today called rosuvastatin also known as Crestor.  Try taking this once daily however if he develop any significant muscle soreness/ pain we may want to consider decreasing the frequency of the dosing.  Feel free to let me know though if you have any questions or concerns with this medication.  This medication will help decrease your risk of heart attack or stroke given your elevated cholesterol levels on your labs last year and the diagnosis of type 2 diabetes.    Your pneumonia vaccine was updated today.  You will be due for second shot and the pneumonia vaccine.     I ordered a fit test which is a screening for colorectal cancer.  This is recommend to be done once yearly.  You will get a kit from the lab and collect this.  You can drop it off in a few months when you are back for another visit.    Let me know anytime you have any questions or concerns you can message me.

## 2025-03-19 ENCOUNTER — TELEPHONE (OUTPATIENT)
Dept: PHARMACY | Facility: CLINIC | Age: 60
End: 2025-03-19
Payer: COMMERCIAL

## 2025-03-19 NOTE — TELEPHONE ENCOUNTER
Prior Authorization Approval    Medication: TRUQAP 200 MG PO TABS  Authorization Effective Date: 3/18/2025  Authorization Expiration Date: 3/18/2026  Approved Dose/Quantity: 64/28  Reference #:     Insurance Company: [employer] - Phone [phone] Fax [phone]  Expected CoPay: $ 0  CoPay Card Available:      Financial Assistance Needed: NO  Which Pharmacy is filling the prescription: [facility] - [place], [place] - [address]  Pharmacy Notified: yes  Patient Notified: yes

## 2025-04-01 ENCOUNTER — DOCUMENTATION ONLY (OUTPATIENT)
Dept: PHARMACY | Facility: CLINIC | Age: 60
End: 2025-04-01

## 2025-04-01 ENCOUNTER — INFUSION THERAPY VISIT (OUTPATIENT)
Dept: INFUSION THERAPY | Facility: CLINIC | Age: 60
End: 2025-04-01
Attending: INTERNAL MEDICINE
Payer: COMMERCIAL

## 2025-04-01 VITALS
SYSTOLIC BLOOD PRESSURE: 106 MMHG | RESPIRATION RATE: 16 BRPM | OXYGEN SATURATION: 96 % | TEMPERATURE: 98.5 F | DIASTOLIC BLOOD PRESSURE: 70 MMHG | HEART RATE: 76 BPM

## 2025-04-01 DIAGNOSIS — C50.919 CARCINOMA OF BREAST METASTATIC TO BONE, UNSPECIFIED LATERALITY (H): Primary | ICD-10-CM

## 2025-04-01 DIAGNOSIS — C79.51 CARCINOMA OF BREAST METASTATIC TO BONE, UNSPECIFIED LATERALITY (H): Primary | ICD-10-CM

## 2025-04-01 LAB
ALBUMIN SERPL BCG-MCNC: 4.3 G/DL (ref 3.5–5.2)
ALP SERPL-CCNC: 58 U/L (ref 40–150)
ALT SERPL W P-5'-P-CCNC: 23 U/L (ref 0–50)
ANION GAP SERPL CALCULATED.3IONS-SCNC: 9 MMOL/L (ref 7–15)
AST SERPL W P-5'-P-CCNC: 22 U/L (ref 0–45)
BASOPHILS # BLD AUTO: 0 10E3/UL (ref 0–0.2)
BASOPHILS NFR BLD AUTO: 1 %
BILIRUB SERPL-MCNC: 0.3 MG/DL
BUN SERPL-MCNC: 12.6 MG/DL (ref 8–23)
CALCIUM SERPL-MCNC: 10 MG/DL (ref 8.8–10.4)
CHLORIDE SERPL-SCNC: 103 MMOL/L (ref 98–107)
CREAT SERPL-MCNC: 1.08 MG/DL (ref 0.51–0.95)
EGFRCR SERPLBLD CKD-EPI 2021: 59 ML/MIN/1.73M2
EOSINOPHIL # BLD AUTO: 0.3 10E3/UL (ref 0–0.7)
EOSINOPHIL NFR BLD AUTO: 5 %
ERYTHROCYTE [DISTWIDTH] IN BLOOD BY AUTOMATED COUNT: 13.5 % (ref 10–15)
GLUCOSE SERPL-MCNC: 113 MG/DL (ref 70–99)
HCO3 SERPL-SCNC: 27 MMOL/L (ref 22–29)
HCT VFR BLD AUTO: 31.8 % (ref 35–47)
HGB BLD-MCNC: 10.9 G/DL (ref 11.7–15.7)
IMM GRANULOCYTES # BLD: 0 10E3/UL
IMM GRANULOCYTES NFR BLD: 1 %
LYMPHOCYTES # BLD AUTO: 1.1 10E3/UL (ref 0.8–5.3)
LYMPHOCYTES NFR BLD AUTO: 19 %
MAGNESIUM SERPL-MCNC: 2.1 MG/DL (ref 1.7–2.3)
MCH RBC QN AUTO: 30.6 PG (ref 26.5–33)
MCHC RBC AUTO-ENTMCNC: 34.3 G/DL (ref 31.5–36.5)
MCV RBC AUTO: 89 FL (ref 78–100)
MONOCYTES # BLD AUTO: 0.5 10E3/UL (ref 0–1.3)
MONOCYTES NFR BLD AUTO: 9 %
NEUTROPHILS # BLD AUTO: 3.7 10E3/UL (ref 1.6–8.3)
NEUTROPHILS NFR BLD AUTO: 67 %
NRBC # BLD AUTO: 0 10E3/UL
NRBC BLD AUTO-RTO: 0 /100
PLATELET # BLD AUTO: 197 10E3/UL (ref 150–450)
POTASSIUM SERPL-SCNC: 4.2 MMOL/L (ref 3.4–5.3)
PROT SERPL-MCNC: 6.7 G/DL (ref 6.4–8.3)
RBC # BLD AUTO: 3.56 10E6/UL (ref 3.8–5.2)
SODIUM SERPL-SCNC: 139 MMOL/L (ref 135–145)
WBC # BLD AUTO: 5.6 10E3/UL (ref 4–11)

## 2025-04-01 PROCEDURE — 83735 ASSAY OF MAGNESIUM: CPT | Performed by: INTERNAL MEDICINE

## 2025-04-01 PROCEDURE — 36415 COLL VENOUS BLD VENIPUNCTURE: CPT

## 2025-04-01 PROCEDURE — 96402 CHEMO HORMON ANTINEOPL SQ/IM: CPT

## 2025-04-01 PROCEDURE — 250N000011 HC RX IP 250 OP 636: Mod: JZ | Performed by: INTERNAL MEDICINE

## 2025-04-01 PROCEDURE — 85025 COMPLETE CBC W/AUTO DIFF WBC: CPT | Performed by: INTERNAL MEDICINE

## 2025-04-01 PROCEDURE — 80053 COMPREHEN METABOLIC PANEL: CPT | Performed by: INTERNAL MEDICINE

## 2025-04-01 RX ORDER — LAMOTRIGINE 25 MG/1
500 TABLET ORAL ONCE
Status: COMPLETED | OUTPATIENT
Start: 2025-04-01 | End: 2025-04-01

## 2025-04-01 RX ADMIN — FULVESTRANT 500 MG: 50 INJECTION, SOLUTION INTRAMUSCULAR at 09:14

## 2025-04-01 NOTE — PROGRESS NOTES
Oral Chemotherapy Monitoring Program  Lab Follow Up    Reviewed lab results from 4/1/25.        11/12/2024     1:00 PM 12/18/2024     8:00 AM 1/7/2025     1:00 PM 1/21/2025    10:00 AM 2/12/2025    11:00 AM 3/12/2025    11:00 AM 4/1/2025    10:00 AM   ORAL CHEMOTHERAPY   Assessment Type Lab Monitoring Refill Lab Monitoring Refill Refill Refill Lab Monitoring   Diagnosis Code Breast Cancer Breast Cancer Breast Cancer Breast Cancer Breast Cancer Breast Cancer Breast Cancer   Providers Dr. Neville Lozada   Clinic Name/Location [facility]   Drug Name Truqap (capivasertib) Truqap (capivasertib) Truqap (capivasertib) Truqap (capivasertib) Truqap (capivasertib) Truqap (capivasertib) Truqap (capivasertib)   Dose 400 mg 400 mg 400 mg 400 mg 400 mg 400 mg 400 mg   Current Schedule BID BID BID BID BID BID BID   Cycle Details Days 1-4 each week Days 1-4 each week Days 1-4 each week Days 1-4 each week Days 1-4 each week Days 1-4 each week Days 1-4 each week   Adverse Effects Hyperglycemia;Increased AST/ALT/T BILI  Increased AST/ALT/T BILI;Anemia    Increased Creatinine   Hyperglycemia Grade 1         Pharmacist Intervention(hyperglycemia) No         Anemia   Grade 1       Pharmacist Intervention(anemia)   No       Increased AST/ALT/T BILI Grade 1  Grade 1       Pharmacist Intervention(increased ast/alt/t bili) No  No       Increased Creatinine       Grade 1   Pharmacist intervention(Increased creatinine)       No       Labs:  _  Result Component Current Result Ref Range   Sodium 139 (4/1/2025) 135 - 145 mmol/L     _  Result Component Current Result Ref Range   Potassium 4.2 (4/1/2025) 3.4 - 5.3 mmol/L     _  Result Component Current Result Ref Range   Calcium 10.0 (4/1/2025) 8.8 - 10.4 mg/dL     _  Result Component Current Result Ref Range   Magnesium 2.1 (4/1/2025) 1.7 - 2.3 mg/dL     No results found for Phos within  last 30 days.     _  Result Component Current Result Ref Range   Albumin 4.3 (4/1/2025) 3.5 - 5.2 g/dL     _  Result Component Current Result Ref Range   Urea Nitrogen 12.6 (4/1/2025) 8.0 - 23.0 mg/dL     _  Result Component Current Result Ref Range   Creatinine 1.08 (H) (4/1/2025) 0.51 - 0.95 mg/dL     _  Result Component Current Result Ref Range   AST 22 (4/1/2025) 0 - 45 U/L     _  Result Component Current Result Ref Range   ALT 23 (4/1/2025) 0 - 50 U/L     _  Result Component Current Result Ref Range   Bilirubin Total 0.3 (4/1/2025) <=1.2 mg/dL     _  Result Component Current Result Ref Range   WBC Count 5.6 (4/1/2025) 4.0 - 11.0 10e3/uL     _  Result Component Current Result Ref Range   Hemoglobin 10.9 (L) (4/1/2025) 11.7 - 15.7 g/dL     _  Result Component Current Result Ref Range   Platelet Count 197 (4/1/2025) 150 - 450 10e3/uL     _  Result Component Current Result Ref Range   Absolute Neutrophils 3.7 (4/1/2025) 1.6 - 8.3 10e3/uL        Assessment & Plan:  No concerning abnormalities. Serum creatinine is slightly elevated. No dose adjustments are indicated at this time. Continue to monitor. Ok to proceed with capivasertib.    Questions answered to patient's satisfaction.    Follow-Up:  4/28 - Appointment with Dr. Lozada + Labs Rachel Moniz, PharmD  Hematology/Oncology Pharmacist  [facility]  [phone]

## 2025-04-01 NOTE — PROGRESS NOTES
Infusion Nursing Note:  Mickie Mcghee presents today for Faslodex+Labs.    Patient seen by provider today: No   present during visit today: Not Applicable.    Note: Pt reports no new health changes or concerns today.      Intravenous Access:  Lab draw site R AC, Needle type Butterfly, Gauge 23.  Labs drawn without difficulty.    Treatment Conditions:  Not Applicable.      Post Infusion Assessment:  Patient tolerated injection without incident.  Site patent and intact, free from redness, edema or discomfort.  No evidence of extravasations.  Access discontinued per protocol.       Discharge Plan:   Discharge instructions reviewed with: Patient.  Patient and/or family verbalized understanding of discharge instructions and all questions answered.  AVS to patient via [employer].  Patient will return 4/28/25 for next appointment.   Patient discharged in stable condition accompanied by: self.  Departure Mode: Ambulatory.      Heather Crabtree RN

## 2025-04-07 DIAGNOSIS — C79.51 CARCINOMA OF BREAST METASTATIC TO BONE, UNSPECIFIED LATERALITY (H): Primary | ICD-10-CM

## 2025-04-07 DIAGNOSIS — C50.919 CARCINOMA OF BREAST METASTATIC TO BONE, UNSPECIFIED LATERALITY (H): Primary | ICD-10-CM

## 2025-04-07 RX ORDER — MEPERIDINE HYDROCHLORIDE 25 MG/ML
25 INJECTION INTRAMUSCULAR; INTRAVENOUS; SUBCUTANEOUS
OUTPATIENT
Start: 2025-04-29

## 2025-04-07 RX ORDER — EPINEPHRINE 1 MG/ML
0.3 INJECTION, SOLUTION INTRAMUSCULAR; SUBCUTANEOUS EVERY 5 MIN PRN
OUTPATIENT
Start: 2025-04-29

## 2025-04-07 RX ORDER — ALBUTEROL SULFATE 90 UG/1
1-2 INHALANT RESPIRATORY (INHALATION)
Start: 2025-04-29

## 2025-04-07 RX ORDER — LAMOTRIGINE 25 MG/1
500 TABLET ORAL ONCE
OUTPATIENT
Start: 2025-04-29 | End: 2025-04-29

## 2025-04-07 RX ORDER — DIPHENHYDRAMINE HYDROCHLORIDE 50 MG/ML
25 INJECTION, SOLUTION INTRAMUSCULAR; INTRAVENOUS
Start: 2025-04-29

## 2025-04-07 RX ORDER — DIPHENHYDRAMINE HYDROCHLORIDE 50 MG/ML
50 INJECTION, SOLUTION INTRAMUSCULAR; INTRAVENOUS
Start: 2025-04-29

## 2025-04-07 RX ORDER — ALBUTEROL SULFATE 0.83 MG/ML
2.5 SOLUTION RESPIRATORY (INHALATION)
OUTPATIENT
Start: 2025-04-29

## 2025-04-07 RX ORDER — METHYLPREDNISOLONE SODIUM SUCCINATE 40 MG/ML
40 INJECTION INTRAMUSCULAR; INTRAVENOUS
Start: 2025-04-29

## 2025-04-28 ENCOUNTER — ONCOLOGY VISIT (OUTPATIENT)
Dept: ONCOLOGY | Facility: CLINIC | Age: 60
End: 2025-04-28
Attending: INTERNAL MEDICINE
Payer: COMMERCIAL

## 2025-04-28 ENCOUNTER — LAB (OUTPATIENT)
Dept: INFUSION THERAPY | Facility: CLINIC | Age: 60
End: 2025-04-28
Attending: INTERNAL MEDICINE
Payer: COMMERCIAL

## 2025-04-28 VITALS
SYSTOLIC BLOOD PRESSURE: 120 MMHG | TEMPERATURE: 98.5 F | RESPIRATION RATE: 16 BRPM | OXYGEN SATURATION: 99 % | WEIGHT: 145.2 LBS | BODY MASS INDEX: 25.32 KG/M2 | DIASTOLIC BLOOD PRESSURE: 76 MMHG | HEART RATE: 79 BPM

## 2025-04-28 DIAGNOSIS — G95.29 SPINAL CORD COMPRESSION DUE TO MALIGNANT NEOPLASM METASTATIC TO SPINE (H): ICD-10-CM

## 2025-04-28 DIAGNOSIS — C50.919 CARCINOMA OF BREAST METASTATIC TO BONE, UNSPECIFIED LATERALITY (H): Primary | ICD-10-CM

## 2025-04-28 DIAGNOSIS — C79.51 CARCINOMA OF BREAST METASTATIC TO BONE, UNSPECIFIED LATERALITY (H): Primary | ICD-10-CM

## 2025-04-28 DIAGNOSIS — C79.51 SPINAL CORD COMPRESSION DUE TO MALIGNANT NEOPLASM METASTATIC TO SPINE (H): ICD-10-CM

## 2025-04-28 LAB
ALBUMIN SERPL BCG-MCNC: 4.5 G/DL (ref 3.5–5.2)
ALP SERPL-CCNC: 57 U/L (ref 40–150)
ALT SERPL W P-5'-P-CCNC: 28 U/L (ref 0–50)
ANION GAP SERPL CALCULATED.3IONS-SCNC: 15 MMOL/L (ref 7–15)
AST SERPL W P-5'-P-CCNC: 30 U/L (ref 0–45)
BASOPHILS # BLD AUTO: 0 10E3/UL (ref 0–0.2)
BASOPHILS NFR BLD AUTO: 0 %
BILIRUB SERPL-MCNC: 0.3 MG/DL
BUN SERPL-MCNC: 15 MG/DL (ref 8–23)
CALCIUM SERPL-MCNC: 9.7 MG/DL (ref 8.8–10.4)
CHLORIDE SERPL-SCNC: 101 MMOL/L (ref 98–107)
CREAT SERPL-MCNC: 0.98 MG/DL (ref 0.51–0.95)
EGFRCR SERPLBLD CKD-EPI 2021: 66 ML/MIN/1.73M2
EOSINOPHIL # BLD AUTO: 0.2 10E3/UL (ref 0–0.7)
EOSINOPHIL NFR BLD AUTO: 3 %
ERYTHROCYTE [DISTWIDTH] IN BLOOD BY AUTOMATED COUNT: 13.6 % (ref 10–15)
GLUCOSE SERPL-MCNC: 86 MG/DL (ref 70–99)
HCO3 SERPL-SCNC: 23 MMOL/L (ref 22–29)
HCT VFR BLD AUTO: 33.4 % (ref 35–47)
HGB BLD-MCNC: 10.7 G/DL (ref 11.7–15.7)
IMM GRANULOCYTES # BLD: 0 10E3/UL
IMM GRANULOCYTES NFR BLD: 1 %
LYMPHOCYTES # BLD AUTO: 0.9 10E3/UL (ref 0.8–5.3)
LYMPHOCYTES NFR BLD AUTO: 15 %
MAGNESIUM SERPL-MCNC: 2.4 MG/DL (ref 1.7–2.3)
MCH RBC QN AUTO: 29.3 PG (ref 26.5–33)
MCHC RBC AUTO-ENTMCNC: 32 G/DL (ref 31.5–36.5)
MCV RBC AUTO: 92 FL (ref 78–100)
MONOCYTES # BLD AUTO: 0.4 10E3/UL (ref 0–1.3)
MONOCYTES NFR BLD AUTO: 7 %
NEUTROPHILS # BLD AUTO: 4.4 10E3/UL (ref 1.6–8.3)
NEUTROPHILS NFR BLD AUTO: 74 %
NRBC # BLD AUTO: 0 10E3/UL
NRBC BLD AUTO-RTO: 0 /100
PLATELET # BLD AUTO: 213 10E3/UL (ref 150–450)
POTASSIUM SERPL-SCNC: 4.1 MMOL/L (ref 3.4–5.3)
PROT SERPL-MCNC: 7.1 G/DL (ref 6.4–8.3)
RBC # BLD AUTO: 3.65 10E6/UL (ref 3.8–5.2)
SODIUM SERPL-SCNC: 139 MMOL/L (ref 135–145)
WBC # BLD AUTO: 5.9 10E3/UL (ref 4–11)

## 2025-04-28 PROCEDURE — G0463 HOSPITAL OUTPT CLINIC VISIT: HCPCS | Performed by: INTERNAL MEDICINE

## 2025-04-28 PROCEDURE — 85004 AUTOMATED DIFF WBC COUNT: CPT | Performed by: NURSE PRACTITIONER

## 2025-04-28 PROCEDURE — 83735 ASSAY OF MAGNESIUM: CPT | Performed by: NURSE PRACTITIONER

## 2025-04-28 PROCEDURE — 250N000011 HC RX IP 250 OP 636: Mod: JZ | Performed by: NURSE PRACTITIONER

## 2025-04-28 PROCEDURE — 99215 OFFICE O/P EST HI 40 MIN: CPT | Performed by: INTERNAL MEDICINE

## 2025-04-28 PROCEDURE — 36415 COLL VENOUS BLD VENIPUNCTURE: CPT

## 2025-04-28 PROCEDURE — G2211 COMPLEX E/M VISIT ADD ON: HCPCS | Performed by: INTERNAL MEDICINE

## 2025-04-28 PROCEDURE — 96402 CHEMO HORMON ANTINEOPL SQ/IM: CPT

## 2025-04-28 PROCEDURE — 84155 ASSAY OF PROTEIN SERUM: CPT | Performed by: NURSE PRACTITIONER

## 2025-04-28 RX ORDER — DIPHENHYDRAMINE HYDROCHLORIDE 50 MG/ML
50 INJECTION, SOLUTION INTRAMUSCULAR; INTRAVENOUS
Start: 2025-05-27

## 2025-04-28 RX ORDER — ALBUTEROL SULFATE 0.83 MG/ML
2.5 SOLUTION RESPIRATORY (INHALATION)
OUTPATIENT
Start: 2025-05-27

## 2025-04-28 RX ORDER — MEPERIDINE HYDROCHLORIDE 25 MG/ML
25 INJECTION INTRAMUSCULAR; INTRAVENOUS; SUBCUTANEOUS
OUTPATIENT
Start: 2025-05-27

## 2025-04-28 RX ORDER — LAMOTRIGINE 25 MG/1
500 TABLET ORAL ONCE
OUTPATIENT
Start: 2025-05-27 | End: 2025-05-27

## 2025-04-28 RX ORDER — ZOLEDRONIC ACID 0.04 MG/ML
4 INJECTION, SOLUTION INTRAVENOUS ONCE
Start: 2025-07-22 | End: 2025-07-22

## 2025-04-28 RX ORDER — EPINEPHRINE 1 MG/ML
0.3 INJECTION, SOLUTION INTRAMUSCULAR; SUBCUTANEOUS EVERY 5 MIN PRN
OUTPATIENT
Start: 2025-05-27

## 2025-04-28 RX ORDER — ALBUTEROL SULFATE 90 UG/1
1-2 INHALANT RESPIRATORY (INHALATION)
Start: 2025-05-27

## 2025-04-28 RX ORDER — HEPARIN SODIUM,PORCINE 10 UNIT/ML
5 VIAL (ML) INTRAVENOUS
OUTPATIENT
Start: 2025-07-22

## 2025-04-28 RX ORDER — DIPHENHYDRAMINE HYDROCHLORIDE 50 MG/ML
25 INJECTION, SOLUTION INTRAMUSCULAR; INTRAVENOUS
Start: 2025-05-27

## 2025-04-28 RX ORDER — METHYLPREDNISOLONE SODIUM SUCCINATE 40 MG/ML
40 INJECTION INTRAMUSCULAR; INTRAVENOUS
Start: 2025-05-27

## 2025-04-28 RX ORDER — HEPARIN SODIUM (PORCINE) LOCK FLUSH IV SOLN 100 UNIT/ML 100 UNIT/ML
5 SOLUTION INTRAVENOUS
OUTPATIENT
Start: 2025-07-22

## 2025-04-28 RX ORDER — LAMOTRIGINE 25 MG/1
500 TABLET ORAL ONCE
Status: COMPLETED | OUTPATIENT
Start: 2025-04-28 | End: 2025-04-28

## 2025-04-28 RX ADMIN — FULVESTRANT 500 MG: 50 INJECTION, SOLUTION INTRAMUSCULAR at 11:37

## 2025-04-28 NOTE — PROGRESS NOTES
ONCOLOGY HISTORY:  Ms. Mcghee is a female with metastatic breast cancer. ER positive, MS positive and HER-2/halina negative (1+ IHC).  -Foundation One reveals PIK3CA alteration.  -She had a stage III left breast cancer in 2007. ER positive and HER-2/halina negative.  She had bilateral mastectomy, chemotherapy, radiation, and anastrozole.     1.  On 03/18/2022, multiple imaging studies done because of bilateral lower extremity weakness:  -MRI of thoracic and lumbar spine revealed bone metastases with spinal cord compression.  -CT chest, abdomen, and pelvis on 03/19/2022 revealed bone metastasis, lung nodules and enlarged upper abdominal lymph node.  There is a hypodense nodule in the liver.  -Brain MRI on 03/19/2022 did not reveal any intracranial metastasis.  2.  CT-guided bone biopsy of left iliac bone on 03/21/2022 revealed metastatic breast cancer. ER positive, MS positive and HER-2/halina negative.  3.  Letrozole started on 03/25/2022.  -Ribociclib started on 03/31/2022. Decreased to 200 mg a day in 09/2022.  -Zometa started on 03/24/2022.  -Radiation to thoracolumbar and sacral area between 03/22/2022 and 04/07/2022.  3000 cGy in 10 fractions.  4.  PET scan on 03/18/2024 reveals progression of disease in the bones.  5. Faslodex and capivasertib started on 04/02/2024.     SUBJECTIVE:    Mrs. Mcghee is a 59-year-old female with metastatic breast cancer on Faslodex and capivasertib.  Overall she is tolerating it well.  She gets few side effects.  She gets intermittent cramps in her leg and foot.  She also gets 2-3 loose stools in a day.  They are not bothersome.  There has been no worsening of side effects.     Patient gets some pain at Faslodex injection site.     She gets Zometa for bone metastasis.  She is tolerating it well.  No dental or jaw related symptoms.     PET scan was done on 12/02/2024: While there is broadly stable FDG avid osseous lesions involving the left lateral mass of C1, T1 vertebral body, midline S2 sacral  segment, and lesion in the posterior aspect of right lesser trochanter, there is decreasing metabolic activity of a lesion in the L4 spinous process suggesting continued response to therapy.     Overall her condition is stable.  She has mild generalized weakness.  She can do all her activities.  She uses cane/walker.  There has been no worsening of her weakness.     No headache. No dizziness.  No chest pain.  No shortness of breath. No abdominal pain.  No nausea or vomiting.  Appetite is good. No urinary complaints. No bleeding. No bone pain.  No fever or night sweats.  All other review of system is negative.     PHYSICAL EXAMINATION:  She is alert and oriented x 3. Not in any distress.  Vitals: Reviewed. ECOG PS of 1.  Rest of systems not examined.     LABS: CBC and CMP reviewed.     ASSESSMENT:  1.  A 59-year-old female with metastatic breast cancer on Faslodex and capivasertib.  Disease is responding to treatment.   2.  Grade 1 diarrhea from capivasertib.  No worsening of diarrhea.   3.  Generalized weakness from metastatic breast cancer, Faslodex and capivasertib.  4.  Mild normocytic anemia.     PLAN:  1.  Continue Faslodex and capivasertib.  2.  Continue Zometa. After next dose, will change it to every 12 weeks.  3.  Continue calcium and vitamin D twice a day.  4.  See me in 2-3 months. (When she comes for faslodex)  5.  Labs as per treatment protocol.  6.  See PCP for blood sugar.     DISCUSSION:  1.  Patient's overall condition is stable.  PET scan was reviewed with her.  Explained to her that it reveals stable to improved disease.  No new lesions.  She was happy to know that.     2.  For breast cancer, she will continue on Faslodex and capivasertib.  Overall she is tolerating it well.  She has mild fatigue.  She has grade 1 diarrhea.  They are all tolerable.  She gets some pain at injection site from Faslodex.  She will take over-the-counter pain medications as needed.     Will consider follow-up PET scan  in 3 to 6 months time.     3.  Labs were reviewed with her.  There are few minor abnormalities.  She has mild anemia.  Protein is mildly low.  ALT minimally elevated.  Will monitor CBC and CMP.     Her blood sugar is elevated.  Hemoglobin A1c was checked which is also elevated at 7.6.  Patient advised to follow-up with her PCP regarding diabetes.     4.  She will continue on Zometa for bone metastasis.  We have been giving it every 2 months. Will change it to every 3 months after next dose. She is tolerating it well.  No dental or jaw related symptoms.  She will also continue on calcium and vitamin D twice a day.  Advised her to follow-up with her dentist.     5.  She had few questions which were all answered.  I will see her in 2 to 3 months time.     Total visit time of 40 minutes.  Time spent in today's visit, review of chart/investigations today, monitoring for toxicity of high risk drugs and documentation.

## 2025-04-28 NOTE — Clinical Note
4/28/2025      Mickie Mcghee  [address] [facility] [address] [place] [address]      Dear Colleague,    Thank you for referring your patient, Mickie Mcghee, to the [facility] CANCER [facility]. Please see a copy of my visit note below.    ONCOLOGY HISTORY:  Ms. Mcghee is a female with metastatic breast cancer. ER positive, AL positive and HER-2/halina negative (1+ IHC).  -Foundation One reveals PIK3CA alteration.  -She had a stage III left breast cancer in 2007. ER positive and HER-2/halina negative.  She had bilateral mastectomy, chemotherapy, radiation, and anastrozole.     1.  On 03/18/2022, multiple imaging studies done because of bilateral lower extremity weakness:  -MRI of thoracic and lumbar spine revealed bone metastases with spinal cord compression.  -CT chest, abdomen, and pelvis on 03/19/2022 revealed bone metastasis, lung nodules and enlarged upper abdominal lymph node.  There is a hypodense nodule in the liver.  -Brain MRI on 03/19/2022 did not reveal any intracranial metastasis.  2.  CT-guided bone biopsy of left iliac bone on 03/21/2022 revealed metastatic breast cancer. ER positive, AL positive and HER-2/halina negative.  3.  Letrozole started on 03/25/2022.  -Ribociclib started on 03/31/2022. Decreased to 200 mg a day in 09/2022.  -Zometa started on 03/24/2022.  -Radiation to thoracolumbar and sacral area between 03/22/2022 and 04/07/2022.  3000 cGy in 10 fractions.  4.  PET scan on 03/18/2024 reveals progression of disease in the bones.  5. Faslodex and capivasertib started on 04/02/2024.     SUBJECTIVE:    Mrs. Mcghee is a 59-year-old female with metastatic breast cancer on Faslodex and capivasertib.  Overall she is tolerating it well.  She gets few side effects.  She gets intermittent cramps in her leg and foot.  She also gets 2-3 loose stools in a day.  They are not bothersome.  There has been no worsening of side effects.     Patient gets some pain at Faslodex injection site.     She gets Zometa for  bone metastasis.  She is tolerating it well.  No dental or jaw related symptoms.     PET scan was done on 12/02/2024: While there is broadly stable FDG avid osseous lesions involving the left lateral mass of C1, T1 vertebral body, midline S2 sacral segment, and lesion in the posterior aspect of right lesser trochanter, there is decreasing metabolic activity of a lesion in the L4 spinous process suggesting continued response to therapy.     Overall her condition is stable.  She has mild generalized weakness.  She can do all her activities.  She uses cane/walker.  There has been no worsening of her weakness.     No headache. No dizziness.  No chest pain.  No shortness of breath. No abdominal pain.  No nausea or vomiting.  Appetite is good. No urinary complaints. No bleeding. No bone pain.  No fever or night sweats.  All other review of system is negative.     PHYSICAL EXAMINATION:  She is alert and oriented x 3. Not in any distress.  Vitals: Reviewed. ECOG PS of 1.  Rest of systems not examined.     LABS: CBC and CMP reviewed.     ASSESSMENT:  1.  A 59-year-old female with metastatic breast cancer on Faslodex and capivasertib.  Disease is responding to treatment.   2.  Grade 1 diarrhea from capivasertib.  No worsening of diarrhea.   3.  Generalized weakness from metastatic breast cancer, Faslodex and capivasertib.  4.  Mild normocytic anemia.     PLAN:  1.  Continue Faslodex and capivasertib.  2.  Continue Zometa. After next dose, will change it to every 12 weeks.  3.  Continue calcium and vitamin D twice a day.  4.  See me in 2-3 months. (When she comes for faslodex)  5.  Labs as per treatment protocol.  6.  See PCP for blood sugar.     DISCUSSION:  1.  Patient's overall condition is stable.  PET scan was reviewed with her.  Explained to her that it reveals stable to improved disease.  No new lesions.  She was happy to know that.     2.  For breast cancer, she will continue on Faslodex and capivasertib.  Overall she  is tolerating it well.  She has mild fatigue.  She has grade 1 diarrhea.  They are all tolerable.  She gets some pain at injection site from Faslodex.  She will take over-the-counter pain medications as needed.     Will consider follow-up PET scan in 3 to 6 months time.     3.  Labs were reviewed with her.  There are few minor abnormalities.  She has mild anemia.  Protein is mildly low.  ALT minimally elevated.  Will monitor CBC and CMP.     Her blood sugar is elevated.  Hemoglobin A1c was checked which is also elevated at 7.6.  Patient advised to follow-up with her PCP regarding diabetes.     4.  She will continue on Zometa for bone metastasis.  We have been giving it every 2 months. Will change it to every 3 months after next dose. She is tolerating it well.  No dental or jaw related symptoms.  She will also continue on calcium and vitamin D twice a day.  Advised her to follow-up with her dentist.     5.  She had few questions which were all answered.  I will see her in 2 to 3 months time.     Total visit time of 40 minutes.  Time spent in today's visit, review of chart/investigations today, monitoring for toxicity of high risk drugs and documentation.         Again, thank you for allowing me to participate in the care of your patient.        Sincerely,        Yury Lozada MD    Electronically signed

## 2025-04-28 NOTE — PROGRESS NOTES
Medical Assistant Note:  Mickie Mcghee presents today for lab draw.    Patient seen by provider today: No.   present during visit today: Not Applicable.    Concerns: No Concerns.    Procedure:  Lab draw site: RHD, Needle type: BF, Gauge: 21. Gauze and coban applied    Post Assessment:  Labs drawn without difficulty: Yes.    Discharge Plan:  Departure Mode: Ambulatory.    Face to Face Time: 5.    Arielle Fry CMA

## 2025-04-28 NOTE — PATIENT INSTRUCTIONS
1.  Continue Faslodex and capivasertib.  2.  Continue Zometa every 3 months.   3.  Continue calcium and vitamin D twice a day.  4.  PET scan in next 2-3 weeks.  4.  See me in 4 weeks. (When she comes for faslodex)  5.  Labs as per treatment protocol.  6.  See PCP for blood sugar.

## 2025-05-12 ENCOUNTER — RESULTS FOLLOW-UP (OUTPATIENT)
Dept: ONCOLOGY | Facility: CLINIC | Age: 60
End: 2025-05-12

## 2025-05-12 ENCOUNTER — HOSPITAL ENCOUNTER (OUTPATIENT)
Dept: PET IMAGING | Facility: HOSPITAL | Age: 60
Discharge: HOME | End: 2025-05-12
Attending: INTERNAL MEDICINE
Payer: COMMERCIAL

## 2025-05-12 DIAGNOSIS — C79.51 CARCINOMA OF BREAST METASTATIC TO BONE, UNSPECIFIED LATERALITY (H): ICD-10-CM

## 2025-05-12 DIAGNOSIS — C50.919 CARCINOMA OF BREAST METASTATIC TO BONE, UNSPECIFIED LATERALITY (H): ICD-10-CM

## 2025-05-12 LAB
CREAT BLD-MCNC: 1 MG/DL (ref 0.5–1)
EGFRCR SERPLBLD CKD-EPI 2021: >60 ML/MIN/1.73M2
GLUCOSE BLDC GLUCOMTR-MCNC: 89 MG/DL (ref 70–99)

## 2025-05-12 PROCEDURE — 343N000001 HC RX 343 MED OP 636: Performed by: INTERNAL MEDICINE

## 2025-05-12 PROCEDURE — 78815 PET IMAGE W/CT SKULL-THIGH: CPT | Mod: PS

## 2025-05-12 PROCEDURE — 82962 GLUCOSE BLOOD TEST: CPT

## 2025-05-12 PROCEDURE — 71260 CT THORAX DX C+: CPT

## 2025-05-12 PROCEDURE — 82565 ASSAY OF CREATININE: CPT

## 2025-05-12 PROCEDURE — A9552 F18 FDG: HCPCS | Performed by: INTERNAL MEDICINE

## 2025-05-12 PROCEDURE — 250N000011 HC RX IP 250 OP 636: Performed by: INTERNAL MEDICINE

## 2025-05-12 RX ORDER — IOPAMIDOL 755 MG/ML
71 INJECTION, SOLUTION INTRAVASCULAR ONCE
Status: COMPLETED | OUTPATIENT
Start: 2025-05-12 | End: 2025-05-12

## 2025-05-12 RX ORDER — FLUDEOXYGLUCOSE F 18 200 MCI/ML
9-18 INJECTION, SOLUTION INTRAVENOUS ONCE
Status: COMPLETED | OUTPATIENT
Start: 2025-05-12 | End: 2025-05-12

## 2025-05-12 RX ADMIN — IOPAMIDOL 71 ML: 755 INJECTION, SOLUTION INTRAVENOUS at 08:08

## 2025-05-12 RX ADMIN — FLUDEOXYGLUCOSE F 18 10.16 MILLICURIE: 200 INJECTION, SOLUTION INTRAVENOUS at 07:10

## 2025-05-27 ENCOUNTER — LAB (OUTPATIENT)
Dept: LAB | Facility: CLINIC | Age: 60
End: 2025-05-27
Payer: COMMERCIAL

## 2025-05-27 ENCOUNTER — INFUSION THERAPY VISIT (OUTPATIENT)
Dept: INFUSION THERAPY | Facility: CLINIC | Age: 60
End: 2025-05-27
Attending: INTERNAL MEDICINE
Payer: COMMERCIAL

## 2025-05-27 ENCOUNTER — ONCOLOGY VISIT (OUTPATIENT)
Dept: ONCOLOGY | Facility: CLINIC | Age: 60
End: 2025-05-27
Attending: INTERNAL MEDICINE
Payer: COMMERCIAL

## 2025-05-27 VITALS
HEART RATE: 65 BPM | OXYGEN SATURATION: 97 % | HEIGHT: 64 IN | RESPIRATION RATE: 16 BRPM | SYSTOLIC BLOOD PRESSURE: 113 MMHG | BODY MASS INDEX: 24.59 KG/M2 | DIASTOLIC BLOOD PRESSURE: 77 MMHG | WEIGHT: 144 LBS

## 2025-05-27 DIAGNOSIS — C79.51 CARCINOMA OF BREAST METASTATIC TO BONE, UNSPECIFIED LATERALITY (H): ICD-10-CM

## 2025-05-27 DIAGNOSIS — C79.51 CARCINOMA OF BREAST METASTATIC TO BONE, UNSPECIFIED LATERALITY (H): Primary | ICD-10-CM

## 2025-05-27 DIAGNOSIS — G95.29 SPINAL CORD COMPRESSION DUE TO MALIGNANT NEOPLASM METASTATIC TO SPINE (H): ICD-10-CM

## 2025-05-27 DIAGNOSIS — C50.919 CARCINOMA OF BREAST METASTATIC TO BONE, UNSPECIFIED LATERALITY (H): ICD-10-CM

## 2025-05-27 DIAGNOSIS — C50.919 CARCINOMA OF BREAST METASTATIC TO BONE, UNSPECIFIED LATERALITY (H): Primary | ICD-10-CM

## 2025-05-27 DIAGNOSIS — C79.51 SPINAL CORD COMPRESSION DUE TO MALIGNANT NEOPLASM METASTATIC TO SPINE (H): ICD-10-CM

## 2025-05-27 LAB
ALBUMIN SERPL BCG-MCNC: 4.4 G/DL (ref 3.5–5.2)
ALP SERPL-CCNC: 60 U/L (ref 40–150)
ALT SERPL W P-5'-P-CCNC: 24 U/L (ref 0–50)
ANION GAP SERPL CALCULATED.3IONS-SCNC: 10 MMOL/L (ref 7–15)
AST SERPL W P-5'-P-CCNC: 17 U/L (ref 0–45)
BASOPHILS # BLD AUTO: 0 10E3/UL (ref 0–0.2)
BASOPHILS NFR BLD AUTO: 1 %
BILIRUB SERPL-MCNC: 0.2 MG/DL
BUN SERPL-MCNC: 14.6 MG/DL (ref 8–23)
CALCIUM SERPL-MCNC: 9.9 MG/DL (ref 8.8–10.4)
CHLORIDE SERPL-SCNC: 105 MMOL/L (ref 98–107)
CREAT SERPL-MCNC: 1 MG/DL (ref 0.51–0.95)
EGFRCR SERPLBLD CKD-EPI 2021: 65 ML/MIN/1.73M2
EOSINOPHIL # BLD AUTO: 0.2 10E3/UL (ref 0–0.7)
EOSINOPHIL NFR BLD AUTO: 4 %
ERYTHROCYTE [DISTWIDTH] IN BLOOD BY AUTOMATED COUNT: 13.7 % (ref 10–15)
GLUCOSE SERPL-MCNC: 81 MG/DL (ref 70–99)
HCO3 SERPL-SCNC: 27 MMOL/L (ref 22–29)
HCT VFR BLD AUTO: 32.2 % (ref 35–47)
HGB BLD-MCNC: 10.5 G/DL (ref 11.7–15.7)
IMM GRANULOCYTES # BLD: 0 10E3/UL
IMM GRANULOCYTES NFR BLD: 0 %
LYMPHOCYTES # BLD AUTO: 1 10E3/UL (ref 0.8–5.3)
LYMPHOCYTES NFR BLD AUTO: 19 %
MAGNESIUM SERPL-MCNC: 2.2 MG/DL (ref 1.7–2.3)
MCH RBC QN AUTO: 30.2 PG (ref 26.5–33)
MCHC RBC AUTO-ENTMCNC: 32.6 G/DL (ref 31.5–36.5)
MCV RBC AUTO: 93 FL (ref 78–100)
MONOCYTES # BLD AUTO: 0.4 10E3/UL (ref 0–1.3)
MONOCYTES NFR BLD AUTO: 7 %
NEUTROPHILS # BLD AUTO: 3.9 10E3/UL (ref 1.6–8.3)
NEUTROPHILS NFR BLD AUTO: 69 %
NRBC # BLD AUTO: 0 10E3/UL
NRBC BLD AUTO-RTO: 0 /100
PLATELET # BLD AUTO: 198 10E3/UL (ref 150–450)
POTASSIUM SERPL-SCNC: 4.4 MMOL/L (ref 3.4–5.3)
PROT SERPL-MCNC: 6.7 G/DL (ref 6.4–8.3)
RBC # BLD AUTO: 3.48 10E6/UL (ref 3.8–5.2)
SODIUM SERPL-SCNC: 142 MMOL/L (ref 135–145)
WBC # BLD AUTO: 5.5 10E3/UL (ref 4–11)

## 2025-05-27 PROCEDURE — 99214 OFFICE O/P EST MOD 30 MIN: CPT | Performed by: INTERNAL MEDICINE

## 2025-05-27 PROCEDURE — 258N000003 HC RX IP 258 OP 636: Performed by: INTERNAL MEDICINE

## 2025-05-27 PROCEDURE — 96365 THER/PROPH/DIAG IV INF INIT: CPT

## 2025-05-27 PROCEDURE — 250N000011 HC RX IP 250 OP 636: Mod: JZ | Performed by: INTERNAL MEDICINE

## 2025-05-27 PROCEDURE — 36415 COLL VENOUS BLD VENIPUNCTURE: CPT

## 2025-05-27 PROCEDURE — G2211 COMPLEX E/M VISIT ADD ON: HCPCS | Performed by: INTERNAL MEDICINE

## 2025-05-27 PROCEDURE — 85025 COMPLETE CBC W/AUTO DIFF WBC: CPT

## 2025-05-27 PROCEDURE — 82565 ASSAY OF CREATININE: CPT

## 2025-05-27 PROCEDURE — 96402 CHEMO HORMON ANTINEOPL SQ/IM: CPT

## 2025-05-27 PROCEDURE — 83735 ASSAY OF MAGNESIUM: CPT

## 2025-05-27 PROCEDURE — G0463 HOSPITAL OUTPT CLINIC VISIT: HCPCS | Performed by: INTERNAL MEDICINE

## 2025-05-27 RX ORDER — MEPERIDINE HYDROCHLORIDE 25 MG/ML
25 INJECTION INTRAMUSCULAR; INTRAVENOUS; SUBCUTANEOUS
OUTPATIENT
Start: 2025-06-24

## 2025-05-27 RX ORDER — DIPHENHYDRAMINE HYDROCHLORIDE 50 MG/ML
50 INJECTION, SOLUTION INTRAMUSCULAR; INTRAVENOUS
Start: 2025-06-24

## 2025-05-27 RX ORDER — ALBUTEROL SULFATE 90 UG/1
1-2 INHALANT RESPIRATORY (INHALATION)
Start: 2025-06-24

## 2025-05-27 RX ORDER — ALBUTEROL SULFATE 0.83 MG/ML
2.5 SOLUTION RESPIRATORY (INHALATION)
OUTPATIENT
Start: 2025-07-22

## 2025-05-27 RX ORDER — MEPERIDINE HYDROCHLORIDE 25 MG/ML
25 INJECTION INTRAMUSCULAR; INTRAVENOUS; SUBCUTANEOUS
OUTPATIENT
Start: 2025-07-22

## 2025-05-27 RX ORDER — DIPHENHYDRAMINE HYDROCHLORIDE 50 MG/ML
25 INJECTION, SOLUTION INTRAMUSCULAR; INTRAVENOUS
Start: 2025-06-24

## 2025-05-27 RX ORDER — DIPHENHYDRAMINE HYDROCHLORIDE 50 MG/ML
50 INJECTION, SOLUTION INTRAMUSCULAR; INTRAVENOUS
Start: 2025-07-22

## 2025-05-27 RX ORDER — LAMOTRIGINE 25 MG/1
500 TABLET ORAL ONCE
OUTPATIENT
Start: 2025-06-24 | End: 2025-06-24

## 2025-05-27 RX ORDER — EPINEPHRINE 1 MG/ML
0.3 INJECTION, SOLUTION INTRAMUSCULAR; SUBCUTANEOUS EVERY 5 MIN PRN
OUTPATIENT
Start: 2025-06-24

## 2025-05-27 RX ORDER — LAMOTRIGINE 25 MG/1
500 TABLET ORAL ONCE
Status: COMPLETED | OUTPATIENT
Start: 2025-05-27 | End: 2025-05-27

## 2025-05-27 RX ORDER — METHYLPREDNISOLONE SODIUM SUCCINATE 40 MG/ML
40 INJECTION INTRAMUSCULAR; INTRAVENOUS
Start: 2025-06-24

## 2025-05-27 RX ORDER — ALBUTEROL SULFATE 90 UG/1
1-2 INHALANT RESPIRATORY (INHALATION)
Start: 2025-07-22

## 2025-05-27 RX ORDER — DIPHENHYDRAMINE HYDROCHLORIDE 50 MG/ML
25 INJECTION, SOLUTION INTRAMUSCULAR; INTRAVENOUS
Start: 2025-07-22

## 2025-05-27 RX ORDER — EPINEPHRINE 1 MG/ML
0.3 INJECTION, SOLUTION INTRAMUSCULAR; SUBCUTANEOUS EVERY 5 MIN PRN
OUTPATIENT
Start: 2025-07-22

## 2025-05-27 RX ORDER — LAMOTRIGINE 25 MG/1
500 TABLET ORAL ONCE
OUTPATIENT
Start: 2025-07-22 | End: 2025-07-22

## 2025-05-27 RX ORDER — ALBUTEROL SULFATE 0.83 MG/ML
2.5 SOLUTION RESPIRATORY (INHALATION)
OUTPATIENT
Start: 2025-06-24

## 2025-05-27 RX ORDER — METHYLPREDNISOLONE SODIUM SUCCINATE 40 MG/ML
40 INJECTION INTRAMUSCULAR; INTRAVENOUS
Start: 2025-07-22

## 2025-05-27 RX ADMIN — FULVESTRANT 500 MG: 50 INJECTION, SOLUTION INTRAMUSCULAR at 12:23

## 2025-05-27 RX ADMIN — ZOLEDRONIC ACID 3.5 MG: 4 INJECTION, SOLUTION, CONCENTRATE INTRAVENOUS at 12:01

## 2025-05-27 ASSESSMENT — PAIN SCALES - GENERAL: PAINLEVEL_OUTOF10: NO PAIN (0)

## 2025-05-27 NOTE — PROGRESS NOTES
ONCOLOGY HISTORY:  Ms. Mcghee is a female with metastatic breast cancer. ER positive, OR positive and HER-2/halina negative (1+ IHC).  -Foundation One reveals PIK3CA alteration.  -She had a stage III left breast cancer in 2007. ER positive and HER-2/halina negative.  She had bilateral mastectomy, chemotherapy, radiation, and anastrozole.     1.  On 03/18/2022, multiple imaging studies done because of bilateral lower extremity weakness:  -MRI of thoracic and lumbar spine revealed bone metastases with spinal cord compression.  -CT chest, abdomen, and pelvis on 03/19/2022 revealed bone metastasis, lung nodules and enlarged upper abdominal lymph node.  There is a hypodense nodule in the liver.  -Brain MRI on 03/19/2022 did not reveal any intracranial metastasis.    2.  CT-guided bone biopsy of left iliac bone on 03/21/2022 revealed metastatic breast cancer. ER positive, OR positive and HER-2/halina negative.    3.  Letrozole started on 03/25/2022.  -Ribociclib started on 03/31/2022. Decreased to 200 mg a day in 09/2022.  -Zometa started on 03/24/2022.  -Radiation to thoracolumbar and sacral area between 03/22/2022 and 04/07/2022.  3000 cGy in 10 fractions.    4.  PET scan on 03/18/2024 reveals progression of disease in the bones.    5. Faslodex and capivasertib started on 04/02/2024.  - Radiation to upper cervical spine between 01/13/2025 and 01/17/2025.  2000 cGy in 5 fractions     SUBJECTIVE:    Mrs. Mcghee is a 60-year-old female with metastatic breast cancer on Faslodex and capivasertib.  She is tolerating it well.    PET scan was done on 05/12/2025: Findings suggesting a partial favorable response to interval therapy involving the osseous metastases.    Overall she is doing great.  She has mild generalized weakness.  She is able to do all her activities.  She uses a cane to walk.  No recent falls.    With capivasertib, she gets some diarrhea.  It is tolerable.     No headache.  Occasional lightheadedness.  No chest pain.  No  shortness of breath.  No abdominal pain.  No nausea or vomiting.  Appetite is good.  No urinary complaints.  No bleeding.  No fever or night sweats.     PHYSICAL EXAMINATION:  She is alert and oriented x 3. Not in any distress.  Vitals: Reviewed. ECOG PS of 1.  Rest of systems not examined.     LABS: CBC, Magnesium and CMP reviewed.     ASSESSMENT:  1.  A 60-year-old female with metastatic breast cancer on Faslodex and capivasertib.  Disease is responding to treatment.  2.  Mild normocytic anemia from breast cancer and its treatment.     PLAN:  1.  Continue Faslodex and capivasertib.  2.  Continue Zometa every 3 months.   3.  Continue calcium and vitamin D twice a day.  4.  See JOYCELYN with next two treatment.  5.  See me in 3 months.  6.  Labs as per treatment protocol.  7.  [id number] on blood with next lab draw.     DISCUSSION:  1.  Patient's overall condition is stable.  PET scan was reviewed with her.  It reveals improvement in metastatic disease.  FDG activity in C1 has resolved.  She was happy to know that.    2.  Discussed regarding treatment.  She will continue on Faslodex and capivasertib.  Overall she is tolerating it well.  She has few side effects including mild diarrhea which are tolerable.     3.  She will continue on Zometa every 3 months.  She is not having dental or jaw related symptoms.     She will continue calcium and vitamin D twice a day.     4.  Labs were reviewed.  She has mild anemia.  Other labs are overall good.  Will continue to monitor it.     5.  She had few questions which were all answered.  I will see her in 3 months.  In between she will see our JOYCELYN.       Total visit time of 30 minutes.  Time spent in today's visit, review of chart/investigations today, monitoring for toxicity of high risk drugs and documentation today.

## 2025-05-27 NOTE — LETTER
"5/27/2025      Mickie Mcghee  [address] [facility] [address]  [facility] [address]      Dear Colleague,    Thank you for referring your patient, Mickie Mcghee, to the [facility] CANCER [facility]. Please see a copy of my visit note below.    Oncology Rooming Note    May 27, 2025 10:58 AM   Mickie Mcghee is a 60 year old female who presents for:    Chief Complaint   Patient presents with     Oncology Clinic Visit     Initial Vitals: /77   Pulse 65   Resp 16   Ht 1.613 m (5' 3.5\")   Wt 65.3 kg (144 lb)   LMP  (LMP Unknown)   SpO2 97%   BMI 25.11 kg/m   Estimated body mass index is 25.11 kg/m  as calculated from the following:    Height as of this encounter: 1.613 m (5' 3.5\").    Weight as of this encounter: 65.3 kg (144 lb). Body surface area is 1.71 meters squared.  No Pain (0) Comment: Data Unavailable   No LMP recorded (lmp unknown). Patient is postmenopausal.  Allergies reviewed: Yes  Medications reviewed: Yes    Medications: Medication refills not needed today.  Pharmacy name entered into [employer]self:    [facility] - [place], [place] - [address]  [id number] - [place], [place] - [address] [place] [address]    Frailty Screening:   Is the patient here for a new oncology consult visit in cancer care? 2. No    PHQ9:  Did this patient require a PHQ9?: No        Blanquita Meyer MA              ONCOLOGY HISTORY:  Ms. Mcghee is a female with metastatic breast cancer. ER positive, SC positive and HER-2/halina negative (1+ IHC).  -Foundation One reveals PIK3CA alteration.  -She had a stage III left breast cancer in 2007. ER positive and HER-2/halina negative.  She had bilateral mastectomy, chemotherapy, radiation, and anastrozole.     1.  On 03/18/2022, multiple imaging studies done because of bilateral lower extremity weakness:  -MRI of thoracic and lumbar spine revealed bone metastases with spinal cord compression.  -CT chest, abdomen, and pelvis on 03/19/2022 revealed bone metastasis, " lung nodules and enlarged upper abdominal lymph node.  There is a hypodense nodule in the liver.  -Brain MRI on 03/19/2022 did not reveal any intracranial metastasis.    2.  CT-guided bone biopsy of left iliac bone on 03/21/2022 revealed metastatic breast cancer. ER positive, AZ positive and HER-2/halina negative.    3.  Letrozole started on 03/25/2022.  -Ribociclib started on 03/31/2022. Decreased to 200 mg a day in 09/2022.  -Zometa started on 03/24/2022.  -Radiation to thoracolumbar and sacral area between 03/22/2022 and 04/07/2022.  3000 cGy in 10 fractions.    4.  PET scan on 03/18/2024 reveals progression of disease in the bones.    5. Faslodex and capivasertib started on 04/02/2024.  - Radiation to upper cervical spine between 01/13/2025 and 01/17/2025.  2000 cGy in 5 fractions     SUBJECTIVE:    Mrs. Mcghee is a 60-year-old female with metastatic breast cancer on Faslodex and capivasertib.  She is tolerating it well.    PET scan was done on 05/12/2025: Findings suggesting a partial favorable response to interval therapy involving the osseous metastases.    Overall she is doing great.  She has mild generalized weakness.  She is able to do all her activities.  She uses a cane to walk.  No recent falls.    With capivasertib, she gets some diarrhea.  It is tolerable.     No headache.  Occasional lightheadedness.  No chest pain.  No shortness of breath.  No abdominal pain.  No nausea or vomiting.  Appetite is good.  No urinary complaints.  No bleeding.  No fever or night sweats.     PHYSICAL EXAMINATION:  She is alert and oriented x 3. Not in any distress.  Vitals: Reviewed. ECOG PS of 1.  Rest of systems not examined.     LABS: CBC, Magnesium and CMP reviewed.     ASSESSMENT:  1.  A 60-year-old female with metastatic breast cancer on Faslodex and capivasertib.  Disease is responding to treatment.  2.  Mild normocytic anemia from breast cancer and its treatment.     PLAN:  1.  Continue Faslodex and capivasertib.  2.   Continue Zometa every 3 months.   3.  Continue calcium and vitamin D twice a day.  4.  See JOYCELYN with next two treatment.  5.  See me in 3 months.  6.  Labs as per treatment protocol.  7.  [id number] on blood with next lab draw.     DISCUSSION:  1.  Patient's overall condition is stable.  PET scan was reviewed with her.  It reveals improvement in metastatic disease.  FDG activity in C1 has resolved.  She was happy to know that.    2.  Discussed regarding treatment.  She will continue on Faslodex and capivasertib.  Overall she is tolerating it well.  She has few side effects including mild diarrhea which are tolerable.     3.  She will continue on Zometa every 3 months.  She is not having dental or jaw related symptoms.     She will continue calcium and vitamin D twice a day.     4.  Labs were reviewed.  She has mild anemia.  Other labs are overall good.  Will continue to monitor it.     5.  She had few questions which were all answered.  I will see her in 3 months.  In between she will see our JOYCELYN.       Total visit time of 30 minutes.  Time spent in today's visit, review of chart/investigations today, monitoring for toxicity of high risk drugs and documentation today.        Again, thank you for allowing me to participate in the care of your patient.        Sincerely,        Yury Lozada MD    Electronically signed

## 2025-05-27 NOTE — PATIENT INSTRUCTIONS
1.  Continue Faslodex and capivasertib.  2.  Continue Zometa every 3 months.   3.  Continue calcium and vitamin D twice a day.  4.  See JOYCELYN with next two treatment.  5.  See me in 3 months.  6.  Labs as per treatment protocol.

## 2025-05-27 NOTE — PROGRESS NOTES
Infusion Nursing Note:  Mickie Mcghee presents today for Faslodex and Zometa.    Patient seen by provider today: Yes: Dr. Lozada   present during visit today: Not Applicable.    Note: N/A.      Intravenous Access:  Peripheral IV placed.    Treatment Conditions:  Lab Results   Component Value Date    HGB 10.5 (L) 05/27/2025    WBC 5.5 05/27/2025    ANEU 3.9 05/27/2025     05/27/2025        Lab Results   Component Value Date     05/27/2025    POTASSIUM 4.4 05/27/2025    MAG 2.2 05/27/2025    CR 1.00 (H) 05/27/2025    OSMEL 9.9 05/27/2025    BILITOTAL 0.2 05/27/2025    ALBUMIN 4.4 05/27/2025    ALT 24 05/27/2025    AST 17 05/27/2025       Results reviewed, labs MET treatment parameters, ok to proceed with treatment.      Post Infusion Assessment:  Patient tolerated infusion without incident.  Patient tolerated injection without incident.  Blood return noted pre and post infusion.  Site patent and intact, free from redness, edema or discomfort.       Discharge Plan:   AVS to patient via MYCHART.  Patient will return as [facility] for next appointment.   Patient discharged in stable condition accompanied by: self.  Departure Mode: Ambulatory.      George Metz RN

## 2025-05-27 NOTE — PROGRESS NOTES
"Oncology Rooming Note    May 27, 2025 10:58 AM   Mickie Mcghee is a 60 year old female who presents for:    Chief Complaint   Patient presents with    Oncology Clinic Visit     Initial Vitals: /77   Pulse 65   Resp 16   Ht 1.613 m (5' 3.5\")   Wt 65.3 kg (144 lb)   LMP  (LMP Unknown)   SpO2 97%   BMI 25.11 kg/m   Estimated body mass index is 25.11 kg/m  as calculated from the following:    Height as of this encounter: 1.613 m (5' 3.5\").    Weight as of this encounter: 65.3 kg (144 lb). Body surface area is 1.71 meters squared.  No Pain (0) Comment: Data Unavailable   No LMP recorded (lmp unknown). Patient is postmenopausal.  Allergies reviewed: Yes  Medications reviewed: Yes    Medications: Medication refills not needed today.  Pharmacy name entered into Zapa:    [facility] - [place], [place] - [address]   [id number] [place], [place] - [address] [place] [address]    Frailty Screening:   Is the patient here for a new oncology consult visit in cancer care? 2. No    PHQ9:  Did this patient require a PHQ9?: No        Blanquita Meyer MA            "

## 2025-06-04 DIAGNOSIS — C79.51 CARCINOMA OF BREAST METASTATIC TO BONE, UNSPECIFIED LATERALITY (H): Primary | ICD-10-CM

## 2025-06-04 DIAGNOSIS — C50.919 CARCINOMA OF BREAST METASTATIC TO BONE, UNSPECIFIED LATERALITY (H): Primary | ICD-10-CM

## 2025-06-08 ENCOUNTER — OFFICE VISIT (OUTPATIENT)
Dept: URGENT CARE | Facility: URGENT CARE | Age: 60
End: 2025-06-08
Payer: COMMERCIAL

## 2025-06-08 VITALS
OXYGEN SATURATION: 99 % | BODY MASS INDEX: 22.31 KG/M2 | DIASTOLIC BLOOD PRESSURE: 76 MMHG | TEMPERATURE: 98.3 F | WEIGHT: 133.9 LBS | RESPIRATION RATE: 16 BRPM | SYSTOLIC BLOOD PRESSURE: 101 MMHG | HEART RATE: 78 BPM | HEIGHT: 65 IN

## 2025-06-08 DIAGNOSIS — B02.9 HERPES ZOSTER WITHOUT COMPLICATION: Primary | ICD-10-CM

## 2025-06-08 PROCEDURE — 99214 OFFICE O/P EST MOD 30 MIN: CPT | Performed by: PHYSICIAN ASSISTANT

## 2025-06-08 PROCEDURE — 3074F SYST BP LT 130 MM HG: CPT | Performed by: PHYSICIAN ASSISTANT

## 2025-06-08 PROCEDURE — 3078F DIAST BP <80 MM HG: CPT | Performed by: PHYSICIAN ASSISTANT

## 2025-06-08 RX ORDER — VALACYCLOVIR HYDROCHLORIDE 1 G/1
1000 TABLET, FILM COATED ORAL 3 TIMES DAILY
Qty: 30 TABLET | Refills: 0 | Status: SHIPPED | OUTPATIENT
Start: 2025-06-08 | End: 2025-06-18

## 2025-06-08 RX ORDER — GABAPENTIN 100 MG/1
100 CAPSULE ORAL 3 TIMES DAILY
Qty: 60 CAPSULE | Refills: 0 | Status: SHIPPED | OUTPATIENT
Start: 2025-06-08

## 2025-06-08 NOTE — PATIENT INSTRUCTIONS
Follow up with your primary next week for recheck and further pain management if needed     OK to take tylenol.      Increase the neurontin (gabapentin)   by 1 tablet every 3 days if needed until you see your primary care provider.     Please let your oncology team know of diagnosis

## 2025-06-08 NOTE — PROGRESS NOTES
Assessment & Plan     Herpes zoster without complication  Pt has herpes zoster, discussed etiology and typical tx, PI given and discussed for shingles.  She has not had singles vaccine.  Although she has had 6 days of sx, she continues to get new lesions. It is not disseminated and remains in 1 dermatome, however given she high risk I will start valtrex antiviral .  She will follow-up if it goes to other dermatomes or other side of the body.  She is having difficulty with pain control, will start neurontin low dose, caution re: sedation. She may ramp up adding 1 tablet every 3 days if pain is not well controlled until she follow-up with her pcp. Recommend she follow-up with in 1 week for recheck and discussion re: pain control. She may continue with tylenol.   Recommend she contact her oncology treatment team to let them know as well.      - valACYclovir (VALTREX) 1000 mg tablet  Dispense: 30 tablet; Refill: 0  - gabapentin (NEURONTIN) 100 MG capsule  Dispense: 60 capsule; Refill: 0     33 minutes spent by me on the date of the encounter doing chart review, patient visit, and documentation         Tania Taylor PA-C  [facility] [place]    Subjective     Abiel is a 60 year old female who presents to clinic today for the following health issues:  Chief Complaint   Patient presents with    Shingles     Blisters x 6 days  Pt also complains of pain,        HPI  Pt is a 60 year old female, presents with concerns: re rash which has been present for about 6 days proceeded by pain in the same area.  She continues to get more lesions L abdomen/flank.  She has active chemotherapy for metastatic breast cancer. She continues to get new lesions.      Review of Systems  Constitutional, HEENT, cardiovascular, pulmonary, gi and gu systems are negative, except as otherwise noted.      Objective    /76 (BP Location: Right arm, Patient Position: Sitting, Cuff Size: Adult Regular)   Pulse 78   Temp  "98.3  F (36.8  C) (Oral)   Resp 16   Ht 1.651 m (5' 5\")   Wt 60.7 kg (133 lb 14.4 oz)   LMP  (LMP Unknown)   SpO2 99%   BMI 22.28 kg/m    Physical Exam   Exam of the L abdomen/flank reveals multiple vesicles on erythematous base consistent with herpes zoster.     "

## 2025-06-16 ENCOUNTER — VIRTUAL VISIT (OUTPATIENT)
Dept: INTERNAL MEDICINE | Facility: CLINIC | Age: 60
End: 2025-06-16
Attending: PHYSICIAN ASSISTANT
Payer: COMMERCIAL

## 2025-06-16 DIAGNOSIS — R63.4 WEIGHT LOSS: ICD-10-CM

## 2025-06-16 DIAGNOSIS — E11.9 TYPE 2 DIABETES MELLITUS WITHOUT COMPLICATION, WITHOUT LONG-TERM CURRENT USE OF INSULIN (H): Primary | ICD-10-CM

## 2025-06-16 DIAGNOSIS — B02.9 HERPES ZOSTER WITHOUT COMPLICATION: ICD-10-CM

## 2025-06-16 LAB — HEMOCCULT STL QL IA: NEGATIVE

## 2025-06-16 PROCEDURE — 98006 SYNCH AUDIO-VIDEO EST MOD 30: CPT | Performed by: NURSE PRACTITIONER

## 2025-06-16 RX ORDER — GABAPENTIN 100 MG/1
100 CAPSULE ORAL 3 TIMES DAILY
Qty: 60 CAPSULE | Refills: 0 | Status: SHIPPED | OUTPATIENT
Start: 2025-06-16

## 2025-06-16 RX ORDER — GLIPIZIDE 2.5 MG/1
2.5 TABLET, EXTENDED RELEASE ORAL DAILY
Qty: 90 TABLET | Refills: 0 | Status: SHIPPED | OUTPATIENT
Start: 2025-06-16

## 2025-06-16 NOTE — PROGRESS NOTES
Abiel is a 60 year old who is being evaluated via a billable video visit.    How would you like to obtain your AVS? MyChart  If the video visit is dropped, the invitation should be resent by: Text to cell phone: [phone]  Will anyone else be joining your video visit? No      Assessment & Plan     Herpes zoster without complication  Gabapentin refilled for ongoing pain.  No new lesions and lesions appear to be healing.  Complete valacyclovir.  Follow-up as needed.  - gabapentin (NEURONTIN) 100 MG capsule; Take 1 capsule (100 mg) by mouth 3 times daily.    Type 2 diabetes mellitus without complication, without long-term current use of insulin (H)  We will stop metformin due to abdominal cramping and concerns with weight loss.  At this time we will start her on glipizide 2.5 mg daily.  She will plan to have her A1c checked when she has her lab draw later this month for oncology.  I will plan to see her back in clinic in 3 months for reevaluation however she should reach out to me sooner if she has any side effects with the glipizide.  - glipiZIDE (GLUCOTROL XL) 2.5 MG 24 hr tablet; Take 1 tablet (2.5 mg) by mouth daily.  - Hemoglobin A1c; Future    Weight loss  Unintentional.  Possibly related to side effects of the metformin causing stomach cramping therefore she is not eating much.  She has had some diarrhea as a result of that her chemotherapy medication.  Will stop metformin as noted above.  Recommend Ensure or boost shakes daily for higher calories to help prevent further weight loss.  Will monitor symptoms closely.      The longitudinal plan of care for the diagnosis(es)/condition(s) as documented were addressed during this visit. Due to the added complexity in care, I will continue to support Abiel in the subsequent management and with ongoing continuity of care.        Subjective   Abiel is a 60 year old, presenting for the following health issues:  Follow Up (Shingles- was seen at Urgent  Care/On Metformin- losing weight, wants to discuss)      6/16/2025     9:08 AM   Additional Questions   Roomed by Susan WING     Video Start Time: 930am    History of Present Illness       Reason for visit:  Shingles and Metformin dose  Symptoms include:  Shingles  Symptom intensity:  Moderate  Symptom progression:  Improving  Had these symptoms before:  No   She is taking medications regularly.          ROS  Comprehensive 12-point review of systems was completed and negative except as noted in HPI.        Objective           Vitals:  No vitals were obtained today due to virtual visit.    Physical Exam   GENERAL: alert and no distress  EYES: Eyes grossly normal to inspection.  No discharge or erythema, or obvious scleral/conjunctival abnormalities.  RESP: No audible wheeze, cough, or visible cyanosis.    SKIN: Visible skin clear.  Scab-like rash noted to the left flank.  No vesicular pustular lesions.  No surrounding erythema.  NEURO: Cranial nerves grossly intact.  Mentation and speech appropriate for age.  PSYCH: Appropriate affect, tone, and pace of words          Video-Visit Details    Type of service:  Video Visit   Video End Time:950am  Originating Location (pt. Location): Home    Distant Location (provider location):  On-site  Platform used for Video Visit: Selwyn  Signed Electronically by: Constance Torres NP

## 2025-06-25 ENCOUNTER — ONCOLOGY VISIT (OUTPATIENT)
Dept: ONCOLOGY | Facility: CLINIC | Age: 60
End: 2025-06-25
Attending: NURSE PRACTITIONER
Payer: COMMERCIAL

## 2025-06-25 ENCOUNTER — RESULTS FOLLOW-UP (OUTPATIENT)
Dept: INTERNAL MEDICINE | Facility: CLINIC | Age: 60
End: 2025-06-25

## 2025-06-25 ENCOUNTER — LAB (OUTPATIENT)
Dept: LAB | Facility: CLINIC | Age: 60
End: 2025-06-25
Payer: COMMERCIAL

## 2025-06-25 VITALS
HEART RATE: 74 BPM | DIASTOLIC BLOOD PRESSURE: 70 MMHG | TEMPERATURE: 98.5 F | RESPIRATION RATE: 16 BRPM | OXYGEN SATURATION: 98 % | SYSTOLIC BLOOD PRESSURE: 111 MMHG | WEIGHT: 137.4 LBS | BODY MASS INDEX: 22.86 KG/M2

## 2025-06-25 DIAGNOSIS — C50.919 CARCINOMA OF BREAST METASTATIC TO BONE, UNSPECIFIED LATERALITY (H): ICD-10-CM

## 2025-06-25 DIAGNOSIS — E11.9 TYPE 2 DIABETES MELLITUS WITHOUT COMPLICATION, WITHOUT LONG-TERM CURRENT USE OF INSULIN (H): ICD-10-CM

## 2025-06-25 DIAGNOSIS — C50.919 CARCINOMA OF BREAST METASTATIC TO BONE, UNSPECIFIED LATERALITY (H): Primary | ICD-10-CM

## 2025-06-25 DIAGNOSIS — C79.51 CARCINOMA OF BREAST METASTATIC TO BONE, UNSPECIFIED LATERALITY (H): Primary | ICD-10-CM

## 2025-06-25 DIAGNOSIS — C79.51 CARCINOMA OF BREAST METASTATIC TO BONE, UNSPECIFIED LATERALITY (H): ICD-10-CM

## 2025-06-25 LAB
ALBUMIN SERPL BCG-MCNC: 4.4 G/DL (ref 3.5–5.2)
ALP SERPL-CCNC: 54 U/L (ref 40–150)
ALT SERPL W P-5'-P-CCNC: 28 U/L (ref 0–50)
ANION GAP SERPL CALCULATED.3IONS-SCNC: 13 MMOL/L (ref 7–15)
AST SERPL W P-5'-P-CCNC: 21 U/L (ref 0–45)
BASOPHILS # BLD AUTO: 0 10E3/UL (ref 0–0.2)
BASOPHILS NFR BLD AUTO: 1 %
BILIRUB SERPL-MCNC: 0.3 MG/DL
BUN SERPL-MCNC: 16.7 MG/DL (ref 8–23)
CALCIUM SERPL-MCNC: 10.5 MG/DL (ref 8.8–10.4)
CHLORIDE SERPL-SCNC: 103 MMOL/L (ref 98–107)
CREAT SERPL-MCNC: 1.2 MG/DL (ref 0.51–0.95)
EGFRCR SERPLBLD CKD-EPI 2021: 52 ML/MIN/1.73M2
EOSINOPHIL # BLD AUTO: 0.2 10E3/UL (ref 0–0.7)
EOSINOPHIL NFR BLD AUTO: 4 %
ERYTHROCYTE [DISTWIDTH] IN BLOOD BY AUTOMATED COUNT: 14.5 % (ref 10–15)
EST. AVERAGE GLUCOSE BLD GHB EST-MCNC: 143 MG/DL
GLUCOSE SERPL-MCNC: 74 MG/DL (ref 70–99)
HBA1C MFR BLD: 6.6 %
HCO3 SERPL-SCNC: 26 MMOL/L (ref 22–29)
HCT VFR BLD AUTO: 30.3 % (ref 35–47)
HGB BLD-MCNC: 9.6 G/DL (ref 11.7–15.7)
IMM GRANULOCYTES # BLD: 0 10E3/UL
IMM GRANULOCYTES NFR BLD: 1 %
LYMPHOCYTES # BLD AUTO: 1.1 10E3/UL (ref 0.8–5.3)
LYMPHOCYTES NFR BLD AUTO: 23 %
MAGNESIUM SERPL-MCNC: 2.5 MG/DL (ref 1.7–2.3)
MCH RBC QN AUTO: 30.1 PG (ref 26.5–33)
MCHC RBC AUTO-ENTMCNC: 31.7 G/DL (ref 31.5–36.5)
MCV RBC AUTO: 95 FL (ref 78–100)
MONOCYTES # BLD AUTO: 0.4 10E3/UL (ref 0–1.3)
MONOCYTES NFR BLD AUTO: 8 %
NEUTROPHILS # BLD AUTO: 3 10E3/UL (ref 1.6–8.3)
NEUTROPHILS NFR BLD AUTO: 63 %
NRBC # BLD AUTO: 0 10E3/UL
NRBC BLD AUTO-RTO: 0 /100
PLATELET # BLD AUTO: 205 10E3/UL (ref 150–450)
POTASSIUM SERPL-SCNC: 4.1 MMOL/L (ref 3.4–5.3)
PROT SERPL-MCNC: 7.1 G/DL (ref 6.4–8.3)
RBC # BLD AUTO: 3.19 10E6/UL (ref 3.8–5.2)
SODIUM SERPL-SCNC: 142 MMOL/L (ref 135–145)
WBC # BLD AUTO: 4.7 10E3/UL (ref 4–11)

## 2025-06-25 PROCEDURE — 85004 AUTOMATED DIFF WBC COUNT: CPT

## 2025-06-25 PROCEDURE — 84155 ASSAY OF PROTEIN SERUM: CPT

## 2025-06-25 PROCEDURE — 36415 COLL VENOUS BLD VENIPUNCTURE: CPT

## 2025-06-25 PROCEDURE — 83735 ASSAY OF MAGNESIUM: CPT

## 2025-06-25 PROCEDURE — 99214 OFFICE O/P EST MOD 30 MIN: CPT | Performed by: NURSE PRACTITIONER

## 2025-06-25 PROCEDURE — G0463 HOSPITAL OUTPT CLINIC VISIT: HCPCS | Performed by: NURSE PRACTITIONER

## 2025-06-25 PROCEDURE — 250N000011 HC RX IP 250 OP 636: Mod: JZ | Performed by: INTERNAL MEDICINE

## 2025-06-25 PROCEDURE — 83036 HEMOGLOBIN GLYCOSYLATED A1C: CPT

## 2025-06-25 RX ORDER — LAMOTRIGINE 25 MG/1
500 TABLET ORAL ONCE
Status: COMPLETED | OUTPATIENT
Start: 2025-06-25 | End: 2025-06-25

## 2025-06-25 RX ADMIN — FULVESTRANT 500 MG: 50 INJECTION, SOLUTION INTRAMUSCULAR at 13:46

## 2025-06-25 ASSESSMENT — PAIN SCALES - GENERAL: PAINLEVEL_OUTOF10: NO PAIN (0)

## 2025-06-25 NOTE — LETTER
"6/25/2025      Mickie Mcghee  [address] [facility] [address] [place] [address]      Dear Colleague,    Thank you for referring your patient, Mickie Mcghee, to the [facility]. Please see a copy of my visit note below.    Oncology Rooming Note    June 25, 2025 1:09 PM   Mickie Mcghee is a 60 year old female who presents for:    Chief Complaint   Patient presents with     Oncology Clinic Visit     Initial Vitals: /70   Pulse 74   Temp 98.5  F (36.9  C) (Oral)   Resp 16   Wt 62.3 kg (137 lb 6.4 oz)   LMP  (LMP Unknown)   SpO2 98%   BMI 22.86 kg/m   Estimated body mass index is 22.86 kg/m  as calculated from the following:    Height as of 6/8/25: 1.651 m (5' 5\").    Weight as of this encounter: 62.3 kg (137 lb 6.4 oz). Body surface area is 1.69 meters squared.  No Pain (0) Comment: Data Unavailable   No LMP recorded (lmp unknown). Patient is postmenopausal.  Allergies reviewed: Yes  Medications reviewed: Yes    Medications: Medication refills not needed today.  Pharmacy name entered into [facility]:    [facility] - [place], [place] - [address]  [id number] - [place], [place] - [facility]    Frailty Screening:   Is the patient here for a new oncology consult visit in cancer care? 2. No    PHQ9:  Did this patient require a PHQ9?: No      Clinical concerns: no       Shari J. Schoenberger, CMA                Oncology/Hematology Visit Note  Jun 25, 2025    Reason for Visit: follow up of metastatic breast cancer  ER positive [place] positive HER2 negative  Diagnosed in 2007 status post bilateral mastectomy chemotherapy radiation anastrozole  -03/21/20221221-[id number]-[id number] bone biopsy of left iliac bone reveals metastatic breast cancer ER positive [place] positive HER2 negative  Treated with letrozole and Ribociclib    03/18/2024-PET scan revealed progression of the disease  04/02/2024-started Faslodex and capivasertib   PET scan revealed stable to improved " disease  05/2025-PET scan reveals improvement in disease. FDG activity in C1 has resolved     Interval History:  Patient reports she has been tolerating treatment well.  Denies fever chills sweats cough shortness of breath denies chest pain denies nausea vomiting diarrhea abdominal pain or bleeding.  Denies headache dizziness    Review of Systems:  14 point ROS of systems including Constitutional, Eyes, Respiratory, Cardiovascular, Gastroenterology, Genitourinary, Integumentary, Muscularskeletal, Psychiatric were all negative except for pertinent positives noted in my HPI.    Physical Examination:  General: The patient is a pleasant female in no acute distress.  HEENT: EOMI, PERRL. Sclerae are anicteric. Oral mucosa is pink and moist with no lesions or thrush.   Lymph: Neck is supple with no lymphadenopathy in the cervical or supraclavicular areas.   Heart: Regular rate and rhythm.   Lungs: Clear to auscultation bilaterally.   GI: Bowel sounds present, soft, nontender with no palpable hepatosplenomegaly or masses.   Extremities: No lower extremity edema noted bilaterally.   Skin: No rashes, petechiae, or bruising noted on exposed skin.    Laboratory Data:  CBC CMP results reviewed    Assessment and Plan:      1 metastatic breast cancer  Patient of Dr. Lozada  ER positive [place] positive HER2 negative  Patient is currently on  Faslodex and capivasertib started in April 2024.  05/2025-PET scan revealed improvement in disease. FDG activity in C1 has resolved   Continue with Faslodex and capivasertib -potential side effects reviewed  Continue to monitor blood sugar closely  Continue checking labs monthly with follow-up appointment  Schedule with Dr Lozada in 4 weeks with labs and Faslodex    2 Bone metastasis  03/22-04/07/2022-status post radiation to the L-spine and LS spine 10 fractions  Continue with Zometa every 3 months-  Call our clinic in the event of jaw pain or any dental issues  Continue with calcium and vitamin  D    3 Blood sugar  Close monitoring of blood sugar while on capivasertib  Monitor closely with PCP    4 chronic mild anemia   Pt denies bleeding or dark stools   Stable   For now monitor closely      5. DIANA  Creatinine 1.2  Advised patient to increase fluid intake  Recheck creatinine next week     6 Hypercalcemia  Mild  Hold off on calcium supplement until next week when we recheck calcium      Please call our clinic with any changes in health condition or questions      SAMI Jacob CNP  [facility]     Chart documentation with Dragon Voice recognition Software. Although reviewed after completion, some words and grammatical errors may remain.      Again, thank you for allowing me to participate in the care of your patient.        Sincerely,        SAMI Jacob CNP    Electronically signed

## 2025-06-25 NOTE — PROGRESS NOTES
"Oncology Rooming Note    June 25, 2025 1:09 PM   Mickie Mcghee is a 60 year old female who presents for:    Chief Complaint   Patient presents with    Oncology Clinic Visit     Initial Vitals: /70   Pulse 74   Temp 98.5  F (36.9  C) (Oral)   Resp 16   Wt 62.3 kg (137 lb 6.4 oz)   LMP  (LMP Unknown)   SpO2 98%   BMI 22.86 kg/m   Estimated body mass index is 22.86 kg/m  as calculated from the following:    Height as of 6/8/25: 1.651 m (5' 5\").    Weight as of this encounter: 62.3 kg (137 lb 6.4 oz). Body surface area is 1.69 meters squared.  No Pain (0) Comment: Data Unavailable   No LMP recorded (lmp unknown). Patient is postmenopausal.  Allergies reviewed: Yes  Medications reviewed: Yes    Medications: Medication refills not needed today.  Pharmacy name entered into [employer]:    [facility] - [place], [place] - [address] [id number] [address]    Frailty Screening:   Is the patient here for a new oncology consult visit in cancer care? 2. No    PHQ9:  Did this patient require a PHQ9?: No      Clinical concerns: no       Shari J. Schoenberger, SHAMIKA            "

## 2025-06-25 NOTE — PROGRESS NOTES
Oncology/Hematology Visit Note  Jun 25, 2025    Reason for Visit: follow up of metastatic breast cancer  ER positive [place] positive HER2 negative  Diagnosed in 2007 status post bilateral mastectomy chemotherapy radiation anastrozole  -03/21/20221622-FV-[id number] bone biopsy of left iliac bone reveals metastatic breast cancer ER positive [place] positive HER2 negative  Treated with letrozole and Ribociclib    03/18/2024-PET scan revealed progression of the disease  04/02/2024-started Faslodex and capivasertib   PET scan revealed stable to improved disease  05/2025-PET scan reveals improvement in disease. FDG activity in C1 has resolved     Interval History:  Patient reports she has been tolerating treatment well.  Denies fever chills sweats cough shortness of breath denies chest pain denies nausea vomiting diarrhea abdominal pain or bleeding.  Denies headache dizziness    Review of Systems:  14 point ROS of systems including Constitutional, Eyes, Respiratory, Cardiovascular, Gastroenterology, Genitourinary, Integumentary, Muscularskeletal, Psychiatric were all negative except for pertinent positives noted in my HPI.    Physical Examination:  General: The patient is a pleasant female in no acute distress.  HEENT: EOMI, PERRL. Sclerae are anicteric. Oral mucosa is pink and moist with no lesions or thrush.   Lymph: Neck is supple with no lymphadenopathy in the cervical or supraclavicular areas.   Heart: Regular rate and rhythm.   Lungs: Clear to auscultation bilaterally.   GI: Bowel sounds present, soft, nontender with no palpable hepatosplenomegaly or masses.   Extremities: No lower extremity edema noted bilaterally.   Skin: No rashes, petechiae, or bruising noted on exposed skin.    Laboratory Data:  CBC CMP results reviewed    Assessment and Plan:      1 metastatic breast cancer  Patient of Dr. Lozada  ER positive [place] positive HER2 negative  Patient is currently on  Faslodex and capivasertib started in April 2024.  05/2025-PET scan  revealed improvement in disease. FDG activity in C1 has resolved   Continue with Faslodex and capivasertib -potential side effects reviewed  Continue to monitor blood sugar closely  Continue checking labs monthly with follow-up appointment  Schedule with Dr Lozada in 4 weeks with labs and Faslodex    2 Bone metastasis  03/22-04/07/2022-status post radiation to the L-spine and LS spine 10 fractions  Continue with Zometa every 3 months-  Call our clinic in the event of jaw pain or any dental issues  Continue with calcium and vitamin D    3 Blood sugar  Close monitoring of blood sugar while on capivasertib  Monitor closely with PCP    4 chronic mild anemia   Pt denies bleeding or dark stools   Stable   For now monitor closely      5. DIANA  Creatinine 1.2  Advised patient to increase fluid intake  Recheck creatinine next week     6 Hypercalcemia  Mild  Hold off on calcium supplement until next week when we recheck calcium      Please call our clinic with any changes in health condition or questions      SAMI Jacob CNP  [facility] [place]     Chart documentation with Dragon Voice recognition Software. Although reviewed after completion, some words and grammatical errors may remain.

## 2025-06-27 DIAGNOSIS — C79.51 CARCINOMA OF BREAST METASTATIC TO BONE, UNSPECIFIED LATERALITY (H): ICD-10-CM

## 2025-06-27 DIAGNOSIS — C50.919 CARCINOMA OF BREAST METASTATIC TO BONE, UNSPECIFIED LATERALITY (H): ICD-10-CM

## 2025-06-29 ENCOUNTER — HEALTH MAINTENANCE LETTER (OUTPATIENT)
Age: 60
End: 2025-06-29

## 2025-07-03 ENCOUNTER — LAB (OUTPATIENT)
Dept: LAB | Facility: HOSPITAL | Age: 60
End: 2025-07-03
Payer: COMMERCIAL

## 2025-07-03 DIAGNOSIS — C79.51 CARCINOMA OF BREAST METASTATIC TO BONE, UNSPECIFIED LATERALITY (H): ICD-10-CM

## 2025-07-03 DIAGNOSIS — C50.919 CARCINOMA OF BREAST METASTATIC TO BONE, UNSPECIFIED LATERALITY (H): ICD-10-CM

## 2025-07-03 LAB
ANION GAP SERPL CALCULATED.3IONS-SCNC: 12 MMOL/L (ref 7–15)
BUN SERPL-MCNC: 15.5 MG/DL (ref 8–23)
CALCIUM SERPL-MCNC: 9.4 MG/DL (ref 8.8–10.4)
CHLORIDE SERPL-SCNC: 105 MMOL/L (ref 98–107)
CREAT SERPL-MCNC: 1.08 MG/DL (ref 0.51–0.95)
EGFRCR SERPLBLD CKD-EPI 2021: 59 ML/MIN/1.73M2
GLUCOSE SERPL-MCNC: 196 MG/DL (ref 70–99)
HCO3 SERPL-SCNC: 24 MMOL/L (ref 22–29)
POTASSIUM SERPL-SCNC: 3.9 MMOL/L (ref 3.4–5.3)
SODIUM SERPL-SCNC: 141 MMOL/L (ref 135–145)

## 2025-07-03 PROCEDURE — 82435 ASSAY OF BLOOD CHLORIDE: CPT

## 2025-07-03 PROCEDURE — 36415 COLL VENOUS BLD VENIPUNCTURE: CPT

## 2025-07-03 PROCEDURE — 80048 BASIC METABOLIC PNL TOTAL CA: CPT

## 2025-07-03 RX ORDER — CALCIUM CARBONATE/VITAMIN D3 600 MG-10
1 TABLET ORAL 2 TIMES DAILY
Qty: 180 TABLET | Refills: 0 | Status: SHIPPED | OUTPATIENT
Start: 2025-07-03

## 2025-07-07 ENCOUNTER — TELEPHONE (OUTPATIENT)
Dept: PHARMACY | Facility: CLINIC | Age: 60
End: 2025-07-07
Payer: COMMERCIAL

## 2025-07-07 NOTE — ORAL ONC MGMT
Oral Chemotherapy Monitoring Program  Lab Follow Up    Reviewed lab results from 7/3/25.        1/21/2025    10:00 AM 2/12/2025    11:00 AM 3/12/2025    11:00 AM 4/1/2025    10:00 AM 4/11/2025     9:00 AM 6/4/2025    11:00 AM 7/7/2025    10:00 AM   ORAL CHEMOTHERAPY   Assessment Type Refill Refill Refill Lab Monitoring Refill Refill Lab Monitoring   Diagnosis Code Breast Cancer Breast Cancer Breast Cancer Breast Cancer Breast Cancer Breast Cancer Breast Cancer   Providers Dr. Neville Lozada   Clinic Name/Location [facility]   Drug Name Truqap (capivasertib)  Truqap (capivasertib)  Truqap (capivasertib)  Truqap (capivasertib)  Truqap (capivasertib)  Truqap (capivasertib)  Truqap (capivasertib)   Dose 400 mg  400 mg  400 mg  400 mg  400 mg  400 mg  400 mg   Current Schedule BID  BID  BID  BID  BID  BID     Cycle Details Days 1-4 each week  Days 1-4 each week  Days 1-4 each week Days 1-4 each week Days 1-4 each week Days 1-4 each week Days 1-4 each week   Adverse Effects    Increased Creatinine   Increased Creatinine   Increased Creatinine    Grade 1   Grade 1   Pharmacist intervention(Increased creatinine)    No   No   Any new drug interactions?      No    Is the dose as ordered appropriate for the patient?     Yes Yes        Data saved with a previous flowsheet row definition       Labs:  _  Result Component Current Result Ref Range   Sodium 141 (7/3/2025) 135 - 145 mmol/L     _  Result Component Current Result Ref Range   Potassium 3.9 (7/3/2025) 3.4 - 5.3 mmol/L     _  Result Component Current Result Ref Range   Calcium 9.4 (7/3/2025) 8.8 - 10.4 mg/dL     _  Result Component Current Result Ref Range   Magnesium 2.5 (H) (6/25/2025) 1.7 - 2.3 mg/dL     No results found for Phos within last 30 days.     _  Result Component Current Result Ref Range   Albumin 4.4 (6/25/2025) 3.5 - 5.2 g/dL     _  Result  Component Current Result Ref Range   Urea Nitrogen 15.5 (7/3/2025) 8.0 - 23.0 mg/dL     _  Result Component Current Result Ref Range   Creatinine 1.08 (H) (7/3/2025) 0.51 - 0.95 mg/dL     _  Result Component Current Result Ref Range   AST 21 (6/25/2025) 0 - 45 U/L     _  Result Component Current Result Ref Range   ALT 28 (6/25/2025) 0 - 50 U/L     _  Result Component Current Result Ref Range   Bilirubin Total 0.3 (6/25/2025) <=1.2 mg/dL     _  Result Component Current Result Ref Range   WBC Count 4.7 (6/25/2025) 4.0 - 11.0 10e3/uL     _  Result Component Current Result Ref Range   Hemoglobin 9.6 (L) (6/25/2025) 11.7 - 15.7 g/dL     _  Result Component Current Result Ref Range   Platelet Count 205 (6/25/2025) 150 - 450 10e3/uL     _  Result Component Current Result Ref Range   Absolute Neutrophils 3.0 (6/25/2025) 1.6 - 8.3 10e3/uL     No results found for ANC within last 30 days.        Assessment & Plan:  No concerning abnormalities. Creatinine and calcium are improved. Continue to hold calcium supplement until appt with Sha on 7/22    [employer] sent to patient    Follow-Up:  7/22 Labs and Sha appt    Adriano Funk  Oncology PharmD  July 7, 2025

## 2025-07-22 ENCOUNTER — LAB (OUTPATIENT)
Dept: INFUSION THERAPY | Facility: CLINIC | Age: 60
End: 2025-07-22
Attending: NURSE PRACTITIONER
Payer: COMMERCIAL

## 2025-07-22 ENCOUNTER — ONCOLOGY VISIT (OUTPATIENT)
Dept: ONCOLOGY | Facility: CLINIC | Age: 60
End: 2025-07-22
Attending: NURSE PRACTITIONER
Payer: COMMERCIAL

## 2025-07-22 ENCOUNTER — TRANSFERRED RECORDS (OUTPATIENT)
Dept: HEALTH INFORMATION MANAGEMENT | Facility: CLINIC | Age: 60
End: 2025-07-22

## 2025-07-22 VITALS
RESPIRATION RATE: 16 BRPM | HEART RATE: 77 BPM | WEIGHT: 141 LBS | TEMPERATURE: 98.2 F | BODY MASS INDEX: 23.46 KG/M2 | SYSTOLIC BLOOD PRESSURE: 121 MMHG | DIASTOLIC BLOOD PRESSURE: 81 MMHG | OXYGEN SATURATION: 100 %

## 2025-07-22 DIAGNOSIS — G95.29 SPINAL CORD COMPRESSION DUE TO MALIGNANT NEOPLASM METASTATIC TO SPINE (H): Primary | ICD-10-CM

## 2025-07-22 DIAGNOSIS — C50.919 CARCINOMA OF BREAST METASTATIC TO BONE, UNSPECIFIED LATERALITY (H): Primary | ICD-10-CM

## 2025-07-22 DIAGNOSIS — C79.51 SPINAL CORD COMPRESSION DUE TO MALIGNANT NEOPLASM METASTATIC TO SPINE (H): Primary | ICD-10-CM

## 2025-07-22 DIAGNOSIS — C79.51 CARCINOMA OF BREAST METASTATIC TO BONE, UNSPECIFIED LATERALITY (H): Primary | ICD-10-CM

## 2025-07-22 DIAGNOSIS — C50.919 CARCINOMA OF BREAST METASTATIC TO BONE, UNSPECIFIED LATERALITY (H): ICD-10-CM

## 2025-07-22 DIAGNOSIS — C79.51 CARCINOMA OF BREAST METASTATIC TO BONE, UNSPECIFIED LATERALITY (H): ICD-10-CM

## 2025-07-22 LAB
ALBUMIN SERPL BCG-MCNC: 4 G/DL (ref 3.5–5.2)
ALP SERPL-CCNC: 48 U/L (ref 40–150)
ALT SERPL W P-5'-P-CCNC: 21 U/L (ref 0–50)
ANION GAP SERPL CALCULATED.3IONS-SCNC: 8 MMOL/L (ref 7–15)
AST SERPL W P-5'-P-CCNC: 21 U/L (ref 0–45)
BASOPHILS # BLD AUTO: 0 10E3/UL (ref 0–0.2)
BASOPHILS NFR BLD AUTO: 1 %
BILIRUB SERPL-MCNC: 0.2 MG/DL
BUN SERPL-MCNC: 14.9 MG/DL (ref 8–23)
CALCIUM SERPL-MCNC: 9.2 MG/DL (ref 8.8–10.4)
CALCIUM SERPL-MCNC: 9.2 MG/DL (ref 8.8–10.4)
CHLORIDE SERPL-SCNC: 105 MMOL/L (ref 98–107)
CREAT SERPL-MCNC: 1.07 MG/DL (ref 0.51–0.95)
EGFRCR SERPLBLD CKD-EPI 2021: 60 ML/MIN/1.73M2
EOSINOPHIL # BLD AUTO: 0.3 10E3/UL (ref 0–0.7)
EOSINOPHIL NFR BLD AUTO: 5 %
ERYTHROCYTE [DISTWIDTH] IN BLOOD BY AUTOMATED COUNT: 15.1 % (ref 10–15)
GLUCOSE SERPL-MCNC: 121 MG/DL (ref 70–99)
HCO3 SERPL-SCNC: 25 MMOL/L (ref 22–29)
HCT VFR BLD AUTO: 27.5 % (ref 35–47)
HGB BLD-MCNC: 9 G/DL (ref 11.7–15.7)
IMM GRANULOCYTES # BLD: 0 10E3/UL
IMM GRANULOCYTES NFR BLD: 1 %
LYMPHOCYTES # BLD AUTO: 1 10E3/UL (ref 0.8–5.3)
LYMPHOCYTES NFR BLD AUTO: 17 %
MAGNESIUM SERPL-MCNC: 2.6 MG/DL (ref 1.7–2.3)
MCH RBC QN AUTO: 31 PG (ref 26.5–33)
MCHC RBC AUTO-ENTMCNC: 32.7 G/DL (ref 31.5–36.5)
MCV RBC AUTO: 95 FL (ref 78–100)
MONOCYTES # BLD AUTO: 0.5 10E3/UL (ref 0–1.3)
MONOCYTES NFR BLD AUTO: 8 %
NEUTROPHILS # BLD AUTO: 3.9 10E3/UL (ref 1.6–8.3)
NEUTROPHILS NFR BLD AUTO: 69 %
NRBC # BLD AUTO: 0 10E3/UL
NRBC BLD AUTO-RTO: 0 /100
PLATELET # BLD AUTO: 199 10E3/UL (ref 150–450)
POTASSIUM SERPL-SCNC: 4.1 MMOL/L (ref 3.4–5.3)
PROT SERPL-MCNC: 6.4 G/DL (ref 6.4–8.3)
RBC # BLD AUTO: 2.9 10E6/UL (ref 3.8–5.2)
SODIUM SERPL-SCNC: 138 MMOL/L (ref 135–145)
WBC # BLD AUTO: 5.7 10E3/UL (ref 4–11)

## 2025-07-22 PROCEDURE — 83735 ASSAY OF MAGNESIUM: CPT | Performed by: INTERNAL MEDICINE

## 2025-07-22 PROCEDURE — 250N000011 HC RX IP 250 OP 636: Mod: JZ | Performed by: INTERNAL MEDICINE

## 2025-07-22 PROCEDURE — 80053 COMPREHEN METABOLIC PANEL: CPT | Performed by: INTERNAL MEDICINE

## 2025-07-22 PROCEDURE — 96402 CHEMO HORMON ANTINEOPL SQ/IM: CPT

## 2025-07-22 PROCEDURE — 99214 OFFICE O/P EST MOD 30 MIN: CPT | Performed by: NURSE PRACTITIONER

## 2025-07-22 PROCEDURE — 36415 COLL VENOUS BLD VENIPUNCTURE: CPT

## 2025-07-22 PROCEDURE — G0463 HOSPITAL OUTPT CLINIC VISIT: HCPCS | Performed by: NURSE PRACTITIONER

## 2025-07-22 PROCEDURE — 85048 AUTOMATED LEUKOCYTE COUNT: CPT | Performed by: INTERNAL MEDICINE

## 2025-07-22 RX ORDER — LAMOTRIGINE 25 MG/1
500 TABLET ORAL ONCE
Status: COMPLETED | OUTPATIENT
Start: 2025-07-22 | End: 2025-07-22

## 2025-07-22 RX ADMIN — FULVESTRANT 500 MG: 50 INJECTION, SOLUTION INTRAMUSCULAR at 10:34

## 2025-07-22 ASSESSMENT — PAIN SCALES - GENERAL: PAINLEVEL_OUTOF10: NO PAIN (0)

## 2025-07-22 NOTE — LETTER
7/22/2025      Mickie Mcghee  [address] [facility] [address] [place] [id number]      Dear Colleague,    Thank you for referring your patient, Mickie Mcghee, to the [facility]. Please see a copy of my visit note below.      Oncology/Hematology Visit Note  Jul 22, 2025    Reason for Visit: follow up of metastatic breast cancer  ER positive [place] positive HER2 negative  Diagnosed in 2007 status post bilateral mastectomy chemotherapy radiation anastrozole  -03/21/20229488-[id number] bone biopsy of left iliac bone reveals metastatic breast cancer ER positive [place] positive HER2 negative  Treated with letrozole and Ribociclib    03/18/2024-PET scan revealed progression of the disease  04/02/2024-started Faslodex and capivasertib   PET scan revealed stable to improved disease  05/2025-PET scan reveals improvement in disease. FDG activity in C1 has resolved     Interval History:  Patient reports feeling well.  Denies fever chills sweats cough shortness of breath chest pain nausea vomiting diarrhea abdominal pain or bleeding      Review of Systems:  14 point ROS of systems including Constitutional, Eyes, Respiratory, Cardiovascular, Gastroenterology, Genitourinary, Integumentary, Muscularskeletal, Psychiatric were all negative except for pertinent positives noted in my HPI.    Physical Examination:  General: The patient is a pleasant female in no acute distress.  HEENT: EOMI, PERRL. Sclerae are anicteric. Oral mucosa is pink and moist with no lesions or thrush.   Lymph: Neck is supple with no lymphadenopathy in the cervical or supraclavicular areas.   Heart: Regular rate and rhythm.   Lungs: Clear to auscultation bilaterally.   GI: Bowel sounds present, soft, nontender with no palpable hepatosplenomegaly or masses.   Extremities: No lower extremity edema noted bilaterally.   Skin: No rashes, petechiae, or bruising noted on exposed skin.    Laboratory Data:  CBC CMP results reviewed    Assessment and Plan:      1  metastatic breast cancer  Patient of Dr. Lozada  ER positive [place] positive HER2 negative  Patient is currently on  Faslodex and capivasertib started in April 2024.  05/2025-PET scan revealed improvement in disease. FDG activity in C1 has resolved   Continue with Faslodex and capivasertib -potential side effects reviewed  Continue to monitor blood sugar closely  Continue checking labs monthly with follow-up appointment  Schedule with Dr Lozada in 4 weeks with labs and Faslodex    2 Bone metastasis  03/22-04/07/2022-status post radiation to the L-spine and LS spine 10 fractions  Continue with Zometa every 3 months-scheduled for next Zometa in August   Call our clinic in the event of jaw pain or any dental issues  Continue with calcium and vitamin D    3 Blood sugar  Close monitoring of blood sugar while on capivasertib  Monitor closely with PCP    4 chronic mild anemia   Pt denies bleeding or dark stools   Stable   For now monitor closely            Please call our clinic with any changes in health condition or questions      SAMI Jacob CNP  [facility]     Chart documentation with Dragon Voice recognition Software. Although reviewed after completion, some words and grammatical errors may remain.    Again, thank you for allowing me to participate in the care of your patient.        Sincerely,        SAMI Jacob CNP    Electronically signed

## 2025-07-22 NOTE — PROGRESS NOTES
Oncology/Hematology Visit Note  Jul 22, 2025    Reason for Visit: follow up of metastatic breast cancer  ER positive [place] positive HER2 negative  Diagnosed in 2007 status post bilateral mastectomy chemotherapy radiation anastrozole  -03/21/[id number]-JW-nftcer bone biopsy of left iliac bone reveals metastatic breast cancer ER positive [place] positive HER2 negative  Treated with letrozole and Ribociclib    03/18/2024-PET scan revealed progression of the disease  04/02/2024-started Faslodex and capivasertib   PET scan revealed stable to improved disease  05/2025-PET scan reveals improvement in disease. FDG activity in C1 has resolved     Interval History:  Patient reports feeling well.  Denies fever chills sweats cough shortness of breath chest pain nausea vomiting diarrhea abdominal pain or bleeding      Review of Systems:  14 point ROS of systems including Constitutional, Eyes, Respiratory, Cardiovascular, Gastroenterology, Genitourinary, Integumentary, Muscularskeletal, Psychiatric were all negative except for pertinent positives noted in my HPI.    Physical Examination:  General: The patient is a pleasant female in no acute distress.  HEENT: EOMI, PERRL. Sclerae are anicteric. Oral mucosa is pink and moist with no lesions or thrush.   Lymph: Neck is supple with no lymphadenopathy in the cervical or supraclavicular areas.   Heart: Regular rate and rhythm.   Lungs: Clear to auscultation bilaterally.   GI: Bowel sounds present, soft, nontender with no palpable hepatosplenomegaly or masses.   Extremities: No lower extremity edema noted bilaterally.   Skin: No rashes, petechiae, or bruising noted on exposed skin.    Laboratory Data:  CBC CMP results reviewed    Assessment and Plan:      1 metastatic breast cancer  Patient of Dr. Lozada  ER positive [place] positive HER2 negative  Patient is currently on  Faslodex and capivasertib started in April 2024.  05/2025-PET scan revealed improvement in disease. FDG activity in C1 has resolved    Continue with Faslodex and capivasertib -potential side effects reviewed  Continue to monitor blood sugar closely  Continue checking labs monthly with follow-up appointment  Schedule with Dr Lozada in 4 weeks with labs and Faslodex    2 Bone metastasis  03/22-04/07/2022-status post radiation to the L-spine and LS spine 10 fractions  Continue with Zometa every 3 months-scheduled for next Zometa in August   Call our clinic in the event of jaw pain or any dental issues  Continue with calcium and vitamin D    3 Blood sugar  Close monitoring of blood sugar while on capivasertib  Monitor closely with PCP    4 chronic mild anemia   Pt denies bleeding or dark stools   Stable   For now monitor closely            Please call our clinic with any changes in health condition or questions      SAMI Jacob CNP  [facility] [place]     Chart documentation with Dragon Voice recognition Software. Although reviewed after completion, some words and grammatical errors may remain.

## 2025-07-22 NOTE — NURSING NOTE
"Oncology Rooming Note    July 22, 2025 9:59 AM   Mickie Mcghee is a 60 year old female who presents for:    Chief Complaint   Patient presents with    Oncology Clinic Visit     Initial Vitals: /81   Pulse 77   Temp 98.2  F (36.8  C) (Oral)   Resp 16   Wt 64 kg (141 lb)   LMP  (LMP Unknown)   SpO2 100%   BMI 23.46 kg/m   Estimated body mass index is 23.46 kg/m  as calculated from the following:    Height as of 6/8/25: 1.651 m (5' 5\").    Weight as of this encounter: 64 kg (141 lb). Body surface area is 1.71 meters squared.  No Pain (0) Comment: Data Unavailable   No LMP recorded (lmp unknown). Patient is postmenopausal.  Allergies reviewed: Yes  Medications reviewed: Yes    Medications: Medication refills not needed today.  Pharmacy name entered into [employer]:    [facility] - [place], [place] - [address]  [id number] - [place], [place] - [address] [place] [address]    PHQ9:  Did this patient require a PHQ9?: No      Clinical concerns: follow up        Brittney Rahman            "

## 2025-07-28 DIAGNOSIS — C50.919 CARCINOMA OF BREAST METASTATIC TO BONE, UNSPECIFIED LATERALITY (H): Primary | ICD-10-CM

## 2025-07-28 DIAGNOSIS — C79.51 CARCINOMA OF BREAST METASTATIC TO BONE, UNSPECIFIED LATERALITY (H): Primary | ICD-10-CM

## 2025-07-28 LAB — LAB GUARDANT ONC MSI-HIGH: NOT DETECTED

## 2025-07-28 RX ORDER — DIPHENHYDRAMINE HYDROCHLORIDE 50 MG/ML
50 INJECTION, SOLUTION INTRAMUSCULAR; INTRAVENOUS
Start: 2025-08-19

## 2025-07-28 RX ORDER — ALBUTEROL SULFATE 90 UG/1
1-2 INHALANT RESPIRATORY (INHALATION)
Start: 2025-08-19

## 2025-07-28 RX ORDER — METHYLPREDNISOLONE SODIUM SUCCINATE 40 MG/ML
40 INJECTION INTRAMUSCULAR; INTRAVENOUS
Start: 2025-08-19

## 2025-07-28 RX ORDER — LAMOTRIGINE 25 MG/1
500 TABLET ORAL ONCE
OUTPATIENT
Start: 2025-08-19 | End: 2025-08-19

## 2025-07-28 RX ORDER — ALBUTEROL SULFATE 0.83 MG/ML
2.5 SOLUTION RESPIRATORY (INHALATION)
OUTPATIENT
Start: 2025-08-19

## 2025-07-28 RX ORDER — DIPHENHYDRAMINE HYDROCHLORIDE 50 MG/ML
25 INJECTION, SOLUTION INTRAMUSCULAR; INTRAVENOUS
Start: 2025-08-19

## 2025-07-28 RX ORDER — EPINEPHRINE 1 MG/ML
0.3 INJECTION, SOLUTION INTRAMUSCULAR; SUBCUTANEOUS EVERY 5 MIN PRN
OUTPATIENT
Start: 2025-08-19

## 2025-07-28 RX ORDER — MEPERIDINE HYDROCHLORIDE 25 MG/ML
25 INJECTION INTRAMUSCULAR; INTRAVENOUS; SUBCUTANEOUS
OUTPATIENT
Start: 2025-08-19

## 2025-07-30 DIAGNOSIS — C79.51 CARCINOMA OF BREAST METASTATIC TO BONE, UNSPECIFIED LATERALITY (H): Primary | ICD-10-CM

## 2025-07-30 DIAGNOSIS — C50.919 CARCINOMA OF BREAST METASTATIC TO BONE, UNSPECIFIED LATERALITY (H): Primary | ICD-10-CM

## 2025-08-19 ENCOUNTER — LAB (OUTPATIENT)
Dept: INFUSION THERAPY | Facility: CLINIC | Age: 60
End: 2025-08-19
Payer: COMMERCIAL

## 2025-08-19 ENCOUNTER — ONCOLOGY VISIT (OUTPATIENT)
Dept: ONCOLOGY | Facility: CLINIC | Age: 60
End: 2025-08-19
Attending: INTERNAL MEDICINE
Payer: COMMERCIAL

## 2025-08-19 ENCOUNTER — INFUSION THERAPY VISIT (OUTPATIENT)
Dept: INFUSION THERAPY | Facility: CLINIC | Age: 60
End: 2025-08-19
Attending: INTERNAL MEDICINE
Payer: COMMERCIAL

## 2025-08-19 VITALS
DIASTOLIC BLOOD PRESSURE: 81 MMHG | WEIGHT: 141.5 LBS | HEART RATE: 74 BPM | SYSTOLIC BLOOD PRESSURE: 118 MMHG | RESPIRATION RATE: 16 BRPM | BODY MASS INDEX: 23.55 KG/M2 | OXYGEN SATURATION: 99 %

## 2025-08-19 DIAGNOSIS — C50.919 CARCINOMA OF BREAST METASTATIC TO BONE, UNSPECIFIED LATERALITY (H): Primary | ICD-10-CM

## 2025-08-19 DIAGNOSIS — G95.29 SPINAL CORD COMPRESSION DUE TO MALIGNANT NEOPLASM METASTATIC TO SPINE (H): ICD-10-CM

## 2025-08-19 DIAGNOSIS — C79.51 CARCINOMA OF BREAST METASTATIC TO BONE, UNSPECIFIED LATERALITY (H): ICD-10-CM

## 2025-08-19 DIAGNOSIS — C50.919 CARCINOMA OF BREAST METASTATIC TO BONE, UNSPECIFIED LATERALITY (H): ICD-10-CM

## 2025-08-19 DIAGNOSIS — C79.51 SPINAL CORD COMPRESSION DUE TO MALIGNANT NEOPLASM METASTATIC TO SPINE (H): ICD-10-CM

## 2025-08-19 DIAGNOSIS — C79.51 SPINAL CORD COMPRESSION DUE TO MALIGNANT NEOPLASM METASTATIC TO SPINE (H): Primary | ICD-10-CM

## 2025-08-19 DIAGNOSIS — C79.51 CARCINOMA OF BREAST METASTATIC TO BONE, UNSPECIFIED LATERALITY (H): Primary | ICD-10-CM

## 2025-08-19 DIAGNOSIS — G95.29 SPINAL CORD COMPRESSION DUE TO MALIGNANT NEOPLASM METASTATIC TO SPINE (H): Primary | ICD-10-CM

## 2025-08-19 LAB
ALBUMIN SERPL BCG-MCNC: 4.4 G/DL (ref 3.5–5.2)
ALP SERPL-CCNC: 54 U/L (ref 40–150)
ALT SERPL W P-5'-P-CCNC: 24 U/L (ref 0–50)
ANION GAP SERPL CALCULATED.3IONS-SCNC: 12 MMOL/L (ref 7–15)
AST SERPL W P-5'-P-CCNC: 22 U/L (ref 0–45)
BASOPHILS # BLD AUTO: 0.03 10E3/UL (ref 0–0.2)
BASOPHILS NFR BLD AUTO: 0.5 %
BILIRUB SERPL-MCNC: 0.2 MG/DL
BUN SERPL-MCNC: 20.2 MG/DL (ref 8–23)
CALCIUM SERPL-MCNC: 10.3 MG/DL (ref 8.8–10.4)
CHLORIDE SERPL-SCNC: 103 MMOL/L (ref 98–107)
CREAT SERPL-MCNC: 1.13 MG/DL (ref 0.51–0.95)
EGFRCR SERPLBLD CKD-EPI 2021: 56 ML/MIN/1.73M2
EOSINOPHIL # BLD AUTO: 0.29 10E3/UL (ref 0–0.7)
EOSINOPHIL NFR BLD AUTO: 5.2 %
ERYTHROCYTE [DISTWIDTH] IN BLOOD BY AUTOMATED COUNT: 14.7 % (ref 10–15)
GLUCOSE SERPL-MCNC: 63 MG/DL (ref 70–99)
HCO3 SERPL-SCNC: 25 MMOL/L (ref 22–29)
HCT VFR BLD AUTO: 29 % (ref 35–47)
HGB BLD-MCNC: 9.7 G/DL (ref 11.7–15.7)
IMM GRANULOCYTES # BLD: 0.04 10E3/UL
IMM GRANULOCYTES NFR BLD: 0.7 %
LYMPHOCYTES # BLD AUTO: 1.29 10E3/UL (ref 0.8–5.3)
LYMPHOCYTES NFR BLD AUTO: 23.2 %
MAGNESIUM SERPL-MCNC: 2.4 MG/DL (ref 1.7–2.3)
MCH RBC QN AUTO: 31.3 PG (ref 26.5–33)
MCHC RBC AUTO-ENTMCNC: 33.4 G/DL (ref 31.5–36.5)
MCV RBC AUTO: 93.5 FL (ref 78–100)
MONOCYTES # BLD AUTO: 0.43 10E3/UL (ref 0–1.3)
MONOCYTES NFR BLD AUTO: 7.7 %
NEUTROPHILS # BLD AUTO: 3.48 10E3/UL (ref 1.6–8.3)
NEUTROPHILS NFR BLD AUTO: 62.7 %
NRBC # BLD AUTO: <0.03 10E3/UL
NRBC BLD AUTO-RTO: 0 /100
PLATELET # BLD AUTO: 218 10E3/UL (ref 150–450)
POTASSIUM SERPL-SCNC: 4.1 MMOL/L (ref 3.4–5.3)
PROT SERPL-MCNC: 7.1 G/DL (ref 6.4–8.3)
RBC # BLD AUTO: 3.1 10E6/UL (ref 3.8–5.2)
SODIUM SERPL-SCNC: 140 MMOL/L (ref 135–145)
WBC # BLD AUTO: 5.56 10E3/UL (ref 4–11)

## 2025-08-19 PROCEDURE — 96374 THER/PROPH/DIAG INJ IV PUSH: CPT

## 2025-08-19 PROCEDURE — 83735 ASSAY OF MAGNESIUM: CPT | Performed by: NURSE PRACTITIONER

## 2025-08-19 PROCEDURE — 82947 ASSAY GLUCOSE BLOOD QUANT: CPT | Performed by: NURSE PRACTITIONER

## 2025-08-19 PROCEDURE — 96402 CHEMO HORMON ANTINEOPL SQ/IM: CPT

## 2025-08-19 PROCEDURE — 258N000003 HC RX IP 258 OP 636: Performed by: INTERNAL MEDICINE

## 2025-08-19 PROCEDURE — 85025 COMPLETE CBC W/AUTO DIFF WBC: CPT | Performed by: NURSE PRACTITIONER

## 2025-08-19 PROCEDURE — 250N000011 HC RX IP 250 OP 636: Performed by: INTERNAL MEDICINE

## 2025-08-19 PROCEDURE — 250N000011 HC RX IP 250 OP 636: Mod: JZ | Performed by: NURSE PRACTITIONER

## 2025-08-19 PROCEDURE — G0463 HOSPITAL OUTPT CLINIC VISIT: HCPCS | Mod: 25 | Performed by: INTERNAL MEDICINE

## 2025-08-19 PROCEDURE — 36415 COLL VENOUS BLD VENIPUNCTURE: CPT

## 2025-08-19 RX ORDER — MEPERIDINE HYDROCHLORIDE 25 MG/ML
25 INJECTION INTRAMUSCULAR; INTRAVENOUS; SUBCUTANEOUS
OUTPATIENT
Start: 2025-10-14

## 2025-08-19 RX ORDER — DIPHENHYDRAMINE HYDROCHLORIDE 50 MG/ML
50 INJECTION, SOLUTION INTRAMUSCULAR; INTRAVENOUS
Start: 2025-09-16

## 2025-08-19 RX ORDER — DIPHENHYDRAMINE HYDROCHLORIDE 50 MG/ML
25 INJECTION, SOLUTION INTRAMUSCULAR; INTRAVENOUS
Start: 2025-11-11

## 2025-08-19 RX ORDER — ALBUTEROL SULFATE 90 UG/1
1-2 INHALANT RESPIRATORY (INHALATION)
Start: 2025-09-16

## 2025-08-19 RX ORDER — METHYLPREDNISOLONE SODIUM SUCCINATE 40 MG/ML
40 INJECTION INTRAMUSCULAR; INTRAVENOUS
Start: 2025-10-14

## 2025-08-19 RX ORDER — ZOLEDRONIC ACID 0.04 MG/ML
4 INJECTION, SOLUTION INTRAVENOUS ONCE
Start: 2026-02-09 | End: 2026-02-09

## 2025-08-19 RX ORDER — DIPHENHYDRAMINE HYDROCHLORIDE 50 MG/ML
25 INJECTION, SOLUTION INTRAMUSCULAR; INTRAVENOUS
Start: 2025-09-16

## 2025-08-19 RX ORDER — MEPERIDINE HYDROCHLORIDE 25 MG/ML
25 INJECTION INTRAMUSCULAR; INTRAVENOUS; SUBCUTANEOUS
OUTPATIENT
Start: 2025-09-16

## 2025-08-19 RX ORDER — METHYLPREDNISOLONE SODIUM SUCCINATE 40 MG/ML
40 INJECTION INTRAMUSCULAR; INTRAVENOUS
Start: 2025-11-11

## 2025-08-19 RX ORDER — EPINEPHRINE 1 MG/ML
0.3 INJECTION, SOLUTION INTRAMUSCULAR; SUBCUTANEOUS EVERY 5 MIN PRN
OUTPATIENT
Start: 2025-10-14

## 2025-08-19 RX ORDER — HEPARIN SODIUM (PORCINE) LOCK FLUSH IV SOLN 100 UNIT/ML 100 UNIT/ML
5 SOLUTION INTRAVENOUS
OUTPATIENT
Start: 2026-02-09

## 2025-08-19 RX ORDER — LAMOTRIGINE 25 MG/1
500 TABLET ORAL ONCE
OUTPATIENT
Start: 2025-11-11 | End: 2025-11-11

## 2025-08-19 RX ORDER — METHYLPREDNISOLONE SODIUM SUCCINATE 40 MG/ML
40 INJECTION INTRAMUSCULAR; INTRAVENOUS
Start: 2025-09-16

## 2025-08-19 RX ORDER — EPINEPHRINE 1 MG/ML
0.3 INJECTION, SOLUTION INTRAMUSCULAR; SUBCUTANEOUS EVERY 5 MIN PRN
OUTPATIENT
Start: 2025-11-11

## 2025-08-19 RX ORDER — ZOLEDRONIC ACID 0.04 MG/ML
4 INJECTION, SOLUTION INTRAVENOUS ONCE
Start: 2025-11-17 | End: 2025-11-17

## 2025-08-19 RX ORDER — ALBUTEROL SULFATE 90 UG/1
1-2 INHALANT RESPIRATORY (INHALATION)
Start: 2025-10-14

## 2025-08-19 RX ORDER — DIPHENHYDRAMINE HYDROCHLORIDE 50 MG/ML
50 INJECTION, SOLUTION INTRAMUSCULAR; INTRAVENOUS
Start: 2025-11-11

## 2025-08-19 RX ORDER — ALBUTEROL SULFATE 0.83 MG/ML
2.5 SOLUTION RESPIRATORY (INHALATION)
OUTPATIENT
Start: 2025-10-14

## 2025-08-19 RX ORDER — DIPHENHYDRAMINE HYDROCHLORIDE 50 MG/ML
25 INJECTION, SOLUTION INTRAMUSCULAR; INTRAVENOUS
Start: 2025-10-14

## 2025-08-19 RX ORDER — HEPARIN SODIUM (PORCINE) LOCK FLUSH IV SOLN 100 UNIT/ML 100 UNIT/ML
5 SOLUTION INTRAVENOUS
OUTPATIENT
Start: 2025-11-17

## 2025-08-19 RX ORDER — LAMOTRIGINE 25 MG/1
500 TABLET ORAL ONCE
OUTPATIENT
Start: 2025-09-16 | End: 2025-09-16

## 2025-08-19 RX ORDER — LAMOTRIGINE 25 MG/1
500 TABLET ORAL ONCE
OUTPATIENT
Start: 2025-10-14 | End: 2025-10-14

## 2025-08-19 RX ORDER — DIPHENHYDRAMINE HYDROCHLORIDE 50 MG/ML
50 INJECTION, SOLUTION INTRAMUSCULAR; INTRAVENOUS
Start: 2025-10-14

## 2025-08-19 RX ORDER — ALBUTEROL SULFATE 90 UG/1
1-2 INHALANT RESPIRATORY (INHALATION)
Start: 2025-11-11

## 2025-08-19 RX ORDER — HEPARIN SODIUM,PORCINE 10 UNIT/ML
5 VIAL (ML) INTRAVENOUS
OUTPATIENT
Start: 2026-02-09

## 2025-08-19 RX ORDER — MEPERIDINE HYDROCHLORIDE 25 MG/ML
25 INJECTION INTRAMUSCULAR; INTRAVENOUS; SUBCUTANEOUS
OUTPATIENT
Start: 2025-11-11

## 2025-08-19 RX ORDER — EPINEPHRINE 1 MG/ML
0.3 INJECTION, SOLUTION INTRAMUSCULAR; SUBCUTANEOUS EVERY 5 MIN PRN
OUTPATIENT
Start: 2025-09-16

## 2025-08-19 RX ORDER — ALBUTEROL SULFATE 0.83 MG/ML
2.5 SOLUTION RESPIRATORY (INHALATION)
OUTPATIENT
Start: 2025-09-16

## 2025-08-19 RX ORDER — LAMOTRIGINE 25 MG/1
500 TABLET ORAL ONCE
Status: COMPLETED | OUTPATIENT
Start: 2025-08-19 | End: 2025-08-19

## 2025-08-19 RX ORDER — HEPARIN SODIUM,PORCINE 10 UNIT/ML
5 VIAL (ML) INTRAVENOUS
OUTPATIENT
Start: 2025-11-17

## 2025-08-19 RX ORDER — ALBUTEROL SULFATE 0.83 MG/ML
2.5 SOLUTION RESPIRATORY (INHALATION)
OUTPATIENT
Start: 2025-11-11

## 2025-08-19 RX ADMIN — ZOLEDRONIC ACID 3.5 MG: 4 INJECTION, SOLUTION, CONCENTRATE INTRAVENOUS at 15:00

## 2025-08-19 RX ADMIN — FULVESTRANT 500 MG: 50 INJECTION, SOLUTION INTRAMUSCULAR at 15:21

## 2025-08-19 ASSESSMENT — PAIN SCALES - GENERAL: PAINLEVEL_OUTOF10: NO PAIN (0)

## 2025-08-26 DIAGNOSIS — C79.51 CARCINOMA OF BREAST METASTATIC TO BONE, UNSPECIFIED LATERALITY (H): Primary | ICD-10-CM

## 2025-08-26 DIAGNOSIS — C50.919 CARCINOMA OF BREAST METASTATIC TO BONE, UNSPECIFIED LATERALITY (H): Primary | ICD-10-CM

## (undated) DEVICE — BAG DECANTER STERILE WHITE DYNJDEC09

## (undated) DEVICE — SOL WATER IRRIG 1000ML BOTTLE 2F7114

## (undated) RX ORDER — FLUMAZENIL 0.1 MG/ML
INJECTION, SOLUTION INTRAVENOUS
Status: DISPENSED
Start: 2022-03-21

## (undated) RX ORDER — FENTANYL CITRATE 50 UG/ML
INJECTION, SOLUTION INTRAMUSCULAR; INTRAVENOUS
Status: DISPENSED
Start: 2022-03-21

## (undated) RX ORDER — NALOXONE HYDROCHLORIDE 0.4 MG/ML
INJECTION, SOLUTION INTRAMUSCULAR; INTRAVENOUS; SUBCUTANEOUS
Status: DISPENSED
Start: 2022-03-21